# Patient Record
Sex: MALE | Race: WHITE | NOT HISPANIC OR LATINO | Employment: OTHER | URBAN - METROPOLITAN AREA
[De-identification: names, ages, dates, MRNs, and addresses within clinical notes are randomized per-mention and may not be internally consistent; named-entity substitution may affect disease eponyms.]

---

## 2018-01-04 ENCOUNTER — APPOINTMENT (OUTPATIENT)
Dept: CT IMAGING | Facility: CLINIC | Age: 78
End: 2018-01-04
Attending: EMERGENCY MEDICINE
Payer: MEDICARE

## 2018-01-04 ENCOUNTER — HOSPITAL ENCOUNTER (EMERGENCY)
Facility: CLINIC | Age: 78
Discharge: HOME | End: 2018-01-04
Attending: EMERGENCY MEDICINE | Admitting: EMERGENCY MEDICINE
Payer: MEDICARE

## 2018-01-04 VITALS
DIASTOLIC BLOOD PRESSURE: 77 MMHG | OXYGEN SATURATION: 100 % | RESPIRATION RATE: 18 BRPM | WEIGHT: 170 LBS | HEART RATE: 65 BPM | SYSTOLIC BLOOD PRESSURE: 128 MMHG | TEMPERATURE: 97 F | BODY MASS INDEX: 21.83 KG/M2

## 2018-01-04 DIAGNOSIS — K43.9 VENTRAL HERNIA WITHOUT OBSTRUCTION OR GANGRENE: ICD-10-CM

## 2018-01-04 LAB
ANION GAP SERPL CALCULATED.3IONS-SCNC: 4 MMOL/L (ref 3–14)
BASOPHILS # BLD AUTO: 0.1 10E9/L (ref 0–0.2)
BASOPHILS NFR BLD AUTO: 0.7 %
BUN SERPL-MCNC: 10 MG/DL (ref 7–30)
CALCIUM SERPL-MCNC: 9 MG/DL (ref 8.5–10.1)
CHLORIDE SERPL-SCNC: 103 MMOL/L (ref 94–109)
CO2 SERPL-SCNC: 28 MMOL/L (ref 20–32)
CREAT SERPL-MCNC: 0.8 MG/DL (ref 0.66–1.25)
DIFFERENTIAL METHOD BLD: NORMAL
EOSINOPHIL # BLD AUTO: 0.3 10E9/L (ref 0–0.7)
EOSINOPHIL NFR BLD AUTO: 3.7 %
ERYTHROCYTE [DISTWIDTH] IN BLOOD BY AUTOMATED COUNT: 12.9 % (ref 10–15)
GFR SERPL CREATININE-BSD FRML MDRD: >90 ML/MIN/1.7M2
GLUCOSE SERPL-MCNC: 86 MG/DL (ref 70–99)
HCT VFR BLD AUTO: 45.8 % (ref 40–53)
HGB BLD-MCNC: 15.3 G/DL (ref 13.3–17.7)
IMM GRANULOCYTES # BLD: 0 10E9/L (ref 0–0.4)
IMM GRANULOCYTES NFR BLD: 0.4 %
LACTATE SERPL-SCNC: 1 MMOL/L (ref 0.4–2)
LYMPHOCYTES # BLD AUTO: 1.1 10E9/L (ref 0.8–5.3)
LYMPHOCYTES NFR BLD AUTO: 14.9 %
MCH RBC QN AUTO: 31.8 PG (ref 26.5–33)
MCHC RBC AUTO-ENTMCNC: 33.4 G/DL (ref 31.5–36.5)
MCV RBC AUTO: 95 FL (ref 78–100)
MONOCYTES # BLD AUTO: 0.6 10E9/L (ref 0–1.3)
MONOCYTES NFR BLD AUTO: 8.3 %
NEUTROPHILS # BLD AUTO: 5.2 10E9/L (ref 1.6–8.3)
NEUTROPHILS NFR BLD AUTO: 72 %
NRBC # BLD AUTO: 0 10*3/UL
NRBC BLD AUTO-RTO: 0 /100
PLATELET # BLD AUTO: 247 10E9/L (ref 150–450)
POTASSIUM SERPL-SCNC: 4.1 MMOL/L (ref 3.4–5.3)
RBC # BLD AUTO: 4.81 10E12/L (ref 4.4–5.9)
SODIUM SERPL-SCNC: 135 MMOL/L (ref 133–144)
WBC # BLD AUTO: 7.3 10E9/L (ref 4–11)

## 2018-01-04 PROCEDURE — 83605 ASSAY OF LACTIC ACID: CPT | Performed by: EMERGENCY MEDICINE

## 2018-01-04 PROCEDURE — 74177 CT ABD & PELVIS W/CONTRAST: CPT

## 2018-01-04 PROCEDURE — 80048 BASIC METABOLIC PNL TOTAL CA: CPT | Performed by: EMERGENCY MEDICINE

## 2018-01-04 PROCEDURE — 25000128 H RX IP 250 OP 636: Performed by: EMERGENCY MEDICINE

## 2018-01-04 PROCEDURE — 85025 COMPLETE CBC W/AUTO DIFF WBC: CPT | Performed by: EMERGENCY MEDICINE

## 2018-01-04 PROCEDURE — 99285 EMERGENCY DEPT VISIT HI MDM: CPT | Mod: 25

## 2018-01-04 RX ORDER — HYDROCODONE BITARTRATE AND ACETAMINOPHEN 5; 325 MG/1; MG/1
1-2 TABLET ORAL EVERY 4 HOURS PRN
Qty: 10 TABLET | Refills: 0 | Status: SHIPPED | OUTPATIENT
Start: 2018-01-04 | End: 2018-01-24

## 2018-01-04 RX ORDER — IOPAMIDOL 755 MG/ML
500 INJECTION, SOLUTION INTRAVASCULAR ONCE
Status: COMPLETED | OUTPATIENT
Start: 2018-01-04 | End: 2018-01-04

## 2018-01-04 RX ADMIN — IOPAMIDOL 89 ML: 755 INJECTION, SOLUTION INTRAVENOUS at 16:14

## 2018-01-04 RX ADMIN — SODIUM CHLORIDE 62 ML: 9 INJECTION, SOLUTION INTRAVENOUS at 16:14

## 2018-01-04 ASSESSMENT — ENCOUNTER SYMPTOMS
VOMITING: 0
FEVER: 0
APPETITE CHANGE: 0
NAUSEA: 0
CONSTIPATION: 1
ABDOMINAL PAIN: 1
DIARRHEA: 0
BACK PAIN: 1

## 2018-01-04 NOTE — ED PROVIDER NOTES
"  History     Chief Complaint:  Hernia    HPI   Tigre Gillette is a 77 year old male who presents to the emergency department today for evaluation of a hernia. The patient reports two days ago after dinner he noticed his umbilical hernia to pop out. He has had this hernia for \"many decades\" and has been able to reduce it at home previously. He has been pushing in the hernia and trying to reposition himself to reduce it. Due to persistent symptoms, he presented to his clinic, however, they were unable to reduce it therefore referred to the ED for further evaluation. Upon presentation, he also endorses constipation and lower back pain. His last bowel movement was two days ago and took some stool softeners last night.  The patient denies nausea and vomiting.     Allergies:  No Known Drug Allergies     Medications:    Naproxen Sodium (ALEVE PO)  cetirizine (ZYRTEC) 10 MG tablet    Past Medical History:    Chronic low back pain  Substance abuse  Lumbar spondylosis  Rhinitis    Past Surgical History:    Hernia repair  Tonsillectomy    Family History:    Arthritis  Alcohol/Drug abuse    Social History:  The patient was accompanied to the ED by his wife.  Smoking Status: Current  Alcohol Use: Yes  Marital Status:        Review of Systems   Constitutional: Negative for appetite change and fever.   Gastrointestinal: Positive for abdominal pain and constipation. Negative for diarrhea, nausea and vomiting.   Musculoskeletal: Positive for back pain.   All other systems reviewed and are negative.    Physical Exam   First Vitals:  BP: 155/89  Pulse: 65  Heart Rate: 65  Temp: 97  F (36.1  C)  Resp: 18  Weight: 77.1 kg (170 lb)  SpO2: 99 %    Physical Exam  General: Patient is alert and interactive when I enter the room  Head:  The scalp, face, and head appear normal  Eyes:  Conjunctivae are normal  ENT:    The nose is normal    Pinnae are normal    External acoustic canals are normal  Neck:  Trachea midline  CV:  Pulses are " normal.    Resp:  No respiratory distress   Abdomen:      Soft, ventral hernia noted with bulging, mild lateral tenderness, unable to reduce with moderate tenderness, non-distended  Musc:  Normal muscular tone    No major joint effusion    Good capillary refill noted  Skin:  No rash or lesions noted  Neuro: Speech is normal and fluent. Face is symmetric.     Moving all extremities well.   Psych:  Awake. Alert.  Normal affect.  Appropriate interactions.    Emergency Department Course     Imaging:  Radiology findings were communicated with the patient and family who voiced understanding of the findings.  Abdominal/Pelvis CT IV Contrast only Trauma/AAA  IMPRESSION: Fat-containing midline ventral hernia in mid to upper  abdomen. There is a small amount of fluid in the hernia and mild fat  stranding. Cannot rule out incarcerated fatty hernia but there is no  bowel involvement. No acute bowel abnormality.  Report per radiology     Laboratory:  Laboratory findings were communicated with the patient and family who voiced understanding of the findings.  CBC: WNL. (WBC 7.3, HGB 15.3, )   Lactic Acid: 1.0  BMP: AWNL (Creatinine 0.80)    Emergency Department Course:  Nursing notes and vitals reviewed.  IV was inserted and blood was drawn for laboratory testing, results above.  The patient was sent for a Abdominal/Pelvis CT IV Contrast only Trauma/AAA while in the emergency department, results above.   1505: I performed an exam of the patient as documented above.   1656 I spoke with Dr. Martin of the General Surgery service regarding patient's presentation, findings, and plan of care.  1658: Patient rechecked and updated.   Findings and plan explained to the Patient. Patient discharged home with instructions regarding supportive care, medications, and reasons to return. The importance of close follow-up was reviewed. The patient was prescribed norco.   I personally reviewed the laboratory and imaging results with the  Patient and spouse and answered all related questions prior to discharge.    Impression & Plan      Medical Decision Making:  Tigre Gillette is a 77 year old male who presents for an evaluation of abdominal pain.  The patient has ventral abdominal pain and an exam consistent with a hernia.  CT of the abdomen and pelvis ordered and demonstrated a fat containing midline ventral hernia in mid to upper abdomen .  Other diagnoses seem far less likely given history, exam, and ED work up.  Blood work is unremarkable including a normal WBC, lactate and lipase. Unable to reduce the hernia.  I spoke with surgery and they recommend to discharge the patient home as the hernia is fat and will likely reduce on its own once swelling goes down. Has minimal pain on exam. General surgery contacted and will see the patient.  Patient discharged with norco for pain control.     Diagnosis:    ICD-10-CM    1. Ventral hernia without obstruction or gangrene K43.9      Disposition:  discharged to home    Discharge Medications:  New Prescriptions    HYDROCODONE-ACETAMINOPHEN (NORCO) 5-325 MG PER TABLET    Take 1-2 tablets by mouth every 4 hours as needed for moderate to severe pain     Scribe Disclosure:  ANIYAH, Nicolasa Logan, am serving as a scribe at 3:02 PM on 1/4/2018 to document services personally performed by Vanessa Alvarado MD based on my observations and the provider's statements to me.     1/4/2018   [facility] EMERGENCY DEPARTMENT       Vanessa Alvarado MD  01/05/18 [id number]

## 2018-01-04 NOTE — DISCHARGE INSTRUCTIONS
Hernia (Adult)    A hernia can happen when there is a weakness or defect in the wall of the abdomen or groin. Intestines or nearby tissues may move from their usual location and push through the weakness in the wall. This can cause a hernia (bulge) you may see or feel.  Causes and Risk Factors   A hernia may be present at birth. Or it may be caused by the wear and tear of daily living. Certain factors can make a hernia more likely. These can include:    Heavy lifting    Straining, whether from lifting, movement, or constipation    Chronic cough    Injury to the abdominal wall    Excess weight    Pregnancy    Prior surgery    Older age    Family history of hernia  Symptoms  Symptoms of a hernia may come on suddenly. Or they may appear slowly over time. Some common symptoms include:    Bulge in the groin area, around the navel, or in the scrotum (the bulge may get bigger when you stand and go away when you lie down)    Pain or pressure around the bulge    Pain during activities such as lifting, coughing, or sneezing    A feeling of weakness or pressure in the groin    Pain or swelling in the scrotum  Types of hernias  There are different types of hernia. The type you have depends on its location:    Inguinal: This type is in the groin or scrotum. It is more common in men.    Femoral: This type is in the groin, upper thigh (where the leg bends), or labia. It is more common in women.    Ventral: This type is in the abdominal wall.    Umbilical: This type occurs around the navel (belly button).    Incisional: This type occurs at the site of a previous surgery.  The condition of the hernia can help determine how urgently it needs to be treated.    Reducible: It goes back in by itself, or it can be pushed back in.    Irreducible: It can t be pushed back in.    Incarcerated/Strangulated: The intestine is trapped (incarcerated). If this happens, you won t be able to push the bulge back in. If the incarcerated hernia isn t  treated, it may become strangulated. This means the area loses blood supply and the tissue may die. This requires emergency surgery! Treatment is needed right away!  In most cases, a hernia will not heal on its own. Surgery is usually needed to repair the defect in the abdominal wall or groin. You ll be told more about surgery, if needed.  If your symptoms are not severe, treatment may sometimes be delayed. In such cases, regular follow-up visits with the provider will be needed. You ll be asked to keep track of your symptoms and to watch for signs of more serious problems. You may also be given guidelines similar to the home care instructions below.  Home Care  To help keep a hernia from getting worse, you may be advised to:    Avoid heavy lifting and straining as directed.    Take steps to prevent constipation, such as eating more fiber and drinking more water. This may help reduce straining that can occur when having a bowel movement. Reducing straining may help keep your symptoms from getting worse.    Maintain a healthy weight or lose excess weight. This can help reduce strain on abdominal muscles and tissues.    Stop smoking. This can help prevent coughing that may also strain abdominal muscles and tissues.  Follow-up care  Follow up with your healthcare provider, or as directed. If imaging tests were done, they will be reviewed a doctor. You will be told the results and any new findings that may affect your care.  When to seek medical advice  Call your healthcare provider right away if any of these occur:    Hernia hardens, swells, or grows larger    Hernia can no longer be pushed back in    Pain moves to the lower right abdomen (just below the waistline), or spreads to the back  Call 911  Call 911 right away if any of these occur:    Nausea and vomiting    Severe pain, redness, or tenderness in the area near the hernia    Pain worsens quickly and doesn t get better    Inability to have a bowel movement or  pass gas    Fever of 100.4 F (38 C) or higher    Trouble breathing    Fainting    Rapid heart rate    Vomiting blood    Large amounts of blood in stool  Date Last Reviewed: 6/9/2015 2000-2017 [employer]. [address] [place] [address], [place] [address]. All rights reserved. This information is not intended as a substitute for professional medical care. Always follow your healthcare professional's instructions.

## 2018-01-04 NOTE — ED AVS SNAPSHOT
[facility] Emergency Department    [address]    [facility] [address]    Phone:  [phone]    Fax:  [phone]                                       Tigre Gillette   MRN: [id number]    Department:  [facility] Emergency Department   Date of Visit:  1/4/2018           After Visit Summary Signature Page     I have received my discharge instructions, and my questions have been answered. I have discussed any challenges I see with this plan with the nurse or doctor.    ..........................................................................................................................................  Patient/Patient Representative Signature      ..........................................................................................................................................  Patient Representative Print Name and Relationship to Patient    ..................................................               ................................................  Date                                            Time    ..........................................................................................................................................  Reviewed by Signature/Title    ...................................................              ..............................................  Date                                                            Time

## 2018-01-04 NOTE — ED AVS SNAPSHOT
[facility] Emergency Department    [address]    [facility] [address]    Phone:  [phone]    Fax:  [phone]                                       Tigre Gillette   MRN: [id number]    Department:  [facility] Emergency Department   Date of Visit:  1/4/2018           Patient Information     Date Of Birth          1940        Your diagnoses for this visit were:     Ventral hernia without obstruction or gangrene        You were seen by Vanessa Alvarado MD.      Follow-up Information     Follow up with Xavier Schumacher NP In 2 days.    Specialty:  Nurse Practitioner    Contact information:    [address] [place]  [address] [address]   [facility] [address]  [phone]          Follow up with Scar Martin MD In 3 days.    Specialty:  General Surgery    Contact information:    [address]  [facility] [address]  [phone]          Follow up with [facility] Emergency Department.    Specialty:  EMERGENCY MEDICINE    Why:  If symptoms worsen    Contact information:    [address]  [facility] [id number] [phone]        Discharge Instructions         Hernia (Adult)    A hernia can happen when there is a weakness or defect in the wall of the abdomen or groin. Intestines or nearby tissues may move from their usual location and push through the weakness in the wall. This can cause a hernia (bulge) you may see or feel.  Causes and Risk Factors   A hernia may be present at birth. Or it may be caused by the wear and tear of daily living. Certain factors can make a hernia more likely. These can include:    Heavy lifting    Straining, whether from lifting, movement, or constipation    Chronic cough    Injury to the abdominal wall    Excess weight    Pregnancy    Prior surgery    Older age    Family history of hernia  Symptoms  Symptoms of a hernia may come on suddenly. Or they may appear slowly over time. Some common symptoms  include:    Bulge in the groin area, around the navel, or in the scrotum (the bulge may get bigger when you stand and go away when you lie down)    Pain or pressure around the bulge    Pain during activities such as lifting, coughing, or sneezing    A feeling of weakness or pressure in the groin    Pain or swelling in the scrotum  Types of hernias  There are different types of hernia. The type you have depends on its location:    Inguinal: This type is in the groin or scrotum. It is more common in men.    Femoral: This type is in the groin, upper thigh (where the leg bends), or labia. It is more common in women.    Ventral: This type is in the abdominal wall.    Umbilical: This type occurs around the navel (belly button).    Incisional: This type occurs at the site of a previous surgery.  The condition of the hernia can help determine how urgently it needs to be treated.    Reducible: It goes back in by itself, or it can be pushed back in.    Irreducible: It can t be pushed back in.    Incarcerated/Strangulated: The intestine is trapped (incarcerated). If this happens, you won t be able to push the bulge back in. If the incarcerated hernia isn t treated, it may become strangulated. This means the area loses blood supply and the tissue may die. This requires emergency surgery! Treatment is needed right away!  In most cases, a hernia will not heal on its own. Surgery is usually needed to repair the defect in the abdominal wall or groin. You ll be told more about surgery, if needed.  If your symptoms are not severe, treatment may sometimes be delayed. In such cases, regular follow-up visits with the provider will be needed. You ll be asked to keep track of your symptoms and to watch for signs of more serious problems. You may also be given guidelines similar to the home care instructions below.  Home Care  To help keep a hernia from getting worse, you may be advised to:    Avoid heavy lifting and straining as  directed.    Take steps to prevent constipation, such as eating more fiber and drinking more water. This may help reduce straining that can occur when having a bowel movement. Reducing straining may help keep your symptoms from getting worse.    Maintain a healthy weight or lose excess weight. This can help reduce strain on abdominal muscles and tissues.    Stop smoking. This can help prevent coughing that may also strain abdominal muscles and tissues.  Follow-up care  Follow up with your healthcare provider, or as directed. If imaging tests were done, they will be reviewed a doctor. You will be told the results and any new findings that may affect your care.  When to seek medical advice  Call your healthcare provider right away if any of these occur:    Hernia hardens, swells, or grows larger    Hernia can no longer be pushed back in    Pain moves to the lower right abdomen (just below the waistline), or spreads to the back  Call 911  Call 911 right away if any of these occur:    Nausea and vomiting    Severe pain, redness, or tenderness in the area near the hernia    Pain worsens quickly and doesn t get better    Inability to have a bowel movement or pass gas    Fever of 100.4 F (38 C) or higher    Trouble breathing    Fainting    Rapid heart rate    Vomiting blood    Large amounts of blood in stool  Date Last Reviewed: 6/9/2015 2000-2017 [employer] [address] [place] [address], [place] [address]. All rights reserved. This information is not intended as a substitute for professional medical care. Always follow your healthcare professional's instructions.          24 Hour Appointment Hotline       To make an appointment at any [facility] clinic, call [phone] ([phone]). If you don't have a family doctor or clinic, we will help you find one. [facility] clinics are conveniently located to serve the needs of you and your family.             Review of your medicines      START taking        Dose /  Directions Last dose taken    HYDROcodone-acetaminophen 5-325 MG per tablet   Commonly known as:  NORCO   Dose:  1-2 tablet   Quantity:  10 tablet        Take 1-2 tablets by mouth every 4 hours as needed for moderate to severe pain   Refills:  0          Our records show that you are taking the medicines listed below. If these are incorrect, please call your family doctor or clinic.        Dose / Directions Last dose taken    ALEVE PO        Refills:  0        cetirizine 10 MG tablet   Commonly known as:  zyrTEC   Dose:  10 mg        Take 10 mg by mouth daily   Refills:  0        ipratropium - albuterol 0.5 mg/2.5 mg/3 mL 0.5-2.5 (3) MG/3ML neb solution   Commonly known as:  DUONEB   Dose:  1 vial   Quantity:  360 mL        Take 1 vial (3 mLs) by nebulization every 6 hours as needed for shortness of breath / dyspnea or wheezing   Refills:  0        pseudoePHEDrine-guaiFENesin  MG per 12 hr tablet   Commonly known as:  MUCINEX D   Dose:  1 tablet   Quantity:  30 tablet        Take 1 tablet by mouth every 12 hours   Refills:  0                Prescriptions were sent or printed at these locations (1 Prescription)                   Other Prescriptions                Printed at Department/Unit printer (1 of 1)         HYDROcodone-acetaminophen (NORCO) 5-325 MG per tablet                Procedures and tests performed during your visit     Abd/pelvis CT,  IV  contrast only TRAUMA / AAA    Basic metabolic panel    CBC + differential    Lactic acid      Orders Needing Specimen Collection     None      Pending Results     Date and Time Order Name Status Description    1/4/2018 1508 Abd/pelvis CT,  IV  contrast only TRAUMA / AAA Preliminary             Pending Culture Results     No orders found from 1/2/2018 to 1/5/2018.            Pending Results Instructions     If you had any lab results that were not finalized at the time of your Discharge, you can call the ED Lab Result RN at [phone]. You will be contacted by  this team for any positive Lab results or changes in treatment. The nurses are available 7 days a week from 10A to 6:30P.  You can leave a message 24 hours per day and they will return your call.        Test Results From Your Hospital Stay        1/4/2018  3:56 PM      Component Results     Component Value Ref Range & Units Status    WBC 7.3 4.0 - 11.0 10e9/L Final    RBC Count 4.81 4.4 - 5.9 10e12/L Final    Hemoglobin 15.3 13.3 - 17.7 g/dL Final    Hematocrit 45.8 40.0 - 53.0 % Final    MCV 95 78 - 100 fl Final    MCH 31.8 26.5 - 33.0 pg Final    MCHC 33.4 31.5 - 36.5 g/dL Final    RDW 12.9 10.0 - 15.0 % Final    Platelet Count 247 150 - 450 10e9/L Final    Diff Method Automated Method  Final    % Neutrophils 72.0 % Final    % Lymphocytes 14.9 % Final    % Monocytes 8.3 % Final    % Eosinophils 3.7 % Final    % Basophils 0.7 % Final    % Immature Granulocytes 0.4 % Final    Nucleated RBCs 0 0 /100 Final    Absolute Neutrophil 5.2 1.6 - 8.3 10e9/L Final    Absolute Lymphocytes 1.1 0.8 - 5.3 10e9/L Final    Absolute Monocytes 0.6 0.0 - 1.3 10e9/L Final    Absolute Eosinophils 0.3 0.0 - 0.7 10e9/L Final    Absolute Basophils 0.1 0.0 - 0.2 10e9/L Final    Abs Immature Granulocytes 0.0 0 - 0.4 10e9/L Final    Absolute Nucleated RBC 0.0  Final         1/4/2018  3:56 PM      Component Results     Component Value Ref Range & Units Status    Lactic Acid 1.0 0.4 - 2.0 mmol/L Final         1/4/2018  4:12 PM      Component Results     Component Value Ref Range & Units Status    Sodium 135 133 - 144 mmol/L Final    Potassium 4.1 3.4 - 5.3 mmol/L Final    Chloride 103 94 - 109 mmol/L Final    Carbon Dioxide 28 20 - 32 mmol/L Final    Anion Gap 4 3 - 14 mmol/L Final    Glucose 86 70 - 99 mg/dL Final    Urea Nitrogen 10 7 - 30 mg/dL Final    Creatinine 0.80 0.66 - 1.25 mg/dL Final    GFR Estimate >90 >60 mL/min/1.7m2 Final    Non  GFR Calc    GFR Estimate If Black >90 >60 mL/min/1.7m2 Final    African American GFR  Calc    Calcium 9.0 8.5 - 10.1 mg/dL Final         1/4/2018  4:47 PM      Narrative     CT ABDOMEN AND PELVIS WITH CONTRAST 1/4/2018 4:27 PM    TECHNIQUE: Images from diaphragm to pubic symphysis. 89 mL Isovue-370  IV contrast.  Radiation dose for this scan was reduced using automated exposure  control, adjustment of the mA and/or kV according to patient size, or  iterative reconstruction technique.    HISTORY: Concern for incarcerated hernia.     COMPARISON: None    FINDINGS:   Abdomen and Pelvis: Discoid atelectasis lung bases. Normal-appearing  liver, spleen, gallbladder, pancreas. Mild bilateral adrenal  thickening. Left kidney appears normal. Small cyst lower pole right  kidney and subcentimeter low dense lesion right kidney too small to  characterize but may represent cyst.    There is a midline ventral hernia anterior abdominal wall at the level  of the kidneys. Abdominal wall defect 3 cm in transverse diameter. The  hernia contains fat, mild fat stranding and small amount of fluid but  no bowel. The herniated fat is approximately 3.5 to 4 cm transverse x  craniocaudal dimension and 3 cm AP dimension.    Atherosclerotic plaque and calcification within diffusely ectatic  abdominal aorta with fusiform dilatation to distal aorta up to 2.9 cm  in diameter compatible with ectatic aorta. No periaortic or pelvic  adenopathy.    Diffuse colonic diverticulosis without acute diverticulitis. Appendix  not visualized. No acute bowel abnormality or aggressive bone lesions.        Impression     IMPRESSION: Fat-containing midline ventral hernia in mid to upper  abdomen. There is a small amount of fluid in the hernia and mild fat  stranding. Cannot rule out incarcerated fatty hernia but there is no  bowel involvement. No acute bowel abnormality.                Clinical Quality Measure: Blood Pressure Screening     Your blood pressure was checked while you were in the emergency department today. The last reading we obtained  "was  BP: 128/77 . Please read the guidelines below about what these numbers mean and what you should do about them.  If your systolic blood pressure (the top number) is less than 120 and your diastolic blood pressure (the bottom number) is less than 80, then your blood pressure is normal. There is nothing more that you need to do about it.  If your systolic blood pressure (the top number) is 120-139 or your diastolic blood pressure (the bottom number) is 80-89, your blood pressure may be higher than it should be. You should have your blood pressure rechecked within a year by a primary care provider.  If your systolic blood pressure (the top number) is 140 or greater or your diastolic blood pressure (the bottom number) is 90 or greater, you may have high blood pressure. High blood pressure is treatable, but if left untreated over time it can put you at risk for heart attack, stroke, or kidney failure. You should have your blood pressure rechecked by a primary care provider within the next 4 weeks.  If your provider in the emergency department today gave you specific instructions to follow-up with your doctor or provider even sooner than that, you should follow that instruction and not wait for up to 4 weeks for your follow-up visit.        Thank you for choosing [facility]       Thank you for choosing [facility] for your care. Our goal is always to provide you with excellent care. Hearing back from our patients is one way we can continue to improve our services. Please take a few minutes to complete the written survey that you may receive in the mail after you visit with us. Thank you!        Skyl[employer]art Information     Timely lets you send messages to your doctor, view your test results, renew your prescriptions, schedule appointments and more. To sign up, go to [email or web address] . Click on \"Log in\" on the left side of the screen, which will take you to the Welcome page. Then click on \"Sign up Now\" on the right side " of the page.     You will be asked to enter the access code listed below, as well as some personal information. Please follow the directions to create your username and password.     Your access code is: [email or web address]  Expires: 2018  5:06 PM     Your access code will  in 90 days. If you need help or a new code, please call your [facility] clinic or [phone].        Care EveryWhere ID     This is your Care EveryWhere ID. This could be used by other organizations to access your [facility] medical records  [id number]        Equal Access to Services     [facility]NA : Dre Abraham, juana kumar, mireya kerr, elmer viera . So [facility] [phone].    ATENCIÓN: Si habla español, tiene a soni disposición servicios gratuitos de asistencia lingüística. Llame al [phone].    We comply with applicable federal civil rights laws and [place] laws. We do not discriminate on the basis of race, color, national origin, age, disability, sex, sexual orientation, or gender identity.            After Visit Summary       This is your record. Keep this with you and show to your community pharmacist(s) and doctor(s) at your next visit.

## 2018-01-04 NOTE — ED NOTES
Pt in with umbilical hernia, unable to reduce at clinic PTA. Denies vomiting. ABC's intact, alert and oriented X3.

## 2018-01-11 ENCOUNTER — OFFICE VISIT (OUTPATIENT)
Dept: SURGERY | Facility: CLINIC | Age: 78
End: 2018-01-11
Payer: COMMERCIAL

## 2018-01-11 VITALS
SYSTOLIC BLOOD PRESSURE: 142 MMHG | RESPIRATION RATE: 16 BRPM | BODY MASS INDEX: 21.82 KG/M2 | DIASTOLIC BLOOD PRESSURE: 80 MMHG | WEIGHT: 170 LBS | HEART RATE: 70 BPM | HEIGHT: 74 IN | OXYGEN SATURATION: 99 %

## 2018-01-11 DIAGNOSIS — K43.9 EPIGASTRIC HERNIA: Primary | ICD-10-CM

## 2018-01-11 PROCEDURE — 99203 OFFICE O/P NEW LOW 30 MIN: CPT | Performed by: SURGERY

## 2018-01-11 NOTE — LETTER
2018    Re: Tigre Gillette - 1940  Tigre Gillette is seen in consultation for a hernia, at the request of Xavier Schumacher NP.     Primary, reducible epigastric hernia.     Plan:    We have discussed observation, reduction techniques and importance, incarceration and strangulation signs, symptoms and importance as well as need to seek emergency treatment.       We have discussed open epigastric hernia repair with mesh in detail, including benefits, alternatives, complications, incision, scar, mesh, infection, anesthesia, bleeding, blood transfusion, DVT, PE, hernia recurrence, lifting and activity limits after surgery.  All questions have been answered to the best of my ability.  I have encouraged the patient to quit smoking prior to surgery to reduce his risk of recurrence and infection postoperatively.     He has been given literature to review.   We will schedule surgery at the patient's convenience.      HPI:  Tigre is a 77 year old male who presents for evaluation of a lump in the epigastric region.  He first noticed it > 20 years ago.  He presented to urgent care and was referred to the ED one week ago for incarceration.  A CT of the abdomen and pelvis was performed which revealed an fat-containing epigastric hernia.  The patient was able to manually reduce the hernia himself later that evening. Negative for associated symptoms of anorexia, nausea and bloating.  He has not had previous surgery in this location.  He has not had a previous herniorrhaphy in this location.  He does enjoy fishing which entails lifting 50 pound gas tank.     Constipation: Yes, occ, uses dulcolax, fiber  Colonoscopy: Yes, ?date   Cough: Yes, chronic sinus drainage, smoker, copd  Diabetes: No  Current Smoker: Yes, <1/2 ppd, encouraged him to quit     Past Medical History:  Has no past medical history on file.    ROS:  The 10 point review of systems is negative other than noted in the HPI and above.     PE:   "  Vitals: /80 (BP Location: Left arm, Cuff Size: Adult Regular)  Pulse 70  Resp 16  Ht 6' 2\" (1.88 m)  Wt 170 lb (77.1 kg)  SpO2 99%  BMI 21.83 kg/m2  BMI= Body mass index is 21.83 kg/(m^2).  General - Well developed, well nourished male in no apparent distress  HEENT:  Head normocephalic and atraumatic, pupils equal and round, conjunctivae clear, no scleral icterus, mucous membranes moist, external ears and nose normal  Heart: Regular rate and rhythm, no murmurs  Abdomen:  abdomen is soft without significant tenderness, masses, organomegaly or guarding  Hernia:  epigastric, 3 cm, reducible, non-tender  Musculoskeletal:  Normal station and gait  Neurologic: alert, speech is clear, moves all extremities with good strength  Psychiatric: Mood and affect appropriate  Skin: Without lesions or rashes, or jamie Alas MD        "

## 2018-01-11 NOTE — MR AVS SNAPSHOT
After Visit Summary   1/11/2018    Tigre Gillette    MRN: [id number]           Patient Information     Date Of Birth          1940        Visit Information        Provider Department      1/11/2018 10:30 AM Rossana Alas MD [facility]      Today's Diagnoses     Epigastric hernia    -  1       Follow-ups after your visit        Your next 10 appointments already scheduled     Jan 26, 2018 11:00 AM CST   [facility]-Op physical with [facility] Tripp MD   [facility]    [address]  [facility] [address]   [phone]            Jan 30, 2018   Procedure with Rossana Alas MD   [facility] (--)    [address]  [facility] [address]   [phone]            Jan 30, 2018 12:30 PM CST   [facility] Same Day Surgery with Rossana Alas MD   [facility]    [facility])   [phone]              Who to contact     If you have questions or need follow up information about today's clinic visit or your schedule please contact [facility] directly at [phone].  Normal or non-critical lab and imaging results will be communicated to you by Hitmeisterhart, letter or phone within 4 business days after the clinic has received the results. If you do not hear from us within 7 days, please contact the clinic through Hitmeisterhart or phone. If you have a critical or abnormal lab result, we will notify you by phone as soon as possible.  Submit refill requests through [employer] or call your pharmacy and they will forward the refill request to us. Please allow 3 business days for your refill to be completed.          Additional Information About Your Visit        [employer] Information     [employer] lets you send messages to your doctor,  "view your test results, renew your prescriptions, schedule appointments and more. To sign up, go to [email or web address] . Click on \"Log in\" on the left side of the screen, which will take you to the Welcome page. Then click on \"Sign up Now\" on the right side of the page.     You will be asked to enter the access code listed below, as well as some personal information. Please follow the directions to create your username and password.     Your access code is: [email or web address]  Expires: 2018  5:06 PM     Your access code will  in 90 days. If you need help or a new code, please call your [facility] clinic or [phone].        Care EveryWhere ID     This is your Care EveryWhere ID. This could be used by other organizations to access your [facility] medical records  [id number]        Your Vitals Were     Pulse Respirations Height Pulse Oximetry BMI (Body Mass Index)       70 16 6' 2\" (1.88 m) 99% 21.83 kg/m2        Blood Pressure from Last 3 Encounters:   18 142/80   18 128/77   05/25/15 156/84    Weight from Last 3 Encounters:   18 170 lb (77.1 kg)   18 170 lb (77.1 kg)              Today, you had the following     No orders found for display       Primary Care Provider Office Phone # Fax #    Xavier Schumacher [phone]       [address] [place] [address] [place] [address]        Equal Access to Services     OCTAVIO ESTES : Hadii aad ku hadasho Soelierali, waaxda luqadaha, qaybta kaalmada adeegyatoby, elmer caraballo. So [facility] [phone].    ATENCIÓN: Si habla español, tiene a soni disposición servicios gratuitos de asistencia lingüística. Llame al [phone].    We comply with applicable federal civil rights laws and [place] laws. We do not discriminate on the basis of race, color, national origin, age, disability, sex, sexual orientation, or gender identity.            Thank you!     Thank you for choosing [facility]  for your care. Our goal is always to provide you with excellent care. Hearing back from our patients is one way we can continue to improve our services. Please take a few minutes to complete the written survey that you may receive in the mail after your visit with us. Thank you!             Your Updated Medication List - Protect others around you: Learn how to safely use, store and throw away your medicines at www.disposemymeds.org.          This list is accurate as of: 1/11/18 11:34 AM.  Always use your most recent med list.                   Brand Name Dispense Instructions for use Diagnosis    ALEVE PO           cetirizine 10 MG tablet    zyrTEC     Take 10 mg by mouth daily        HYDROcodone-acetaminophen 5-325 MG per tablet    NORCO    10 tablet    Take 1-2 tablets by mouth every 4 hours as needed for moderate to severe pain        ipratropium - albuterol 0.5 mg/2.5 mg/3 mL 0.5-2.5 (3) MG/3ML neb solution    DUONEB    360 mL    Take 1 vial (3 mLs) by nebulization every 6 hours as needed for shortness of breath / dyspnea or wheezing        pseudoePHEDrine-guaiFENesin  MG per 12 hr tablet    MUCINEX D    30 tablet    Take 1 tablet by mouth every 12 hours

## 2018-01-11 NOTE — PROGRESS NOTES
Assessment:    Tigre Gillette is seen in consultation for a hernia, at the request of Xavier Schumacher NP.    Primary, reducible epigastric hernia.    Plan:    We have discussed observation, reduction techniques and importance, incarceration and strangulation signs, symptoms and importance as well as need to seek emergency treatment.      We have discussed open epigastric hernia repair with mesh in detail, including benefits, alternatives, complications, incision, scar, mesh, infection, anesthesia, bleeding, blood transfusion, DVT, PE, hernia recurrence, lifting and activity limits after surgery.  All questions have been answered to the best of my ability.  I have encouraged the patient to quit smoking prior to surgery to reduce his risk of recurrence and infection postoperatively.    He has been given literature to review.   We will schedule surgery at the patient's convenience.      HPI:  Tigre is a 77 year old male who presents for evaluation of a lump in the epigastric region.  He first noticed it > 20 years ago.  He presented to urgent care and was referred to the ED one week ago for incarceration.  A CT of the abdomen and pelvis was performed which revealed an fat-containing epigastric hernia.  The patient was able to manually reduce the hernia himself later that evening.  Negative for associated symptoms of anorexia, nausea and bloating.  He has not had previous surgery in this location.  He has not had a previous herniorrhaphy in this location.  He does enjoy fishing which entails lifting 50 pound gas tank.    Constipation: Yes, occ, uses dulcolax, fiber  Colonoscopy: Yes, ?date   Cough: Yes, chronic sinus drainage, smoker, copd  Diabetes: No  Current Smoker: Yes, <1/2 ppd, encouraged him to quit    Past Medical History:   has no past medical history on file.    Past Surgical History:  Past Surgical History:   Procedure Laterality Date     HERNIA REPAIR          Social History:  Social History     Social  "History     Marital status:      Spouse name: N/A     Number of children: N/A     Years of education: N/A     Occupational History     Not on file.     Social History Main Topics     Smoking status: Never Smoker     Smokeless tobacco: Never Used     Alcohol use No     Drug use: No     Sexual activity: Not on file     Other Topics Concern     Not on file     Social History Narrative        Family History:  Family History   Problem Relation Age of Onset     Family history unknown: Yes     Family history reviewed and not pertinent.    ROS:  The 10 point review of systems is negative other than noted in the HPI and above.    PE:    Vitals: /80 (BP Location: Left arm, Cuff Size: Adult Regular)  Pulse 70  Resp 16  Ht 6' 2\" (1.88 m)  Wt 170 lb (77.1 kg)  SpO2 99%  BMI 21.83 kg/m2  BMI= Body mass index is 21.83 kg/(m^2).  General - Well developed, well nourished male in no apparent distress  HEENT:  Head normocephalic and atraumatic, pupils equal and round, conjunctivae clear, no scleral icterus, mucous membranes moist, external ears and nose normal  Heart: Regular rate and rhythm, no murmurs  Abdomen:  abdomen is soft without significant tenderness, masses, organomegaly or guarding   Hernia:  epigastric, 3 cm, reducible, non-tender  Musculoskeletal:  Normal station and gait  Neurologic: alert, speech is clear, moves all extremities with good strength  Psychiatric: Mood and affect appropriate  Skin: Without lesions or rashes, or juandice    This note was created using voice recognition software. Undetected word substitutions or other errors may have occurred.     Time spent with the patient with greater that 50% of the time in discussion was 20 minutes.     Rossana Alas MD    Please route or send letter to:  Primary Care Provider (PCP) and Referring Provider  "

## 2018-01-24 RX ORDER — BETAMETHASONE VALERATE 1.2 MG/G
CREAM TOPICAL 2 TIMES DAILY
COMMUNITY
End: 2021-05-11

## 2018-01-26 ENCOUNTER — OFFICE VISIT (OUTPATIENT)
Dept: INTERNAL MEDICINE | Facility: CLINIC | Age: 78
End: 2018-01-26
Payer: COMMERCIAL

## 2018-01-26 VITALS
TEMPERATURE: 98 F | HEART RATE: 68 BPM | WEIGHT: 168.4 LBS | OXYGEN SATURATION: 97 % | BODY MASS INDEX: 21.62 KG/M2 | SYSTOLIC BLOOD PRESSURE: 138 MMHG | DIASTOLIC BLOOD PRESSURE: 80 MMHG | RESPIRATION RATE: 16 BRPM

## 2018-01-26 DIAGNOSIS — Z01.818 PREOP GENERAL PHYSICAL EXAM: Primary | ICD-10-CM

## 2018-01-26 DIAGNOSIS — K43.9 VENTRAL HERNIA WITHOUT OBSTRUCTION OR GANGRENE: ICD-10-CM

## 2018-01-26 DIAGNOSIS — M47.816 SPONDYLOSIS OF LUMBAR REGION WITHOUT MYELOPATHY OR RADICULOPATHY: ICD-10-CM

## 2018-01-26 DIAGNOSIS — J44.9 CHRONIC OBSTRUCTIVE PULMONARY DISEASE, UNSPECIFIED COPD TYPE (H): ICD-10-CM

## 2018-01-26 PROBLEM — Z13.6 CARDIOVASCULAR SCREENING; LDL GOAL LESS THAN 130: Status: ACTIVE | Noted: 2018-01-26

## 2018-01-26 LAB
FEF 25/75: NORMAL
FEV-1: NORMAL
FEV1/FVC: NORMAL
FVC: NORMAL

## 2018-01-26 PROCEDURE — 93000 ELECTROCARDIOGRAM COMPLETE: CPT | Performed by: INTERNAL MEDICINE

## 2018-01-26 PROCEDURE — 94010 BREATHING CAPACITY TEST: CPT | Performed by: INTERNAL MEDICINE

## 2018-01-26 PROCEDURE — 99204 OFFICE O/P NEW MOD 45 MIN: CPT | Mod: 25 | Performed by: INTERNAL MEDICINE

## 2018-01-26 NOTE — NURSING NOTE
"Chief Complaint   Patient presents with     Pre-Op Exam       Initial /80  Pulse 68  Temp 98  F (36.7  C) (Oral)  Resp 16  Wt 168 lb 6.4 oz (76.4 kg)  SpO2 97%  BMI 21.62 kg/m2 Estimated body mass index is 21.62 kg/(m^2) as calculated from the following:    Height as of 1/24/18: 6' 2\" (1.88 m).    Weight as of this encounter: 168 lb 6.4 oz (76.4 kg).  Medication Reconciliation: complete      Carito Longoria CMA      "

## 2018-01-26 NOTE — PROGRESS NOTES
[place] [address]  [facility] [address]  [phone]  Dept: [phone]    PRE-OP EVALUATION:  Today's date: 2018    Tigre Gillette (: 1940) presents for pre-operative evaluation assessment as requested by Dr. Alas.  He requires evaluation and anesthesia risk assessment prior to undergoing surgery/procedure for treatment of ventral hernia .  Proposed procedure: HERNIORRHAPHY EPIGASTRIC    Date of Surgery/ Procedure: 2018  Time of Surgery/ Procedure: 12:05  Hospital/Surgical Facility: [facility]  Fax number for surgical facility:   Primary Physician: Xavier Schumacher  Type of Anesthesia Anticipated: to be determined    Patient has a Health Care Directive or Living Will:  YES     1. NO - Do you have a history of heart attack, stroke, stent, bypass or surgery on an artery in the head, neck, heart or legs?  2. NO - Do you ever have any pain or discomfort in your chest?  3. NO - Do you have a history of  Heart Failure?  4. NO - ARE YOUR TROUBLED BY SHORTNESS OF BREATH WHEN WALKING ON THE LEVEL, UP A SLIGHT HILL OR AT NIGHT?   5. NO - Do you currently have a cold, bronchitis or other respiratory infection?  6. YES - DO YOU HAVE A COUGH, SHORTNESS OF BREATH OR WHEEZING? Some dyspnea with moderate exertion  7. NO - Do you sometimes get pains in the calves of your legs when you walk?  8. NO - Do you or anyone in your family have previous history of blood clots?  9. NO - Do you or does anyone in your family have a serious bleeding problem such as prolonged bleeding following surgeries or cuts?  10. NO - Have you ever had problems with anemia or been told to take iron pills?  11. NO - Have you had any abnormal blood loss such as black, tarry or bloody stools, or abnormal vaginal bleeding?  12. NO - Have you ever had a blood transfusion?  13. NO - Have you or any of your relatives ever had problems with anesthesia?  14. NO - Do you have sleep apnea, excessive snoring or  daytime drowsiness?  15. NO - Do you have any prosthetic heart valves?  16. NO - Do you have prosthetic joints?  17. NO - Is there any chance that you may be pregnant?              HPI:                                                      See problem list for active medical problems.  Problems all longstanding and stable, except as noted/documented.  See ROS for pertinent symptoms related to these conditions.                                                                                                  .    MEDICAL HISTORY:                                                    There are no active problems to display for this patient.     Past Medical History:   Diagnosis Date     Arthritis     spine     COPD (chronic obstructive pulmonary disease) (H)      Gastroesophageal reflux disease      Other chronic pain     lower back     Past Surgical History:   Procedure Laterality Date     ENT SURGERY      t&a     HERNIORRHAPHY INGUINAL  2010    open recurrent LIH rpr with mesh     HERNIORRHAPHY INGUINAL  1990    open LIH rpr with mesh     Current Outpatient Prescriptions   Medication Sig Dispense Refill     fluticasone-vilanterol (BREO ELLIPTA) 100-25 MCG/INH oral inhaler Inhale 1 puff into the lungs daily       umeclidinium (INCRUSE ELLIPTA) 62.5 MCG/INH oral inhaler Inhale 1 puff into the lungs daily       betamethasone valerate (VALISONE) 0.1 % cream Apply topically 2 times daily       Naproxen Sodium (ALEVE PO) Take 440 mg by mouth 2 times daily (with meals)        cetirizine (ZYRTEC) 10 MG tablet Take 10 mg by mouth daily       NSAID on hold a week  OTC products: None, except as noted above    No Known Allergies   Latex Allergy: NO    Social History   Substance Use Topics     Smoking status: Heavy Tobacco Smoker     Packs/day: 0.50     Types: Cigarettes     Smokeless tobacco: Never Used     Alcohol use Yes      Comment: rare      History   Drug Use No       REVIEW OF SYSTEMS:                                                     GENERAL: negative for, fever, chills, weight loss, weight gain  EYES: negative  ENT: negative  RESPIRATORY: No cough and stable dyspnea on exertion with moderate activity  CARDIOVASCULAR: negative for, palpitations, tachycardia, irregular heart beat, chest pain and lower extremity edema  GI: negative for, nausea, vomiting, , melena and hematochezia, abdominal pain related to hernia  : negative for, dysuria and hematuria  MUSCULOSKELETAL: low back pain  NEUROLOGIC: negative for, headaches, seizures, local weakness, numbness or tingling of hands and positive episodic numbness feet related to back, resolves with change in position  SKIN: negative  ENDOCRINE: negative         EXAM:                                                    There were no vitals taken for this visit.    Patient is alert, oriented, cooperative in no acute distress.  /80  Pulse 68  Temp 98  F (36.7  C) (Oral)  Resp 16  Wt 168 lb 6.4 oz (76.4 kg)  SpO2 97%  BMI 21.62 kg/m2    HEENT: PERRL, EOMI, TM's are normal. Oropharynx is clear.  NECK: No lymphadenopathy or thyromegaly. Carotid pulses full without bruits.  LUNGS: clear with mild decrease breath sounds, no wheezes  CV: normal S1, S2 without murmur, S3 or S4 present. Pulses are 2/2 throughout. No JVD.  ABDOMEN: Bowel sounds present, nontender without hepatosplenomegaly. Liver is normal size to percussion.  EXTREMITIES: no edema present, unremarkable joints  NEUROLOGIC: Cranial nerves II-XII intact, reflexes 2/4 throughout, strength 5/5, sensation grossly intact, gait normal.  SKIN: without rashes or significant lesions     DIAGNOSTICS:                                                    EKG: Normal Sinus Rhythm, normal axis, normal intervals, no acute ST/T changes c/w ischemia, no LVH by voltage criteria, unchanged from previous tracings  Spirometry: 2.74/5.23, mild airway obstruction    Recent Labs   Lab Test  01/04/18   1541  05/25/15   1053   HGB  15.3  15.7   PLT  247   228   NA  135  133   POTASSIUM  4.1  4.0   CR  0.80  0.74        IMPRESSION:                                                    Reason for surgery/procedure: abdominal hernia  Diagnosis/reason for consult: preop    The proposed surgical procedure is considered INTERMEDIATE risk.    REVISED CARDIAC RISK INDEX  The patient has the following serious cardiovascular risks for perioperative complications such as (MI, PE, VFib and 3  AV Block):  No serious cardiac risks  INTERPRETATION: 0 risks: Class I (very low risk - 0.4% complication rate)    The patient has the following additional risks for perioperative complications:  No identified additional risks      ICD-10-CM    1. Preop general physical exam Z01.818 EKG 12-lead complete w/read - Clinics   2. Ventral hernia without obstruction or gangrene K43.9    3. Chronic obstructive pulmonary disease, unspecified COPD type (H) J44.9 Spirometry, Breathing Capacity: Normal Order, Clinic Performed   4. Spondylosis of lumbar region without myelopathy or radiculopathy M47.816        RECOMMENDATIONS:                                                        Pulmonary Risk  Incentive spirometry post op  Respiratory Therapy (Respiratory Care IP Consult)  post op  Continue inhalers.   Recommend albuterol neb treatment as needed intra- or postoperatively for hypoxia or wheezing.       --Patient is to take all scheduled medications on the day of surgery EXCEPT for modifications listed below. none    APPROVAL GIVEN to proceed with proposed procedure, without further diagnostic evaluation       Signed Electronically by: Lauren Tripp MD    Copy of this evaluation report is provided to requesting physician.    [facility] Preop Guidelines

## 2018-01-26 NOTE — MR AVS SNAPSHOT
After Visit Summary   1/26/2018    Tigre Gillette    MRN: [id number]           Patient Information     Date Of Birth          1940        Visit Information        Provider Department      1/26/2018 11:00 AM Lauren Tripp MD [facility]        Today's Diagnoses     Preop general physical exam    -  1    Ventral hernia without obstruction or gangrene        Chronic obstructive pulmonary disease, unspecified COPD type (H)          Care Instructions      Before Your Surgery      Call your surgeon if there is any change in your health. This includes signs of a cold or flu (such as a sore throat, runny nose, cough, rash or fever).    Do not smoke, drink alcohol or take over the counter medicine (unless your surgeon or primary care doctor tells you to) for the 24 hours before and after surgery.    If you take prescribed drugs: Follow your doctor s orders about which medicines to take and which to stop until after surgery.    Eating and drinking prior to surgery: follow the instructions from your surgeon    Take a shower or bath the night before surgery. Use the soap your surgeon gave you to gently clean your skin. If you do not have soap from your surgeon, use your regular soap. Do not shave or scrub the surgery site.  Wear clean pajamas and have clean sheets on your bed.           Follow-ups after your visit        Your next 10 appointments already scheduled     Jan 30, 2018   Procedure with Rossana Alas MD   [facility] PeriOp Services (--)    [address] [facility] [address]   [phone]            Jan 30, 2018 12:30 PM CST   [facility] Same Day Surgery with Rossana Alas MD, Angela Kaye Storlie, PA-C   [facility] Surgery Scheduling ([facility])    [facility] Surgery Scheduling ([facility])   [phone]              Who to contact     If you have questions or need follow up information about  "today's clinic visit or your schedule please contact [facility] directly at [phone].  Normal or non-critical lab and imaging results will be communicated to you by MyChart, letter or phone within 4 business days after the clinic has received the results. If you do not hear from us within 7 days, please contact the clinic through [employer] or phone. If you have a critical or abnormal lab result, we will notify you by phone as soon as possible.  Submit refill requests through [employer] or call your pharmacy and they will forward the refill request to us. Please allow 3 business days for your refill to be completed.          Additional Information About Your Visit        MyChart Information     [employer] lets you send messages to your doctor, view your test results, renew your prescriptions, schedule appointments and more. To sign up, go to [email or web address] . Click on \"Log in\" on the left side of the screen, which will take you to the Welcome page. Then click on \"Sign up Now\" on the right side of the page.     You will be asked to enter the access code listed below, as well as some personal information. Please follow the directions to create your username and password.     Your access code is: [email or web address]  Expires: 2018  5:06 PM     Your access code will  in 90 days. If you need help or a new code, please call your [facility] clinic or [phone].        Care EveryWhere ID     This is your Care EveryWhere ID. This could be used by other organizations to access your [facility] medical records  [id number]        Your Vitals Were     Pulse Temperature Respirations Pulse Oximetry BMI (Body Mass Index)       68 98  F (36.7  C) (Oral) 16 97% 21.62 kg/m2        Blood Pressure from Last 3 Encounters:   18 140/80   18 142/80   18 128/77    Weight from Last 3 Encounters:   18 168 lb 6.4 oz (76.4 kg)   18 170 lb (77.1 kg)   18 170 lb (77.1 kg)              We " Performed the Following     EKG 12-lead complete w/read - Clinics     Spirometry, Breathing Capacity: Normal Order, Clinic Performed        Primary Care Provider Office Phone # Fax #    Xavier Schumacher [phone]       [facility] [address] [place] [address]        Equal Access to Services     [facility] FRANKLYN : Hadii aad ku hadasho Soomaali, waaxda luqadaha, qaybta kaalmada adeegyada, waxay idiin hayaan adeeg kharash la'soheilan . So [facility] [phone].    ATENCIÓN: Si habla español, tiene a soni disposición servicios gratuitos de asistencia lingüística. Yrname al [phone].    We comply with applicable federal civil rights laws and [place] laws. We do not discriminate on the basis of race, color, national origin, age, disability, sex, sexual orientation, or gender identity.            Thank you!     Thank you for choosing [facility]  for your care. Our goal is always to provide you with excellent care. Hearing back from our patients is one way we can continue to improve our services. Please take a few minutes to complete the written survey that you may receive in the mail after your visit with us. Thank you!             Your Updated Medication List - Protect others around you: Learn how to safely use, store and throw away your medicines at www.disposemymeds.org.          This list is accurate as of 1/26/18 11:53 AM.  Always use your most recent med list.                   Brand Name Dispense Instructions for use Diagnosis    ALEVE PO      Take 440 mg by mouth 2 times daily (with meals)        betamethasone valerate 0.1 % cream    VALISONE     Apply topically 2 times daily        cetirizine 10 MG tablet    zyrTEC     Take 10 mg by mouth daily        fluticasone-vilanterol 100-25 MCG/INH oral inhaler    BREO ELLIPTA     Inhale 1 puff into the lungs daily        INCRUSE ELLIPTA 62.5 MCG/INH oral inhaler   Generic drug:  umeclidinium      Inhale 1 puff into the  lungs daily

## 2018-01-29 NOTE — H&P (VIEW-ONLY)
[place] [address]  [facility] [address]  [phone]  Dept: [phone]    PRE-OP EVALUATION:  Today's date: 2018    Tigre Gillette (: 1940) presents for pre-operative evaluation assessment as requested by Dr. Alas.  He requires evaluation and anesthesia risk assessment prior to undergoing surgery/procedure for treatment of ventral hernia .  Proposed procedure: HERNIORRHAPHY EPIGASTRIC    Date of Surgery/ Procedure: 2018  Time of Surgery/ Procedure: 12:05  Hospital/Surgical Facility: [facility]  Fax number for surgical facility:   Primary Physician: Xavier Schumacher  Type of Anesthesia Anticipated: to be determined    Patient has a Health Care Directive or Living Will:  YES     1. NO - Do you have a history of heart attack, stroke, stent, bypass or surgery on an artery in the head, neck, heart or legs?  2. NO - Do you ever have any pain or discomfort in your chest?  3. NO - Do you have a history of  Heart Failure?  4. NO - ARE YOUR TROUBLED BY SHORTNESS OF BREATH WHEN WALKING ON THE LEVEL, UP A SLIGHT HILL OR AT NIGHT?   5. NO - Do you currently have a cold, bronchitis or other respiratory infection?  6. YES - DO YOU HAVE A COUGH, SHORTNESS OF BREATH OR WHEEZING? Some dyspnea with moderate exertion  7. NO - Do you sometimes get pains in the calves of your legs when you walk?  8. NO - Do you or anyone in your family have previous history of blood clots?  9. NO - Do you or does anyone in your family have a serious bleeding problem such as prolonged bleeding following surgeries or cuts?  10. NO - Have you ever had problems with anemia or been told to take iron pills?  11. NO - Have you had any abnormal blood loss such as black, tarry or bloody stools, or abnormal vaginal bleeding?  12. NO - Have you ever had a blood transfusion?  13. NO - Have you or any of your relatives ever had problems with anesthesia?  14. NO - Do you have sleep apnea, excessive snoring or  daytime drowsiness?  15. NO - Do you have any prosthetic heart valves?  16. NO - Do you have prosthetic joints?  17. NO - Is there any chance that you may be pregnant?              HPI:                                                      See problem list for active medical problems.  Problems all longstanding and stable, except as noted/documented.  See ROS for pertinent symptoms related to these conditions.                                                                                                  .    MEDICAL HISTORY:                                                    There are no active problems to display for this patient.     Past Medical History:   Diagnosis Date     Arthritis     spine     COPD (chronic obstructive pulmonary disease) (H)      Gastroesophageal reflux disease      Other chronic pain     lower back     Past Surgical History:   Procedure Laterality Date     ENT SURGERY      t&a     HERNIORRHAPHY INGUINAL  2010    open recurrent LIH rpr with mesh     HERNIORRHAPHY INGUINAL  1990    open LIH rpr with mesh     Current Outpatient Prescriptions   Medication Sig Dispense Refill     fluticasone-vilanterol (BREO ELLIPTA) 100-25 MCG/INH oral inhaler Inhale 1 puff into the lungs daily       umeclidinium (INCRUSE ELLIPTA) 62.5 MCG/INH oral inhaler Inhale 1 puff into the lungs daily       betamethasone valerate (VALISONE) 0.1 % cream Apply topically 2 times daily       Naproxen Sodium (ALEVE PO) Take 440 mg by mouth 2 times daily (with meals)        cetirizine (ZYRTEC) 10 MG tablet Take 10 mg by mouth daily       NSAID on hold a week  OTC products: None, except as noted above    No Known Allergies   Latex Allergy: NO    Social History   Substance Use Topics     Smoking status: Heavy Tobacco Smoker     Packs/day: 0.50     Types: Cigarettes     Smokeless tobacco: Never Used     Alcohol use Yes      Comment: rare      History   Drug Use No       REVIEW OF SYSTEMS:                                                     GENERAL: negative for, fever, chills, weight loss, weight gain  EYES: negative  ENT: negative  RESPIRATORY: No cough and stable dyspnea on exertion with moderate activity  CARDIOVASCULAR: negative for, palpitations, tachycardia, irregular heart beat, chest pain and lower extremity edema  GI: negative for, nausea, vomiting, , melena and hematochezia, abdominal pain related to hernia  : negative for, dysuria and hematuria  MUSCULOSKELETAL: low back pain  NEUROLOGIC: negative for, headaches, seizures, local weakness, numbness or tingling of hands and positive episodic numbness feet related to back, resolves with change in position  SKIN: negative  ENDOCRINE: negative         EXAM:                                                    There were no vitals taken for this visit.    Patient is alert, oriented, cooperative in no acute distress.  /80  Pulse 68  Temp 98  F (36.7  C) (Oral)  Resp 16  Wt 168 lb 6.4 oz (76.4 kg)  SpO2 97%  BMI 21.62 kg/m2    HEENT: PERRL, EOMI, TM's are normal. Oropharynx is clear.  NECK: No lymphadenopathy or thyromegaly. Carotid pulses full without bruits.  LUNGS: clear with mild decrease breath sounds, no wheezes  CV: normal S1, S2 without murmur, S3 or S4 present. Pulses are 2/2 throughout. No JVD.  ABDOMEN: Bowel sounds present, nontender without hepatosplenomegaly. Liver is normal size to percussion.  EXTREMITIES: no edema present, unremarkable joints  NEUROLOGIC: Cranial nerves II-XII intact, reflexes 2/4 throughout, strength 5/5, sensation grossly intact, gait normal.  SKIN: without rashes or significant lesions     DIAGNOSTICS:                                                    EKG: Normal Sinus Rhythm, normal axis, normal intervals, no acute ST/T changes c/w ischemia, no LVH by voltage criteria, unchanged from previous tracings  Spirometry: 2.74/5.23, mild airway obstruction    Recent Labs   Lab Test  01/04/18   1541  05/25/15   1053   HGB  15.3  15.7   PLT  247   228   NA  135  133   POTASSIUM  4.1  4.0   CR  0.80  0.74        IMPRESSION:                                                    Reason for surgery/procedure: abdominal hernia  Diagnosis/reason for consult: preop    The proposed surgical procedure is considered INTERMEDIATE risk.    REVISED CARDIAC RISK INDEX  The patient has the following serious cardiovascular risks for perioperative complications such as (MI, PE, VFib and 3  AV Block):  No serious cardiac risks  INTERPRETATION: 0 risks: Class I (very low risk - 0.4% complication rate)    The patient has the following additional risks for perioperative complications:  No identified additional risks      ICD-10-CM    1. Preop general physical exam Z01.818 EKG 12-lead complete w/read - Clinics   2. Ventral hernia without obstruction or gangrene K43.9    3. Chronic obstructive pulmonary disease, unspecified COPD type (H) J44.9 Spirometry, Breathing Capacity: Normal Order, Clinic Performed   4. Spondylosis of lumbar region without myelopathy or radiculopathy M47.816        RECOMMENDATIONS:                                                        Pulmonary Risk  Incentive spirometry post op  Respiratory Therapy (Respiratory Care IP Consult)  post op  Continue inhalers.   Recommend albuterol neb treatment as needed intra- or postoperatively for hypoxia or wheezing.       --Patient is to take all scheduled medications on the day of surgery EXCEPT for modifications listed below. none    APPROVAL GIVEN to proceed with proposed procedure, without further diagnostic evaluation       Signed Electronically by: Lauren Tripp MD    Copy of this evaluation report is provided to requesting physician.    [facility] Preop Guidelines

## 2018-01-30 ENCOUNTER — ANESTHESIA EVENT (OUTPATIENT)
Dept: SURGERY | Facility: CLINIC | Age: 78
End: 2018-01-30
Payer: MEDICARE

## 2018-01-30 ENCOUNTER — HOSPITAL ENCOUNTER (OUTPATIENT)
Facility: CLINIC | Age: 78
Discharge: HOME | End: 2018-01-30
Attending: SURGERY | Admitting: SURGERY
Payer: MEDICARE

## 2018-01-30 ENCOUNTER — APPOINTMENT (OUTPATIENT)
Dept: SURGERY | Facility: PHYSICIAN GROUP | Age: 78
End: 2018-01-30
Payer: COMMERCIAL

## 2018-01-30 ENCOUNTER — ANESTHESIA (OUTPATIENT)
Dept: SURGERY | Facility: CLINIC | Age: 78
End: 2018-01-30
Payer: MEDICARE

## 2018-01-30 VITALS
BODY MASS INDEX: 21.17 KG/M2 | SYSTOLIC BLOOD PRESSURE: 152 MMHG | RESPIRATION RATE: 34 BRPM | HEIGHT: 74 IN | TEMPERATURE: 97.6 F | DIASTOLIC BLOOD PRESSURE: 71 MMHG | WEIGHT: 165 LBS | OXYGEN SATURATION: 92 %

## 2018-01-30 DIAGNOSIS — K43.9 EPIGASTRIC HERNIA: Primary | ICD-10-CM

## 2018-01-30 PROCEDURE — 49570 ZZHC REPAIR EPIGASTRIC HERNIA,REDUC: CPT | Performed by: SURGERY

## 2018-01-30 PROCEDURE — 25000132 ZZH RX MED GY IP 250 OP 250 PS 637: Mod: GY | Performed by: ANESTHESIOLOGY

## 2018-01-30 PROCEDURE — 25000128 H RX IP 250 OP 636: Performed by: ANESTHESIOLOGY

## 2018-01-30 PROCEDURE — 49570 ZZHC REPAIR EPIGASTRIC HERNIA,REDUC: CPT | Mod: AS | Performed by: PHYSICIAN ASSISTANT

## 2018-01-30 PROCEDURE — 25000125 ZZHC RX 250: Performed by: SURGERY

## 2018-01-30 PROCEDURE — 36000093 ZZH SURGERY LEVEL 4 1ST 30 MIN: Performed by: SURGERY

## 2018-01-30 PROCEDURE — 36000063 ZZH SURGERY LEVEL 4 EA 15 ADDTL MIN: Performed by: SURGERY

## 2018-01-30 PROCEDURE — 25000128 H RX IP 250 OP 636: Performed by: NURSE ANESTHETIST, CERTIFIED REGISTERED

## 2018-01-30 PROCEDURE — 25000566 ZZH SEVOFLURANE, EA 15 MIN: Performed by: SURGERY

## 2018-01-30 PROCEDURE — 37000008 ZZH ANESTHESIA TECHNICAL FEE, 1ST 30 MIN: Performed by: SURGERY

## 2018-01-30 PROCEDURE — 25000125 ZZHC RX 250: Performed by: ANESTHESIOLOGY

## 2018-01-30 PROCEDURE — 25000125 ZZHC RX 250: Performed by: NURSE ANESTHETIST, CERTIFIED REGISTERED

## 2018-01-30 PROCEDURE — 27210794 ZZH OR GENERAL SUPPLY STERILE: Performed by: SURGERY

## 2018-01-30 PROCEDURE — C1781 MESH (IMPLANTABLE): HCPCS | Performed by: SURGERY

## 2018-01-30 PROCEDURE — 37000009 ZZH ANESTHESIA TECHNICAL FEE, EACH ADDTL 15 MIN: Performed by: SURGERY

## 2018-01-30 PROCEDURE — A9270 NON-COVERED ITEM OR SERVICE: HCPCS | Mod: GY | Performed by: SURGERY

## 2018-01-30 PROCEDURE — 71000013 ZZH RECOVERY PHASE 1 LEVEL 1 EA ADDTL HR: Performed by: SURGERY

## 2018-01-30 PROCEDURE — 40000306 ZZH STATISTIC PRE PROC ASSESS II: Performed by: SURGERY

## 2018-01-30 PROCEDURE — 71000027 ZZH RECOVERY PHASE 2 EACH 15 MINS: Performed by: SURGERY

## 2018-01-30 PROCEDURE — 25000128 H RX IP 250 OP 636: Performed by: SURGERY

## 2018-01-30 PROCEDURE — 71000012 ZZH RECOVERY PHASE 1 LEVEL 1 FIRST HR: Performed by: SURGERY

## 2018-01-30 PROCEDURE — 25000132 ZZH RX MED GY IP 250 OP 250 PS 637: Mod: GY | Performed by: SURGERY

## 2018-01-30 PROCEDURE — A9270 NON-COVERED ITEM OR SERVICE: HCPCS | Mod: GY | Performed by: ANESTHESIOLOGY

## 2018-01-30 DEVICE — MESH VENTRALEX HERNIA 3.2" CIRCLE LG W/STRAP 5950009: Type: IMPLANTABLE DEVICE | Site: ABDOMEN | Status: FUNCTIONAL

## 2018-01-30 RX ORDER — FENTANYL CITRATE 50 UG/ML
25-50 INJECTION, SOLUTION INTRAMUSCULAR; INTRAVENOUS EVERY 5 MIN PRN
Status: DISCONTINUED | OUTPATIENT
Start: 2018-01-30 | End: 2018-01-30 | Stop reason: HOSPADM

## 2018-01-30 RX ORDER — BUPIVACAINE HYDROCHLORIDE 2.5 MG/ML
INJECTION, SOLUTION EPIDURAL; INFILTRATION; INTRACAUDAL PRN
Status: DISCONTINUED | OUTPATIENT
Start: 2018-01-30 | End: 2018-01-30 | Stop reason: HOSPADM

## 2018-01-30 RX ORDER — HYDROCODONE BITARTRATE AND ACETAMINOPHEN 5; 325 MG/1; MG/1
1 TABLET ORAL
Status: COMPLETED | OUTPATIENT
Start: 2018-01-30 | End: 2018-01-30

## 2018-01-30 RX ORDER — DEXAMETHASONE SODIUM PHOSPHATE 4 MG/ML
INJECTION, SOLUTION INTRA-ARTICULAR; INTRALESIONAL; INTRAMUSCULAR; INTRAVENOUS; SOFT TISSUE PRN
Status: DISCONTINUED | OUTPATIENT
Start: 2018-01-30 | End: 2018-01-30

## 2018-01-30 RX ORDER — HYDRALAZINE HYDROCHLORIDE 20 MG/ML
2.5-5 INJECTION INTRAMUSCULAR; INTRAVENOUS EVERY 10 MIN PRN
Status: DISCONTINUED | OUTPATIENT
Start: 2018-01-30 | End: 2018-01-30 | Stop reason: HOSPADM

## 2018-01-30 RX ORDER — GLYCINE 1.5 G/100ML
SOLUTION IRRIGATION PRN
Status: DISCONTINUED | OUTPATIENT
Start: 2018-01-30 | End: 2018-01-30 | Stop reason: HOSPADM

## 2018-01-30 RX ORDER — EPHEDRINE SULFATE 50 MG/ML
25 INJECTION, SOLUTION INTRAVENOUS ONCE
Status: COMPLETED | OUTPATIENT
Start: 2018-01-30 | End: 2018-01-30

## 2018-01-30 RX ORDER — CEFAZOLIN SODIUM 1 G/3ML
1 INJECTION, POWDER, FOR SOLUTION INTRAMUSCULAR; INTRAVENOUS SEE ADMIN INSTRUCTIONS
Status: DISCONTINUED | OUTPATIENT
Start: 2018-01-30 | End: 2018-01-30 | Stop reason: HOSPADM

## 2018-01-30 RX ORDER — IBUPROFEN 600 MG/1
600 TABLET, FILM COATED ORAL
Status: DISCONTINUED | OUTPATIENT
Start: 2018-01-30 | End: 2018-01-30 | Stop reason: HOSPADM

## 2018-01-30 RX ORDER — PROPOFOL 10 MG/ML
INJECTION, EMULSION INTRAVENOUS PRN
Status: DISCONTINUED | OUTPATIENT
Start: 2018-01-30 | End: 2018-01-30

## 2018-01-30 RX ORDER — AMOXICILLIN 250 MG
1-2 CAPSULE ORAL 2 TIMES DAILY
Qty: 30 TABLET | Refills: 0 | Status: SHIPPED | OUTPATIENT
Start: 2018-01-30 | End: 2019-07-22

## 2018-01-30 RX ORDER — ALBUTEROL SULFATE 0.83 MG/ML
2.5 SOLUTION RESPIRATORY (INHALATION) EVERY 4 HOURS PRN
Status: DISCONTINUED | OUTPATIENT
Start: 2018-01-30 | End: 2018-01-30 | Stop reason: HOSPADM

## 2018-01-30 RX ORDER — HYDROCODONE BITARTRATE AND ACETAMINOPHEN 5; 325 MG/1; MG/1
1-2 TABLET ORAL EVERY 4 HOURS PRN
Qty: 20 TABLET | Refills: 0 | Status: SHIPPED | OUTPATIENT
Start: 2018-01-30 | End: 2018-02-01

## 2018-01-30 RX ORDER — PROMETHAZINE HYDROCHLORIDE 25 MG/ML
25 INJECTION, SOLUTION INTRAMUSCULAR; INTRAVENOUS EVERY 6 HOURS PRN
Status: DISCONTINUED | OUTPATIENT
Start: 2018-01-30 | End: 2018-01-30 | Stop reason: HOSPADM

## 2018-01-30 RX ORDER — ONDANSETRON 2 MG/ML
INJECTION INTRAMUSCULAR; INTRAVENOUS PRN
Status: DISCONTINUED | OUTPATIENT
Start: 2018-01-30 | End: 2018-01-30

## 2018-01-30 RX ORDER — ONDANSETRON 2 MG/ML
4 INJECTION INTRAMUSCULAR; INTRAVENOUS EVERY 30 MIN PRN
Status: DISCONTINUED | OUTPATIENT
Start: 2018-01-30 | End: 2018-01-30 | Stop reason: HOSPADM

## 2018-01-30 RX ORDER — METOPROLOL TARTRATE 1 MG/ML
1-2 INJECTION, SOLUTION INTRAVENOUS EVERY 5 MIN PRN
Status: DISCONTINUED | OUTPATIENT
Start: 2018-01-30 | End: 2018-01-30 | Stop reason: HOSPADM

## 2018-01-30 RX ORDER — HYDROMORPHONE HYDROCHLORIDE 1 MG/ML
.3-.5 INJECTION, SOLUTION INTRAMUSCULAR; INTRAVENOUS; SUBCUTANEOUS EVERY 10 MIN PRN
Status: DISCONTINUED | OUTPATIENT
Start: 2018-01-30 | End: 2018-01-30 | Stop reason: HOSPADM

## 2018-01-30 RX ORDER — EPHEDRINE SULFATE 50 MG/ML
INJECTION, SOLUTION INTRAMUSCULAR; INTRAVENOUS; SUBCUTANEOUS PRN
Status: DISCONTINUED | OUTPATIENT
Start: 2018-01-30 | End: 2018-01-30

## 2018-01-30 RX ORDER — GLYCOPYRROLATE 0.2 MG/ML
INJECTION, SOLUTION INTRAMUSCULAR; INTRAVENOUS PRN
Status: DISCONTINUED | OUTPATIENT
Start: 2018-01-30 | End: 2018-01-30

## 2018-01-30 RX ORDER — LIDOCAINE 40 MG/G
CREAM TOPICAL
Status: DISCONTINUED | OUTPATIENT
Start: 2018-01-30 | End: 2018-01-30 | Stop reason: HOSPADM

## 2018-01-30 RX ORDER — CEFAZOLIN SODIUM 2 G/100ML
2 INJECTION, SOLUTION INTRAVENOUS
Status: COMPLETED | OUTPATIENT
Start: 2018-01-30 | End: 2018-01-30

## 2018-01-30 RX ORDER — SODIUM CHLORIDE, SODIUM LACTATE, POTASSIUM CHLORIDE, CALCIUM CHLORIDE 600; 310; 30; 20 MG/100ML; MG/100ML; MG/100ML; MG/100ML
INJECTION, SOLUTION INTRAVENOUS CONTINUOUS
Status: DISCONTINUED | OUTPATIENT
Start: 2018-01-30 | End: 2018-01-30 | Stop reason: HOSPADM

## 2018-01-30 RX ORDER — OXYCODONE HYDROCHLORIDE 5 MG/1
5 TABLET ORAL EVERY 4 HOURS PRN
Status: DISCONTINUED | OUTPATIENT
Start: 2018-01-30 | End: 2018-01-30 | Stop reason: HOSPADM

## 2018-01-30 RX ORDER — NALOXONE HYDROCHLORIDE 0.4 MG/ML
.1-.4 INJECTION, SOLUTION INTRAMUSCULAR; INTRAVENOUS; SUBCUTANEOUS
Status: DISCONTINUED | OUTPATIENT
Start: 2018-01-30 | End: 2018-01-30 | Stop reason: HOSPADM

## 2018-01-30 RX ORDER — FENTANYL CITRATE 50 UG/ML
25-50 INJECTION, SOLUTION INTRAMUSCULAR; INTRAVENOUS
Status: DISCONTINUED | OUTPATIENT
Start: 2018-01-30 | End: 2018-01-30 | Stop reason: HOSPADM

## 2018-01-30 RX ORDER — ONDANSETRON 4 MG/1
4 TABLET, ORALLY DISINTEGRATING ORAL EVERY 30 MIN PRN
Status: DISCONTINUED | OUTPATIENT
Start: 2018-01-30 | End: 2018-01-30 | Stop reason: HOSPADM

## 2018-01-30 RX ORDER — FENTANYL CITRATE 50 UG/ML
INJECTION, SOLUTION INTRAMUSCULAR; INTRAVENOUS PRN
Status: DISCONTINUED | OUTPATIENT
Start: 2018-01-30 | End: 2018-01-30

## 2018-01-30 RX ORDER — LIDOCAINE HYDROCHLORIDE 10 MG/ML
INJECTION, SOLUTION INFILTRATION; PERINEURAL PRN
Status: DISCONTINUED | OUTPATIENT
Start: 2018-01-30 | End: 2018-01-30

## 2018-01-30 RX ORDER — MEPERIDINE HYDROCHLORIDE 25 MG/ML
12.5 INJECTION INTRAMUSCULAR; INTRAVENOUS; SUBCUTANEOUS
Status: DISCONTINUED | OUTPATIENT
Start: 2018-01-30 | End: 2018-01-30 | Stop reason: HOSPADM

## 2018-01-30 RX ADMIN — ONDANSETRON 4 MG: 2 INJECTION INTRAMUSCULAR; INTRAVENOUS at 13:17

## 2018-01-30 RX ADMIN — ONDANSETRON 4 MG: 2 INJECTION INTRAMUSCULAR; INTRAVENOUS at 15:04

## 2018-01-30 RX ADMIN — FENTANYL CITRATE 100 MCG: 50 INJECTION, SOLUTION INTRAMUSCULAR; INTRAVENOUS at 12:43

## 2018-01-30 RX ADMIN — PHENYLEPHRINE HYDROCHLORIDE 150 MCG: 10 INJECTION, SOLUTION INTRAMUSCULAR; INTRAVENOUS; SUBCUTANEOUS at 12:43

## 2018-01-30 RX ADMIN — PROCHLORPERAZINE EDISYLATE 5 MG: 5 INJECTION INTRAMUSCULAR; INTRAVENOUS at 15:19

## 2018-01-30 RX ADMIN — HYDROCODONE BITARTRATE AND ACETAMINOPHEN 1 TABLET: 5; 325 TABLET ORAL at 13:59

## 2018-01-30 RX ADMIN — Medication 5 MG: at 12:49

## 2018-01-30 RX ADMIN — SODIUM CHLORIDE, POTASSIUM CHLORIDE, SODIUM LACTATE AND CALCIUM CHLORIDE: 600; 310; 30; 20 INJECTION, SOLUTION INTRAVENOUS at 11:23

## 2018-01-30 RX ADMIN — PROPOFOL 200 MG: 10 INJECTION, EMULSION INTRAVENOUS at 12:43

## 2018-01-30 RX ADMIN — FENTANYL CITRATE 100 MCG: 50 INJECTION, SOLUTION INTRAMUSCULAR; INTRAVENOUS at 12:46

## 2018-01-30 RX ADMIN — FENTANYL CITRATE 50 MCG: 50 INJECTION INTRAMUSCULAR; INTRAVENOUS at 15:20

## 2018-01-30 RX ADMIN — PROMETHAZINE HYDROCHLORIDE 25 MG: 25 INJECTION INTRAMUSCULAR; INTRAVENOUS at 17:54

## 2018-01-30 RX ADMIN — OXYCODONE HYDROCHLORIDE 5 MG: 5 TABLET ORAL at 17:17

## 2018-01-30 RX ADMIN — FENTANYL CITRATE 50 MCG: 50 INJECTION INTRAMUSCULAR; INTRAVENOUS at 13:51

## 2018-01-30 RX ADMIN — Medication 5 MG: at 12:57

## 2018-01-30 RX ADMIN — EPHEDRINE SULFATE 25 MG: 50 INJECTION INTRAMUSCULAR; INTRAVENOUS; SUBCUTANEOUS at 17:45

## 2018-01-30 RX ADMIN — DEXAMETHASONE SODIUM PHOSPHATE 4 MG: 4 INJECTION, SOLUTION INTRA-ARTICULAR; INTRALESIONAL; INTRAMUSCULAR; INTRAVENOUS; SOFT TISSUE at 12:43

## 2018-01-30 RX ADMIN — FENTANYL CITRATE 50 MCG: 50 INJECTION INTRAMUSCULAR; INTRAVENOUS at 17:49

## 2018-01-30 RX ADMIN — LIDOCAINE HYDROCHLORIDE 50 MG: 10 INJECTION, SOLUTION INFILTRATION; PERINEURAL at 12:43

## 2018-01-30 RX ADMIN — GLYCOPYRROLATE 0.2 MG: 0.2 INJECTION, SOLUTION INTRAMUSCULAR; INTRAVENOUS at 13:00

## 2018-01-30 RX ADMIN — CEFAZOLIN SODIUM 2 G: 2 INJECTION, SOLUTION INTRAVENOUS at 12:49

## 2018-01-30 RX ADMIN — FENTANYL CITRATE 50 MCG: 50 INJECTION INTRAMUSCULAR; INTRAVENOUS at 14:15

## 2018-01-30 ASSESSMENT — COPD QUESTIONNAIRES
COPD: 1
CAT_SEVERITY: MODERATE

## 2018-01-30 ASSESSMENT — LIFESTYLE VARIABLES: TOBACCO_USE: 1

## 2018-01-30 NOTE — DISCHARGE INSTRUCTIONS
You had 1 [place] at 1pm    GENERAL ANESTHESIA OR SEDATION ADULT DISCHARGE INSTRUCTIONS   SPECIAL PRECAUTIONS FOR 24 HOURS AFTER SURGERY    IT IS NOT UNUSUAL TO FEEL LIGHT-HEADED OR FAINT, UP TO 24 HOURS AFTER SURGERY OR WHILE TAKING PAIN MEDICATION.  IF YOU HAVE THESE SYMPTOMS; SIT FOR A FEW MINUTES BEFORE STANDING AND HAVE SOMEONE ASSIST YOU WHEN YOU GET UP TO WALK OR USE THE BATHROOM.    YOU SHOULD REST AND RELAX FOR THE NEXT 24 HOURS AND YOU MUST MAKE ARRANGEMENTS TO HAVE SOMEONE STAY WITH YOU FOR AT LEAST 24 HOURS AFTER YOUR DISCHARGE.  AVOID HAZARDOUS AND STRENUOUS ACTIVITIES.  DO NOT MAKE IMPORTANT DECISIONS FOR 24 HOURS.    DO NOT DRIVE ANY VEHICLE OR OPERATE MECHANICAL EQUIPMENT FOR 24 HOURS FOLLOWING THE END OF YOUR SURGERY.  EVEN THOUGH YOU MAY FEEL NORMAL, YOUR REACTIONS MAY BE AFFECTED BY THE MEDICATION YOU HAVE RECEIVED.    DO NOT DRINK ALCOHOLIC BEVERAGES FOR 24 HOURS FOLLOWING YOUR SURGERY.    DRINK CLEAR LIQUIDS (APPLE JUICE, GINGER ALE, 7-UP, BROTH, ETC.).  PROGRESS TO YOUR REGULAR DIET AS YOU FEEL ABLE.    YOU MAY HAVE A DRY MOUTH, A SORE THROAT, MUSCLES ACHES OR TROUBLE SLEEPING.  THESE SHOULD GO AWAY AFTER 24 HOURS.    CALL YOUR DOCTOR FOR ANY OF THE FOLLOWING:  SIGNS OF INFECTION (FEVER, GROWING TENDERNESS AT THE SURGERY SITE, A LARGE AMOUNT OF DRAINAGE OR BLEEDING, SEVERE PAIN, FOUL-SMELLING DRAINAGE, REDNESS OR SWELLING.    IT HAS BEEN OVER 8 TO 10 HOURS SINCE SURGERY AND YOU ARE STILL NOT ABLE TO URINATE (PASS WATER).       HOME CARE FOLLOWING UMBILICAL/VENTRAL HERNIA REPAIR  KELLI Gallo E. Gavin, N. Guttormson, D. Maurer, SANIA Stiles    DIET:  No restrictions.  Increased fluid intake is recommended. While taking pain medications, increase dietary fiber or add a fiber supplementation like Metamucil or Citrucel to help prevent constipation - a possible side effect of pain medications.    NAUSEA:  If nauseated from the anesthetic/pain meds; rest in bed, get up  "cautiously with assistance, and drink clear liquids (juice, tea, broth).    ACTIVITY:  Light Activity -- you may immediately be up and about as tolerated.  Driving -- you may drive when comfortable and off narcotic pain medications.  Light Work -- resume when comfortable off pain medications.  (If you can drive, you probably can work.)  Strenuous Work/Activity -- limit lifting to 20 pounds for 6 weeks.  Active Sports (running, biking, etc.) -- cautiously resume after 4 weeks.    INCISIONAL CARE:    If you have a dressing in place, keep clean and dry for 48 hours after surgery.  After this timeframe, you may replace the gauze daily if it becomes soiled.    You may remove the dressing and shower 48 hours after surgery.  Do not submerse incision in water for 1 week.    If you have a Dermabond dressing (a type of skin glue), you may shower immediately.    Sutures will absorb and need not be removed.    If present, leave the steri-strips (white paper tapes) in place for 14 days after surgery.    If present, leave Dermabond glue in place until it wears/flakes off.    Expect a variable amount of swelling/bruising/discoloration that may appear around or below the repair site.    Some numbness around the incision is common.    A lump/\"healing ridge\" under the incision is normal and will gradually resolve over the following 1-2 months.    DISCOMFORT:  Local anesthetic placed at surgery should provide relief for 4-8 hours.  Begin taking pain pills before discomfort is severe.  Take the pain medication with some food, when possible, to minimize side effects.  Intermittent use of ice packs to the hernia repair site may help during the first 1-3 weeks after surgery.  Expect gradual improvement.    Over-the-counter anti-inflammatory medications (i.e. Ibuprofen/Advil/Motrin or Naprosyn/Aleve) may be used per package instructions in addition to or while tapering off the narcotic pain medications to decrease swelling and sensitivity " at the repair site.  DO NOT TAKE these Anti-inflammatory medications if your primary physician has advised against doing so, or if you have acid reflux, ulcer, or bleeding disorder, or take blood-thinner medications.  Call your primary physician or the surgery office if you have medication questions.      RETURN APPOINTMENT:  Schedule a follow-up visit 2-3 weeks post-op.  Office Phone:  [phone]     CONTACT US IF THE FOLLOWING DEVELOPS:   1. A fever that is above 101     2. If there is a large amount of drainage, bleeding, or swelling.   3. Severe pain that is not relieved by your prescription.   4. Drainage that is thick, cloudy, yellow, green or white.   5. Any other questions not answered by  Frequently Asked Questions  sheet.      FREQUENTLY ASKED QUESTIONS:    Q:  How should my incision look?    A:  Normally your incision will appear slightly swollen with light redness directly along the incision itself as it heals.  It may feel like a bump or ridge as the healing/scarring happens, and over time (3-4 months) this bump or ridge feeling should slowly go away.  In general, clear or pink watery drainage can be normal at first as your incision heals, but should decrease over time.    Q:  How do I know if my incision is infected?  A:  Look at your incision for signs of infection, like redness around the incision spreading to surrounding skin, or drainage of cloudy or foul-smelling drainage.  If you feel warm, check your temperature to see if you are running a fever.    **If any of these things occur, please notify the nurse at our office.  We may need you to come into the office for an incision check.      Q:  How do I take care of my incision?  A:  If you have a dressing in place - Starting the day after surgery, replace the dressing 1-2 times a day until there is no further drainage from the incision.  At that time, a dressing is no longer needed.  Try to minimize tape on the skin if irritation is occurring at  the tape sites.  If you have significant irritation from tape on the skin, please call the office to discuss other method of dressing your incision.    Small pieces of tape called  steri-strips  may be present directly overlying your incision; these may be removed 10 days after surgery unless otherwise specified by your surgeon.  If these tapes start to loosen at the ends, you may trim them back until they fall off or are removed.    A:  If you had  Dermabond  tissue glue used as a dressing (this causes your incision to look shiny with a clear covering over it) - This type of dressing wears off with time and does not require more dressings over the top unless it is draining around the glue as it wears off.  Do not apply ointments or lotions over the incisions until the glue has completely worn off.    Q:  There is a piece of tape or a sticky  lead  still on my skin.  Can I remove this?  A:  Sometimes the sticky  leads  used for monitoring during surgery or for evaluation in the emergency department are not all removed while you are in the hospital.  These sometimes have a tab or metal dot on them.  You can easily remove these on your own, like taking off a band-aid.  If there is a gel substance under the  lead , simply wipe/clean it off with a washcloth or paper towel.      Q:  What can I do to minimize constipation (very hard stools, or lack of stools)?  A:  Stay well hydrated.  Increase your dietary fiber intake or take a fiber supplement -with plenty of water.  Walk around frequently.  You may consider an over-the-counter stool-softener.  Your Pharmacist can assist you with choosing one that is stocked at your pharmacy.  Constipation is also one of the most common side effects of pain medication.  If you are using pain medication, be pro-active and try to PREVENT problems with constipation by taking the steps above BEFORE constipation becomes a problem.    Q:  What do I do if I need more pain medications?  A:   Call the office to receive refills.  Be aware that certain pain meds cannot be called into a pharmacy and actually require a paper prescription.  A change may be made in your pain med as you progress thru your recovery period or if you have side effects to certain meds.    --Pain meds are NOT refilled after 5pm on weekdays, and NOT AT ALL on the weekends, so please look ahead to prevent problems.      Q:  Why am I having a hard time sleeping now that I am at home?  A:  Many medications you receive while you are in the hospital can impact your sleep for a number of days after your surgery/hospitalization.  Decreased level of activity and naps during the day may also make sleeping at night difficult.  Try to minimize day-time naps, and get up frequently during the day to walk around your home during your recovery time.  Sleep aides may be of some help, but are not recommended for long-term use.      Q:  I am having some back discomfort.  What should I do?  A:  This may be related to certain positioning that was required for your surgery, extended periods of time in bed, or other changes in your overall activity level.  You may try ice, heat, acetaminophen, or ibuprofen to treat this temporarily.  Note that many pain medications have acetaminophen in them and would state this on the prescription bottle.  Be sure not to exceed the maximum of 4000mg per day of acetaminophen.     **If the pain you are having does not resolve, is severe, or is a flare of back pain you have had on other occasions prior to surgery, please contact your primary physician for further recommendations or for an appointment to be examined at their office.    Q:  Why am I having headaches?  A:  Headaches can be caused by many things:  caffeine withdrawal, use of pain meds, dehydration, high blood pressure, lack of sleep, over-activity/exhaustion, flare-up of usual migraine headaches.  If you feel this is related to muscle tension (a band-like  feeling around the head, or a pressure at the low-back of the head) you may try ice or heat to this area.  You may need to drink more fluids (try electrolyte drink like Gatorade), rest, or take your usual migraine medications.   **If your headaches do not resolve, worsen, are accompanied by other symptoms, or if your blood pressure is high, please call your primary physician for recommendation and/or examination.    Q:  I am unable to urinate.  What do I do?  A:  A small percentage of people can have difficulty urinating initially after surgery.  This includes being able to urinate only a very small amount at a time and feeling discomfort or pressure in the very low abdomen.  This is called  urinary retention , and is actually an urgent situation.  Proceed to your nearest Emergency department for evaluation (not an Urgent Care Center).  Sometimes the bladder does not work correctly after certain medications you receive during surgery, or related to certain procedures.  You may need to have a catheter placed until your bladder recovers.  When planning to go to an Emergency department, it may help to call the ER to let them know you are coming in for this problem after a surgery.  This may help you get in quicker to be evaluated.  **If you have symptoms of a urinary tract infection, please contact your primary physician for the proper evaluation and treatment.          If you have other questions, please call the office Monday thru Friday between 8am and 5pm to discuss with the nurse or physician assistant.  [phone]    There is a surgeon ON CALL on weekday evenings and over the weekend in case of urgent need only, and may be contacted at the same number.    If you are having an emergency, call 911 or proceed to your nearest emergency department.

## 2018-01-30 NOTE — OP NOTE
General Surgery Operative Note      Pre-operative diagnosis: Epigastric Hernia   Post-operative diagnosis: Same    Procedure: Epigastric Herniorrhaphy with Bard Ventralex ST Hernia Patch (8 cm round)    Surgeon: Rossana Alas MD   Assistant(s): Gilda Jenkins PA-C  The Physician Assistant was medically necessary for their expertise in prepping, suctioning, suturing and retraction.   Anesthesia: General    Estimated blood loss:   1 cc       DESCRIPTION OF PROCEDURE: The patient was placed on the table in supine position. The abdomen was prepped and draped in standard sterile fashion. A longitudinal incision in the midline 3 cm above the umbilicus was made with a blade. The incision was carried down into the subcutaneous tissue. The hernia contents (preperitoneal fat) were  from surrounding subcutaneous tissue using blunt and sharp dissection. The fascial defect was 3 cm. The preperitoneal space was developed circumferentially to allow for placement of the 8 cm Ventralex ST mesh patch. We then trimmed the leaflets at the level of the fascia and closed the fascia transverely with a running 0 PDS suture, incorporating the leaflets of the mesh. Hemostasis was maintained throughout with cautery. We irrigated the wound with Irricept followed by saline. The skin and subcutaneous tissue was anesthetized with local. The subcutaneous tissue was closed with interrupted 3-0 vicryl suture. The skin was closed with a running 4-0 Vicryl subcuticular suture and Dermabond. The patient tolerated the procedure well. Sponge and instrument counts were correct.   Rossana lAas MD

## 2018-01-30 NOTE — ANESTHESIA PREPROCEDURE EVALUATION
Anesthesia Evaluation     . Pt has had prior anesthetic. Type: General    No history of anesthetic complications          ROS/MED HX    ENT/Pulmonary:     (+)tobacco use, Current use moderate COPD, , . .    Neurologic:       Cardiovascular:     (+) ----. : . . KRISHNAN, . :. .       METS/Exercise Tolerance:  3 - Able to walk 1-2 blocks without stopping   Hematologic:         Musculoskeletal:   (+) arthritis, , , -       GI/Hepatic:     (+) GERD       Renal/Genitourinary:         Endo:         Psychiatric:         Infectious Disease:         Malignancy:         Other:    (+) H/O Chronic Pain,                   Physical Exam      Airway   Mallampati: II  TM distance: >3 FB  Neck ROM: full    Dental     Cardiovascular   Rhythm and rate: regular and normal      Pulmonary (+) decreased breath sounds                       Anesthesia Plan      History & Physical Review  History and physical reviewed and following examination; no interval change.    ASA Status:  3 .    NPO Status:  > 8 hours    Plan for General and LMA with Intravenous and Propofol induction. Maintenance will be Balanced.    PONV prophylaxis:  Ondansetron (or other 5HT-3) and Dexamethasone or Solumedrol       Postoperative Care  Postoperative pain management:  IV analgesics, Oral pain medications and Multi-modal analgesia.      Consents  Anesthetic plan, risks, benefits and alternatives discussed with:  Patient..                          .

## 2018-01-30 NOTE — IP AVS SNAPSHOT
[facility] PreOP/PostOP    [address]    [facility] [address]    Phone:  [phone]    Fax:  [phone]                                       After Visit Summary   1/30/2018    Tigre Gillette    MRN: [id number]           After Visit Summary Signature Page     I have received my discharge instructions, and my questions have been answered. I have discussed any challenges I see with this plan with the nurse or doctor.    ..........................................................................................................................................  Patient/Patient Representative Signature      ..........................................................................................................................................  Patient Representative Print Name and Relationship to Patient    ..................................................               ................................................  Date                                            Time    ..........................................................................................................................................  Reviewed by Signature/Title    ...................................................              ..............................................  Date                                                            Time

## 2018-01-30 NOTE — IP AVS SNAPSHOT
MRN:[id number]                      After Visit Summary   1/30/2018    Tigre Gillette    MRN: [id number]           Thank you!     Thank you for choosing [facility] for your care. Our goal is always to provide you with excellent care. Hearing back from our patients is one way we can continue to improve our services. Please take a few minutes to complete the written survey that you may receive in the mail after you visit. If you would like to speak to someone directly about your visit please contact Patient Relations at [phone]. Thank you!          Patient Information     Date Of Birth          1940        About your hospital stay     You were admitted on:  January 30, 2018 You last received care in the:  [facility] PreOP/PostOP    You were discharged on:  January 30, 2018       Who to Call     For medical emergencies, please call 911.  For non-urgent questions about your medical care, please call your primary care provider or clinic, [phone]  For questions related to your surgery, please call your surgery clinic        Attending Provider     Provider Specialty    Rossana Alas MD Surgery       Primary Care Provider Office Phone # Fax #    Lauren Tripp [phone]      After Care Instructions     Ice to affected area       Ice to operative site PRN                  Further instructions from your care team         You had 1 Ariel at 1pm    GENERAL ANESTHESIA OR SEDATION ADULT DISCHARGE INSTRUCTIONS   SPECIAL PRECAUTIONS FOR 24 HOURS AFTER SURGERY    IT IS NOT UNUSUAL TO FEEL LIGHT-HEADED OR FAINT, UP TO 24 HOURS AFTER SURGERY OR WHILE TAKING PAIN MEDICATION.  IF YOU HAVE THESE SYMPTOMS; SIT FOR A FEW MINUTES BEFORE STANDING AND HAVE SOMEONE ASSIST YOU WHEN YOU GET UP TO WALK OR USE THE BATHROOM.    YOU SHOULD REST AND RELAX FOR THE NEXT 24 HOURS AND YOU MUST MAKE ARRANGEMENTS TO HAVE SOMEONE STAY WITH YOU FOR AT LEAST 24 HOURS AFTER YOUR  DISCHARGE.  AVOID HAZARDOUS AND STRENUOUS ACTIVITIES.  DO NOT MAKE IMPORTANT DECISIONS FOR 24 HOURS.    DO NOT DRIVE ANY VEHICLE OR OPERATE MECHANICAL EQUIPMENT FOR 24 HOURS FOLLOWING THE END OF YOUR SURGERY.  EVEN THOUGH YOU MAY FEEL NORMAL, YOUR REACTIONS MAY BE AFFECTED BY THE MEDICATION YOU HAVE RECEIVED.    DO NOT DRINK ALCOHOLIC BEVERAGES FOR 24 HOURS FOLLOWING YOUR SURGERY.    DRINK CLEAR LIQUIDS (APPLE JUICE, GINGER ALE, 7-UP, BROTH, ETC.).  PROGRESS TO YOUR REGULAR DIET AS YOU FEEL ABLE.    YOU MAY HAVE A DRY MOUTH, A SORE THROAT, MUSCLES ACHES OR TROUBLE SLEEPING.  THESE SHOULD GO AWAY AFTER 24 HOURS.    CALL YOUR DOCTOR FOR ANY OF THE FOLLOWING:  SIGNS OF INFECTION (FEVER, GROWING TENDERNESS AT THE SURGERY SITE, A LARGE AMOUNT OF DRAINAGE OR BLEEDING, SEVERE PAIN, FOUL-SMELLING DRAINAGE, REDNESS OR SWELLING.    IT HAS BEEN OVER 8 TO 10 HOURS SINCE SURGERY AND YOU ARE STILL NOT ABLE TO URINATE (PASS WATER).       HOME CARE FOLLOWING UMBILICAL/VENTRAL HERNIA REPAIR  KELLI Gallo, YENNI Alas, BAILEY Troy, KELLI Hernandez, SANIA Stiles    DIET:  No restrictions.  Increased fluid intake is recommended. While taking pain medications, increase dietary fiber or add a fiber supplementation like Metamucil or Citrucel to help prevent constipation - a possible side effect of pain medications.    NAUSEA:  If nauseated from the anesthetic/pain meds; rest in bed, get up cautiously with assistance, and drink clear liquids (juice, tea, broth).    ACTIVITY:  Light Activity -- you may immediately be up and about as tolerated.  Driving -- you may drive when comfortable and off narcotic pain medications.  Light Work -- resume when comfortable off pain medications.  (If you can drive, you probably can work.)  Strenuous Work/Activity -- limit lifting to 20 pounds for 6 weeks.  Active Sports (running, biking, etc.) -- cautiously resume after 4 weeks.    INCISIONAL CARE:    If you have a dressing in place,  "keep clean and dry for 48 hours after surgery.  After this timeframe, you may replace the gauze daily if it becomes soiled.    You may remove the dressing and shower 48 hours after surgery.  Do not submerse incision in water for 1 week.    If you have a Dermabond dressing (a type of skin glue), you may shower immediately.    Sutures will absorb and need not be removed.    If present, leave the steri-strips (white paper tapes) in place for 14 days after surgery.    If present, leave Dermabond glue in place until it wears/flakes off.    Expect a variable amount of swelling/bruising/discoloration that may appear around or below the repair site.    Some numbness around the incision is common.    A lump/\"healing ridge\" under the incision is normal and will gradually resolve over the following 1-2 months.    DISCOMFORT:  Local anesthetic placed at surgery should provide relief for 4-8 hours.  Begin taking pain pills before discomfort is severe.  Take the pain medication with some food, when possible, to minimize side effects.  Intermittent use of ice packs to the hernia repair site may help during the first 1-3 weeks after surgery.  Expect gradual improvement.    Over-the-counter anti-inflammatory medications (i.e. Ibuprofen/Advil/Motrin or Naprosyn/Aleve) may be used per package instructions in addition to or while tapering off the narcotic pain medications to decrease swelling and sensitivity at the repair site.  DO NOT TAKE these Anti-inflammatory medications if your primary physician has advised against doing so, or if you have acid reflux, ulcer, or bleeding disorder, or take blood-thinner medications.  Call your primary physician or the surgery office if you have medication questions.      RETURN APPOINTMENT:  Schedule a follow-up visit 2-3 weeks post-op.  Office Phone:  [phone]     CONTACT US IF THE FOLLOWING DEVELOPS:   1. A fever that is above 101     2. If there is a large amount of drainage, bleeding, or " swelling.   3. Severe pain that is not relieved by your prescription.   4. Drainage that is thick, cloudy, yellow, green or white.   5. Any other questions not answered by  Frequently Asked Questions  sheet.      FREQUENTLY ASKED QUESTIONS:    Q:  How should my incision look?    A:  Normally your incision will appear slightly swollen with light redness directly along the incision itself as it heals.  It may feel like a bump or ridge as the healing/scarring happens, and over time (3-4 months) this bump or ridge feeling should slowly go away.  In general, clear or pink watery drainage can be normal at first as your incision heals, but should decrease over time.    Q:  How do I know if my incision is infected?  A:  Look at your incision for signs of infection, like redness around the incision spreading to surrounding skin, or drainage of cloudy or foul-smelling drainage.  If you feel warm, check your temperature to see if you are running a fever.    **If any of these things occur, please notify the nurse at our office.  We may need you to come into the office for an incision check.      Q:  How do I take care of my incision?  A:  If you have a dressing in place - Starting the day after surgery, replace the dressing 1-2 times a day until there is no further drainage from the incision.  At that time, a dressing is no longer needed.  Try to minimize tape on the skin if irritation is occurring at the tape sites.  If you have significant irritation from tape on the skin, please call the office to discuss other method of dressing your incision.    Small pieces of tape called  steri-strips  may be present directly overlying your incision; these may be removed 10 days after surgery unless otherwise specified by your surgeon.  If these tapes start to loosen at the ends, you may trim them back until they fall off or are removed.    A:  If you had  Dermabond  tissue glue used as a dressing (this causes your incision to look shiny  with a clear covering over it) - This type of dressing wears off with time and does not require more dressings over the top unless it is draining around the glue as it wears off.  Do not apply ointments or lotions over the incisions until the glue has completely worn off.    Q:  There is a piece of tape or a sticky  lead  still on my skin.  Can I remove this?  A:  Sometimes the sticky  leads  used for monitoring during surgery or for evaluation in the emergency department are not all removed while you are in the hospital.  These sometimes have a tab or metal dot on them.  You can easily remove these on your own, like taking off a band-aid.  If there is a gel substance under the  lead , simply wipe/clean it off with a washcloth or paper towel.      Q:  What can I do to minimize constipation (very hard stools, or lack of stools)?  A:  Stay well hydrated.  Increase your dietary fiber intake or take a fiber supplement -with plenty of water.  Walk around frequently.  You may consider an over-the-counter stool-softener.  Your Pharmacist can assist you with choosing one that is stocked at your pharmacy.  Constipation is also one of the most common side effects of pain medication.  If you are using pain medication, be pro-active and try to PREVENT problems with constipation by taking the steps above BEFORE constipation becomes a problem.    Q:  What do I do if I need more pain medications?  A:  Call the office to receive refills.  Be aware that certain pain meds cannot be called into a pharmacy and actually require a paper prescription.  A change may be made in your pain med as you progress thru your recovery period or if you have side effects to certain meds.    --Pain meds are NOT refilled after 5pm on weekdays, and NOT AT ALL on the weekends, so please look ahead to prevent problems.      Q:  Why am I having a hard time sleeping now that I am at home?  A:  Many medications you receive while you are in the hospital can  impact your sleep for a number of days after your surgery/hospitalization.  Decreased level of activity and naps during the day may also make sleeping at night difficult.  Try to minimize day-time naps, and get up frequently during the day to walk around your home during your recovery time.  Sleep aides may be of some help, but are not recommended for long-term use.      Q:  I am having some back discomfort.  What should I do?  A:  This may be related to certain positioning that was required for your surgery, extended periods of time in bed, or other changes in your overall activity level.  You may try ice, heat, acetaminophen, or ibuprofen to treat this temporarily.  Note that many pain medications have acetaminophen in them and would state this on the prescription bottle.  Be sure not to exceed the maximum of 4000mg per day of acetaminophen.     **If the pain you are having does not resolve, is severe, or is a flare of back pain you have had on other occasions prior to surgery, please contact your primary physician for further recommendations or for an appointment to be examined at their office.    Q:  Why am I having headaches?  A:  Headaches can be caused by many things:  caffeine withdrawal, use of pain meds, dehydration, high blood pressure, lack of sleep, over-activity/exhaustion, flare-up of usual migraine headaches.  If you feel this is related to muscle tension (a band-like feeling around the head, or a pressure at the low-back of the head) you may try ice or heat to this area.  You may need to drink more fluids (try electrolyte drink like Gatorade), rest, or take your usual migraine medications.   **If your headaches do not resolve, worsen, are accompanied by other symptoms, or if your blood pressure is high, please call your primary physician for recommendation and/or examination.    Q:  I am unable to urinate.  What do I do?  A:  A small percentage of people can have difficulty urinating initially after  "surgery.  This includes being able to urinate only a very small amount at a time and feeling discomfort or pressure in the very low abdomen.  This is called  urinary retention , and is actually an urgent situation.  Proceed to your nearest Emergency department for evaluation (not an Urgent Care Center).  Sometimes the bladder does not work correctly after certain medications you receive during surgery, or related to certain procedures.  You may need to have a catheter placed until your bladder recovers.  When planning to go to an Emergency department, it may help to call the ER to let them know you are coming in for this problem after a surgery.  This may help you get in quicker to be evaluated.  **If you have symptoms of a urinary tract infection, please contact your primary physician for the proper evaluation and treatment.          If you have other questions, please call the office Monday thru Friday between 8am and 5pm to discuss with the nurse or physician assistant.  [phone]    There is a surgeon ON CALL on weekday evenings and over the weekend in case of urgent need only, and may be contacted at the same number.    If you are having an emergency, call 911 or proceed to your nearest emergency department.          Pending Results     No orders found from 1/28/2018 to 1/31/2018.            Admission Information     Date & Time Provider Department Dept. Phone    1/30/2018 Rossana Alas MD [facility] PreOP/PostOP [phone]      Your Vitals Were     Blood Pressure Temperature Respirations Height Weight Pulse Oximetry    152/71 97.6  F (36.4  C) (Temporal) 34 1.88 m (6' 2.02\") 74.8 kg (165 lb) 92%    BMI (Body Mass Index)                   21.18 kg/m2           [facility] Information     [facility] lets you send messages to your doctor, view your test results, renew your prescriptions, schedule appointments and more. To sign up, go to [email or web address] . Click on \"Log in\" on the left side " "of the screen, which will take you to the Welcome page. Then click on \"Sign up Now\" on the right side of the page.     You will be asked to enter the access code listed below, as well as some personal information. Please follow the directions to create your username and password.     Your access code is: [email or web address]  Expires: 2018  5:06 PM     Your access code will  in 90 days. If you need help or a new code, please call your [place] clinic or [phone].        Care EveryWhere ID     This is your Care EveryWhere ID. This could be used by other organizations to access your [place] medical records  [id number]        Equal Access to Services     OCTAVIO ESTES : Dre Abraham, juana kumar, mireya kerr, elmer caraballo. So [facility] [phone].    ATENCIÓN: Si habla español, tiene a soni disposición servicios gratuitos de asistencia lingüística. Llame al [phone].    We comply with applicable federal civil rights laws and [place] laws. We do not discriminate on the basis of race, color, national origin, age, disability, sex, sexual orientation, or gender identity.               Review of your medicines      START taking        Dose / Directions    HYDROcodone-acetaminophen 5-325 MG per tablet   Commonly known as:  NORCO   Used for:  Epigastric hernia        Dose:  1-2 tablet   Take 1-2 tablets by mouth every 4 hours as needed for other (Moderate to Severe Pain)   Quantity:  20 tablet   Refills:  0       senna-docusate 8.6-50 MG per tablet   Commonly known as:  SENOKOT-S;PERICOLACE   Used for:  Epigastric hernia        Dose:  1-2 tablet   Take 1-2 tablets by mouth 2 times daily Take while on oral narcotics to prevent or treat constipation.   Quantity:  30 tablet   Refills:  0         CONTINUE these medicines which have NOT CHANGED        Dose / Directions    ALEVE PO        Dose:  440 mg   Take 440 mg by mouth 2 times daily (with meals) "   Refills:  0       betamethasone valerate 0.1 % cream   Commonly known as:  VALISONE        Apply topically 2 times daily   Refills:  0       cetirizine 10 MG tablet   Commonly known as:  zyrTEC        Dose:  10 mg   Take 10 mg by mouth daily   Refills:  0       fluticasone-vilanterol 100-25 MCG/INH oral inhaler   Commonly known as:  BREO ELLIPTA        Dose:  1 puff   Inhale 1 puff into the lungs daily   Refills:  0       INCRUSE ELLIPTA 62.5 MCG/INH oral inhaler   Generic drug:  umeclidinium        Dose:  1 puff   Inhale 1 puff into the lungs daily   Refills:  0       TYLENOL PO        Dose:  1000 mg   Take 1,000 mg by mouth   Refills:  0            Where to get your medicines      [place] [facility] [id number] [facility] [id number]     Phone:  [phone]     senna-docusate 8.6-50 MG per tablet         Some of these will need a paper prescription and others can be bought over the counter. Ask your nurse if you have questions.     Bring a paper prescription for each of these medications     HYDROcodone-acetaminophen 5-325 MG per tablet                Protect others around you: Learn how to safely use, store and throw away your medicines at www.disposemymeds.org.        Information about OPIOIDS     PRESCRIPTION OPIOIDS: WHAT YOU NEED TO KNOW    Prescription opioids can be used to help relieve moderate to severe pain and are often prescribed following a surgery or injury, or for certain health conditions. These medications can be an important part of treatment but also come with serious risks. It is important to work with your health care provider to make sure you are getting the safest, most effective care.    WHAT ARE THE RISKS AND SIDE EFFECTS OF OPIOID USE?  Prescription opioids carry serious risks of addiction and overdose, especially with prolonged use. An opioid overdose, often marked by slowed breathing can  cause sudden death. The use of prescription opioids can have a number of side effects as well, even when taken as directed:      Tolerance - meaning you might need to take more of a medication for the same pain relief    Physical dependence - meaning you have symptoms of withdrawal when a medication is stopped    Increased sensitivity to pain    Constipation    Nausea, vomiting, and dry mouth    Sleepiness and dizziness    Confusion    Depression    Low levels of testosterone that can result in lower sex drive, energy, and strength    Itching and sweating    RISKS ARE GREATER WITH:    History of drug misuse, substance use disorder, or overdose    Mental health conditions (such as depression or anxiety)    Sleep apnea    Older age (65 years or older)    Pregnancy    Avoid alcohol while taking prescription opioids.   Also, unless specifically advised by your health care provider, medications to avoid include:    Benzodiazepines (such as Xanax or Valium)    Muscle relaxants (such as Soma or Flexeril)    Hypnotics (such as Ambien or Lunesta)    Other prescription opioids    KNOW YOUR OPTIONS:  Talk to your health care provider about ways to manage your pain that do not involve prescription opioids. Some of these options may actually work better and have fewer risks and side effects:    Pain relievers such as acetaminophen, ibuprofen, and naproxen    Some medications that are also used for depression or seizures    Physical therapy and exercise    Cognitive behavioral therapy, a psychological, goal-directed approach, in which patients learn how to modify physical, behavioral, and emotional triggers of pain and stress    IF YOU ARE PRESCRIBED OPIOIDS FOR PAIN:    Never take opioids in greater amounts or more often than prescribed    Follow up with your primary health care provider and work together to create a plan on how to manage your pain.    Talk about ways to help manage your pain that do not involve prescription  opioids    Talk about all concerns and side effects    Help prevent misuse and abuse    Never sell or share prescription opioids    Never use another person's prescription opioids    Store prescription opioids in a secure place and out of reach of others (this may include visitors, children, friends, and family)    Visit www.cdc.gov/drugoverdose to learn about risks of opioid abuse and overdose    If you believe you may be struggling with addiction, tell your health care provider and ask for guidance or call [facility]'s National Helpline at 4-232-083-HELP    LEARN MORE / www.cdc.gov/drugoverdose/prescribing/guideline.html    Safely dispose of unused prescription opioids: Find your local drug take-back programs and more information about the importance of safe disposal at www.doseofreality.[place].gov             Medication List: This is a list of all your medications and when to take them. Check marks below indicate your daily home schedule. Keep this list as a reference.      Medications           Morning Afternoon Evening Bedtime As Needed    ALEVE PO   Take 440 mg by mouth 2 times daily (with meals)                                betamethasone valerate 0.1 % cream   Commonly known as:  VALISONE   Apply topically 2 times daily                                cetirizine 10 MG tablet   Commonly known as:  zyrTEC   Take 10 mg by mouth daily                                fluticasone-vilanterol 100-25 MCG/INH oral inhaler   Commonly known as:  BREO ELLIPTA   Inhale 1 puff into the lungs daily                                HYDROcodone-acetaminophen 5-325 MG per tablet   Commonly known as:  NORCO   Take 1-2 tablets by mouth every 4 hours as needed for other (Moderate to Severe Pain)   Last time this was given:  1 tablet on 1/30/2018  1:59 PM                                INCRUSE ELLIPTA 62.5 MCG/INH oral inhaler   Inhale 1 puff into the lungs daily   Generic drug:  umeclidinium                                senna-docusate  8.6-50 MG per tablet   Commonly known as:  SENOKOT-S;PERICOLACE   Take 1-2 tablets by mouth 2 times daily Take while on oral narcotics to prevent or treat constipation.                                TYLENOL PO   Take 1,000 mg by mouth                                          More Information        Cornell Catheter Care    A Cornell catheter is a rubber tube that is placed through the urethra (opening where urine comes out) and into the bladder. This helps drain urine from the bladder. There is a small balloon on the end of the tube that is inflated after insertion. This keeps the catheter from sliding out of the bladder.  A Cornell catheter is used to treat urinary retention (unable to pass urine). It is also used when there is incontinence (loss of bladder control).  Home care    Finish taking any prescribed antibiotic even if you are feeling better before then.    It is important to keep bacteria from getting into the collection bag. Do not disconnect the catheter from the collection bag.    Use a leg band to secure the drainage tube, so it does not pull on the catheter. Drain the collection bag when it becomes full using the drain spout at the bottom of the bag.    Do not try to pull or remove your catheter. This will injure your urethra. It must be removed by your healthcare provider or nurse.  Follow-up care  Follow up with your healthcare provider as advised for repeat urine testing and catheter removal or replacement.  When to seek medical advice  Call your healthcare provider right away if any of these occur:    Fever of 100.4 F (38 C) or higher, or as directed by your healthcare provider    Bladder pain or fullness    Abdominal swelling, nausea or vomiting, or back pain    Blood or urine leakage around the catheter    Bloody urine coming from the catheter (if a new symptom)    Catheter falls out    Catheter stops draining for 6 hours    Weakness, dizziness, or fainting  Date Last Reviewed: 10/1/2016     [id number] [employer]. [address] [place] [address], [place] [address]. All rights reserved. This information is not intended as a substitute for professional medical care. Always follow your healthcare professional's instructions.

## 2018-01-30 NOTE — ANESTHESIA POSTPROCEDURE EVALUATION
Patient: Tigre Gillette    Procedure(s):  open epigastric hernia repair with mesh - Wound Class: I-Clean    Diagnosis:epigastric hernia  Diagnosis Additional Information: Pre-operative diagnosis: Epigastric Hernia  Post-operative diagnosis: Same   Procedure: Epigastric Herniorrhaphy with Bard Ventralex ST Hernia Patch (8 cm round)         Anesthesia Type:  General, LMA    Note:  Anesthesia Post Evaluation    Patient location during evaluation: PACU  Patient participation: Able to fully participate in evaluation  Level of consciousness: awake  Pain management: adequate  Airway patency: patent  Cardiovascular status: acceptable  Respiratory status: acceptable  Hydration status: acceptable  PONV: controlled     Anesthetic complications: None          Last vitals:  Vitals:    01/30/18 1425 01/30/18 1430 01/30/18 1450   BP:   147/69   Resp: 22 (!) 34    Temp:   97.6  F (36.4  C)   SpO2: 97% 99% 95%         Electronically Signed By: Carlos Carvajal MD  January 30, 2018  4:05 PM

## 2018-01-30 NOTE — ANESTHESIA CARE TRANSFER NOTE
Patient: Tigre Gillette    Procedure(s):  open epigastric hernia repair with mesh - Wound Class: I-Clean    Diagnosis: epigastric hernia  Diagnosis Additional Information: No value filed.    Anesthesia Type:   General, LMA     Note:  Airway :Face Mask  Patient transferred to:PACU  Comments: Pt spon resp, follows commands, LMA removed without complications. Pt tx to PACU, VSS, pt comfortable. Report given to  PACU RN.Handoff Report: Identifed the Patient, Identified the Reponsible Provider, Reviewed the pertinent medical history, Discussed the surgical course, Reviewed Intra-OP anesthesia mangement and issues during anesthesia, Set expectations for post-procedure period and Allowed opportunity for questions and acknowledgement of understanding      Vitals: (Last set prior to Anesthesia Care Transfer)    CRNA VITALS  1/30/2018 1301 - 1/30/2018 1338      1/30/2018             Pulse: 71    SpO2: 99 %    Resp Rate (observed): 8                Electronically Signed By: JERALD Quevedo CRNA  January 30, 2018  1:38 PM

## 2018-01-31 ENCOUNTER — TELEPHONE (OUTPATIENT)
Dept: SURGERY | Facility: CLINIC | Age: 78
End: 2018-01-31

## 2018-01-31 DIAGNOSIS — R33.9 URINE RETENTION: Primary | ICD-10-CM

## 2018-01-31 RX ORDER — TAMSULOSIN HYDROCHLORIDE 0.4 MG/1
0.4 CAPSULE ORAL DAILY
Qty: 7 CAPSULE | Refills: 0 | Status: SHIPPED | OUTPATIENT
Start: 2018-01-31 | End: 2019-07-22

## 2018-01-31 NOTE — TELEPHONE ENCOUNTER
S/p Epigastric hernia repair, 1/30/18  Surgeon:  Dr. Alas    Patient was sent home with calderon cath, that was placed late in the day yesterday at 8pm, as pt was unable to void after surgery.  He is calling this morning to schedule clinic appointment to have catheter removed.    Pt is anxious to have calderon removed as he reports it causes him discomfort when he is trying to have a BM.  States no previous problems with prostate or difficulty urinating.        Discussed with Gilda Jenkins PA-C.  Patient to start on Flomax today - take for one week. Scheduled for catheter removal tomorrow 2/1/18 at 10:30.  Pt agrees with plan.

## 2018-01-31 NOTE — TELEPHONE ENCOUNTER
Patient stopped by clinic after picking up rx.(Flomax). He has some pink tinged urine in calderon bag and wanted to be sure this is ok.    Upon pt exam their is a small amount of pink tinged urine in calderon bag.  Urine in calderon tubing is clear.  Likely due to irritation from catheter.    Denies any burning or low pelvic pain at this time.  States he had some discomfort this morning while sitting on toilet and believes this is when irritation occurred.      He will monitor and call clinic if any new blood/clots noted, or if burning or pain.  Clinic appointment tomorrow AM to ROBERT calderon.

## 2018-01-31 NOTE — OR NURSING
Pt post hernia repair unable to void.  Bladder scanned multiple times; 487 cc at 1841 and 564 cc at 2000.  Dr. Cm contacted to consult.  Calderon ordered with pt to call Dr. Alas's office to schedule removal. Calderon placed with 475 immediate return.  Instructions on calderon care given to pt and wife. Questions answered.

## 2018-02-01 ENCOUNTER — OFFICE VISIT (OUTPATIENT)
Dept: SURGERY | Facility: CLINIC | Age: 78
End: 2018-02-01
Payer: COMMERCIAL

## 2018-02-01 VITALS — WEIGHT: 165 LBS | HEIGHT: 74 IN | BODY MASS INDEX: 21.17 KG/M2

## 2018-02-01 DIAGNOSIS — Z09 SURGICAL FOLLOWUP VISIT: Primary | ICD-10-CM

## 2018-02-01 PROCEDURE — 99207 ZZC NO CHARGE NURSE ONLY: CPT

## 2018-02-01 NOTE — MR AVS SNAPSHOT
"              After Visit Summary   2/1/2018    Tigre Gillette    MRN: [id number]           Patient Information     Date Of Birth          1940        Visit Information        Provider Department      2/1/2018 10:30 AM Nurse, [facility] Hern[facility]      Today's Diagnoses     Surgical followup visit    -  1       Follow-ups after your visit        Your next 10 appointments already scheduled     Feb 12, 2018 10:30 AM CST   Post Op with Argelia Zepeda PA-C   [facility] ([facility]    [address]  [facility] [id number] [phone]              Who to contact     If you have questions or need follow up information about today's clinic visit or your schedule please contact [facility] directly at [phone].  Normal or non-critical lab and imaging results will be communicated to you by Consumer Physicshart, letter or phone within 4 business days after the clinic has received the results. If you do not hear from us within 7 days, please contact the clinic through MyChart or phone. If you have a critical or abnormal lab result, we will notify you by phone as soon as possible.  Submit refill requests through Storyvine or call your pharmacy and they will forward the refill request to us. Please allow 3 business days for your refill to be completed.          Additional Information About Your Visit        MyChart Information     Storyvine lets you send messages to your doctor, view your test results, renew your prescriptions, schedule appointments and more. To sign up, go to [email or web address] . Click on \"Log in\" on the left side of the screen, which will take you to the Welcome page. Then click on \"Sign up Now\" on the right side of the page.     You will be asked to enter the access code listed below, as well as some personal information. Please follow the directions to " "create your username and password.     Your access code is: [email or web address]  Expires: 2018  5:06 PM     Your access code will  in 90 days. If you need help or a new code, please call your [facility] clinic or [phone].        Care EveryWhere ID     This is your Care EveryWhere ID. This could be used by other organizations to access your [facility] medical records  [id number]        Your Vitals Were     Height BMI (Body Mass Index)                6' 2\" (1.88 m) 21.18 kg/m2           Blood Pressure from Last 3 Encounters:   18 152/71   18 138/80   18 142/80    Weight from Last 3 Encounters:   18 165 lb (74.8 kg)   18 165 lb (74.8 kg)   18 168 lb 6.4 oz (76.4 kg)              Today, you had the following     No orders found for display       Primary Care Provider Office Phone # Fax #    Lauren Tripp [phone]       [address]  [facility] [address]        Equal Access to Services     [facility]: Hadii aad ku hadasho Soelierali, waaxda luqadaha, qaybta kaalmada adeegyada, elmer viera . So [facility] [phone].    ATENCIÓN: Si habla español, tiene a soni disposición servicios gratuitos de asistencia lingüística. Llame al [phone].    We comply with applicable federal civil rights laws and [place] laws. We do not discriminate on the basis of race, color, national origin, age, disability, sex, sexual orientation, or gender identity.            Thank you!     Thank you for choosing [facility]  for your care. Our goal is always to provide you with excellent care. Hearing back from our patients is one way we can continue to improve our services. Please take a few minutes to complete the written survey that you may receive in the mail after your visit with us. Thank you!             Your Updated Medication List - Protect others around you: Learn how to safely use, store and throw away your medicines at " www.disposemymeds.org.          This list is accurate as of 2/1/18 11:44 AM.  Always use your most recent med list.                   Brand Name Dispense Instructions for use Diagnosis    ALEVE PO      Take 440 mg by mouth 2 times daily (with meals)        betamethasone valerate 0.1 % cream    VALISONE     Apply topically 2 times daily        cetirizine 10 MG tablet    zyrTEC     Take 10 mg by mouth daily        fluticasone-vilanterol 100-25 MCG/INH oral inhaler    BREO ELLIPTA     Inhale 1 puff into the lungs daily        INCRUSE ELLIPTA 62.5 MCG/INH oral inhaler   Generic drug:  umeclidinium      Inhale 1 puff into the lungs daily        senna-docusate 8.6-50 MG per tablet    SENOKOT-S;PERICOLACE    30 tablet    Take 1-2 tablets by mouth 2 times daily Take while on oral narcotics to prevent or treat constipation.    Epigastric hernia       tamsulosin 0.4 MG capsule    FLOMAX    7 capsule    Take 1 capsule (0.4 mg) by mouth daily    Urine retention       TYLENOL PO      Take 1,000 mg by mouth

## 2018-02-01 NOTE — NURSING NOTE
[facility] Note-Nursing  Subjective:  Tigre Gillette is here for his first postoperative visit.  He underwent Epigastric Herniorrhaphy with Bard Ventralex ST Hernia Patch (8 cm round)  by Dr. Alas.  He is now 2 days postop.  There was a Cornell catheter placed post operatively for urinary retention.  He has been compliant with activity restrictions since surgery.  He has no concerns about his recovery today.      Objective:  Incisions - clean, dry, intact, Durabond in place.  No erythema.    Cornell - clear light yellow urine.serous fluid in bulb.  Entire Cornell catheter drain removed without difficulty.    No complaints of pain.  Getting up and out of chair and exam table, unusually well for PO day 2.  Assessment:  Cornell Catheter removal; s/p Epigastric Herniorrhaphy with Bard Ventralex ST Hernia Patch (8 cm round) .    Plan:  Patient will Call- go to ED or urgent care, if he has not voided in 6 hours- or experiences pain and is unable to void.    Tigre will RTC to see clinic PA- C  in 1-3 weeks for his regular post-operative appointment.  He will get further instruction regarding activity and weight restrictions at that time.  Until then, he was recommended to remain at a 15 lb limit   He will call the office at any time if he has further questions or concerns.  Kerri Couch RN

## 2018-02-12 ENCOUNTER — OFFICE VISIT (OUTPATIENT)
Dept: SURGERY | Facility: CLINIC | Age: 78
End: 2018-02-12
Payer: COMMERCIAL

## 2018-02-12 VITALS
HEART RATE: 72 BPM | BODY MASS INDEX: 21.17 KG/M2 | RESPIRATION RATE: 16 BRPM | WEIGHT: 165 LBS | OXYGEN SATURATION: 99 % | SYSTOLIC BLOOD PRESSURE: 130 MMHG | HEIGHT: 74 IN | DIASTOLIC BLOOD PRESSURE: 74 MMHG

## 2018-02-12 DIAGNOSIS — Z09 SURGICAL FOLLOWUP VISIT: Primary | ICD-10-CM

## 2018-02-12 PROCEDURE — 99024 POSTOP FOLLOW-UP VISIT: CPT | Performed by: PHYSICIAN ASSISTANT

## 2018-02-12 NOTE — PROGRESS NOTES
2018    Re:  Tigre Gillette   :  1940      Dear Dr. Tripp,    I had the pleasure of seeing Tigre today in follow-up after his ventral hernia repair with mesh on 2018 by Dr. Alas. The patient tolerated the procedure well. Tigre is happy to report that his preoperative symptoms have improved. He is now tolerating a regular diet and having normal bowel movements. He denies abdominal pain today.    On exam, the patient's incision is healing well without signs of infection. A normal healing ridge is present at the incision site, as expected. His abdomen is soft and nontender.     Tigre is recovering well postoperatively. We will be happy to see him in the future as needed. He was encouraged to call us with any questions or concerns.      Sincerely,           Argelia Zepeda PA-C            Please route or send letter to:  Primary Care Provider (PCP)

## 2018-02-12 NOTE — MR AVS SNAPSHOT
"              After Visit Summary   2018    Tigre Gillette    MRN: [id number]           Patient Information     Date Of Birth          1940        Visit Information        Provider Department      2018 10:30 AM Argelia Zepeda PA-C [facility] Hern[facility]      Today's Diagnoses     Surgical followup visit    -  1       Follow-ups after your visit        Who to contact     If you have questions or need follow up information about today's clinic visit or your schedule please contact [facility] directly at [phone].  Normal or non-critical lab and imaging results will be communicated to you by [email or web address], letter or phone within 4 business days after the clinic has received the results. If you do not hear from us within 7 days, please contact the clinic through [email or web address] or phone. If you have a critical or abnormal lab result, we will notify you by phone as soon as possible.  Submit refill requests through [email or web address] or call your pharmacy and they will forward the refill request to us. Please allow 3 business days for your refill to be completed.          Additional Information About Your Visit        MyChart Information     [email or web address] lets you send messages to your doctor, view your test results, renew your prescriptions, schedule appointments and more. To sign up, go to [email or web address] . Click on \"Log in\" on the left side of the screen, which will take you to the Welcome page. Then click on \"Sign up Now\" on the right side of the page.     You will be asked to enter the access code listed below, as well as some personal information. Please follow the directions to create your username and password.     Your access code is: [email or web address]  Expires: 2018  5:06 PM     Your access code will  in 90 days. If you need help or a new code, please call your [facility] clinic or [phone].        Care EveryWhere ID     This is " "your Care EveryWhere ID. This could be used by other organizations to access your [facility] medical records  [id number]        Your Vitals Were     Pulse Respirations Height Pulse Oximetry BMI (Body Mass Index)       72 16 1.88 m (6' 2\") 99% 21.18 kg/m2        Blood Pressure from Last 3 Encounters:   02/12/18 130/74   01/30/18 152/71   01/26/18 138/80    Weight from Last 3 Encounters:   02/12/18 74.8 kg (165 lb)   02/01/18 74.8 kg (165 lb)   01/30/18 74.8 kg (165 lb)              Today, you had the following     No orders found for display       Primary Care Provider Office Phone # Fax #    Lauren Tripp [phone]       [address]  [facility] [address]        Equal Access to Services     [facility]: Hadii juan gomes hadasho Soomaali, waaxda luqadaha, qaybta kaalmada adeegyada, elmer viera . So [facility] [phone].    ATENCIÓN: Si habla español, tiene a soni disposición servicios gratuitos de asistencia lingüística. Dennis al [phone].    We comply with applicable federal civil rights laws and [place] laws. We do not discriminate on the basis of race, color, national origin, age, disability, sex, sexual orientation, or gender identity.            Thank you!     Thank you for choosing [facility]  for your care. Our goal is always to provide you with excellent care. Hearing back from our patients is one way we can continue to improve our services. Please take a few minutes to complete the written survey that you may receive in the mail after your visit with us. Thank you!             Your Updated Medication List - Protect others around you: Learn how to safely use, store and throw away your medicines at www.disposemymeds.org.          This list is accurate as of 2/12/18 10:41 AM.  Always use your most recent med list.                   Brand Name Dispense Instructions for use Diagnosis    ALEVE PO      Take 440 mg by mouth 2 times daily " (with meals)        betamethasone valerate 0.1 % cream    VALISONE     Apply topically 2 times daily        cetirizine 10 MG tablet    zyrTEC     Take 10 mg by mouth daily        fluticasone-vilanterol 100-25 MCG/INH oral inhaler    BREO ELLIPTA     Inhale 1 puff into the lungs daily        INCRUSE ELLIPTA 62.5 MCG/INH oral inhaler   Generic drug:  umeclidinium      Inhale 1 puff into the lungs daily        senna-docusate 8.6-50 MG per tablet    SENOKOT-S;PERICOLACE    30 tablet    Take 1-2 tablets by mouth 2 times daily Take while on oral narcotics to prevent or treat constipation.    Epigastric hernia       tamsulosin 0.4 MG capsule    FLOMAX    7 capsule    Take 1 capsule (0.4 mg) by mouth daily    Urine retention       TYLENOL PO      Take 1,000 mg by mouth

## 2019-07-22 ENCOUNTER — ANCILLARY PROCEDURE (OUTPATIENT)
Dept: GENERAL RADIOLOGY | Facility: CLINIC | Age: 79
End: 2019-07-22
Attending: INTERNAL MEDICINE
Payer: COMMERCIAL

## 2019-07-22 ENCOUNTER — OFFICE VISIT (OUTPATIENT)
Dept: INTERNAL MEDICINE | Facility: CLINIC | Age: 79
End: 2019-07-22
Payer: COMMERCIAL

## 2019-07-22 VITALS
SYSTOLIC BLOOD PRESSURE: 144 MMHG | TEMPERATURE: 98 F | HEART RATE: 75 BPM | OXYGEN SATURATION: 98 % | WEIGHT: 166.2 LBS | DIASTOLIC BLOOD PRESSURE: 80 MMHG | BODY MASS INDEX: 21.34 KG/M2

## 2019-07-22 DIAGNOSIS — M54.50 BILATERAL LOW BACK PAIN WITHOUT SCIATICA, UNSPECIFIED CHRONICITY: ICD-10-CM

## 2019-07-22 DIAGNOSIS — M54.50 BILATERAL LOW BACK PAIN WITHOUT SCIATICA, UNSPECIFIED CHRONICITY: Primary | ICD-10-CM

## 2019-07-22 PROCEDURE — 72100 X-RAY EXAM L-S SPINE 2/3 VWS: CPT

## 2019-07-22 PROCEDURE — 99214 OFFICE O/P EST MOD 30 MIN: CPT | Performed by: INTERNAL MEDICINE

## 2019-07-22 RX ORDER — HYDROCORTISONE 2.5 %
CREAM (GRAM) TOPICAL PRN
COMMUNITY
Start: 2018-04-20

## 2019-07-22 NOTE — PROGRESS NOTES
Subjective     Tigre Gillette is a 79 year old male who presents to clinic today for the following health issues:    HPI   Back pain: He has had problems with back pain for at least 8 to 9 years in the past he had had chiropractic treatments.  He did have an MRI there in 2011.  He has some physical therapy which seemed to help for a while.   he also had been followed at [facility], had an MRI in around 2012.  He is tried to continue working with regular exercises but it is not helping as much.  He had some significant increase in his back pain a few weeks ago.  This is mostly across the low back, not constant but will be present much of the day.  It tends to be worse later in the day, bothersome standing.  Walking short-term can feel a little better but he cannot walk long distances without increasing pain.  Sitting bothers pain.  Can bother her occasionally.  Sometimes he will feel it radiating a little to the upper back, occasionally in the buttock muscles.  He tries ice which may seem to help.  He does not have significant radiation into the legs, numbness, tingling or weakness in the legs.          Patient Active Problem List   Diagnosis     CARDIOVASCULAR SCREENING; LDL GOAL LESS THAN 130     COPD (chronic obstructive pulmonary disease) (H)     Degenerative arthritis of lumbar spine     Current Outpatient Medications   Medication Sig Dispense Refill     cetirizine (ZYRTEC) 10 MG tablet Take 10 mg by mouth daily       fluticasone-vilanterol (BREO ELLIPTA) 100-25 MCG/INH oral inhaler Inhale 1 puff into the lungs daily       hydrocortisone 2.5 % cream        Naproxen Sodium (ALEVE PO) Take 440 mg by mouth 2 times daily (with meals)        umeclidinium (INCRUSE ELLIPTA) 62.5 MCG/INH oral inhaler Inhale 1 puff into the lungs daily       Acetaminophen (TYLENOL PO) Take 1,000 mg by mouth       betamethasone valerate (VALISONE) 0.1 % cream Apply topically 2 times daily                Past Surgical History:    Procedure Laterality Date     HERNIORRHAPHY EPIGASTRIC N/A 1/30/2018    Procedure: HERNIORRHAPHY EPIGASTRIC;  Epigastric herniorrhaphy with Bard Ventralex ST Hernia Patch ;  Surgeon: Rossana Alas MD;  Location: [facility] OR     HERNIORRHAPHY INGUINAL  2010    open recurrent LIH rpr with mesh     HERNIORRHAPHY INGUINAL  1990    open LIH rpr with mesh     TONSILLECTOMY & ADENOIDECTOMY      t&a      Social History     Tobacco Use     Smoking status: Heavy Tobacco Smoker     Packs/day: 0.50     Types: Cigarettes     Smokeless tobacco: Never Used   Substance Use Topics     Alcohol use: Yes     Comment: rare      Drug use: No      Reviewed and updated as needed this visit by Provider         Review of Systems   No fever, chills, numbness, tingling or weakness, no loss of control of bowel or bladder, no gait changes, no abdominal pain, no hematuria.        Objective    /80 (BP Location: Left arm, Patient Position: Sitting, Cuff Size: Adult Regular)   Pulse 75   Temp 98  F (36.7  C) (Oral)   Wt 75.4 kg (166 lb 3.2 oz)   SpO2 98%   BMI 21.34 kg/m    Body mass index is 21.34 kg/m .  Physical Exam     Back: There is some tenderness diffusely across lumbar back.  Range of motion is mildly decreased with flexion, extension, fairly preserved lateral flexion and rotation.  Stance: Normal, gait normal  DTRs 2/4  Strength: 5/5  Sensation intact    X-ray of lumbar spine with some degenerative changes, no spondylolisthesis or other structural abnormality        Assessment & Plan     1. Bilateral low back pain without sciatica, unspecified chronicity  She has been in her chronic condition with evidence of degenerative changes on previous MRIs.  Gradually progressive.  Recommend repeat MRI, depending on those results may refer on to spine clinic or to pain clinic for management.  No acute red flags to suggest need for emergent assessment.  Advised on over-the-counter medications, probably just for pain management for  now.  - XR Lumbar Spine 2/3 Views; Future  - MR Lumbar Spine w/o Contrast; Future       No follow-ups on file.    Lauren Tripp MD  [facility]

## 2019-07-30 ENCOUNTER — TELEPHONE (OUTPATIENT)
Dept: INTERNAL MEDICINE | Facility: CLINIC | Age: 79
End: 2019-07-30

## 2019-07-30 NOTE — TELEPHONE ENCOUNTER
Reason for Call:  Other call back    Detailed comments: Patient called because he has not been contacted by scheduling to get the MRI that Dr. Tripp discussed at his appointment on 7/22/19. Please call him back to advise.    Phone Number Patient can be reached at: Cell number on file:    Telephone Information:   Mobile [phone]       Best Time: any    Can we leave a detailed message on this number? YES    Call taken on 7/30/2019 at 11:01 AM by Hallie Smith

## 2019-08-01 ENCOUNTER — HOSPITAL ENCOUNTER (OUTPATIENT)
Dept: MRI IMAGING | Facility: CLINIC | Age: 79
Discharge: HOME | End: 2019-08-01
Attending: INTERNAL MEDICINE | Admitting: INTERNAL MEDICINE
Payer: COMMERCIAL

## 2019-08-01 DIAGNOSIS — M54.50 BILATERAL LOW BACK PAIN WITHOUT SCIATICA, UNSPECIFIED CHRONICITY: ICD-10-CM

## 2019-08-01 PROCEDURE — 72148 MRI LUMBAR SPINE W/O DYE: CPT

## 2019-08-05 NOTE — PROGRESS NOTES
[facility]     Date of visit: 8/6/2019    Reason for consultation:    Tigre Gillette is a 79 year old male who is seen in consultation today at the request of his PCP,  Lauren Tripp for evaluation of his pain issues and recommendations for management, with specific emphasis on  Reason for Referral: Evaluation for comprehensive services- patients will be evaluated if appropriate for comprehensive service including medication changes, procedures, pain psychology, and pain physical therapy.  While involved with comprehensive services, pain providers will work with referring provider/PCP to stabilize appropriate medication management, with long-term plan of transition of prescribing back to referring provider/PCP upon completion of comprehensive services.      Please complete the following questions:    Do you have any specific questions for the pain specialist? No    Are there any red flags that may impact the assessment or management of the patient? None      What is your diagnosis for the patient's pain? Degenerative disk disease lumbar spine.     Please see the [facility] health questionnaire which the patient completed and reviewed with me in detail.    Review of [place] Prescription Monitoring Program (): No concern for abuse or misuse of controlled medications based on this report. No .    Pain medications are being prescribed by NA.     Subjective:    Chief Complaint:    Chief Complaint   Patient presents with     Pain       Pain history:  Tigre Gillette is a 79 year old male who presents for initial evaluation of chief complaint of low back pain. He presents with his wife.     He first started having problems with low back pain 25 years ago. Insidious onset, without acute precipitating event. He started chiropractic care with good benefit, went on a regular basis for years at various locations. He stopped in 2012 so that he could start physical therapy.  Physical therapy was somewhat helpful. He was referred to [facility] neurosurgery, was told that surgery was not recommended. He recently completed physical therapy, has had some benefit but not as much as previous. He has occasional muscle spasm, reports this responds well to ice. He walks on a daily basis. He has intermittently practiced stretches and exercises, not recently. The pain is located in bilateral low back, denies radiation. He does have some tingling in right foot, rarely in left foot. Denies weakness.     Pain description:  Location: low back  Quality: aching, sharp, shooting  Severity/Intensity: 2/10 at best, 9/10 at worst, 7-8/10 on average  Aggravating factors include: pulling weeds, standing still for long periods  Relieving factors include: walking usually, changing positions, Tylenol/naproxen    The patient otherwise denies bowel or bladder incontinence, parasthesias, weakness, saddle anesthesia, unintentional weight loss, or fever/chills/sweats.     Tigre Gillette has not been seen at a pain clinic in the past.      Pain Treatments:  (H--helped; HI--Helped initially; [facility]--Somewhat helpful; [facility]--No help; W--worse; SE--side effects; ?--Unsure if helpful)   1. Medications:       Current pain medications:   Naproxen 440mg BID- [facility], notes only when forgets    Tylenol 500mg BID (afternoon and bedtime)- [facility], wakes up in the middle of the night when wears off, recently added 2 weeks ago per PCP   Lidocaine cream/roll on prn- [facility]    Current calculated MME: 0    1. Previous Pain Relevant Medications:  NOTE: This medication information taken from patient's intake form, not medical records.    Opiates: Norco- [facility] (given post op)   NSAIDS: ibuprofen- [facility], naproxen- [facility], more so    Muscle Relaxants: no   Anti-migraine mediations: no   Anti-depressants: no   Sleep aids: no   Anxiolytics: no   Neuropathics: no    Topicals: lidocaine cream/roll on- [facility]   Other medications not covered above: Tylenol- [facility]    2.  Physical Therapy: twice as recent as [facility] 2016- [facility]   3. Pain Psychology: no  4. Surgery: no  5. Injections: no  6. Chiropractic: yes for years with different providers with good benefit, stopped in 2012 as he was starting physical therapy  7. Acupuncture: no  8. TENS Unit: no    Imaging:  MRI of lumbar spine was completed on 8/1/19 and shows:  T12-L1, L1-L2, L2-L3: Normal.    L3-L4: Shallow symmetric disc bulge with minimal facet degenerative  changes. Minimal contact of the posterior leftward aspect of the disc  and the traversing left L4 nerve root, without significant mass  effect/displacement or narrowing of the lateral recess. There is no  spinal or lateral recess stenosis. No neural foraminal stenosis.     L4-L5: Shallow symmetric disc bulge with mild facet arthropathy and  ligamentum flavum thickening results in minimal narrowing of the  lateral recess without central spinal stenosis. No significant neural  foraminal stenosis.     L5-S1: Shallow disc bulge with small superimposed disc protrusion. No  spinal, lateral recess, or neural foraminal stenosis.     Extraspinal findings: Suggestion of sigmoid colonic diverticulosis,  incompletely evaluated. Multiple T2 hyperintense lesions of the right  kidney are incompletely evaluated, but presumably represent cysts.                                                                      IMPRESSION:  1. Mild degenerative changes of the lumbar spine. No significant  spinal or neural foraminal stenosis.  2. 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    Past Medical History:  Past Medical History:   Diagnosis Date     COPD (chronic obstructive pulmonary disease) (H)      Degenerative arthritis of lumbar spine     spine     Gastroesophageal reflux disease        Past Surgical History:  Past Surgical History:   Procedure Laterality Date     HERNIORRHAPHY EPIGASTRIC N/A 1/30/2018    Procedure: HERNIORRHAPHY EPIGASTRIC;  Epigastric  herniorrhaphy with Bard Ventralex ST Hernia Patch ;  Surgeon: Rossana Alas MD;  Location: [facility] OR     HERNIORRHAPHY INGUINAL  2010    open recurrent LIH rpr with mesh     HERNIORRHAPHY INGUINAL  1990    open LIH rpr with mesh     TONSILLECTOMY & ADENOIDECTOMY      t&a       Medications:  Current Outpatient Medications   Medication Sig Dispense Refill     Acetaminophen (TYLENOL PO) Take 1,000 mg by mouth       betamethasone valerate (VALISONE) 0.1 % cream Apply topically 2 times daily       cetirizine (ZYRTEC) 10 MG tablet Take 10 mg by mouth daily       fluticasone-vilanterol (BREO ELLIPTA) 100-25 MCG/INH oral inhaler Inhale 1 puff into the lungs daily       hydrocortisone 2.5 % cream        Naproxen Sodium (ALEVE PO) Take 440 mg by mouth 2 times daily (with meals)        umeclidinium (INCRUSE ELLIPTA) 62.5 MCG/INH oral inhaler Inhale 1 puff into the lungs daily         Allergies:     Allergies   Allergen Reactions     Amoxicillin        Social History:  Home situation: lives in a house  Support system: wife and daughter  Occupation/Schooling: retired   Tobacco use: less than 1/2 PPD  Drug use: no  Alcohol use: a beer a week, hx of alcoholism, sober 1991  History of chemical dependency treatment: for alcoholism 1991  Mental health admissions: no    Family history:  Family History   Family history unknown: Yes       Review of Systems:    POSTIVE IN BOLD  GENERAL: fever/chills, fatigue, general unwell feeling, weight gain/loss.  HEAD/EYES:  headache, dizziness, or vision changes.    EARS/NOSE/THROAT: nosebleeds, hearing loss, sinus infection, earache, tinnitus.  IMMUNE:  allergies, cancer, immune deficiency, or infections.  SKIN:  itching, rash, hives  HEME/Lymphatic: anemia, easy bruising, easy bleeding.  RESPIRATORY: cough, wheezing, or shortness of breath  CARDIOVASCULAR/Circulation: extremity edema, syncope, hypertension, tachycardia, or angina.  GASTROINTESTINAL: abdominal pain,  nausea/emesis, diarrhea, constipation,  hematochezia, or melena.  ENDOCRINE:  diabetes, steroid use,  thyroid disease or osteoporosis.  MUSCULOSKELETAL: joint pain, stiffness, neck pain, back pain, arthritis, or gout.  GENITOURINARY: frequency, urgency, dysuria, difficulty voiding, hematuria or incontinence.  NEUROLOGIC: weakness, numbness, paresthesias, seizure, tremor, stroke or memory loss.  PSYCHIATRIC: depression, anxiety, stress, suicidal thoughts or mood swings.     Objective:    Physical Exam:  Vitals:    08/06/19 1020   BP: (!) 143/81   Pulse: 59   SpO2: 98%     Exam:  Constitutional: Well developed, well nourished, appears stated age.  HEENT: Head atraumatic, normocephalic. Eyes without conjunctival injection or jaundice. Oropharynx clear. Neck supple. No obvious neck masses.   Skin: No rash, lesions, or petechiae of exposed skin.   Extremities: Peripheral pulses intact. No clubbing, cyanosis, or edema.  Psychiatric/mental status: Alert, without lethargy or stupor. Speech fluent. Appropriate affect. Mood normal. Able to follow commands without difficulty.     Musculoskeletal exam:  Able to walk on the heels and toes with difficulty. Patient has a slightly antalgic gait.   Normal bulk and tone. Unremarkable spinal curvature.     Cervical spine:  Range of motion decreased.  Tenderness in the cervical paraspinal muscles.No    Thoracic spine:    Kyphosis. No   Tenderness in the thoracic paraspinal muscles.No    Lumbar spine:    Flex:  80 degrees   Ext: 25 degrees   Tenderness in the lumbar paraspinal muscles.No   Rotation/ext to right: painful    Rotation/ext to left: pain free    Myofascial tenderness:  no  Focal tenderness: No SI joint, gluteal, piriformis, or GT tenderness  Straight leg raise: negative   FADIR: negative     Hip exam:   Normal internal and external range of motion bilaterally. CEDRIC negative .     Neurologic exam:  CN:  Cranial nerves 2-12 are grossly intact  Motor:  5/5 UE and LE  strength  Strength:       C4 (shoulder shrug)  symmetric 5/5       C5 (shoulder abduction) symmetric 5/5       C6 (elbow flexion) symmetric 5/5       C7 (elbow extension) symmetric 5/5       C8 (finger abduction, thumb flexion) symmetric 5/5    Reflexes:     Biceps:     R:  2/4 L: 2/4   Brachioradialis   R:  2/4 L: 2/4   Patella:  R:  1/4 L: 1/4   Achilles:  R:  2/4 L: 2/4  Other reflexes:    No ankle clonus     Sensory:   Light touch: normal bilateral upper and lower extremities    No allodynia, dysesthesia, or hyperalgesia.    DIRE Score for ongoing opioid management is calculated as follows:   Diagnosis = 2 pts (slowly progressive; moderate pain/objective findings)   Intractability = 2 pts (most treatments tried; patient not fully engaged/barriers)   Risk    Psych = 3 pts (no significant personality dysfunction/mental illness; good communication with clinic)    Chem Hlth = 2 pts (use of medications to cope with stress; chemical dependency in remission)   Reliability = 3 pts (highly reliable with meds, appointments, treatments)   Social = 3 pts (supportive family/close relationships; involved in work/school; no isolation)   (Psych + Chem hlth + Reliability + Social) = 15     Efficacy = 2 pts (moderate benefit/function; low med dose; too early/not tried meds)         DIRE Score = 17        7-13: likely NOT suitable candidate for long-term opioid analgesia       14-21: may be a suitable candidate for long-term opioid analgesia     Assessment:  Tigre Gillette is a 79 year old male with a past medical history significant for GERD and COPD who presents with complaints of low back pain.     1. Low back pain- etiology likely mild degenerative changes with overlying myofascial component.   2. Mental Health - the patient's mental health concerns affect his experience of pain and contribute to his clinically significant distress.    1. Chronic bilateral low back pain without sciatica    2. Lumbosacral spondylosis without  myelopathy        Plan:  The following recommendations were given to the patient. Diagnosis, treatment options, risks, benefits, and alternatives were discussed, and all questions were answered. The patient expressed understanding of the plan for management.     I am recommending a multidisciplinary treatment plan to help this patient better manage his pain.  This includes:      1.  Pain Physical Therapy:  YES   Would recommend this resource but would like to prioritize chiropractic and medication adjustments first. Advised he consider, plan to discuss at next visit.    2.  Pain Psychologist to address relaxation, behavioral change, coping style, and other factors important to improvement.  Not at this time.    3.  Medication Management:     1. Continue Naproxen and Lidocaine roll on a consistent basis.    2. Tigre started Tylenol 2 weeks ago with good benefit, currently taking 500mg BID. He notes he wakes up with pain in the middle of the night. We discussed that I recommend a dose increase. Suggested 1000mg at bedtime or 500mg in the middle of the night prn. See safe dosing provided.  Acetaminophen (tylenol) comes in 325mg and 500mg sizes.  In the situation of normal liver function, you can take up to 1000mg per dose.  You should limit to no more than 3,000mg per day.  Acetaminophen is available in many over the counter medications including cold medications, so make sure to read labels.   4.  Potential procedures: could potentially consider lumbar facet injections, briefly discussed today. Not at this time.     5.  Referrals: chiropractic care was helpful years ago but stopped due to initiation of physical therapy. He is interested in restarting. Chiropractic care ordered today. Would also recommend addition of acupuncture. He will call to check insurance coverage.    6.  Follow up with JERALD Eckert CNP in 4 weeks.    7.  Tigre to follow up with Primary Care provider regarding elevated blood  pressure.      Review of Electronic Chart: Today I have also reviewed available medical information in the patient's medical record at [facility] ([facility]), including relevant provider notes, laboratory work, and imaging.       I spent 60 minutes of time face to face with the patient.  Greater than 50% of this time was spent in patient counseling and/or coordination of care regarding principles of multidisciplinary care, medication management, and treatment options as discussed above.      JERALD Eckert CNP  [facility]

## 2019-08-06 ENCOUNTER — OFFICE VISIT (OUTPATIENT)
Dept: PALLIATIVE MEDICINE | Facility: CLINIC | Age: 79
End: 2019-08-06
Payer: COMMERCIAL

## 2019-08-06 VITALS — OXYGEN SATURATION: 98 % | SYSTOLIC BLOOD PRESSURE: 143 MMHG | HEART RATE: 59 BPM | DIASTOLIC BLOOD PRESSURE: 81 MMHG

## 2019-08-06 DIAGNOSIS — G89.29 CHRONIC BILATERAL LOW BACK PAIN WITHOUT SCIATICA: Primary | ICD-10-CM

## 2019-08-06 DIAGNOSIS — M54.50 CHRONIC BILATERAL LOW BACK PAIN WITHOUT SCIATICA: Primary | ICD-10-CM

## 2019-08-06 DIAGNOSIS — M47.817 LUMBOSACRAL SPONDYLOSIS WITHOUT MYELOPATHY: ICD-10-CM

## 2019-08-06 PROCEDURE — 99215 OFFICE O/P EST HI 40 MIN: CPT | Performed by: NURSE PRACTITIONER

## 2019-08-06 ASSESSMENT — PAIN SCALES - GENERAL: PAINLEVEL: EXTREME PAIN (8)

## 2019-08-06 NOTE — PATIENT INSTRUCTIONS
1.  Pain Physical Therapy:  YES   Can consider in the future, would recommend.    2.  Pain Psychologist to address relaxation, behavioral change, coping style, and other factors important to improvement.  Not at this time.    3.  Medication Management:     1. Continue Naproxen and Lidocaine roll on a consistent basis.    2. Recommend increasing amount of Tylenol you are taking. Consider taking 1000mg at bedtime or 500mg in the middle of the night. See safe dosing below.  Acetaminophen (tylenol) comes in 325mg and 500mg sizes.  In the situation of normal liver function, you can take up to 1000mg per dose.  You should limit to no more than 3,000mg per day.  Acetaminophen is available in many over the counter medications including cold medications, so make sure to read labels.   4.  Potential procedures: not at this time.     5.  Referrals: chiropractic care ordered today. Consider addition of acupuncture. Call to check insurance coverage.    6.  Follow up with JERALD Eckert CNP in 4 weeks.       ----------------------------------------------------------------  Clinic Number:  [phone]     Call with any questions about your care and for scheduling assistance.     Calls are returned Monday through Friday between 8 AM and 4:30 PM. We usually get back to you within 2 business days depending on the issue/request.    If we are prescribing your medications:    For opioid medication refills, call the clinic or send a appiris message 7 days in advance.  Please include:    Name of requested medication    Name of the pharmacy.    For non-opioid medications, call your pharmacy directly to request a refill. Please allow 3-4 days to be processed.     Per [place] State Law:    All controlled substance prescriptions must be filled within 30 days of being written.      For those controlled substances allowing refills, pickup must occur within 30 days of last fill.      We believe regular attendance is key to your success in our  program!      Any time you are unable to keep your appointment we ask that you call us at least 24 hours in advance to cancel.This will allow us to offer the appointment time to another patient.     Multiple missed appointments may lead to dismissal from the clinic.

## 2019-08-12 ENCOUNTER — MYC MEDICAL ADVICE (OUTPATIENT)
Dept: PALLIATIVE MEDICINE | Facility: CLINIC | Age: 79
End: 2019-08-12

## 2019-08-12 NOTE — TELEPHONE ENCOUNTER
Chente Landeros,     I reviewed Mesha Henry's office note with you and it looks like it would be ok for you to reschedule to see her after you see the chiropractor. Sounds like she wanted you to follow up with chiropractic, then possibly with our pain physical therapy depending on how chiropractic appointment went. Feel free to call to move that appointment with her out a bit further. [phone]    Dagmar POLANCON, RN Care Coordinator  [facility]

## 2019-09-17 ENCOUNTER — THERAPY VISIT (OUTPATIENT)
Dept: CHIROPRACTIC MEDICINE | Facility: CLINIC | Age: 79
End: 2019-09-17
Payer: COMMERCIAL

## 2019-09-17 DIAGNOSIS — M99.03 SEGMENTAL DYSFUNCTION OF LUMBAR REGION: ICD-10-CM

## 2019-09-17 DIAGNOSIS — M99.02 SEGMENTAL DYSFUNCTION OF THORACIC REGION: ICD-10-CM

## 2019-09-17 DIAGNOSIS — M99.04 SEGMENTAL DYSFUNCTION OF SACRAL REGION: Primary | ICD-10-CM

## 2019-09-17 DIAGNOSIS — M54.9 MECHANICAL BACK PAIN: ICD-10-CM

## 2019-09-17 PROCEDURE — 98941 CHIROPRACT MANJ 3-4 REGIONS: CPT | Mod: AT | Performed by: CHIROPRACTOR

## 2019-09-17 PROCEDURE — 99203 OFFICE O/P NEW LOW 30 MIN: CPT | Mod: GA | Performed by: CHIROPRACTOR

## 2019-09-17 PROCEDURE — 97035 APP MDLTY 1+ULTRASOUND EA 15: CPT | Mod: GA | Performed by: CHIROPRACTOR

## 2019-09-17 NOTE — PROGRESS NOTES
Answers for HPI/ROS submitted by the patient on 9/12/2019   History Reported by Patient  Reason for Visit:: Lower back pain  How problem occurred:: Progressive over the last couple of decades.  Number scale: 6/10  Pain quality: aching  Frequency: constant  Pain is: worse during the day, worse during the night  Progression since onset: unchanged  Special tests: MRI, x-ray  Previous treatment: physical therapy, chiropractic  Improvement with previous treatment: none  General health as reported by patient: good  Please check all that apply to your current or past medical history: smoking  Surgeries: other  Other Surgery Detail: hernia surgery 3 times  Medications you are currently taking: other  Other Meds Detail: My medications are listed above.  Medical allergies: adhesives  Occupation:: Retired  What are your primary job tasks: other  Other Tasks Detail: Household, yard maintenance, repair and cleaning.  Barriers at home/work: none as reported by patient  Red flags: other      Initial Chiropractic Clinic Visit    PCP: Lauren Tripp    Tigre Gillette is a 79 year old male who is seen  as a self referral presenting with LBP that he has had for years but has worsened recently. He saw his PCP, they did xray and MRI, sent him to pain management, was lined up with a chiropractor in [place] who didn't carry his insurance. He was told that the L5/S1 disc is worn out. He points to pain in this general area, with the muscle laterally. Ice helps. He takes Naproxcyn for pain. His sleep has been interrupted. His PCP told him to take more Tylenol, 2-4 times per day. The pain worsens throughout the day. Sitting causes the worst pain. He rates his current pain 8/10 and debilitating. This is for short durations and he does things to make the pain better, adjusts sitting, hand behind back, etc. He denies radiation of symptoms, but he has tingling in his bilateral toes. He has been to a chiropractor in the past which was helpful. He  has done PT and it just wasn't helpful anymore.     Injury: Not aware of any.     Location of Pain: bilateral and central LBP   Duration of Pain: 25 years, several months  Rating of Pain at worst: 10/10  Rating of Pain Currently: 8/10  Symptoms are better with: Ice and Tylenol  Symptoms are worse with: sitting  Additional Features: tingling in toes     Health History  as reported by the patient:    How does the patient rate their own health:   Good    Current or past medical history:   Smoking - 1/2 pack, COPD    Medical allergies:  Amoxicillin    Past Traumas/Surgeries:  Hernia surgery    Family History:  Strokes, RA, heart issues    Medications:  As currently listed    Occupation:  Retired; ; always been active    Primary job tasks:   Very active person, states he is Type A    Barriers as home/work:   active            Tigre was asked to complete the Oswestry Low Back Disability Index and Kvng Start Back screening tool, today in the office.  Patient states he did them online.     Review of Systems  Musculoskeletal: as above  Remainder of review of systems is negative including constitutional, CV, pulmonary, GI, Skin and Neurologic except as noted in HPI or medical history.    Past Medical History:   Diagnosis Date     COPD (chronic obstructive pulmonary disease) (H)      Degenerative arthritis of lumbar spine     spine     Gastroesophageal reflux disease      Past Surgical History:   Procedure Laterality Date     HERNIORRHAPHY EPIGASTRIC N/A 1/30/2018    Procedure: HERNIORRHAPHY EPIGASTRIC;  Epigastric herniorrhaphy with Bard Ventralex ST Hernia Patch ;  Surgeon: Rossana Alas MD;  Location: [facility] OR     HERNIORRHAPHY INGUINAL  2010    open recurrent LIH rpr with mesh     HERNIORRHAPHY INGUINAL  1990    open LIH rpr with mesh     TONSILLECTOMY & ADENOIDECTOMY      t&a     Objective  There were no vitals taken for this visit.      GENERAL APPEARANCE: healthy, alert and no distress    GAIT: NORMAL  SKIN: no suspicious lesions or rashes  NEURO: Normal strength and tone, mentation intact and speech normal  PSYCH:  mentation appears normal and affect normal/bright    Low back exam:    Inspection:       no visible deformity in the low back    ROM:       WNL    Tender:  Central L5 region      Strength:       ankle dorsiflexion 5/5 Normal       ankle plantarflexion 5/5 Normal       dorsiflexion of the great toe 5/5 Normal    Reflexes:       patellar (L3, L4) 2 bilaterally    Sensation:      grossly intact throughout lower extremities    Special tests:  Milgrams - negative, Valsalva - negative, Kemps - Right negative and Left negative, SLR - Right negative and Left negative, Gaenslen's - Right negative and Left negative, Fabere - Right negative and Left negative, Yeoman's - Right negative and Left negative, Bryson - Right negative and Left negative and Ely's - Right negative and Left negative    Segmental spinal dysfunction/restrictions found at:  T10, L4  and PSIS Right       Muscle spasm found in:Lumbar erector spine      Radiology: Lumbar MRI 8/1/2019  IMPRESSION:  1. Mild degenerative changes of the lumbar spine. No significant  spinal or neural foraminal stenosis.  2. 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    Assessment:    No diagnosis found.    RX ordered/plan of care: Mechanical lower back pain, likely related to degenerative changes, with associated myospasm and intersegmental dysfunction.   Anticipated outcomes: Patient is expected to get relief with care.   Possible risks and side effects: Minimal soreness expected post-adjustment.     After discussing the risk and benefits of care, patient consented to treatment.    Prognosis: Good      Patient's condition:  Patient had restrictions pre-manipulation    Treatment effectiveness:  Post manipulation there is better intersegmental movement and Patient claims to feel looser post  manipulation      Plan:    Procedures:  Evaluation and Management:  11553 Moderate level exam 30 min    CMT:  72546 Chiropractic manipulative treatment 3-4 regions performed   Thoracic: Diversified, T10, Prone  Lumbar: Drop Table, L4, Prone  Pelvis: Drop Table, PSIS Right , Prone    Modalities:  42079: US:  1.0 Downs/cm squared for 8 minutes at 1.2mhz  Continuous  pulsed, Location: bilateral lower lumbar spine    Therapeutic procedures:  Gave patient Ice instructions post adjustment, and instructions for acute care    Response to Treatment:  Patient tolerated treatment well today.       Treatment plan and goals:  Goals:  Decrease pain from 8/10 to 4/10 on VAS.  Be able to continue to be active.  Be able to sit without pain.     Frequency of care  Duration of care is estimated to be 6 weeks, from the initial treatment.  It is estimated that the patient will need a total of 8 visits to resolve this episode.  For the initial therapeutic trial of care, the frequency is recommended at 1-2 times per week.  A reevaluation would be clinically appropriate in 8 visits, to determine progress and further course of care.    In-Office Treatment  Evaluation  Spinal Chiropractic Manipulative Therapy:  Trial of care - re-evaluate after 8 visits.         Recommendations:    Instructions:  ice 20 minutes every other hour as needed    Follow-up:    Return to care in 1 week.       Discussed the assessment with the patient.      Disclaimer: This note consists of symbols derived from keyboarding, dictation and/or voice recognition software. As a result, there may be errors in the script that have gone undetected. Please consider this when interpreting information found in this chart.

## 2019-09-24 ENCOUNTER — THERAPY VISIT (OUTPATIENT)
Dept: CHIROPRACTIC MEDICINE | Facility: CLINIC | Age: 79
End: 2019-09-24
Payer: COMMERCIAL

## 2019-09-24 DIAGNOSIS — M54.9 MECHANICAL BACK PAIN: ICD-10-CM

## 2019-09-24 DIAGNOSIS — M99.02 SEGMENTAL DYSFUNCTION OF THORACIC REGION: ICD-10-CM

## 2019-09-24 DIAGNOSIS — M99.04 SEGMENTAL DYSFUNCTION OF SACRAL REGION: Primary | ICD-10-CM

## 2019-09-24 DIAGNOSIS — M99.03 SEGMENTAL DYSFUNCTION OF LUMBAR REGION: ICD-10-CM

## 2019-09-24 PROCEDURE — 98941 CHIROPRACT MANJ 3-4 REGIONS: CPT | Mod: AT | Performed by: CHIROPRACTOR

## 2019-09-24 NOTE — PROGRESS NOTES
[facility]    Date of visit: 9/26/2019    Chief complaint:   Chief Complaint   Patient presents with     Pain       Interval history:  Tigre Gillette is a 79 year old male last seen by me on 8/6/19.      Recommendations/plan at the last visit included:              1.  Pain Physical Therapy:  YES   Would recommend this resource but would like to prioritize chiropractic and medication adjustments first. Advised he consider, plan to discuss at next visit.               2.  Pain Psychologist to address relaxation, behavioral change, coping style, and other factors important to improvement.  Not at this time.               3.  Medication Management:                           1. Continue Naproxen and Lidocaine roll on a consistent basis.                          2. Tigre started Tylenol 2 weeks ago with good benefit, currently taking 500mg BID. He notes he wakes up with pain in the middle of the night. We discussed that I recommend a dose increase. Suggested 1000mg at bedtime or 500mg in the middle of the night prn. See safe dosing provided.  Acetaminophen (tylenol) comes in 325mg and 500mg sizes.  In the situation of normal liver function, you can take up to 1000mg per dose.  You should limit to no more than 3,000mg per day.  Acetaminophen is available in many over the counter medications including cold medications, so make sure to read labels.              4.  Potential procedures: could potentially consider lumbar facet injections, briefly discussed today. Not at this time.                5.  Referrals: chiropractic care was helpful years ago but stopped due to initiation of physical therapy. He is interested in restarting. Chiropractic care ordered today. Would also recommend addition of acupuncture. He will call to check insurance coverage.               6.  Follow up with JERALD Eckert CNP in 4 weeks.               7.  Tigre to follow up with Primary Care provider regarding elevated blood  "pressure.      Since his last visit, Tigre Gillette reports:  -His pain is better than it was at last visit.   -He has had x2 sessions of chiropractic care with Melissa Rodney. States he was told he had a couple areas that were tight and needed to be adjusted. He did have a slight increase in pain for the first day or two after are each session but the improvement has been significant. He states, \"I think Mesha that we've hit the nail on the head with the chiropractic care process.\"   -He decreased the amount of Tylenol he is taking, from 3-4x a day to 1-2x a day. States he is taking it more as prevention rather than treatment.    -He continues taking naproxen BID with good benefit.   -He wants to hold off on consideration of an injection for now.       Pain Information:   Pain quality: numb    Pain timing: intermittent     Pain rating: intensity ranges from 2/10 to 9/10, and averages 4/10 on a 0-10 scale.   Aggravating factors include: bending   Relieving factors include: chiropractic care    Current pain medications:               Naproxen 440mg BID- SW, notes only when forgets               Tylenol 500mg BID (afternoon and bedtime)- H, was taking 3-4x a day              Lidocaine cream/roll on prn- H    Current MME: 0    Review of [place] Prescription Monitoring Program (): No concern for abuse or misuse of controlled medications based on this report.     Annual Controlled Substance Agreement/UDS due date: NA    Past pain treatments:  1. Previous Pain Relevant Medications:  NOTE: This medication information taken from patient's intake form, not medical records.               Opiates: Norco- H (given post op)              NSAIDS: ibuprofen- [facility], naproxen- [facility], more so              Muscle Relaxants: no              Anti-migraine mediations: no              Anti-depressants: no              Sleep aids: no              Anxiolytics: no              Neuropathics: no                       Topicals: lidocaine " "cream/roll on- H              Other medications not covered above: Tylenol- [facility]     2. Physical Therapy: twice as recent as [facility] 2016- [facility]   3. Pain Psychology: no  4. Surgery: no  5. Injections: no  6. Chiropractic: yes for years with different providers with good benefit, stopped in 2012 as he was starting physical therapy, started at [facility]  7. Acupuncture: no  8. TENS Unit: no    Medications:  Current Outpatient Medications   Medication Sig Dispense Refill     Acetaminophen (TYLENOL PO) Take 1,000 mg by mouth       betamethasone valerate (VALISONE) 0.1 % cream Apply topically 2 times daily       cetirizine (ZYRTEC) 10 MG tablet Take 10 mg by mouth daily       fluticasone-vilanterol (BREO ELLIPTA) 100-25 MCG/INH oral inhaler Inhale 1 puff into the lungs daily       hydrocortisone 2.5 % cream        Naproxen Sodium (ALEVE PO) Take 440 mg by mouth 2 times daily (with meals)        umeclidinium (INCRUSE ELLIPTA) 62.5 MCG/INH oral inhaler Inhale 1 puff into the lungs daily         Medical History: any changes in medical history since they were last seen? No    Review of Systems:  The 14 system ROS was reviewed from the intake questionnaire, and is positive for: allergies, rash, joint pain, arthritis, stiffness, back pain  Any bowel or bladder problems: denies   Mood: \"much better\"     Physical Exam:  Blood pressure (!) 167/73, pulse 85, weight 75.3 kg (166 lb 1.6 oz), SpO2 97 %.  General: A&O x4, no signs of distress.  Gait: Slow.   Neuro exam: 5/5 upper and lower extremity strength.    Imaging:  MRI of lumbar spine was completed on 8/1/19 and shows:  T12-L1, L1-L2, L2-L3: Normal.     L3-L4: Shallow symmetric disc bulge with minimal facet degenerative  changes. Minimal contact of the posterior leftward aspect of the disc  and the traversing left L4 nerve root, without significant mass  effect/displacement or narrowing of the lateral recess. There is no  spinal or lateral recess " stenosis. No neural foraminal stenosis.     L4-L5: Shallow symmetric disc bulge with mild facet arthropathy and  ligamentum flavum thickening results in minimal narrowing of the  lateral recess without central spinal stenosis. No significant neural  foraminal stenosis.     L5-S1: Shallow disc bulge with small superimposed disc protrusion. No  spinal, lateral recess, or neural foraminal stenosis.     Extraspinal findings: Suggestion of sigmoid colonic diverticulosis,  incompletely evaluated. Multiple T2 hyperintense lesions of the right  kidney are incompletely evaluated, but presumably represent cysts.        IMPRESSION:  1. Mild degenerative changes of the lumbar spine. No significant  spinal or neural foraminal stenosis.  2. 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    Assessment:   Tigre Gillette is a 79 year old male with a past medical history significant for GERD and COPD who presents with complaints of low back pain.      1. Low back pain- etiology likely mild degenerative changes with overlying myofascial component.   2. Mental Health - the patient's mental health concerns affect his experience of pain and contribute to his clinically significant distress.     1. Lumbosacral spondylosis without myelopathy      Plan:     1.  Pain Physical Therapy:  We discussed benefit of pain physical therapy today, may be of benefit but he would like to hold off for now. We will review again at next visit.    2.  Pain Psychologist to address relaxation, behavioral change, coping style, and other factors important to improvement.  NO   3.  Medication Management:     1. Taking Naproxen 440mg BID and Tylenol 500-1000mg daily or BID. We discussed that it is okay to continue Tylenol and naproxen as needed. I encouraged Tigre continue to try to take Tylenol before naproxen.     4.  Potential procedures: he is not interested in.    5.  Referrals: chiropractic care has been of great benefit recommend  he continue. Okay to transition to an as needed basis when ready.    6.  Follow up with JERALD Eckert CNP in 4-6 weeks. Will likely be last visit needed.     Tigre to follow up with Primary Care provider regarding elevated blood pressure.      I spent 30 minutes of time face to face with the patient.  Greater than 50% of this time was spent in patient counseling and/or coordination of care.    Mesha MARQUES CNP  [facility]

## 2019-09-24 NOTE — PROGRESS NOTES
"Visit #:  2 of 8 based on treatment plan 9/17/2019    Subjective:  Tigre Gillette is a 79 year old male who is seen in f/u up for:        Segmental dysfunction of sacral region  Segmental dysfunction of lumbar region  Segmental dysfunction of thoracic region  Mechanical back pain.     Since last visit on 9/17/2019,  Tigre Gillette reports the following changes: Patient presents and states that he is much improved. He states that we got the kink out of his back, and it has remained improved. He was more active yesterday, and it is still better. He feels it when he drives. He currently has pain rated 3/10. He continues to take the Naproxcyn. Patient \"cannot believe how much better he feels!\"       Objective:  The following was observed:    P: palpatory tenderness across lower back    A: {[id number]::\"static palpation demonstrates intersegmental asymmetry     R: motion palpation notes restricted motion    T: localized muscle spasm at: Gluteal and Lumbar erector spine Bilaterally      Assessment:    Segmental spinal dysfunction/restrictions found at:  T7  T10  L4  PSIS Right    Diagnoses:      1. Segmental dysfunction of sacral region    2. Segmental dysfunction of lumbar region    3. Segmental dysfunction of thoracic region    4. Mechanical back pain        Patient's condition:  Patient had restrictions pre-manipulation and Patient had decreased motion prior to manipulation    Treatment effectiveness:  Post manipulation there is better intersegmental movement and Patient claims to feel looser post manipulation      Procedures:  CMT:  14224 Chiropractic manipulative treatment 3-4 regions performed   Thoracic: Mobilization, T7, T10, Prone  Lumbar: Drop Table, L4, Prone  Pelvis: Drop Table, PSIS Right , Prone    Modalities:  59304: MSTM:  To Gluteal and Lumbar erector spine  for 5 min    Therapeutic procedures:  Gave patient Ice instructions post adjustment, and instructions for acute care      Prognosis: Good    Progress " "towards Goals:   Decrease pain from 8/10 to 4/10 on VAS.  Be able to continue to be active.  Be able to sit without pain.      Response to Treatment:   Reduction of symptoms with first treatment, patient states over and over \"I feel so good, I thought I was going to have pain forever, I cannot believe how good I feel\" after treatment.       Recommendations:    Instructions:ice 20 minutes every other hour as needed    Follow-up:  Return to care in 1 week, per patient's request. He sees his pain management provider on Thursday.      "

## 2019-09-26 ENCOUNTER — OFFICE VISIT (OUTPATIENT)
Dept: PALLIATIVE MEDICINE | Facility: CLINIC | Age: 79
End: 2019-09-26
Payer: COMMERCIAL

## 2019-09-26 VITALS
BODY MASS INDEX: 21.33 KG/M2 | OXYGEN SATURATION: 97 % | WEIGHT: 166.1 LBS | SYSTOLIC BLOOD PRESSURE: 167 MMHG | HEART RATE: 85 BPM | DIASTOLIC BLOOD PRESSURE: 73 MMHG

## 2019-09-26 DIAGNOSIS — M47.817 LUMBOSACRAL SPONDYLOSIS WITHOUT MYELOPATHY: Primary | ICD-10-CM

## 2019-09-26 PROCEDURE — 99214 OFFICE O/P EST MOD 30 MIN: CPT | Performed by: NURSE PRACTITIONER

## 2019-09-26 ASSESSMENT — PAIN SCALES - GENERAL: PAINLEVEL: MILD PAIN (3)

## 2019-09-26 NOTE — PATIENT INSTRUCTIONS
1.  Pain Physical Therapy:  Not at this time. Discuss again at next visit.    2.  Pain Psychologist to address relaxation, behavioral change, coping style, and other factors important to improvement.  NO   3.  Medication Management:     1. Okay to continue Tylenol and naproxen as needed. Remember to try to take Tylenol before naproxen.     4.  Potential procedures: not at this time.     5.  Referrals: continue chiropractic care, on an as needed basis when you feel ready.    6.  Follow up with JERALD Eckert CNP in 4-6 weeks.       ----------------------------------------------------------------  Clinic Number:  [phone]     Call with any questions about your care and for scheduling assistance.     Calls are returned Monday through Friday between 8 AM and 4:30 PM. We usually get back to you within 2 business days depending on the issue/request.    If we are prescribing your medications:    For opioid medication refills, call the clinic or send a [facility] ([facility]) message 7 days in advance.  Please include:    Name of requested medication    Name of the pharmacy.    For non-opioid medications, call your pharmacy directly to request a refill. Please allow 3-4 days to be processed.     Per [place] State Law:    All controlled substance prescriptions must be filled within 30 days of being written.      For those controlled substances allowing refills, pickup must occur within 30 days of last fill.      We believe regular attendance is key to your success in our program!      Any time you are unable to keep your appointment we ask that you call us at least 24 hours in advance to cancel.This will allow us to offer the appointment time to another patient.     Multiple missed appointments may lead to dismissal from the clinic.

## 2019-09-29 ENCOUNTER — HEALTH MAINTENANCE LETTER (OUTPATIENT)
Age: 79
End: 2019-09-29

## 2019-10-03 ENCOUNTER — MYC MEDICAL ADVICE (OUTPATIENT)
Dept: PALLIATIVE MEDICINE | Facility: CLINIC | Age: 79
End: 2019-10-03

## 2019-10-03 ENCOUNTER — THERAPY VISIT (OUTPATIENT)
Dept: CHIROPRACTIC MEDICINE | Facility: CLINIC | Age: 79
End: 2019-10-03
Payer: COMMERCIAL

## 2019-10-03 DIAGNOSIS — M99.04 SEGMENTAL DYSFUNCTION OF SACRAL REGION: Primary | ICD-10-CM

## 2019-10-03 DIAGNOSIS — M99.02 SEGMENTAL DYSFUNCTION OF THORACIC REGION: ICD-10-CM

## 2019-10-03 DIAGNOSIS — M99.03 SEGMENTAL DYSFUNCTION OF LUMBAR REGION: ICD-10-CM

## 2019-10-03 DIAGNOSIS — M54.9 MECHANICAL BACK PAIN: ICD-10-CM

## 2019-10-03 PROCEDURE — 98941 CHIROPRACT MANJ 3-4 REGIONS: CPT | Mod: AT | Performed by: CHIROPRACTOR

## 2019-10-03 NOTE — PROGRESS NOTES
"Visit #:  3 of 8 based on treatment plan 9/17/2019    Subjective:  Tigre Gillette is a 79 year old male who is seen in f/u up for:        Segmental dysfunction of sacral region  Segmental dysfunction of lumbar region  Segmental dysfunction of thoracic region  Mechanical back pain.     Since last visit on 9/24/2019,  Tigre Gillette reports the following changes: Patient presents and states that had been doing better but he is having some funky pain in his central lumbar spine. He thinks he may have been sitting around too much. He rates his current pain 8/10 earlier, but he was able to calm it down with a \"fist in his back in his recliner.\" Changing positions increased the pain. Extending his back hurts. He has had to use ice a couple of times. He states that it took a while to calm down but he had been very active outside. His pain is more nagging now.        Objective:  The following was observed:    P: palpatory tenderness across lower back    A: {[id number]::\"static palpation demonstrates intersegmental asymmetry     R: motion palpation notes restricted motion    T: localized muscle spasm at: Gluteal and Lumbar erector spine Bilaterally      Assessment:    Segmental spinal dysfunction/restrictions found at:  T7 E, FR  T10 E, FR  L4 LR, RRR  Right SI posterior    Diagnoses:      1. Segmental dysfunction of sacral region    2. Segmental dysfunction of lumbar region    3. Segmental dysfunction of thoracic region    4. Mechanical back pain        Patient's condition:  Patient had restrictions pre-manipulation and Patient had decreased motion prior to manipulation    Treatment effectiveness:  Post manipulation there is better intersegmental movement and Patient claims to feel looser post manipulation      Procedures:  CMT:  41257 Chiropractic manipulative treatment 3-4 regions performed   Thoracic: Mobilization, T7, T10, Prone  Lumbar: Drop Table, L4, Prone  Pelvis: Drop Table, PSIS Right , " Prone    Modalities:  13316: MSTM:  To Gluteal and Lumbar erector spine  for 5 min    Therapeutic procedures:  Gave patient Ice instructions post adjustment, and instructions for acute care      Prognosis: Good    Progress towards Goals:   Decrease pain from 8/10 to 4/10 on VAS.  Be able to continue to be active.  Be able to sit without pain.      Response to Treatment:   Reduction of symptoms with care, especially the first treatment.     Recommendations:    Instructions:ice 20 minutes every other hour as needed    Follow-up:  Return to care PRN, per patient's request. He sees his pain management provider and the next step would be injections.

## 2019-10-22 NOTE — PROGRESS NOTES
[facility]    Date of visit: 10/24/2019    Chief complaint:   Chief Complaint   Patient presents with     Pain       Interval history:  Tigre Gillette is a 79 year old male last seen by me on 9/26/19.      Recommendations/plan at the last visit included:   1.  Pain Physical Therapy:  We discussed benefit of pain physical therapy today, may be of benefit but he would like to hold off for now. We will review again at next visit.    2.  Pain Psychologist to address relaxation, behavioral change, coping style, and other factors important to improvement.  NO   3.  Medication Management:     1. Taking Naproxen 440mg BID and Tylenol 500-1000mg daily or BID. We discussed that it is okay to continue Tylenol and naproxen as needed. I encouraged Tigre continue to try to take Tylenol before naproxen.     4.  Potential procedures: he is not interested in.    5.  Referrals: chiropractic care has been of great benefit recommend he continue. Okay to transition to an as needed basis when ready.    6.  Follow up with JERALD Eckert CNP in 4-6 weeks. Will likely be last visit needed.       Since his last visit, Tigre Gillette reports:  -His pain is somewhat better than it was at last visit, though still continues to be bothersome.   -He attributes the recent improvement to be due to Tylenol and Naproxen. He previously tried decreasing as directed. Due to difficulty/increased pain, he returned to previous dosing with good benefit.   -He reduced naproxen to 220mg in the morning and 440mg in the evening. He tried decreasing naproxen further but had an increase in pain. He has been taking 500mg of Tylenol TID or QID.   -He had another session with Melissa Rodney, chiropractic with additional benefit. He plans to return as needed for now.  -He has been having trouble sleeping at night, states he wakes up due to discomfort after a couple hours.   -He would be interested in an injection, states he is pleased with the  progress he has made with chiropractor but would still finds pain limiting.     Pain Information:   Pain quality: aching and nagging   Pain timing: intermittent     Pain rating: intensity ranges from 4/10 to 8/10, and averages 4/10 on a 0-10 scale.   Aggravating factors include: bending   Relieving factors include: chiropractic care    Current pain medications:               Naproxen 220mg and 440mg qhs- [facility]              Tylenol 500mg BID (afternoon and bedtime)- [facility], taking 3-4x a day 500mg tabs              Lidocaine cream/roll on prn- [facility]    Current MME: 0    Review of [place] Prescription Monitoring Program (): No concern for abuse or misuse of controlled medications based on this report.     Annual Controlled Substance Agreement/UDS due date: NA    Past pain treatments:  1. Previous Pain Relevant Medications:  NOTE: This medication information taken from patient's intake form, not medical records.               Opiates: Norco- [facility] (given post op)              NSAIDS: ibuprofen- [facility], naproxen- [facility], more so              Muscle Relaxants: no              Anti-migraine mediations: no              Anti-depressants: no              Sleep aids: no              Anxiolytics: no              Neuropathics: no                       Topicals: lidocaine cream/roll on- [facility]              Other medications not covered above: Tylenol- [facility]     2. Physical Therapy: twice as recent as [facility] 2016- [facility]   3. Pain Psychology: no  4. Surgery: no  5. Injections: no  6. Chiropractic: yes for years with different providers with good benefit, stopped in 2012 as he was starting physical therapy, [facility] Pablo Rodney- [facility], no returning prn   7. Acupuncture: no  8. TENS Unit: no    Medications:  Current Outpatient Medications   Medication Sig Dispense Refill     Acetaminophen (TYLENOL PO) Take 1,000 mg by mouth       betamethasone valerate (VALISONE) 0.1 % cream Apply topically 2 times daily       cetirizine (ZYRTEC) 10 MG tablet  "Take 10 mg by mouth daily       fluticasone-vilanterol (BREO ELLIPTA) 100-25 MCG/INH oral inhaler Inhale 1 puff into the lungs daily       hydrocortisone 2.5 % cream        Naproxen Sodium (ALEVE PO) Take 440 mg by mouth 2 times daily (with meals)        umeclidinium (INCRUSE ELLIPTA) 62.5 MCG/INH oral inhaler Inhale 1 puff into the lungs daily         Medical History: any changes in medical history since they were last seen? No    Review of Systems:  The 14 system ROS was reviewed from the intake questionnaire, and is positive for: sinus infection, rash, cough, SOB, joint pain, arthritis, stiffness, back pain, numbness and tingling  Any bowel or bladder problems: denies   Mood: \"same\"    Physical Exam:  Blood pressure (!) 159/88, pulse 84, weight 75.3 kg (166 lb 1.6 oz), SpO2 98 %.  General: A&O x4, no signs of distress.  Gait: Slow.   Neuro exam: 5/5 upper and lower extremity strength.    Imaging:  MRI of lumbar spine was completed on 8/1/19 and shows:  T12-L1, L1-L2, L2-L3: Normal.     L3-L4: Shallow symmetric disc bulge with minimal facet degenerative  changes. Minimal contact of the posterior leftward aspect of the disc  and the traversing left L4 nerve root, without significant mass  effect/displacement or narrowing of the lateral recess. There is no  spinal or lateral recess stenosis. No neural foraminal stenosis.     L4-L5: Shallow symmetric disc bulge with mild facet arthropathy and  ligamentum flavum thickening results in minimal narrowing of the  lateral recess without central spinal stenosis. No significant neural  foraminal stenosis.     L5-S1: Shallow disc bulge with small superimposed disc protrusion. No  spinal, lateral recess, or neural foraminal stenosis.     Extraspinal findings: Suggestion of sigmoid colonic diverticulosis,  incompletely evaluated. Multiple T2 hyperintense lesions of the right  kidney are incompletely evaluated, but presumably represent cysts.        IMPRESSION:  1. Mild " degenerative changes of the lumbar spine. No significant  spinal or neural foraminal stenosis.  2. 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    Assessment:   Tigre Gillette is a 79 year old male with a past medical history significant for GERD and COPD who presents with complaints of low back pain.      1. Low back pain- etiology likely mild degenerative changes/ bilateral L4-5 facet arthropathy with overlying myofascial component.   2. Mental Health - the patient's mental health concerns affect his experience of pain and contribute to his clinically significant distress.     1. Lumbar facet arthropathy         Plan:     1.  Pain Physical Therapy:    We discussed pain physical therapy today. Tigre has tried physical therapy twice in the past with limited benefit. Also, he reports that physical therapy limited his ability to continue chiropractic in the past which he finds beneficial. Discussed how pain physical therapy is different and I may recommend. We will hold off on for now but consider in the future, especially if injection isn't helpful.    2.  Pain Psychologist to address relaxation, behavioral change, coping style, and other factors important to improvement.  NO   3.  Medication Management:     1. Previous attempt to decrease naproxen and Tylenol was not very successful, was able to reduce by x1 tab of naproxen. Continue current regimen naproxen 220mg in the morning and 440mg in the evening, Tylenol 3-4 tabs/day.     4.  Potential procedures: we discussed that facet arthropathy may be contributing to his pain. Could try lumbar facet joint injections. He is interested in. They will call to schedule.     5.  Continue chiropractic care as needed.    6.  Follow up with JERALD Eckert CNP in 4 weeks.    7.  Tigre to follow up with Primary Care provider regarding elevated blood pressure.      I spent 30 minutes of time face to face with the patient.  Greater than 50% of this  time was spent in patient counseling and/or coordination of care.    Mesha MARQUES [facility]

## 2019-10-24 ENCOUNTER — TELEPHONE (OUTPATIENT)
Dept: PALLIATIVE MEDICINE | Facility: CLINIC | Age: 79
End: 2019-10-24

## 2019-10-24 ENCOUNTER — OFFICE VISIT (OUTPATIENT)
Dept: PALLIATIVE MEDICINE | Facility: CLINIC | Age: 79
End: 2019-10-24
Payer: COMMERCIAL

## 2019-10-24 VITALS
DIASTOLIC BLOOD PRESSURE: 88 MMHG | OXYGEN SATURATION: 98 % | BODY MASS INDEX: 21.33 KG/M2 | WEIGHT: 166.1 LBS | SYSTOLIC BLOOD PRESSURE: 159 MMHG | HEART RATE: 84 BPM

## 2019-10-24 DIAGNOSIS — M47.816 LUMBAR FACET ARTHROPATHY: Primary | ICD-10-CM

## 2019-10-24 PROCEDURE — 99214 OFFICE O/P EST MOD 30 MIN: CPT | Performed by: NURSE PRACTITIONER

## 2019-10-24 ASSESSMENT — PAIN SCALES - GENERAL: PAINLEVEL: MODERATE PAIN (5)

## 2019-10-24 NOTE — TELEPHONE ENCOUNTER
Kody to schedule bilateral L4-5 facet joint injection       Jenna Rolle    [facility]

## 2019-10-24 NOTE — PATIENT INSTRUCTIONS
1.  Pain Physical Therapy:    Could consider in the future, especially if injection isn't helpful.    2.  Pain Psychologist to address relaxation, behavioral change, coping style, and other factors important to improvement.  NO   3.  Medication Management:     1. Continue naproxen 220mg in the morning and 440mg in the evening. Continue Tylenol 3-4 tabs/day.     4.  Potential procedures: lumbar facet joint injections ordered today. They will call to schedule.     5.  Continue chiropractic care as needed.    6.  Follow up with JERALD Eckert CNP in 4 weeks.       ----------------------------------------------------------------  Clinic Number:  [phone]     Call with any questions about your care and for scheduling assistance.     Calls are returned Monday through Friday between 8 AM and 4:30 PM. We usually get back to you within 2 business days depending on the issue/request.    If we are prescribing your medications:    For opioid medication refills, call the clinic or send a [employer] message 7 days in advance.  Please include:    Name of requested medication    Name of the pharmacy.    For non-opioid medications, call your pharmacy directly to request a refill. Please allow 3-4 days to be processed.     Per [place] State Law:    All controlled substance prescriptions must be filled within 30 days of being written.      For those controlled substances allowing refills, pickup must occur within 30 days of last fill.      We believe regular attendance is key to your success in our program!      Any time you are unable to keep your appointment we ask that you call us at least 24 hours in advance to cancel.This will allow us to offer the appointment time to another patient.     Multiple missed appointments may lead to dismissal from the clinic.

## 2019-10-29 ENCOUNTER — THERAPY VISIT (OUTPATIENT)
Dept: CHIROPRACTIC MEDICINE | Facility: CLINIC | Age: 79
End: 2019-10-29
Payer: COMMERCIAL

## 2019-10-29 DIAGNOSIS — M99.04 SEGMENTAL DYSFUNCTION OF SACRAL REGION: ICD-10-CM

## 2019-10-29 DIAGNOSIS — M54.9 MECHANICAL BACK PAIN: ICD-10-CM

## 2019-10-29 DIAGNOSIS — M99.02 SEGMENTAL DYSFUNCTION OF THORACIC REGION: ICD-10-CM

## 2019-10-29 DIAGNOSIS — M99.03 SEGMENTAL DYSFUNCTION OF LUMBAR REGION: ICD-10-CM

## 2019-10-29 PROCEDURE — 98941 CHIROPRACT MANJ 3-4 REGIONS: CPT | Mod: AT | Performed by: CHIROPRACTOR

## 2019-10-29 NOTE — PROGRESS NOTES
"Visit #:  4 of 8 based on treatment plan 9/17/2019    Subjective:  Tigre Gillette is a 79 year old male who is seen in f/u up for:        Segmental dysfunction of sacral region  Segmental dysfunction of lumbar region  Segmental dysfunction of thoracic region  Mechanical back pain.     Since last visit on 10/3/2019,  Tigre Gillette reports the following changes: Patient presents and states that he is starting to have some pain in his central lower back again, more on the right side near the SI joint. He typically feels better when he puts a fist in his back, but it hasn't been working. He is waiting to hear about making an appt for injections. I will assist him with that today.          Objective:  The following was observed:    P: palpatory tenderness across lower back    A: {[id number]::\"static palpation demonstrates intersegmental asymmetry     R: motion palpation notes restricted motion    T: localized muscle spasm at: Gluteal and Lumbar erector spine Bilaterally      Assessment:    Segmental spinal dysfunction/restrictions found at:  T7 E, FR  T10 E, FR  L4 LR, RRR  Right SI posterior    Diagnoses:      1. Segmental dysfunction of sacral region    2. Segmental dysfunction of lumbar region    3. Segmental dysfunction of thoracic region    4. Mechanical back pain        Patient's condition:  Patient had restrictions pre-manipulation and Patient had decreased motion prior to manipulation    Treatment effectiveness:  Post manipulation there is better intersegmental movement and Patient claims to feel looser post manipulation      Procedures:  CMT:  60822 Chiropractic manipulative treatment 3-4 regions performed   Thoracic: Mobilization, T7, T10, Prone  Lumbar: Drop Table, L4, Prone  Pelvis: Drop Table, PSIS Right , Prone    Modalities:  39463: MSTM:  To Gluteal and Lumbar erector spine  for 5 min    Therapeutic procedures:  Gave patient Ice instructions post adjustment, and instructions for acute " care      Prognosis: Good    Progress towards Goals:   Decrease pain from 8/10 to 4/10 on VAS.  Be able to continue to be active.  Be able to sit without pain.      Response to Treatment:   Reduction of symptoms with care, especially the first treatment.     Recommendations:    Instructions:ice 20 minutes every other hour as needed    Follow-up:  Return to care PRN, per patient's request. Patient will call to schedule injections.

## 2019-11-02 ENCOUNTER — OFFICE VISIT (OUTPATIENT)
Dept: URGENT CARE | Facility: URGENT CARE | Age: 79
End: 2019-11-02
Payer: COMMERCIAL

## 2019-11-02 VITALS
WEIGHT: 168.9 LBS | OXYGEN SATURATION: 96 % | DIASTOLIC BLOOD PRESSURE: 70 MMHG | TEMPERATURE: 97.7 F | BODY MASS INDEX: 21.69 KG/M2 | SYSTOLIC BLOOD PRESSURE: 141 MMHG | HEART RATE: 62 BPM

## 2019-11-02 DIAGNOSIS — J06.9 VIRAL URI: Primary | ICD-10-CM

## 2019-11-02 DIAGNOSIS — R50.9 FEVER IN ADULT: ICD-10-CM

## 2019-11-02 LAB
DEPRECATED S PYO AG THROAT QL EIA: NORMAL
SPECIMEN SOURCE: NORMAL

## 2019-11-02 PROCEDURE — 99213 OFFICE O/P EST LOW 20 MIN: CPT | Performed by: PHYSICIAN ASSISTANT

## 2019-11-02 PROCEDURE — 87880 STREP A ASSAY W/OPTIC: CPT | Performed by: FAMILY MEDICINE

## 2019-11-02 PROCEDURE — 87081 CULTURE SCREEN ONLY: CPT | Performed by: PHYSICIAN ASSISTANT

## 2019-11-02 RX ORDER — AZITHROMYCIN 250 MG/1
TABLET, FILM COATED ORAL
Qty: 6 TABLET | Refills: 0 | Status: SHIPPED | OUTPATIENT
Start: 2019-11-02 | End: 2020-09-01

## 2019-11-03 LAB
BACTERIA SPEC CULT: NORMAL
SPECIMEN SOURCE: NORMAL

## 2019-11-03 NOTE — PROGRESS NOTES
SUBJECTIVE:   Tigre Gillette is a 79 year old male presenting with a chief complaint of sinus congestion and yellow discharge.  Low grade fevers today of 99.  Wife with strep.  Does have COPD but breathing been pretty good overall and taking inhalers as directed.  .  Ears fine.  Denies SOB or chest pain ut does have a standard cough for him.   Was raking leaves recently which worsened his congestion. Doing some nasal saline rinses.  He thinks that fine but wants to make sure as is getting a steroid shot soon in his back for pain and cannot be sick to get it performed   Onset of symptoms was 3 day(s) ago.  Course of illness is same.    Severity mild  Current and Associated symptoms: no GI sx, rashes ear pain, fatigue or other sx   Treatment measures tried include Fluids, Rest and saline rinse .  Predisposing factors include HX of COPD.    Past Medical History:   Diagnosis Date     COPD (chronic obstructive pulmonary disease) (H)      Degenerative arthritis of lumbar spine     spine     Gastroesophageal reflux disease      Current Outpatient Medications   Medication Sig Dispense Refill     Acetaminophen (TYLENOL PO) Take 1,000 mg by mouth       betamethasone valerate (VALISONE) 0.1 % cream Apply topically 2 times daily       cetirizine (ZYRTEC) 10 MG tablet Take 10 mg by mouth daily       fluticasone-vilanterol (BREO ELLIPTA) 100-25 MCG/INH oral inhaler Inhale 1 puff into the lungs daily       hydrocortisone 2.5 % cream        Naproxen Sodium (ALEVE PO) Take 440 mg by mouth 2 times daily (with meals)        umeclidinium (INCRUSE ELLIPTA) 62.5 MCG/INH oral inhaler Inhale 1 puff into the lungs daily       Social History     Tobacco Use     Smoking status: Heavy Tobacco Smoker     Packs/day: 0.50     Types: Cigarettes     Smokeless tobacco: Never Used   Substance Use Topics     Alcohol use: Yes     Comment: rare        ROS:  Review of systems negative except as stated above.    OBJECTIVE:  BP (!) 141/70   Pulse 62    Temp 97.7  F (36.5  C)   Wt 76.6 kg (168 lb 14.4 oz)   SpO2 96%   BMI 21.69 kg/m    GENERAL APPEARANCE: healthy, alert and no distress  EYES: EOMI,  PERRL, conjunctiva clear  HENT: ear canals and TM's normal.  Nose and mouth without ulcers, erythema or lesions  Small amount of PND noted. No sinus pain or pressure.    NECK: supple, nontender, no lymphadenopathy  RESP: lungs clear to auscultation - no rales, rhonchi or wheezes  CV: regular rates and rhythm, normal S1 S2, no murmur noted  SKIN: no suspicious lesions or rashes    Results for orders placed or performed in visit on 11/02/19   Strep, Rapid Screen     Status: None   Result Value Ref Range    Specimen Description Throat     Rapid Strep A Screen       NEGATIVE: No Group A streptococcal antigen detected by immunoassay, await culture report.         ASSESSMENT:  Viral syndrome    PLAN:  Tylenol, Ibuprofen, Fluids, Rest, Saline gargles, Saline nasal spray, Smoking discouraged and continue with COPD med.  No red flag signs and appears well.  Negative strep and culture pending.  Hold med and start if sx worsen due to upcoming procedure     See orders in Epic

## 2019-11-05 ENCOUNTER — ANCILLARY PROCEDURE (OUTPATIENT)
Dept: GENERAL RADIOLOGY | Facility: CLINIC | Age: 79
End: 2019-11-05
Attending: PHYSICAL MEDICINE & REHABILITATION
Payer: COMMERCIAL

## 2019-11-05 ENCOUNTER — RADIOLOGY INJECTION OFFICE VISIT (OUTPATIENT)
Dept: PALLIATIVE MEDICINE | Facility: CLINIC | Age: 79
End: 2019-11-05
Payer: COMMERCIAL

## 2019-11-05 VITALS — OXYGEN SATURATION: 99 % | HEART RATE: 69 BPM | SYSTOLIC BLOOD PRESSURE: 170 MMHG | DIASTOLIC BLOOD PRESSURE: 87 MMHG

## 2019-11-05 DIAGNOSIS — M47.816 FACET ARTHROPATHY, LUMBAR: ICD-10-CM

## 2019-11-05 DIAGNOSIS — M47.816 LUMBAR FACET ARTHROPATHY: Primary | ICD-10-CM

## 2019-11-05 PROCEDURE — 64493 INJ PARAVERT F JNT L/S 1 LEV: CPT | Mod: 50 | Performed by: PHYSICAL MEDICINE & REHABILITATION

## 2019-11-05 NOTE — NURSING NOTE
Pre-procedure Intake    Have you been fasting? No    If yes, for how long?     Are you taking a prescribed blood thinner such as coumadin, Plavix, Xarelto?    No    If yes, when did you take your last dose?     Do you take aspirin?  No    If cervical procedure, have you held aspirin for 6 days?   NA    Do you have any allergies to contrast dye, iodine, steroid and/or numbing medications?  NO    Are you currently taking antibiotics or have an active infection?  NO    Have you had a fever/elevated temperature within the past week? Unsure- went to  no temperature     Are you currently taking oral steroids? NO    Do you have a ? Yes       Are you pregnant or breastfeeding?  NO    Are the vital signs normal?  Yes

## 2019-11-05 NOTE — PATIENT INSTRUCTIONS
[facility] Procedure Discharge Instructions    Today you saw:    Dr. Meka Moon      Your procedure:  Facet joint injection      Medications used:  Lidocaine (anesthetic)  Bupivacaine (anesthetic) Kenalog (steroid)  Omnipaque (contrast)              Be cautious when walking as numbness and/or weakness in the legs may occur up to 6-8 hours after the procedure due to effect of the local anesthetic    Do not drive for 6 hours. The effect of the local anesthetic could slow your reflexes.     Avoid strenuous activity for the first 24 hours. You may resume your regular activities after that.     You may shower, however avoid swimming, tub baths or hot tubs for 24 hours following your procedure    You may have a mild to moderate increase in pain for several days following the injection.      You may use ice packs for 10-15 minutes, 3 to 4 times a day at the injection site for comfort    Do not use heat to painful areas for 6 to 8 hours. This will give the local anesthetic time to wear off and prevent you from accidentally burning your skin.    Unless you have been directed to avoid the use of anti-inflammatory medications (NSAIDS-ibuprofen, Aleve, Motrin), you may use these medications or Tylenol for pain control if needed.     With diabetes, check your blood sugar more frequently than usual as your blood sugar may be higher than normal for 10-14 days following a steroid injection. Contact your doctor who manages your diabetes if your blood sugar is higher than usual    Possible side effects of steroids that you may experience include flushing, elevated blood pressure, increased appetite, mild headaches and restlessness.  All of these symptoms will get better with time.    It may take up to 14 days for the steroid medication to start working although you may feel the effect as early as a few days after the procedure.     Follow up with your referring provider in 2-3 weeks      If you experience any of  the following, call the pain center line during work hours at [phone] or on-call physician after hours at [phone]:  -Fever over 100 degree F  -Swelling, bleeding, redness, drainage, warmth at the injection site  -Progressive weakness or numbness in your legs  -Loss of bowel or bladder function  -Unusual headache that is not relieved by Tylenol or your regular headache medication  -Unusual new onset of pain that is not improving

## 2019-11-19 NOTE — PROGRESS NOTES
"[facility] Pain Management     Date of visit: 11/21/2019    Chief complaint:   Chief Complaint   Patient presents with     Pain     low back pain     Pain Management       Interval history:  Tigre Gillette is a 79 year old male last seen by me on 10/24/19.      Recommendations/plan at the last visit included:   1.  Pain Physical Therapy:    We discussed pain physical therapy today. Tigre has tried physical therapy twice in the past with limited benefit. Also, he reports that physical therapy limited his ability to continue chiropractic in the past which he finds beneficial. Discussed how pain physical therapy is different and I may recommend. We will hold off on for now but consider in the future, especially if injection isn't helpful.    2.  Pain Psychologist to address relaxation, behavioral change, coping style, and other factors important to improvement.  NO   3.  Medication Management:     1. Previous attempt to decrease naproxen and Tylenol was not very successful, was able to reduce by x1 tab of naproxen. Continue current regimen naproxen 220mg in the morning and 440mg in the evening, Tylenol 3-4 tabs/day.     4.  Potential procedures: we discussed that facet arthropathy may be contributing to his pain. Could try lumbar facet joint injections. He is interested in. They will call to schedule.     5.  Continue chiropractic care as needed.    6.  Follow up with JERALD Eckert CNP in 4 weeks.    7.  Tigre to follow up with Primary Care provider regarding elevated blood pressure.    Since his last visit, Tigre Gillette reports:  -His pain is better than it was at last visit.  -He had bilateral L4-5 facet joint injections with Dr. Moon on 11/5/19. He notes significant improvement in pain over last weekend, \"I'm pretty sure its helping.\" Pain now is most bothersome only with certain positions.   -He reports a lot of stress related to his wife's health. She was hospitalized with a complicated UTI this " week and was discharged home from the hospital last night. He states she is starting to feel better but he is worried about her.   -He doesn't feel as though he needs pain physical therapy at this time, states he thinks he is feeling well enough without it.   -He plans to continue chiropractic care as needed, hasn't returned lately as he hasn't felt as though he needed.   -He continues naproxen 220mg and 440mg daily and has been taking Tylenol 3-4 tabs a day with benefit. He hasn't been able to reduce further, states has taken this consistently over the last few days with increased stress.     Pain Information:   Pain quality: aching    Pain timing: intermittent     Pain rating: intensity ranges from 1/10 to 8/10, and averages 4-5/10 on a 0-10 scale.   Aggravating factors include: bending, stress, certain positions   Relieving factors include: chiropractic care, injection    Current pain medications:               Naproxen 220mg and 440mg qhs- SWH              Tylenol 500mg BID (afternoon and bedtime)- H, taking 3-4x a day 500mg tabs              Lidocaine cream/roll on prn- H    Current MME: 0    Review of [place] Prescription Monitoring Program (): No concern for abuse or misuse of controlled medications based on this report.     Annual Controlled Substance Agreement/UDS due date: NA    Past pain treatments:  1. Previous Pain Relevant Medications:  NOTE: This medication information taken from patient's intake form, not medical records.               Opiates: Norco- H (given post op)              NSAIDS: ibuprofen- SWH, naproxen- SWH, more so              Muscle Relaxants: no              Anti-migraine mediations: no              Anti-depressants: no              Sleep aids: no              Anxiolytics: no              Neuropathics: no                       Topicals: lidocaine cream/roll on- H              Other medications not covered above: Tylenol- SWH     2. Physical Therapy: twice as recent as [facility]   3. Pain Psychology: no  4. Surgery: no  5. Injections: bilateral L4-5 facet joint injections with Dr. Moon on 11/5/19- H  6. Chiropractic: yes for years with different providers with good benefit, stopped in 2012 as he was starting physical therapy, Pedro Pablo Rodney- [facility], no returning prn   7. Acupuncture: no  8. TENS Unit: no    Medications:  Current Outpatient Medications   Medication Sig Dispense Refill     Acetaminophen (TYLENOL PO) Take 1,000 mg by mouth       azithromycin (ZITHROMAX) 250 MG tablet Two tablets first day, then one tablet daily for four days. 6 tablet 0     betamethasone valerate (VALISONE) 0.1 % cream Apply topically 2 times daily       cetirizine (ZYRTEC) 10 MG tablet Take 10 mg by mouth daily       fluticasone-vilanterol (BREO ELLIPTA) 100-25 MCG/INH oral inhaler Inhale 1 puff into the lungs daily       hydrocortisone 2.5 % cream        Naproxen Sodium (ALEVE PO) Take 440 mg by mouth 2 times daily (with meals)        umeclidinium (INCRUSE ELLIPTA) 62.5 MCG/INH oral inhaler Inhale 1 puff into the lungs daily          Medical History: any changes in medical history since they were last seen? No    Review of Systems:  The 14 system ROS was reviewed from the intake questionnaire, and is positive for: back pain, stress  Any bowel or bladder problems: denies   Mood: reports stress related to wife's illness    Physical Exam:  Blood pressure (!) 149/82, pulse 73, height 1.88 m (6' 2\"), weight 76.2 kg (168 lb), SpO2 99 %.  General: A&O x4, no signs of distress.  Gait: Slow.   Neuro exam: 5/5 upper and lower extremity strength.    Imaging:  MRI of lumbar spine was completed on 8/1/19 and shows:  T12-L1, L1-L2, L2-L3: Normal.     L3-L4: Shallow symmetric disc bulge with minimal facet degenerative  changes. Minimal contact of the posterior leftward aspect of the disc  and the traversing left L4 nerve root, without significant mass  effect/displacement or narrowing of the " lateral recess. There is no  spinal or lateral recess stenosis. No neural foraminal stenosis.     L4-L5: Shallow symmetric disc bulge with mild facet arthropathy and  ligamentum flavum thickening results in minimal narrowing of the  lateral recess without central spinal stenosis. No significant neural  foraminal stenosis.     L5-S1: Shallow disc bulge with small superimposed disc protrusion. No  spinal, lateral recess, or neural foraminal stenosis.     Extraspinal findings: Suggestion of sigmoid colonic diverticulosis,  incompletely evaluated. Multiple T2 hyperintense lesions of the right  kidney are incompletely evaluated, but presumably represent cysts.        IMPRESSION:  1. Mild degenerative changes of the lumbar spine. No significant  spinal or neural foraminal stenosis.  2. 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    Assessment:   Tigre Gillette is a 79 year old male with a past medical history significant for GERD and COPD who presents with complaints of low back pain.      1. Low back pain- etiology likely mild degenerative changes/ bilateral L4-5 facet arthropathy with overlying myofascial component.   2. Mental Health - the patient's mental health concerns affect his experience of pain and contribute to his clinically significant distress.     1. Lumbar facet arthropathy         Plan:     1.  Pain Physical Therapy:   We discussed pain physical therapy again today, I would recommend.  He notes that physical therapy limited his ability to continue chiropractic in the past which was frustrating for him. We will hold off on for now but consider in the future.   2.  Pain Psychologist to address relaxation, behavioral change, coping style, and other factors important to improvement.  NO   3.  Medication Management:      1. Continue current regimen naproxen 220mg in the morning and 440mg in the evening and Tylenol 3-4 tabs/day. Continue to reduce as able, supplement with lidocaine  roll on.     2.  Continue lidocaine roll on.    4.  Potential procedures: advised Tigre monitor benefit from injection, can repeat in the future as needed.     5.  Have return visits with Melissa for chiropractic care as needed.    6.  Follow up with JERALD Eckert CNP in 4 weeks. If doing well, okay to return as needed.   7. Tigre to follow up with Primary Care provider regarding elevated blood pressure.      I spent 30 minutes of time face to face with the patient.  Greater than 50% of this time was spent in patient counseling and/or coordination of care.    Mesha MARQUES CNP  [facility] Pain Management

## 2019-11-21 ENCOUNTER — OFFICE VISIT (OUTPATIENT)
Dept: PALLIATIVE MEDICINE | Facility: CLINIC | Age: 79
End: 2019-11-21
Payer: COMMERCIAL

## 2019-11-21 VITALS
WEIGHT: 168 LBS | HEIGHT: 74 IN | OXYGEN SATURATION: 99 % | SYSTOLIC BLOOD PRESSURE: 149 MMHG | HEART RATE: 73 BPM | DIASTOLIC BLOOD PRESSURE: 82 MMHG | BODY MASS INDEX: 21.56 KG/M2

## 2019-11-21 DIAGNOSIS — M47.816 LUMBAR FACET ARTHROPATHY: Primary | ICD-10-CM

## 2019-11-21 DIAGNOSIS — M79.18 MYOFASCIAL PAIN: ICD-10-CM

## 2019-11-21 PROCEDURE — 99214 OFFICE O/P EST MOD 30 MIN: CPT | Performed by: NURSE PRACTITIONER

## 2019-11-21 ASSESSMENT — MIFFLIN-ST. JEOR: SCORE: 1556.79

## 2019-11-21 ASSESSMENT — PAIN SCALES - GENERAL: PAINLEVEL: NO PAIN (1)

## 2019-11-21 NOTE — PATIENT INSTRUCTIONS
1.  Pain Physical Therapy:   We will hold off on for now but consider in the future.   2.  Pain Psychologist to address relaxation, behavioral change, coping style, and other factors important to improvement.  NO   3.  Medication Management:      1. Continue current regimen naproxen 220mg in the morning and 440mg in the evening and Tylenol 3-4 tabs/day. Continue to reduce as able.    2.  Continue lidocaine roll on.    4.  Potential procedures: monitor benefit from injection, can repeat in the future as needed.     5.  Have return visits with Melissa for chiropractic care as needed.    6.  Follow up with JERALD Eckert CNP in 4 weeks. If doing well, okay to return as needed.       ----------------------------------------------------------------  Clinic Number:  [phone]     Call with any questions about your care and for scheduling assistance.     Calls are returned Monday through Friday between 8 AM and 4:30 PM. We usually get back to you within 2 business days depending on the issue/request.    If we are prescribing your medications:    For opioid medication refills, call the clinic or send a App[employer]y message 7 days in advance.  Please include:    Name of requested medication    Name of the pharmacy.    For non-opioid medications, call your pharmacy directly to request a refill. Please allow 3-4 days to be processed.     Per [place] State Law:    All controlled substance prescriptions must be filled within 30 days of being written.      For those controlled substances allowing refills, pickup must occur within 30 days of last fill.      We believe regular attendance is key to your success in our program!      Any time you are unable to keep your appointment we ask that you call us at least 24 hours in advance to cancel.This will allow us to offer the appointment time to another patient.     Multiple missed appointments may lead to dismissal from the clinic.

## 2019-11-27 ENCOUNTER — TRANSFERRED RECORDS (OUTPATIENT)
Dept: HEALTH INFORMATION MANAGEMENT | Facility: CLINIC | Age: 79
End: 2019-11-27

## 2020-03-10 NOTE — PROGRESS NOTES
[facility] Pain Management     Date of visit: 3/12/2020    Chief complaint:   Chief Complaint   Patient presents with     Pain       Interval history:  Tigre Gillette is a 79 year old male last seen by me on 11/21/19.      Recommendations/plan at the last visit included:   1.  Pain Physical Therapy:   We discussed pain physical therapy again today, I would recommend.  He notes that physical therapy limited his ability to continue chiropractic in the past which was frustrating for him. We will hold off on for now but consider in the future.   2.  Pain Psychologist to address relaxation, behavioral change, coping style, and other factors important to improvement.  NO   3.  Medication Management:      1. Continue current regimen naproxen 220mg in the morning and 440mg in the evening and Tylenol 3-4 tabs/day. Continue to reduce as able, supplement with lidocaine roll on.     2.  Continue lidocaine roll on.    4.  Potential procedures: advised Tigre monitor benefit from injection, can repeat in the future as needed.     5.  Have return visits with Melissa for chiropractic care as needed.    6.  Follow up with JERALD Eckert CNP in 4 weeks. If doing well, okay to return as needed.   7. Tigre to follow up with Primary Care provider regarding elevated blood pressure.    Since his last visit, Tigre Gillette reports:  -His pain is about the same as it was at last visit.  -He was last seen in clinic in November of last year, states things were going well and thus hasn't returned since then.  -He had bilateral L4-5 facet joint injections with Dr. Moon on 11/5/19, thinks he continues to have pain benefit. He had an increase in pain last week but this improved without intervention.   -He reports restless legs that have been bothersome, he has trouble getting comfortable in the evening and has leg twitching at times. He may be interested in starting gabapentin.  -He increased the amount of naproxen he is taking, now  440mg BID. States 220mg in the morning didn't seem enough. He stopped taking Tylenol as he thinks he developed an allergic reaction.   -He hasn't returned for chiropractic care in a while, doesn't feel like it has been needed.  -He continues to report stress regarding his wife's health, though notes this is improving.       Pain Information:   Pain quality: aching    Pain timing: intermittent     Pain rating: intensity ranges from 0/10 to 3/10, and averages 3/10 on a 0-10 scale.   Aggravating factors include: bending, stress   Relieving factors include: chiropractic care, injection    Current pain medications:               Naproxen 440mg BID- SWH              Tylenol 500mg BID (afternoon and bedtime)- H, taking 3-4x a day 500mg tabs              Lidocaine cream/roll on prn- H    Current MME: 0    Review of [place] Prescription Monitoring Program (): No concern for abuse or misuse of controlled medications based on this report.     Annual Controlled Substance Agreement/UDS due date: NA    Past pain treatments:  1. Previous Pain Relevant Medications:  NOTE: This medication information taken from patient's intake form, not medical records.               Opiates: Norco- H (given post op)              NSAIDS: ibuprofen- SWH, naproxen- SWH, more so              Muscle Relaxants: no              Anti-migraine mediations: no              Anti-depressants: no              Sleep aids: no              Anxiolytics: no              Neuropathics: no                       Topicals: lidocaine cream/roll on- H              Other medications not covered above: Tylenol- H     2. Physical Therapy: twice as recent as [facility]   3. Pain Psychology: no  4. Surgery: no  5. Injections: bilateral L4-5 facet joint injections with Dr. Moon on 11/5/19- H  6. Chiropractic: yes for years with different providers with good benefit, stopped in 2012 as he was starting physical therapy, Pedro Pablo Rodney- [facility], no  "returning prn   7. Acupuncture: no  8. TENS Unit: no    Medications:  Current Outpatient Medications   Medication Sig Dispense Refill     gabapentin (NEURONTIN) 300 MG capsule Take 2 capsules (600 mg) by mouth At Bedtime 60 capsule 0     Acetaminophen (TYLENOL PO) Take 1,000 mg by mouth       azithromycin (ZITHROMAX) 250 MG tablet Two tablets first day, then one tablet daily for four days. (Patient not taking: Reported on 3/12/2020) 6 tablet 0     betamethasone valerate (VALISONE) 0.1 % cream Apply topically 2 times daily       cetirizine (ZYRTEC) 10 MG tablet Take 10 mg by mouth daily       fluticasone-vilanterol (BREO ELLIPTA) 100-25 MCG/INH oral inhaler Inhale 1 puff into the lungs daily       hydrocortisone 2.5 % cream        Naproxen Sodium (ALEVE PO) Take 440 mg by mouth 2 times daily (with meals)        umeclidinium (INCRUSE ELLIPTA) 62.5 MCG/INH oral inhaler Inhale 1 puff into the lungs daily          Medical History: any changes in medical history since they were last seen? No    Review of Systems:  The 14 system ROS was reviewed from the intake questionnaire, and is positive for: back pain, stress  Any bowel or bladder problems: denies   Mood: \"same\"     Physical Exam:  Blood pressure 132/80, pulse 70, SpO2 98 %.  General: A&O x4, no signs of distress.  Gait: Slow.   Neuro exam: 5/5 upper and lower extremity strength.    Imaging:  MRI of lumbar spine was completed on 8/1/19 and shows:  T12-L1, L1-L2, L2-L3: Normal.     L3-L4: Shallow symmetric disc bulge with minimal facet degenerative  changes. Minimal contact of the posterior leftward aspect of the disc  and the traversing left L4 nerve root, without significant mass  effect/displacement or narrowing of the lateral recess. There is no  spinal or lateral recess stenosis. No neural foraminal stenosis.     L4-L5: Shallow symmetric disc bulge with mild facet arthropathy and  ligamentum flavum thickening results in minimal narrowing of the  lateral recess " without central spinal stenosis. No significant neural  foraminal stenosis.     L5-S1: Shallow disc bulge with small superimposed disc protrusion. No  spinal, lateral recess, or neural foraminal stenosis.     Extraspinal findings: Suggestion of sigmoid colonic diverticulosis,  incompletely evaluated. Multiple T2 hyperintense lesions of the right  kidney are incompletely evaluated, but presumably represent cysts.        IMPRESSION:  1. Mild degenerative changes of the lumbar spine. No significant  spinal or neural foraminal stenosis.  2. 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    Assessment:   Tigre Gillette is a 79 year old male with a past medical history significant for GERD and COPD who presents with complaints of low back pain.      1. Low back pain- etiology likely mild degenerative changes/ bilateral L4-5 facet arthropathy with overlying myofascial component.   2. Mental Health - the patient's mental health concerns affect his experience of pain and contribute to his clinically significant distress.     1. Lumbar facet arthropathy    2. Encounter for therapeutic drug monitoring    3. Pain syndrome, chronic         Plan:     1.  Pain Physical Therapy:     Encouraged Tigre continue regular physical activity, walking as able. He will let me know if interested physical therapy.   2.  Pain Psychologist to address relaxation, behavioral change, coping style, and other factors important to improvement.  NO   3.  Medication Management:     1. Tigre reports increased pain at bedtime and restless legs. We discussed gabapentin as an option and he is interested. He will start gabapentin at bedtime, x1 capsule at bedtime and up to x2 capsules as needed. If this is not helping, I recommend he discuss restless legs with primary care provider.     4.  Potential procedures: monitor benefit from injection. As pain seems to worsen steadily and he feels ready for an injection, he will call to let me  know.     5.  Chiropractic care has been helpful. Advised Tigre look into chiropractic coverage and return as needed.    6.  Follow up with JERALD Eckert CNP as needed.       I spent 30 minutes of time face to face with the patient.  Greater than 50% of this time was spent in patient counseling and/or coordination of care.    Mesha MARQUES CNP  [facility] Pain Management

## 2020-03-12 ENCOUNTER — OFFICE VISIT (OUTPATIENT)
Dept: PALLIATIVE MEDICINE | Facility: CLINIC | Age: 80
End: 2020-03-12
Payer: COMMERCIAL

## 2020-03-12 VITALS — DIASTOLIC BLOOD PRESSURE: 80 MMHG | SYSTOLIC BLOOD PRESSURE: 132 MMHG | HEART RATE: 70 BPM | OXYGEN SATURATION: 98 %

## 2020-03-12 DIAGNOSIS — G89.4 PAIN SYNDROME, CHRONIC: ICD-10-CM

## 2020-03-12 DIAGNOSIS — M47.816 LUMBAR FACET ARTHROPATHY: ICD-10-CM

## 2020-03-12 DIAGNOSIS — Z51.81 ENCOUNTER FOR THERAPEUTIC DRUG MONITORING: Primary | ICD-10-CM

## 2020-03-12 PROCEDURE — 99214 OFFICE O/P EST MOD 30 MIN: CPT | Performed by: NURSE PRACTITIONER

## 2020-03-12 RX ORDER — GABAPENTIN 300 MG/1
600 CAPSULE ORAL AT BEDTIME
Qty: 60 CAPSULE | Refills: 0 | Status: SHIPPED | OUTPATIENT
Start: 2020-03-12 | End: 2020-05-04

## 2020-03-12 ASSESSMENT — PAIN SCALES - GENERAL: PAINLEVEL: MODERATE PAIN (4)

## 2020-03-12 NOTE — PATIENT INSTRUCTIONS
1.  Pain Physical Therapy:     Continue regular physical activity, walking as able. Let me know if interested physical therapy.   2.  Pain Psychologist to address relaxation, behavioral change, coping style, and other factors important to improvement.  NO   3.  Medication Management:     1. Start gabapentin at bedtime. Take one capsule at bedtime and monitor pain and restless leg benefit. If after 1 week not helping, okay to increase to x2 capsules at bedtime. If this is not helping, I recommend discussing restless legs with primary care provider.     4.  Potential procedures: monitor benefit from injection. As pain seems to worsen steadily and you feel ready for an injection, call to let me know.     5. Look into chiropractic care and return as needed.    6.  Follow up with JERALD Eckert CNP as needed.       ----------------------------------------------------------------  Clinic Number:  [phone]     Call with any questions about your care and for scheduling assistance.     Calls are returned Monday through Friday between 8 AM and 4:30 PM. We usually get back to you within 2 business days depending on the issue/request.    If we are prescribing your medications:    For opioid medication refills, call the clinic or send a Nimbuzz message 7 days in advance.  Please include:    Name of requested medication    Name of the pharmacy.    For non-opioid medications, call your pharmacy directly to request a refill. Please allow 3-4 days to be processed.     Per [place] State Law:    All controlled substance prescriptions must be filled within 30 days of being written.      For those controlled substances allowing refills, pickup must occur within 30 days of last fill.      We believe regular attendance is key to your success in our program!      Any time you are unable to keep your appointment we ask that you call us at least 24 hours in advance to cancel.This will allow us to offer the appointment time to another  patient.     Multiple missed appointments may lead to dismissal from the clinic.

## 2020-03-13 DIAGNOSIS — Z51.81 ENCOUNTER FOR THERAPEUTIC DRUG MONITORING: ICD-10-CM

## 2020-03-13 LAB
ANION GAP SERPL CALCULATED.3IONS-SCNC: 6 MMOL/L (ref 3–14)
BUN SERPL-MCNC: 9 MG/DL (ref 7–30)
CALCIUM SERPL-MCNC: 9.2 MG/DL (ref 8.5–10.1)
CHLORIDE SERPL-SCNC: 104 MMOL/L (ref 94–109)
CO2 SERPL-SCNC: 25 MMOL/L (ref 20–32)
CREAT SERPL-MCNC: 0.74 MG/DL (ref 0.66–1.25)
GFR SERPL CREATININE-BSD FRML MDRD: 88 ML/MIN/{1.73_M2}
GLUCOSE SERPL-MCNC: 85 MG/DL (ref 70–99)
POTASSIUM SERPL-SCNC: 3.9 MMOL/L (ref 3.4–5.3)
SODIUM SERPL-SCNC: 135 MMOL/L (ref 133–144)

## 2020-03-13 PROCEDURE — 80048 BASIC METABOLIC PNL TOTAL CA: CPT | Performed by: NURSE PRACTITIONER

## 2020-03-13 PROCEDURE — 36415 COLL VENOUS BLD VENIPUNCTURE: CPT | Performed by: NURSE PRACTITIONER

## 2020-03-15 ENCOUNTER — HEALTH MAINTENANCE LETTER (OUTPATIENT)
Age: 80
End: 2020-03-15

## 2020-03-17 ENCOUNTER — MYC MEDICAL ADVICE (OUTPATIENT)
Dept: PALLIATIVE MEDICINE | Facility: CLINIC | Age: 80
End: 2020-03-17

## 2020-03-17 DIAGNOSIS — M47.816 LUMBAR FACET ARTHROPATHY: Primary | ICD-10-CM

## 2020-03-17 NOTE — TELEPHONE ENCOUNTER
Routing FYI to provider.     Chente Landeros,     I will pass along the message about the gabapentin to Mesha Henry. We re taking every precaution to prevent the spread of COVID-19. Our top priority is to protect and care for our patients. Changes are being made across the country to cancel most elective procedures, given the risk of exposure to COVID 19 to you and our staff.  In addition, receiving steroids can decrease your body s ability to fight infection.  This would be a big concern during this COVID-19 pandemic. We are currently not scheduling injections. As we continue to monitor the impact of COVID-19, we ask that you reach out to us in a couple of weeks to find out if we are able to schedule your injection at [phone] or inquire via [employer].     If you have questions between now and when call us in a few weeks, please do not hesitate to call us. If you have any additional questions about COVID-19, please visit the [place] Department of Health ([facility]) or Centers for Disease Control (CDC) websites for the latest information on COVID-19.  Please take care of yourself during this time and practice recommended safety measures including social distancing and good hand hygiene. If you develop symptoms, please contact [email or web address] or [phone].    Dagmar RODRIGUEZ, RN Care Coordinator  [facility]  Pain Management       Please order injections and advise who I call to schedule those.  The gabapentine is working very well.    Thanks.  Tigre Gillette

## 2020-03-19 NOTE — TELEPHONE ENCOUNTER
[employer] message sent to patient: Mesha Henry did review your Home[employer] message.  She did place the order for the facet joint injection.  The orders are going into a holding file with our scheduling department. They should be reaching out to you in about 2 weeks to hopefully schedule these appointments. If you do not hear from anyone at the beginning of April, please call [phone] to see what the status is for scheduling injection procedures. Stay safe and healthy.    Take Care,  Olga West RN  Care Coordinator  [facility] Pain Management

## 2020-03-19 NOTE — TELEPHONE ENCOUNTER
Order placed for non emergent injection.    JERALD Eckert [facility] Pain Management

## 2020-05-04 DIAGNOSIS — M47.816 LUMBAR FACET ARTHROPATHY: ICD-10-CM

## 2020-05-04 DIAGNOSIS — G89.4 PAIN SYNDROME, CHRONIC: ICD-10-CM

## 2020-05-04 RX ORDER — GABAPENTIN 300 MG/1
600 CAPSULE ORAL AT BEDTIME
Qty: 60 CAPSULE | Refills: 0 | Status: SHIPPED | OUTPATIENT
Start: 2020-05-04 | End: 2020-07-02

## 2020-05-04 NOTE — TELEPHONE ENCOUNTER
Signed Prescriptions:                        Disp   Refills    gabapentin (NEURONTIN) 300 MG capsule      60 cap*0        Sig: Take 2 capsules (600 mg) by mouth At Bedtime  Authorizing Provider: KRISTEL RODRÍGUEZ    Please have Tigre make follow up this month.    JERALD Eckert [facility] Pain Management

## 2020-05-04 NOTE — TELEPHONE ENCOUNTER
Received fax request from [facility] pharmacy requesting refill(s) for gabapentin (NEURONTIN) 300 MG capsule    Last refilled on 3/12/2020    Pt last seen on 3/12/2020  Next appt scheduled for none    Will facilitate refill.

## 2020-06-05 ENCOUNTER — RADIOLOGY INJECTION OFFICE VISIT (OUTPATIENT)
Dept: PALLIATIVE MEDICINE | Facility: CLINIC | Age: 80
End: 2020-06-05
Payer: COMMERCIAL

## 2020-06-05 ENCOUNTER — ANCILLARY PROCEDURE (OUTPATIENT)
Dept: GENERAL RADIOLOGY | Facility: CLINIC | Age: 80
End: 2020-06-05
Attending: PHYSICAL MEDICINE & REHABILITATION
Payer: COMMERCIAL

## 2020-06-05 VITALS — OXYGEN SATURATION: 99 % | SYSTOLIC BLOOD PRESSURE: 158 MMHG | DIASTOLIC BLOOD PRESSURE: 86 MMHG | HEART RATE: 71 BPM

## 2020-06-05 DIAGNOSIS — M47.816 FACET ARTHROPATHY, LUMBAR: Primary | ICD-10-CM

## 2020-06-05 DIAGNOSIS — M47.816 FACET ARTHROPATHY, LUMBAR: ICD-10-CM

## 2020-06-05 PROCEDURE — 64493 INJ PARAVERT F JNT L/S 1 LEV: CPT | Mod: 50 | Performed by: PHYSICAL MEDICINE & REHABILITATION

## 2020-06-05 NOTE — PATIENT INSTRUCTIONS
[facility] Procedure Discharge Instructions    Today you saw:    Dr. Meka Moon     Your procedure:   Facet joint injection     Medications used:  Lidocaine (anesthetic)  Bupivacaine (anesthetic),  Kenalog (steroid)  Omnipaque (contrast)             Be cautious when walking as numbness and/or weakness in the legs may occur up to 6-8 hours after the procedure due to effect of the local anesthetic    Do not drive for 6 hours. The effect of the local anesthetic could slow your reflexes.     Avoid strenuous activity for the first 24 hours. You may resume your regular activities after that.     You may shower, however avoid swimming, tub baths or hot tubs for 24 hours following your procedure    You may have a mild to moderate increase in pain for several days following the injection.      You may use ice packs for 10-15 minutes, 3 to 4 times a day at the injection site for comfort    Do not use heat to painful areas for 6 to 8 hours. This will give the local anesthetic time to wear off and prevent you from accidentally burning your skin.    Unless you have been directed to avoid the use of anti-inflammatory medications (NSAIDS-ibuprofen, Aleve, Motrin), you may use these medications or Tylenol for pain control if needed.     With diabetes, check your blood sugar more frequently than usual as your blood sugar may be higher than normal for 10-14 days following a steroid injection. Contact your doctor who manages your diabetes if your blood sugar is higher than usual    Possible side effects of steroids that you may experience include flushing, elevated blood pressure, increased appetite, mild headaches and restlessness.  All of these symptoms will get better with time.    It may take up to 14 days for the steroid medication to start working although you may feel the effect as early as a few days after the procedure.     Follow up with your referring provider in 2-3 weeks      If you experience any of  the following, call the pain center line during work hours at [phone] or on-call physician after hours at [phone]:  -Fever over 100 degree F  -Swelling, bleeding, redness, drainage, warmth at the injection site  -Progressive weakness or numbness in your legs   -Loss of bowel or bladder function  -Unusual headache that is not relieved by Tylenol or your regular headache medication  -Unusual new onset of pain that is not improving

## 2020-06-05 NOTE — PROGRESS NOTES
[facility] - Procedure Note    Date of Visit: 6/5/2020    Pre procedure Diagnosis: Facet arthropathy   Post procedure Diagnosis: Same  Procedure performed: Bilateral L4-5 facet joint injections  Anesthesia: none  Complications: none  Operators: Meka Moon MD    Indications:   Tigre Gillette is a 79 year old male was sent by JERALD Nunez CNP for lumbar facet joint injections.  They have a history of chronic low back pain.  Exam shows pain with  and they have tried conservative treatment including lumbar extension and extension rotation.     This is a repeat injection. Last one was completed by myself on 11/5/2019 which provided 70% relief for about 3 months.    Options/alternatives, benefits and risks were discussed with the patient including bleeding, infection, flared pain, tissue trauma, exposure to radiation, reaction to medications including seizure, spinal cord injury, paralysis, weakness, numbness and headache.    Questions were answered to his satisfaction and he agrees to proceed. Voluntary informed consent was obtained and signed.     Vitals were reviewed: Yes  Allergies were reviewed:  Yes   Medications were reviewed:  Yes     Imaging:   MRI of Lumbar Spine was completed on 8/1/2019 which shows:     FINDINGS: Trace anterolisthesis of L4 on L5. Alignment otherwise is  within normal limits. No fracture. Normal marrow signal. Disc  desiccation at L3-L4, L4-L5 and L5-S1, with only minimal disc height  loss at L4-L5 and L5-S1.     The conus terminates at L1-L2, which is normal. Normal signal  intensity of the conus and cauda equina nerve roots. 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 There is mild thickening of the filum terminale,  which extends just to the right and anterior to the Tarlov cyst. No  visible associated intraspinal  lipoma/fatty infiltration appreciated.  Paraspinous soft tissues are normal.     Segmental analysis:  T12-L1, L1-L2, L2-L3: Normal.     L3-L4: Shallow symmetric disc bulge with minimal facet degenerative  changes. Minimal contact of the posterior leftward aspect of the disc  and the traversing left L4 nerve root, without significant mass  effect/displacement or narrowing of the lateral recess. There is no  spinal or lateral recess stenosis. No neural foraminal stenosis.     L4-L5: Shallow symmetric disc bulge with mild facet arthropathy and  ligamentum flavum thickening results in minimal narrowing of the  lateral recess without central spinal stenosis. No significant neural  foraminal stenosis.     L5-S1: Shallow disc bulge with small superimposed disc protrusion. No  spinal, lateral recess, or neural foraminal stenosis.     Extraspinal findings: Suggestion of sigmoid colonic diverticulosis,  incompletely evaluated. Multiple T2 hyperintense lesions of the right  kidney are incompletely evaluated, but presumably represent cysts.                                                                      IMPRESSION:  1. Mild degenerative changes of the lumbar spine. No significant  spinal or neural foraminal stenosis.  2. 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    Procedure:  After getting informed consent, patient was brought into the procedure suite and was placed in a prone position on the procedure table.   A Pause for the Cause was performed.  Patient was prepped and draped in sterile fashion.     Under AP fluoroscopic guidance the L4-5 facet joints on the right and left sides were identified, and the C-arm was rotated obliquely to the affected side to open the joint space. A total of 2 ml of 1% lidocaine was injected at the needle entry point and needle tract. Then a 22 gauge 3.5 inch quincke type spinal needle was inserted and advanced under fluoroscopic guidance targeting the  superior articular pillar of each joint. Once the needle made a contact with SAP, it was rotated and was then advanced into the joint.    AP fluoroscopic views were obtained to confirm the needle placement. Then,  Omnipaque 300 contrast dye was injected after negative aspiration for heme and CSF in each joint, confirming appropriate placement.  A total of 0.75 mL of Omnipaque was used and 9.25 mL was wasted.    The injection was then accomplished using a solution containing 1ml of 0.25% bupivacaine mixed with 40 mg of kenolog, divided between the 2 joints. The needles were removed..     Hemostasis was achieved, the area was cleaned, and bandaids were placed when appropriate.  The patient tolerated the procedure well, and was taken to the recovery room.    Images were saved to PACS.    Pre-procedure pain score: 3/10  Post-procedure pain score: 0/10    Assessment/Plan: Tigre Gillette is a 79 year old male s/p bilateral L4-5 facet joint injections for chronic back pain.     1. Following today's procedure, the patient was advised to contact the [facility] for any of the following:   Fever, chills, or night sweats   New onset of pain, numbness, or weakness   Any questions/concerns regarding the procedure  If unable to contact the Pain Center, the patient was instructed to go to a local Emergency Room for any complications.   2. The patient will receive a follow-up call in 1 week.   3. Follow-up with the referring provider in 2 weeks for post-procedure evaluation.    Meka Moon MD  [facility] [place]

## 2020-06-05 NOTE — NURSING NOTE
Discharge Information    IV Discontiued Time:  NA    Amount of Fluid Infused:  NA    Discharge Criteria = When patient returns to baseline or as per MD order    Consciousness:  Pt is fully awake    Circulation:  BP +/- 20% of pre-procedure level    Respiration:  Patient is able to breathe deeply    O2 Sat:  Patient is able to maintain O2 Sat >92% on room air    Activity:  Moves 4 extremities on command    Ambulation:  Patient is able to stand and walk or stand and pivot into wheelchair    Dressing:  Clean/dry or No Dressing    Notes:   Discharge instructions and AVS given to patient    Patient meets criteria for discharge?  YES    Admitted to PCU?  No    Responsible adult present to accompany patient home?  Yes    Signature/Title:    Dagmar Truong RN  RN Care Coordinator  [facility] [place]

## 2020-07-02 DIAGNOSIS — G89.4 PAIN SYNDROME, CHRONIC: ICD-10-CM

## 2020-07-02 DIAGNOSIS — M47.816 LUMBAR FACET ARTHROPATHY: ICD-10-CM

## 2020-07-02 RX ORDER — GABAPENTIN 300 MG/1
600 CAPSULE ORAL AT BEDTIME
Qty: 60 CAPSULE | Refills: 1 | Status: SHIPPED | OUTPATIENT
Start: 2020-07-02 | End: 2020-09-11

## 2020-07-02 NOTE — TELEPHONE ENCOUNTER
Signed Prescriptions:                        Disp   Refills    gabapentin (NEURONTIN) 300 MG capsule      60 cap*1        Sig: Take 2 capsules (600 mg) by mouth At Bedtime  Authorizing Provider: KRISTEL RODRÍGUEZ      I am okay seeing him as needed but do need to see him at least every 6 months if I am prescribing gabapentin. Alternatively his primary care provider could prescribe this medication.    JERALD Eckert [facility]

## 2020-07-02 NOTE — TELEPHONE ENCOUNTER
Received fax request from     [id number] - [place] - [address]  [address] [place] [address]  Phone: [phone] Fax: [phone]     pharmacy requesting refill(s) for gabapentin (NEURONTIN) 300 MG capsule     Last refilled on 05/04/20    Pt last seen on 03/12/20    No future appointments scheduled at this time    Will facilitate refill.    Henna Maguire [facility]  [place]

## 2020-09-03 NOTE — PROGRESS NOTES
[facility]     Date of visit: 9/8/2020    Chief complaint:   Chief Complaint   Patient presents with     Pain       Interval history:  Tigre Gillette is a 80 year old male last seen by me on 3/12/2020.      Recommendations/plan at the last visit included:      1.  Pain Physical Therapy:                Encouraged Tigre continue regular physical activity, walking as able. He will let me know if interested physical therapy.              2.  Pain Psychologist to address relaxation, behavioral change, coping style, and other factors important to improvement.  NO              3.  Medication Management:                           1. Tigre reports increased pain at bedtime and restless legs. We discussed gabapentin as an option and he is interested. He will start gabapentin at bedtime, x1 capsule at bedtime and up to x2 capsules as needed. If this is not helping, I recommend he discuss restless legs with primary care provider.                4.  Potential procedures: monitor benefit from injection. As pain seems to worsen steadily and he feels ready for an injection, he will call to let me know.                5.  Chiropractic care has been helpful. Advised Tigre look into chiropractic coverage and return as needed.               6.  Follow up with JERALD Eckert CNP as needed.     Since his last visit, Tigre Gillette reports:  -His pain is somewhat worse than it was at last visit.   -He had bilateral L4-5 facet joint injections with Dr. Moon on 6/5/2020. He reports 80-90% benefit for about 3 months though notes it took about a month to start working.   -He has some concerns about COVID-19 and not being able to have an elective procedure this fall and winter. He would be interested in longer term pain relief if possible with a RFA.  -He hasn't returned to chiropractic care in a while, hasn't felt like that is necessary.   -He continues naproxen 440mg BID with benefit.   -He started gabapentin as  directed at bedtime with improvement in restless leg syndrome.     Pain Information:   Pain quality: penetrating    Pain timing: constant     Pain rating: intensity ranges from 5/10 to 8/10, and averages 5/10 on a 0-10 scale.   Aggravating factors include: prolonged sitting   Relieving factors include: changing positions, stretching    Current pain medications:               Naproxen 440mg BID- [facility], kidney function normal in March              Tylenol 500mg BID (afternoon and bedtime)- [facility], taking 3-4x a day 500mg tabs   Gabapentin 600mg at bedtime- H for restless leg syndrome               Lidocaine cream/roll on prn- [facility]    Current MME: 0    Review of [place] Prescription Monitoring Program (): No concern for abuse or misuse of controlled medications based on this report.     Annual Controlled Substance Agreement/UDS due date: NA    Past pain treatments:  1. Previous Pain Relevant Medications:  NOTE: This medication information taken from patient's intake form, not medical records.               Opiates: Norco- [facility] (given post op)              NSAIDS: ibuprofen- [facility], naproxen- [facility], more so              Muscle Relaxants: no              Anti-migraine mediations: no              Anti-depressants: no              Sleep aids: no              Anxiolytics: no              Neuropathics: no                       Topicals: lidocaine cream/roll on- [facility]              Other medications not covered above: Tylenol- [facility]     2. Physical Therapy: twice as recent as [facility] 2016- [facility]   3. Pain Psychology: no  4. Surgery: no  5. Injections: bilateral L4-5 facet joint injections with Dr. Moon on 6/5/2020 80-90% for almost 3 months, better than last  bilateral L4-5 facet joint injections with Dr. Moon on 11/5/19- H 70% 3 months   6. Chiropractic: yes for years with different providers with good benefit, stopped in 2012 as he was starting physical therapy, [facility]- [facility], some change in insurance  7.  Acupuncture: no  8. TENS Unit: no       Medications:  Current Outpatient Medications   Medication Sig Dispense Refill     Acetaminophen (TYLENOL PO) Take 1,000 mg by mouth       betamethasone valerate (VALISONE) 0.1 % cream Apply topically 2 times daily       cetirizine (ZYRTEC) 10 MG tablet Take 10 mg by mouth daily       fluticasone-vilanterol (BREO ELLIPTA) 100-25 MCG/INH oral inhaler Inhale 1 puff into the lungs daily       gabapentin (NEURONTIN) 300 MG capsule Take 2 capsules (600 mg) by mouth At Bedtime 60 capsule 1     hydrocortisone 2.5 % cream        Naproxen Sodium (ALEVE PO) Take 440 mg by mouth 2 times daily (with meals)        umeclidinium (INCRUSE ELLIPTA) 62.5 MCG/INH oral inhaler Inhale 1 puff into the lungs daily         Medical History: any changes in medical history since they were last seen? No    Review of Systems:  The 14 system ROS was reviewed from the intake questionnaire, and is positive for: joint pain, stiffness, arthritis  Any bowel or bladder problems: denies  Mood: denies depression or anxiety    Physical Exam:  Blood pressure (!) 155/95, pulse 61, SpO2 98 %.  General: A&O x4, no signs of distress.  Gait: Slow.   Neuro exam: 5/5 upper and lower extremity strength     Imaging:  MRI of lumbar spine was completed on 8/1/19 and shows:  T12-L1, L1-L2, L2-L3: Normal.     L3-L4: Shallow symmetric disc bulge with minimal facet degenerative  changes. Minimal contact of the posterior leftward aspect of the disc  and the traversing left L4 nerve root, without significant mass  effect/displacement or narrowing of the lateral recess. There is no  spinal or lateral recess stenosis. No neural foraminal stenosis.     L4-L5: Shallow symmetric disc bulge with mild facet arthropathy and  ligamentum flavum thickening results in minimal narrowing of the  lateral recess without central spinal stenosis. No significant neural  foraminal stenosis.     L5-S1: Shallow disc bulge with small superimposed disc  protrusion. No  spinal, lateral recess, or neural foraminal stenosis.     Extraspinal findings: Suggestion of sigmoid colonic diverticulosis,  incompletely evaluated. Multiple T2 hyperintense lesions of the right  kidney are incompletely evaluated, but presumably represent cysts.        IMPRESSION:  1. Mild degenerative changes of the lumbar spine. No significant  spinal or neural foraminal stenosis.  2. 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    Assessment:   Tigre Gillette is a 80 year old male with a past medical history significant for GERD and COPD who presents with complaints of low back pain.      1. Low back pain- etiology likely mild degenerative changes/ bilateral L4-5 facet arthropathy with overlying myofascial component.   2. Mental Health - the patient's mental health concerns affect his experience of pain and contribute to his clinically significant distress.       1. Lumbar facet arthropathy        Plan:       1.  Pain Physical Therapy:               Encouraged Tigre continue regular physical activity, walking as able.               2.  Pain Psychologist to address relaxation, behavioral change, coping style, and other factors important to improvement.  NO              3.  Medication Management:                           1. Gabapentin 600mg at bedtime has been helpful for restless leg syndrome and sleep. We will plan to continue at this dose.     2. Okay to continue naproxen 440mg BID for now. Advised he try decreasing naproxen after RFA when feeling better.     3. Continue Tylenol as needed.               4.  Potential procedures: discussed repeating lumbar facet joint injections vs. RFA process. He is interested in trying RFA process as he hopes for longer term relief with COVID-19. 1st lumbar medial branch block ordered today.               5.  He hasn't had chiropractic care in a while because he thinks his coverage may have changed. As this has been helpful for him  in the past, advised he recheck insurance coverage of chiropractic.               6.  Follow up with JERALD Eckert CNP as needed.    7.  Tigre to follow up with Primary Care provider regarding elevated blood pressure.      I spent 30 minutes of time face to face with the patient.  Greater than 50% of this time was spent in patient counseling and/or coordination of care.    Mesha MARQUES CNP  [facility]

## 2020-09-08 ENCOUNTER — OFFICE VISIT (OUTPATIENT)
Dept: PALLIATIVE MEDICINE | Facility: CLINIC | Age: 80
End: 2020-09-08
Payer: COMMERCIAL

## 2020-09-08 VITALS — SYSTOLIC BLOOD PRESSURE: 155 MMHG | DIASTOLIC BLOOD PRESSURE: 95 MMHG | HEART RATE: 61 BPM | OXYGEN SATURATION: 98 %

## 2020-09-08 DIAGNOSIS — M47.816 LUMBAR FACET ARTHROPATHY: Primary | ICD-10-CM

## 2020-09-08 PROCEDURE — 99214 OFFICE O/P EST MOD 30 MIN: CPT | Performed by: NURSE PRACTITIONER

## 2020-09-08 ASSESSMENT — PAIN SCALES - GENERAL: PAINLEVEL: SEVERE PAIN (6)

## 2020-09-08 NOTE — PATIENT INSTRUCTIONS
1.  Pain Physical Therapy:                Continue regular physical activity, walking as able.               2.  Pain Psychologist to address relaxation, behavioral change, coping style, and other factors important to improvement.  NO              3.  Medication Management:                           1. Continue gabapentin 600mg at bedtime.    2. Okay to continue naproxen x2 tabs twice daily. Consider decreasing naproxen after RFA if feeling better.     3. Continue Tylenol as needed.               4.  Potential procedures: 1st lumbar medial branch block ordered today. They will call to schedule. If this goes well, you will have your second medial branch block. If this also goes well, you will have your RFA.               5.  Recheck insurance coverage of chiropractic. It would be nice to return to this as needed.               6.  Follow up with JERALD Eckert CNP as needed.     ----------------------------------------------------------------  Clinic Number:  [phone]     Call with any questions about your care and for scheduling assistance.     Calls are returned Monday through Friday between 8 AM and 4:30 PM. We usually get back to you within 2 business days depending on the issue/request.    If we are prescribing your medications:    For opioid medication refills, call the clinic or send a [employer] message 7 days in advance.  Please include:    Name of requested medication    Name of the pharmacy.    For non-opioid medications, call your pharmacy directly to request a refill. Please allow 3-4 days to be processed.     Per [place] State Law:    All controlled substance prescriptions must be filled within 30 days of being written.      For those controlled substances allowing refills, pickup must occur within 30 days of last fill.      We believe regular attendance is key to your success in our program!      Any time you are unable to keep your appointment we ask that you call us at least 24 hours in advance  to cancel.This will allow us to offer the appointment time to another patient.     Multiple missed appointments may lead to dismissal from the clinic.

## 2020-09-11 ENCOUNTER — MYC MEDICAL ADVICE (OUTPATIENT)
Dept: PALLIATIVE MEDICINE | Facility: CLINIC | Age: 80
End: 2020-09-11

## 2020-09-11 DIAGNOSIS — M47.816 LUMBAR FACET ARTHROPATHY: ICD-10-CM

## 2020-09-11 DIAGNOSIS — G89.4 PAIN SYNDROME, CHRONIC: ICD-10-CM

## 2020-09-11 RX ORDER — GABAPENTIN 300 MG/1
600 CAPSULE ORAL AT BEDTIME
Qty: 60 CAPSULE | Refills: 3 | Status: SHIPPED | OUTPATIENT
Start: 2020-09-11 | End: 2020-12-29

## 2020-09-11 NOTE — TELEPHONE ENCOUNTER
Signed Prescriptions:                        Disp   Refills    gabapentin (NEURONTIN) 300 MG capsule      60 cap*3        Sig: Take 2 capsules (600 mg) by mouth At Bedtime  Authorizing Provider: KRISTEL RODRÍGUEZ APRN [facility]

## 2020-09-11 NOTE — TELEPHONE ENCOUNTER
Routing to  to contact for [facility] scheduling    Interventional Evaluation:     Interventional Injection Only - Type of Injection: bilateral L3,4,5 medial branch block #1 with plan to progress to RFA Radiology? Yes       Mychart below from pt    I have not received a phone call to schedule an appointment for RFA test.  Please make sure my information lists [phone] as my primary phone number.  Tigre    ---------------  Chente Landeros,     Laine for reaching out. Sorry you haven't been contacted yet. I will go ahead and forward your message to our scheduling desk and they should reach out to assist you with that appointment.  You can also call them at [phone]. Thanks!    Dagmar RODRIGUEZ, RN Care Coordinator  [facility]  Pain [facility]

## 2020-09-11 NOTE — TELEPHONE ENCOUNTER
Received fax request from     [id number] - [place] - [address]  [address] [place] [address]  Phone: [phone] Fax: [phone]     pharmacy requesting refill(s) for gabapentin (NEURONTIN) 300 MG capsule     Last refilled on 08/01/20    Pt last seen on 09/08/20    No future appointments scheduled at this time    Will facilitate refill.    Henna Maguire [facility]  [place]

## 2020-09-14 ENCOUNTER — TELEPHONE (OUTPATIENT)
Dept: PALLIATIVE MEDICINE | Facility: CLINIC | Age: 80
End: 2020-09-14

## 2020-09-14 DIAGNOSIS — Z20.822 ENCOUNTER FOR LABORATORY TESTING FOR COVID-19 VIRUS: Primary | ICD-10-CM

## 2020-09-14 NOTE — TELEPHONE ENCOUNTER
Screening questions for MBB Injections:    Injection to be done at which interventional clinic site? [facility]    Instruct patient to arrive as directed prior to the scheduled appointment time:    [place] and [place]: 30 minutes before      Procedure ordered by Dr. Henry    Procedure ordered? Lumbar Medial Branch Block    What insurance would patient like us to bill for this procedure? Medicare/ [employer]      Worker's comp- Any injection DO NOT SCHEDULE and route to Marnie Canas.      [employer] insurance - If scheduling an SI joint injection DO NOT SCHEDULE and route to Marnie Canas.       MBBs must be scheduled with elapsed time interval of at least 2 weeks and not more than 6 months between the First MBB and the Second MBB for insurance purposes       [employer] - Any injection besides hip/shoulder/knee joint DO NOT SCHEDULE and route to Marnie Canas. She will obtain PA and call pt back to schedule procedure or notify pt of denial.       HP [employer]- PA required for all MBB's      **BCBS- ALL need to be routed to Marnie for review if a PA is needed**    Any chance of pregnancy? Not Applicable   If YES, do NOT schedule and route to RN pool    Is an  needed? No     Patient has a drive home? (mandatory) Yes     Is patient taking any blood thinners (plavix, coumadin, jantoven, warfarin, heparin, pradaxa or dabigatran )? No    If hold needed, do NOT schedule, route to RN pool     Is patient taking any aspirin products? No     If more than 325mg/day, OK to schedule; Instruct pt to decrease to less than 325 mg for 7 days AND route to RN pool    For CERVICAL procedures, hold all aspirin products for 6 days.     Tell pt that if aspirin product is not held for 6 days, the procedure WILL BE cancelled.      Does the patient have a bleeding or clotting disorder? No    If YES, okay to schedule AND route to RN nurse pool    **For any patients with platelet count <100, must be forwarded to provider**    Is  patient diabetic? NO If YES, have them bring their glucometer.    Does patient have an active infection or treated for one within the past week? No    Is patient currently taking any antibiotics?  No    For patients on chronic, preventative, or prophylactic antibiotics, procedures may be scheduled.     For patients on antibiotics for active or recent infection:antibiotic course must have been completed for 4 days    Is patient currently taking any steroid medications? (i.e. Prednisone, Medrol)  No     For patients on steroid medications, course must have been completed for 4 days    Is patient actively being treated for cancer or immunocompromised? No   If YES, do NOT schedule and route to RN    Are you able to get on and off an exam table with minimal or no assistance? Yes   If NO, do NOT schedule and route to RN    Are you able to roll over and lay on your stomach with minimal or no assistance? Yes   If NO, do NOT schedule and route to RN    Any allergies to contrast dye, iodine, shellfish, or numbing and steroid medications? No  (If so, inform nursing and note in scheduling comments.)    Allergies: Amoxicillin     Has the patient had a flu shot or any other vaccinations within 7 days before or after the procedure.  YES- 9/9     Does patient have an MRI/CT?  YES: 2019  Check Procedure Scheduling Grid to see if required.      Was the MRI done within the last 3 years?  Yes    If yes, where was the MRI done i.e.[facility], [facility], [facility], [facility] etc? [facility]      If no, do not schedule and route to nursing    If MRI was not done at [facility], [facility] or [facility] do NOT schedule and route to nursing.      If pt has an imaging disc, the injection MAY be scheduled but pt has to bring disc to appt.     If they show up without the disc the injection cannot be done      Medial Branch Block Pre-Procedure Instructions    It is okay to take long acting pain medications (if you are on them) the day of the  procedure but try not to take any short acting medications unless absolutely necessary.    YES: Informed   Long acting meds would include: Gabapentin (Neurontin), MS Contin, Oxycontin        Short acting meds would include:  Percocet, Oxycodone, Vicodin, Ibuprofen     The day of the procedure, you should try to do things that provoke your pain, since the injection is being done to see if it will relieve your pain . YES: Informed     If your pain level is a 4 out of 10 or less on the day of the procedu re, please call [phone] to reschedule.  YES: Informed     Reminders:      If you are started on any steroids or antibiotics between now and your appointment, you must contact us because it may affect our ability to perform your procedure Informed      Instructed pt to arrive 30 minutes early for IV start if required. (Check Procedure Scheduling Grid) INot Applicable       If this is for a cervical MBB aspirin needs to be held for 6 days.  Not Applicable       Do not schedule procedures requiring IV placement in the first appointment of the day or first appointment after lunch. Do NOT schedule at 0745, 0815 or 1245.        For patients 85 or older we recommend having an adult stay w/ them for the remainder of the day.      Does the patient have any questions? NO    Verna CHOW    [facility]

## 2020-09-14 NOTE — TELEPHONE ENCOUNTER
Pt scheduled 9/18 and pre-screened.    Verna CHOW    [facility]

## 2020-09-16 NOTE — TELEPHONE ENCOUNTER
MBB #1 scheduled for 9/18.     JENNIFER Blakely, RN-BC  Patient Care Supervisor  [facility] [place]

## 2020-09-17 NOTE — PROGRESS NOTES
[facility] - Procedure Note    Date of Service: 9/18/2020    Procedure performed: Bilateral L3,4,5 medial branch block #1  Diagnosis: Lumbar spondylosis; Lumbar facet arthropathy  : Patricia Frias MD     Indications: Tigre Gillette is a 80 year old male who is seen at the request of Mesha Henry CNP for lumbar medial branch blocks. The patient describes pain with extension/rotation. The patient reports minimal improvement with conservative treatment, including previous injections, PT and medications.    S/P previous bilateral L4-5 facet joint injections Dr. Moon on 6/5/2020 & 11/5/2019 which provided 70% pain relief for 1-3 months.     MRI of the LUMBAR SPINE was done on 8/1/2019 which showed   FINDINGS: Trace anterolisthesis of L4 on L5. Alignment otherwise is  within normal limits. No fracture. Normal marrow signal. Disc  desiccation at L3-L4, L4-L5 and L5-S1, with only minimal disc height  loss at L4-L5 and L5-S1.     The conus terminates at L1-L2, which is normal. Normal signal  intensity of the conus and cauda equina nerve roots. 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 There is mild thickening of the filum terminale,  which extends just to the right and anterior to the Tarlov cyst. No  visible associated intraspinal lipoma/fatty infiltration appreciated.  Paraspinous soft tissues are normal.     Segmental analysis:  T12-L1, L1-L2, L2-L3: Normal.     L3-L4: Shallow symmetric disc bulge with minimal facet degenerative  changes. Minimal contact of the posterior leftward aspect of the disc  and the traversing left L4 nerve root, without significant mass  effect/displacement or narrowing of the lateral recess. There is no  spinal or lateral recess stenosis. No neural foraminal stenosis.     L4-L5: Shallow symmetric disc bulge with mild facet  arthropathy and  ligamentum flavum thickening results in minimal narrowing of the  lateral recess without central spinal stenosis. No significant neural  foraminal stenosis.     L5-S1: Shallow disc bulge with small superimposed disc protrusion. No  spinal, lateral recess, or neural foraminal stenosis.     Extraspinal findings: Suggestion of sigmoid colonic diverticulosis,  incompletely evaluated. Multiple T2 hyperintense lesions of the right  kidney are incompletely evaluated, but presumably represent cysts.                                                                   IMPRESSION:  1. Mild degenerative changes of the lumbar spine. No significant  spinal or neural foraminal stenosis.  2. 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    Allergies:      Allergies   Allergen Reactions     Amoxicillin         Vitals:  BP (!) 148/76   Pulse 72   SpO2 97%     Review of Systems: The patient denies recent fever, chills, illness, use of antibiotics or anticoagulants. All other 10-point review of systems negative.     Procedure:   Options/alternatives, benefits and risks were discussed with the patient including bleeding, infection, tissue trauma, exposure to radiation, reaction to medications, spinal cord injury, weakness, numbness and paralysis.  Questions were answered to his satisfaction and he agrees to proceed. Voluntary informed consent was obtained and signed.     After getting informed consent, patient was brought into the procedure suite and was placed in a prone position on the procedure table.   A Pause for the Cause was performed.  Patient was prepped and draped in sterile fashion.     Under AP fluoroscopic guidance the L3, L4, L5 vertebral bodies were identified. The C-arm was rotated to the oblique view to afford optimal visualization the pedicles.  Under intermittent fluoroscopy, 25 gauge 3.5 inch Quinke spinal needles were positioned inferior and lateral to the intersection of  the transverse process and pedicle at the Bilateral L4 & L5 levels, as well as the corresponding sacral alar notch. The needle positions were verified and optimized from the AP and lateral views.    The anatomic targets for the L3 & L4 medial nerve and L5 dorsal ramus (which functionally incorporates the medial branch) were the  L4 & L5 transverse processes and sacral alar notch, with laterality as described above, resulting in blockade of the L4/5 and L5/S1 facet joints.    After negative aspiration, Bupivacaine 0.5% 0.5 ml was injected at each location. The needles were removed. Hemostasis was achieved, the area was cleaned, and bandaids were placed when appropriate. The patient tolerated the procedure well, and was taken to the recovery room. Images were saved to PACS.    Assessment/Plan: Tigre Gillette is a 80 year old male s/p Bilateral L3,4,5 medial branch block today for lumbar spondylosis, facet arthropathy.     Pre procecedure pain score: 6/10   Post procedure pain score: 2/10.   The patient will continue to monitor progress, and they were given a pain diary to complete at home.  They will either fax or mail this back to us or bring it to their next appointment. We will determine the treatment plan after we review the diary.      1. The patient was advised to contact the Pain Management Center for any of the following:   Fever, chills, or night sweats   New onset of pain, numbness, or weakness   Any questions/concerns regarding the procedure  If unable to contact the Pain Center, the patient was instructed to go to a local Emergency Room for any complications.   2. The patient will receive a follow-up call in 1 week.  3. We will await the pain diary to determent next step of the treatment plan and call patient to schedule.      KLEVER HERNANDEZ MD   Pain Management & Addiction Medicine

## 2020-09-18 ENCOUNTER — RADIOLOGY INJECTION OFFICE VISIT (OUTPATIENT)
Dept: PALLIATIVE MEDICINE | Facility: CLINIC | Age: 80
End: 2020-09-18
Payer: COMMERCIAL

## 2020-09-18 ENCOUNTER — ANCILLARY PROCEDURE (OUTPATIENT)
Dept: GENERAL RADIOLOGY | Facility: CLINIC | Age: 80
End: 2020-09-18
Attending: ANESTHESIOLOGY
Payer: COMMERCIAL

## 2020-09-18 VITALS — HEART RATE: 70 BPM | OXYGEN SATURATION: 97 % | SYSTOLIC BLOOD PRESSURE: 131 MMHG | DIASTOLIC BLOOD PRESSURE: 75 MMHG

## 2020-09-18 DIAGNOSIS — M47.816 LUMBAR FACET ARTHROPATHY: Primary | ICD-10-CM

## 2020-09-18 DIAGNOSIS — M47.816 FACET ARTHROPATHY, LUMBAR: ICD-10-CM

## 2020-09-18 PROCEDURE — 64493 INJ PARAVERT F JNT L/S 1 LEV: CPT | Mod: 50 | Performed by: ANESTHESIOLOGY

## 2020-09-18 PROCEDURE — 64494 INJ PARAVERT F JNT L/S 2 LEV: CPT | Performed by: ANESTHESIOLOGY

## 2020-09-18 NOTE — NURSING NOTE
Pre-procedure Intake    Have you been fasting? No    If yes, for how long?     Are you taking a prescribed blood thinner such as coumadin, Plavix, Xarelto?    No    If yes, when did you take your last dose?     Do you take aspirin?   NO    If cervical procedure, have you held aspirin for 6 days?   NA    Do you have any allergies to contrast dye, iodine, steroid and/or numbing medications?  NO    Are you currently taking antibiotics or have an active infection?  NO    Have you had a fever/elevated temperature within the past week? NO    Are you currently taking oral steroids? NO    Do you have a ? Yes       Are you pregnant or breastfeeding?  NO    Are the vital signs normal?  No:

## 2020-09-18 NOTE — PATIENT INSTRUCTIONS
[facility]   Medial Branch Block Discharge Instructions      Your procedure was performed by:  Dr. Patricia Frias Dr.     Medications used: lidocaine, bupivicaine     You will need to complete the Pain Scale Log form and return it to us as soon as possible.  Once we have received the form, we will review it and call you to determine the next steps.     The form can be faxed to [phone] or mailed to:   [facility] [address], [address] [id number]      You may resume your regular activity after the injection.    Be cautious with walking since you may have numbness and/or weakness in the lower extremities for up to 6-8 hours after the procedure due to the effects of the local anesthetic.    Avoid driving for 6 hours. The local anesthetic could slow your reflexes    You may shower, however no swimming or tub baths or hot tubs for 24 hours following your procedure.    Your pain will return after the numbing medications have worn off.  You may use your current pain medications as needed.    Unless you have been directed to avoid the use of anti-inflammatory medications (NSAIDS), you may use medications such as ibuprofen, Aleve or Tylenol for pain control if needed. Some people find it helpful to alternate ibuprofen and Tylenol every 3 hours for a couple of days.    You may use ice packs 10-15 minutes three to four times a day at the injection site for comfort.     Do not use heat to painful areas for 6 to 8 hours. This will give the local anesthetic time to wear off and prevent you from accidentally burning your skin.     If you experience any of the following, call the Pain Clinic during work hours (Monday through Friday 8 am-4:30 pm) at [phone] or the Provider Line after hours at [phone]:  -Fever over 100 degree F  -Swelling, bleeding, redness, drainage, warmth at the injection site  -Progressive  weakness or numbness in your legs   -If lumbar, call if you have a loss of bowel or bladder function  -Unusual new onset of pain that is not improving

## 2020-09-18 NOTE — NURSING NOTE
Discharge Information    IV Discontiued Time:  NA    Amount of Fluid Infused:  NA    Discharge Criteria = When patient returns to baseline or as per MD order    Consciousness:  Pt is fully awake    Circulation:  BP +/- 20% of pre-procedure level    Respiration:  Patient is able to breathe deeply    O2 Sat:  Patient is able to maintain O2 Sat >92% on room air    Activity:  Moves 4 extremities on command    Ambulation:  Patient is able to stand and walk or stand and pivot into wheelchair    Dressing:  Clean/dry or No Dressing    Notes:   Discharge instructions and AVS given to patient    Patient meets criteria for discharge?  YES    Admitted to PCU?  No    Responsible adult present to accompany patient home?  Yes    Signature/Title:    Olga West RN Care Coordinator  [facility] [place]

## 2020-09-23 ENCOUNTER — MYC MEDICAL ADVICE (OUTPATIENT)
Dept: PALLIATIVE MEDICINE | Facility: CLINIC | Age: 80
End: 2020-09-23

## 2020-09-24 NOTE — TELEPHONE ENCOUNTER
Lumbar MBB#1 pain data reviewed. Max relief obtained:     At rest:                    83% for hour 1-2, 100% hr 3  Left facet load:        67% hr 1, then 100% hr 2-3  Right facet load:      67% hr 1, then 100% hr 2-3  Normal activity:       75% hr 1, then 100% hr 2-3     MBB to RFA order verified:  Yes  Insurance coverage verified with TEENA Canas: OK to proceed to MBB or RFA: please copy in criteria for proceeding to RFA (any PT requirements ect)    **routing to  to contact for scheduling    Dagmar RODRIGUEZ, RN Care Coordinator  [facility]  Pain Management

## 2020-09-24 NOTE — TELEPHONE ENCOUNTER
Will leave encounter open for patient response/chart review by nursing.     Chente Landeros,     It does take an extra day or so for us to receive our mail at times (as it is sorted from the suite into the various clinics in the suite) so I have not received your pain log yet.  I imagine I will likely get that today if you mailed it Saturday. Once received, we will calculate the percentage of relief and verify with your insurance that you meet their criteria to move forward. This may take a day to hear back. We will contact you and let you know/get you scheduled for the second block at that time.   I just wanted to clarify what you mean by Dr Burton calling with your report?     Dagmar RODRIGUEZ, RN Care Coordinator  [facility]  Pain Management    ---below from pt----  I saw Dr. Patricia Frias Fri and mailed my  homework Sat.   Dr. Frias said she would call me when she got my report.   I have not heard from her.      This unfriendly website won't let me write to her.  Please let her know that Mon. and Tues.  were bad days.  Better today.  Also let tell her I am waiting for her phone call.  I really want to schedule my second test ASAP.     Thank you.  Tigre Gillette

## 2020-09-25 NOTE — TELEPHONE ENCOUNTER
Screening questions for MBB Injections:    Injection to be done at which interventional clinic site? [facility]       Instruct patient to arrive as directed prior to the scheduled appointment time:    [place] and [place]: 30 minutes before      Procedure ordered by Dr. Frias    Procedure ordered? Lumbar Medial Branch Block    What insurance would patient like us to bill for this procedure? [employer]      Worker's comp- Any injection DO NOT SCHEDULE and route to Marnie Canas.      [employer] insurance - If scheduling an SI joint injection DO NOT SCHEDULE and route to Marnie Canas.       MBBs must be scheduled with elapsed time interval of at least 2 weeks and not more than 6 months between the First MBB and the Second MBB for insurance purposes       [employer] - Any injection besides hip/shoulder/knee joint DO NOT SCHEDULE and route to Marnie Canas. She will obtain PA and call pt back to schedule procedure or notify pt of denial.       HP [employer]- PA required for all MBB's      **BCBS- ALL need to be routed to Marnie for review if a PA is needed**    Any chance of pregnancy? Not Applicable   If YES, do NOT schedule and route to RN pool    Is an  needed? No     Patient has a drive home? (mandatory) Yes     Is patient taking any blood thinners (plavix, coumadin, jantoven, warfarin, heparin, pradaxa or dabigatran )? No    If hold needed, do NOT schedule, route to RN pool     Is patient taking any aspirin products? No     If more than 325mg/day, OK to schedule; Instruct pt to decrease to less than 325 mg for 7 days AND route to RN pool    For CERVICAL procedures, hold all aspirin products for 6 days.     Tell pt that if aspirin product is not held for 6 days, the procedure WILL BE cancelled.      Does the patient have a bleeding or clotting disorder? No    If YES, okay to schedule AND route to RN nurse pool    **For any patients with platelet count <100, must be forwarded to provider**    Is patient  diabetic? NO If YES, have them bring their glucometer.    Does patient have an active infection or treated for one within the past week? No    Is patient currently taking any antibiotics?  No    For patients on chronic, preventative, or prophylactic antibiotics, procedures may be scheduled.     For patients on antibiotics for active or recent infection:antibiotic course must have been completed for 4 days    Is patient currently taking any steroid medications? (i.e. Prednisone, Medrol)  No     For patients on steroid medications, course must have been completed for 4 days    Is patient actively being treated for cancer or immunocompromised? No   If YES, do NOT schedule and route to RN    Are you able to get on and off an exam table with minimal or no assistance? Yes   If NO, do NOT schedule and route to RN    Are you able to roll over and lay on your stomach with minimal or no assistance? Yes   If NO, do NOT schedule and route to RN    Any allergies to contrast dye, iodine, shellfish, or numbing and steroid medications? No  (If so, inform nursing and note in scheduling comments.)    Allergies: Amoxicillin     Has the patient had a flu shot or any other vaccinations within 7 days before or after the procedure.  No     Does patient have an MRI/CT?  YES: 2019  Check Procedure Scheduling Grid to see if required.      Was the MRI done within the last 3 years?  Yes    If yes, where was the MRI done i.e.[facility], [facility], [facility], [facility] etc? [facility]      If no, do not schedule and route to nursing    If MRI was not done at [facility], [facility] or [facility] do NOT schedule and route to nursing.      If pt has an imaging disc, the injection MAY be scheduled but pt has to bring disc to appt.     If they show up without the disc the injection cannot be done      Medial Branch Block Pre-Procedure Instructions    It is okay to take long acting pain medications (if you are on them) the day of the procedure but try not  to take any short acting medications unless absolutely necessary.    YES: Informed   Long acting meds would include: Gabapentin (Neurontin), MS Contin, Oxycontin        Short acting meds would include:  Percocet, Oxycodone, Vicodin, Ibuprofen     The day of the procedure, you should try to do things that provoke your pain, since the injection is being done to see if it will relieve your pain . YES: Informed     If your pain level is a 4 out of 10 or less on the day of the procedu re, please call [phone] to reschedule.  YES: Informed     Reminders:      If you are started on any steroids or antibiotics between now and your appointment, you must contact us because it may affect our ability to perform your procedure Informed      Instructed pt to arrive 30 minutes early for IV start if required. (Check Procedure Scheduling Grid) INot Applicable       If this is for a cervical MBB aspirin needs to be held for 6 days.  Not Applicable       Do not schedule procedures requiring IV placement in the first appointment of the day or first appointment after lunch. Do NOT schedule at 0745, 0815 or 1245.        For patients 85 or older we recommend having an adult stay w/ them for the remainder of the day.      Does the patient have any questions? NO    Verna CHOW    [facility]

## 2020-09-25 NOTE — TELEPHONE ENCOUNTER
[employer] RFA REQUIREMENTS- NO PA REQUIRED    [employer] (MEDICARE REPLACEMENT)  o Dx of arthropathy, spondylosis, or sprain;   o 6 months failed conservative treatment (medication, injections, etc.) This includes 4 weeks of PT or an unfavorable evaluation;   o 2 successful workups resulting in >80-90% pain relief  Okay to schedule MBB #2        Marnie ELMORE    [facility]

## 2020-09-29 NOTE — PROGRESS NOTES
[facility] - Procedure Note    Date of Service: 10/1/2020    Procedure performed: Bilateral L3,4,5 medial branch block #2 (first was done on 9/18/2020)  Diagnosis: Lumbar spondylosis; Lumbar facet arthropathy  : Patricia Frias MD & Urvashi Lambert MD (pain fellow)     Indications: Tigre Gillette is a 80 year old male who is seen at the request of Mesha Henry CNP for lumbar medial branch blocks. The patient describes pain with extension/rotation. The patient reports minimal improvement with conservative treatment, including previous injections, PT and medications.    S/P previous bilateral L4-5 facet joint injections Dr. Moon on 6/5/2020 & 11/5/2019 which provided 70% pain relief for 1-3 months.     First MBB done on 9/18/2020 provided at least 70% relief    MRI of the LUMBAR SPINE was done on 8/1/2019 which showed   FINDINGS: Trace anterolisthesis of L4 on L5. Alignment otherwise is  within normal limits. No fracture. Normal marrow signal. Disc  desiccation at L3-L4, L4-L5 and L5-S1, with only minimal disc height  loss at L4-L5 and L5-S1.     The conus terminates at L1-L2, which is normal. Normal signal  intensity of the conus and cauda equina nerve roots. 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 There is mild thickening of the filum terminale,  which extends just to the right and anterior to the Tarlov cyst. No  visible associated intraspinal lipoma/fatty infiltration appreciated.  Paraspinous soft tissues are normal.     Segmental analysis:  T12-L1, L1-L2, L2-L3: Normal.     L3-L4: Shallow symmetric disc bulge with minimal facet degenerative  changes. Minimal contact of the posterior leftward aspect of the disc  and the traversing left L4 nerve root, without significant mass  effect/displacement or narrowing of the lateral recess. There is  no  spinal or lateral recess stenosis. No neural foraminal stenosis.     L4-L5: Shallow symmetric disc bulge with mild facet arthropathy and  ligamentum flavum thickening results in minimal narrowing of the  lateral recess without central spinal stenosis. No significant neural  foraminal stenosis.     L5-S1: Shallow disc bulge with small superimposed disc protrusion. No  spinal, lateral recess, or neural foraminal stenosis.     Extraspinal findings: Suggestion of sigmoid colonic diverticulosis,  incompletely evaluated. Multiple T2 hyperintense lesions of the right  kidney are incompletely evaluated, but presumably represent cysts.                                                                   IMPRESSION:  1. Mild degenerative changes of the lumbar spine. No significant  spinal or neural foraminal stenosis.  2. 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    Allergies:      Allergies   Allergen Reactions     Amoxicillin         Vitals:  BP (!) 168/98 (BP Location: Left arm, Cuff Size: Adult Large)   Pulse 81   SpO2 97%     Review of Systems: The patient denies recent fever, chills, illness, use of antibiotics or anticoagulants. All other 10-point review of systems negative.     Procedure:   Options/alternatives, benefits and risks were discussed with the patient including bleeding, infection, tissue trauma, exposure to radiation, reaction to medications, spinal cord injury, weakness, numbness and paralysis.  Questions were answered to his satisfaction and he agrees to proceed. Voluntary informed consent was obtained and signed.     After getting informed consent, patient was brought into the procedure suite and was placed in a prone position on the procedure table.   A Pause for the Cause was performed.  Patient was prepped and draped in sterile fashion.     Under AP fluoroscopic guidance the L3, L4, L5 vertebral bodies were identified. The C-arm was rotated to the oblique view to  afford optimal visualization the pedicles.  Under intermittent fluoroscopy, 25 gauge 3.5 inch Quinke spinal needles were positioned inferior and lateral to the intersection of the transverse process and pedicle at the Bilateral L4 & L5 levels, as well as the corresponding sacral alar notch. The needle positions were verified and optimized from the AP and lateral views.    The anatomic targets for the L3 & L4 medial nerve and L5 dorsal ramus (which functionally incorporates the medial branch) were the  L4 & L5 transverse processes and sacral alar notch, with laterality as described above, resulting in blockade of the L4/5 and L5/S1 facet joints.    After negative aspiration, Lidocaine 2% 0.6 ml was injected at each location. The needles were removed. Hemostasis was achieved, the area was cleaned, and bandaids were placed when appropriate. The patient tolerated the procedure well, and was taken to the recovery room. Images were saved to PACS.    Assessment/Plan: Tigre Gillette is a 80 year old male s/p Bilateral L3,4,5 medial branch block #2 today for lumbar spondylosis, facet arthropathy.     Pre procecedure pain score: 7/10   Post procedure pain score: 2/10.   The patient will continue to monitor progress, and they were given a pain diary to complete at home.  They will either fax or mail this back to us or bring it to their next appointment. We will determine the treatment plan after we review the diary.      1. The patient was advised to contact the Pain Management Center for any of the following:   Fever, chills, or night sweats   New onset of pain, numbness, or weakness   Any questions/concerns regarding the procedure  If unable to contact the Pain Center, the patient was instructed to go to a local Emergency Room for any complications.   2. The patient will receive a follow-up call in 1 week.  3. We will await the pain diary to determent next step of the treatment plan and call patient to schedule.      KLEVER  MD MARY   Pain Management & Addiction Medicine

## 2020-09-30 ENCOUNTER — TELEPHONE (OUTPATIENT)
Dept: PALLIATIVE MEDICINE | Facility: CLINIC | Age: 80
End: 2020-09-30

## 2020-09-30 NOTE — TELEPHONE ENCOUNTER
Spoke with patient's spouse reminded them of date, time and location of appointment.     Reminded patient they will need a  for this appointment and requested that the  stays out of the clinic unless its medically necessary. Reminded them to wear mask the whole time they are at the clinic.     Henna Maguire [facility]  [place]

## 2020-10-01 ENCOUNTER — ANCILLARY PROCEDURE (OUTPATIENT)
Dept: GENERAL RADIOLOGY | Facility: CLINIC | Age: 80
End: 2020-10-01
Attending: ANESTHESIOLOGY
Payer: COMMERCIAL

## 2020-10-01 ENCOUNTER — RADIOLOGY INJECTION OFFICE VISIT (OUTPATIENT)
Dept: PALLIATIVE MEDICINE | Facility: CLINIC | Age: 80
End: 2020-10-01
Payer: COMMERCIAL

## 2020-10-01 VITALS — HEART RATE: 81 BPM | OXYGEN SATURATION: 97 % | DIASTOLIC BLOOD PRESSURE: 98 MMHG | SYSTOLIC BLOOD PRESSURE: 168 MMHG

## 2020-10-01 DIAGNOSIS — M47.816 FACET ARTHROPATHY, LUMBAR: ICD-10-CM

## 2020-10-01 DIAGNOSIS — M47.819 FACET ARTHROPATHY: Primary | ICD-10-CM

## 2020-10-01 PROCEDURE — 99207 PR DROP WITH A PROCEDURE: CPT | Performed by: ANESTHESIOLOGY

## 2020-10-01 NOTE — NURSING NOTE
Discharge Information    IV Discontiued Time:  NA    Amount of Fluid Infused:  NA    Discharge Criteria = When patient returns to baseline or as per MD order    Consciousness:  Pt is fully awake    Circulation:  BP +/- 20% of pre-procedure level    Respiration:  Patient is able to breathe deeply    O2 Sat:  Patient is able to maintain O2 Sat >92% on room air    Activity:  Moves 4 extremities on command    Ambulation:  Patient is able to stand and walk or stand and pivot into wheelchair    Dressing:  Clean/dry or No Dressing    Notes:   Discharge instructions and AVS given to patient    Patient meets criteria for discharge?  YES    Admitted to PCU?  No    Responsible adult present to accompany patient home?  Yes    Signature/Title:    Olga West RN Care Coordinator  [facility] [place]

## 2020-10-01 NOTE — PATIENT INSTRUCTIONS
[facility]   Medial Branch Block Discharge Instructions      Your procedure was performed by:  Dr. Patricia Frias     Medications used: lidocaine    You will need to complete the Pain Scale Log form and return it to us as soon as possible.  Once we have received the form, we will review it and call you to determine the next steps.     The form can be faxed to [phone] or mailed to:   [facility]    [address], [address] [id number]      You may resume your regular activity after the injection.    Be cautious with walking since you may have numbness and/or weakness in the lower extremities for up to 6-8 hours after the procedure due to the effects of the local anesthetic.    Avoid driving for 6 hours. The local anesthetic could slow your reflexes    You may shower, however no swimming or tub baths or hot tubs for 24 hours following your procedure.    Your pain will return after the numbing medications have worn off.  You may use your current pain medications as needed.    You may use anti-inflammatory medications (such as ibuprofen, aleve, or advil) or Tylenol for pain control if necessary.  Some people find it helpful to alternate ibuprofen and Tylenol every 3 hours for a couple of days.    You may use ice packs 10-15 minutes three to four times a day at the injection site for comfort.     Do not use heat to painful areas for 6 to 8 hours. This will give the local anesthetic time to wear off and prevent you from accidentally burning your skin.     If you experience any of the following, call the Pain Clinic during work hours at [phone] or the Provider Line after hours at [phone]:  -Fever over 100 degree F  -Swelling, bleeding, redness, drainage, warmth at the injection site  -Progressive weakness or numbness on your legs  -If lumbar, call if you have a loss of bowel or bladder function  -Unusual new  onset of pain that is not improving

## 2020-10-02 NOTE — TELEPHONE ENCOUNTER
Lumbar MBB# 2 pain data reviewed. Max relief obtained:     At rest:  Hr 1-2: 100%, Hr 3-4:86% Hr 5:71%  Left facet load:  Hr 1-4: 100% Hr 5: 71%  Right facet load:  Hr 1-4: 100%, Hr 5 71%  Normal activity: Hr 1-3: 100%, Hr 4: 88%, Hr 5: 75%    Pt would like to proceed with RFA. PT exercises stopped due to not being helpful after 5-6 months of trying. [facility] [place]    Routing to CHANCE Canas for insurance verification    Olga West RN  Care Coordinator  [facility]

## 2020-10-05 NOTE — TELEPHONE ENCOUNTER
GAYLE to SCHEDULE LUMBAR RFA        Jenna Rolle    [place] [facility]

## 2020-10-06 NOTE — TELEPHONE ENCOUNTER
Screening Questions for RFA Procedure      Procedure ordered? LRFA    What insurance are we billing for this procedure?  [employer]    Has patient had this injection before? No  This procedure requires that a COVID-19 lab test be done within 4 days of the procedure.   If patient asks why? We will not always be able to maintain a 6' distance, the procedure takes a long time, there will be more people in a smaller area, and use of sedation medication increases the risk of respiratory treatments.    Would you still like to move forward with scheduling the procedure?  Yes  If YES, let patient know that someone will call them to schedule the COVID-19 test. Route to nursing to enter order.   Any chance of pregnancy? NO   If YES, do NOT schedule and route to RN pool     Is  Needed?: No  Will patient have a ?  Yes   If pt is given sedation meds, no driving for 24 hours.  Is pt taking a cab or transportation service? NO        If so will need to be accompanied by an adult too (friend/family member) in order for IV sedation to be given.      Per [place] Policy:  Outpatients are to have responsible adult or family member to accompany them at discharge and drive them home. A service providing medically trained drivers or attendants would be acceptable. Public transportation would not be acceptable unless the patient is accompanied by a responsible adult or family member.  Is patient taking any blood thinners (i.e. plavix, coumadin, jantoven, warfarin, heparin, pradaxa or dabigatran, etc)? No   If YES, do NOT schedule, and route to RN pool    Is patient taking any aspirin products? Yes - Pt takes 325mg daily; instructed to hold 0 day(s) prior to procedure.      If more than 325mg/day, OK to schedule; Instruct pt to decrease to less than 325 mg for 7 days AND route to RN pool    For CERVICAL procedures, hold all aspirin products for 6 days.     Tell pt that if aspirin product is not held for 6 days, the procedure  WILL BE cancelled.      Does the patient have a bleeding or clotting disorder? No     If YES, it it OKAY to schedule AND route to RN pool    **For any patients with platelet count <100, must be forwarded to provider**    Is patient diabetic? No If YES, have them bring their glucometer.    Does patient have an active infection or treated for one within the past week? No   If YES, do NOT schedule and route to RN nurse pool     Is patient currently taking any antibiotics?  No    For patients on chronic, preventative, or prophylactic antibiotics, procedures may be scheduled.     For patients on antibiotics for active or recent infection:antibiotic course must have been completed for 4 days    Is patient currently taking any steroid medications? (i.e. Prednisone, Medrol)  No     For patients on steroid medications, course must have been completed for 4 days    Is patient actively being treated for cancer or immunocompromised, including the spleen having been removed? No  If YES, do NOT schedule and route to RN pool     Any history of complications with sedation medications?  NO   If YES, OK to schedule AND route to RN pool     Any history of sleep apnea?  NO   If YES, OK to schedule AND route to RN pool     Any cardiac history?  NO   If YES, OK to schedule AND route to RN pool     Do you have an implanted pacemaker, ICD (implanted cardiac device) or AICD (automatic implanted cardiac device)?  NO    If YES, do NOT schedule AND route to RN pool.     Obtain name of device :       Obtain name of cardiologist:       Do you have an implanted stimulator?  NO    If YES, OK to schedule AND route to nursing.     Instruct patient to bring in the remote to the appointment and it will need to be turned off.  reviewed      Does patient have an allergy to contrast dye, iodine or shellfish?  No   If YES, OK to schedule. Route to RN pool AND add allergy information to appointment notes    Are you able to get on and off an  exam table with minimal or no assistance? Yes   If NO, do NOT schedule and route to RN pool    Are you able to roll over and lay on your stomach with minimal or no assistance? Yes   If NO, do NOT schedule and route to RN pool    Reminders:    If you are started on any steroids or antibiotics between now and your appointment, you must contact us because it may affect our ability to perform your procedure.  Yes    Informed patient that s/he needs to be NPO for 6 hours before procedure?  YES    Informed patient that it is OK to take normal medications with sips of water, especially blood pressure medications, before the procedure and must hold blood thinners as instructed.  Yes    Informed patient to arrive 30 minutes before procedure time to have an IV inserted.  reviewed   Do NOT schedule at 0745, 0815 or 1245.  reviewed     All radiofrequency ablations are in a 90 minute time slot.  reviewed

## 2020-10-06 NOTE — TELEPHONE ENCOUNTER
Order placed, Pt notified that scheduling will call Pt to schedule.    Bandar Bai, RN  Care Coordinator   [facility] [place]

## 2020-10-08 NOTE — TELEPHONE ENCOUNTER
10/23/2020 LFRA with Dr Frias. COVID order placed.    Olga West RN  Care Coordinator  [facility]

## 2020-10-16 NOTE — PROGRESS NOTES
[facility] - Procedure Note    Date of Visit: 10/23/2020    Pre procedure Diagnosis: facet arthropathy   Post procedure Diagnosis: Same  Procedure performed: Bilateral L3,4,5 radiofrequency ablation   Anesthesia: moderate sedation with 0mg versed & 50mcg fentanyl  Complications: NONE  Operators: Patricia Frias MD &  Urvashi Lambert MD (Fellow)    Indications:   Tigre Gillette is a 80 year old male was sent by Mesha Henry CNP for lumbar RFA.  They have a history of lumbar facet arthropathy.  Exam shows increased discomfort with extension and rotation and they have tried conservative treatment including PT, medications and prior injections..    Tigre Gillette had medial branch blocks on 9/18/2020 and 10/1/2020 showing appropriate pain relief, therefore radiofrequency ablation will be done.     MRI of the LUMBAR SPINE was done on 8/1/2019 which showed   FINDINGS: Trace anterolisthesis of L4 on L5. Alignment otherwise is  within normal limits. No fracture. Normal marrow signal. Disc  desiccation at L3-L4, L4-L5 and L5-S1, with only minimal disc height  loss at L4-L5 and L5-S1.     The conus terminates at L1-L2, which is normal. Normal signal  intensity of the conus and cauda equina nerve roots. 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 There is mild thickening of the filum terminale,  which extends just to the right and anterior to the Tarlov cyst. No  visible associated intraspinal lipoma/fatty infiltration appreciated.  Paraspinous soft tissues are normal.     Segmental analysis:  T12-L1, L1-L2, L2-L3: Normal.     L3-L4: Shallow symmetric disc bulge with minimal facet degenerative  changes. Minimal contact of the posterior leftward aspect of the disc  and the traversing left L4 nerve root, without significant mass  effect/displacement or narrowing of the  lateral recess. There is no  spinal or lateral recess stenosis. No neural foraminal stenosis.     L4-L5: Shallow symmetric disc bulge with mild facet arthropathy and  ligamentum flavum thickening results in minimal narrowing of the  lateral recess without central spinal stenosis. No significant neural  foraminal stenosis.     L5-S1: Shallow disc bulge with small superimposed disc protrusion. No  spinal, lateral recess, or neural foraminal stenosis.     Extraspinal findings: Suggestion of sigmoid colonic diverticulosis,  incompletely evaluated. Multiple T2 hyperintense lesions of the right  kidney are incompletely evaluated, but presumably represent cysts.                                                                      IMPRESSION:  1. Mild degenerative changes of the lumbar spine. No significant  spinal or neural foraminal stenosis.  2. 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    Allergies:      Allergies   Allergen Reactions     Amoxicillin         Vitals:  BP (!) 169/86   Pulse 74   SpO2 99%     Review of Systems: The patient denies recent fever, chills, illness, use of antibiotics or anticoagulants. All other 10-point review of systems negative.     Physical Exam:   Resp: CTA bilaterally  CV: RRR no murmurs, rubs or gallops  Airway:  Mallampati Class I    The patient does not have a history of obstructive sleep apnea.     Options/alternatives, benefits and risks were discussed with the patient including bleeding, infection, no pain relief, tissue trauma, exposure to radiation, reaction to medications including seizure, spinal cord injury,increased pain after the procedure, weakness, numbness or sensory changes and headache.   We also discussed risks of sedation, including reaction to medications and cardiovascular collapse.    Questions were answered to his satisfaction and he agrees to proceed. Voluntary informed consent was obtained and signed.     Medications were  reviewed:  Yes   Pre-procedure pain score: 6/10    Procedure:  After getting informed consent, patient was brought into the procedure suite and was placed in a prone position on the procedure table.   A Pause for the Cause was performed.  Patient was prepped and draped in sterile fashion.     Tigre Gillette had an IV line placed prior the procedure.  The C-arm was positioned in the contralateral oblique view to afford optimal view of the L4-L5 vertebral bodies. Lidocaine 1% was used to anesthetize the skin at each level.  At each level, a 150mm, 20G curved radiofrequency cannula with a 10mm active tip was positioned, overlying the intersection of the transverse process and pedicle at L4 & L5, and was advanced under intermittent fluoroscopy until it contacted the transverse process and notch, and the tip slightly overran that process, just lateral to the mamillary process.  The position of each cannula was verified and optimized in the oblique view and AP views.    In the AP view, another cannula was placed at the sacral alar notch.      Each position was tested for motor and sensory stimulation, and was positioned so that stimulation was negative for stimuli outside the immediate area of the desired lesion.  Sensory stimulation was completed at 50 Hz, with max stimulation up to 0.8V.  Motor stimulation was completed at 2Hz, up to 2.5V. Lidocaine 1% 0.5 ml was injected at each level, and a 90 second, 80 degree Centigrade lesion was generated.    The needles were then rotated within the pathway of the medial branch, and locations were evaluated with repeat imaging.  Motor testing was again completed, and showed appropriate stimulation.  A second lesion was then generated at each location.    The procedure was then repeated on the left side, using the same technique as described above.    The needles were withdrawn. Hemostasis was achieved, the area was cleaned, and bandaids were placed when appropriate.  The patient  tolerated the procedure well, and was taken to the recovery room.    Images were saved to PACS.    Start sedation time: 1335  End sedation time: 1420    Post-procedure pain score: 0/10  Follow-up includes:   -f/u phone call in one week  -post-procedure pain medications: none  -f/u with the referring provider      KLEVER HERNANDEZ MD   Pain Management & Addiction Medicine

## 2020-10-21 DIAGNOSIS — Z20.822 ENCOUNTER FOR LABORATORY TESTING FOR COVID-19 VIRUS: ICD-10-CM

## 2020-10-21 LAB
SARS-COV-2 RNA SPEC QL NAA+PROBE: NORMAL
SPECIMEN SOURCE: NORMAL

## 2020-10-21 PROCEDURE — U0003 INFECTIOUS AGENT DETECTION BY NUCLEIC ACID (DNA OR RNA); SEVERE ACUTE RESPIRATORY SYNDROME CORONAVIRUS 2 (SARS-COV-2) (CORONAVIRUS DISEASE [COVID-19]), AMPLIFIED PROBE TECHNIQUE, MAKING USE OF HIGH THROUGHPUT TECHNOLOGIES AS DESCRIBED BY CMS-2020-01-R: HCPCS | Performed by: INTERNAL MEDICINE

## 2020-10-22 ENCOUNTER — TELEPHONE (OUTPATIENT)
Dept: PALLIATIVE MEDICINE | Facility: CLINIC | Age: 80
End: 2020-10-22

## 2020-10-22 LAB
LABORATORY COMMENT REPORT: NORMAL
SARS-COV-2 RNA SPEC QL NAA+PROBE: NEGATIVE
SPECIMEN SOURCE: NORMAL

## 2020-10-22 NOTE — TELEPHONE ENCOUNTER
Spoke to patient, reminded patient of date, time and location of appointment. Reminded patient to fast at least 6 hours prior to appointment.     Reminded patient they will need a  for this appointment and requested that the  stays out of the clinic unless its medically necessary. Reminded patient to wear mask the whole time they are at the clinic. Patient stated they may not be able to come in 30 minutes early due to their ride, but may be able to come in 15 minutes early.     Henna Maguire [facility]  [place]

## 2020-10-23 ENCOUNTER — RADIOLOGY INJECTION OFFICE VISIT (OUTPATIENT)
Dept: PALLIATIVE MEDICINE | Facility: CLINIC | Age: 80
End: 2020-10-23
Payer: COMMERCIAL

## 2020-10-23 ENCOUNTER — ANCILLARY PROCEDURE (OUTPATIENT)
Dept: GENERAL RADIOLOGY | Facility: CLINIC | Age: 80
End: 2020-10-23
Attending: ANESTHESIOLOGY
Payer: COMMERCIAL

## 2020-10-23 VITALS — SYSTOLIC BLOOD PRESSURE: 132 MMHG | DIASTOLIC BLOOD PRESSURE: 83 MMHG | OXYGEN SATURATION: 94 % | HEART RATE: 66 BPM

## 2020-10-23 DIAGNOSIS — M47.816 FACET ARTHROPATHY, LUMBAR: ICD-10-CM

## 2020-10-23 DIAGNOSIS — G89.18 PAIN ASSOCIATED WITH SURGICAL PROCEDURE: ICD-10-CM

## 2020-10-23 DIAGNOSIS — M47.816 LUMBAR FACET ARTHROPATHY: Primary | ICD-10-CM

## 2020-10-23 PROCEDURE — 64636 DESTROY L/S FACET JNT ADDL: CPT | Mod: 50 | Performed by: ANESTHESIOLOGY

## 2020-10-23 PROCEDURE — 99152 MOD SED SAME PHYS/QHP 5/>YRS: CPT | Performed by: ANESTHESIOLOGY

## 2020-10-23 PROCEDURE — 99153 MOD SED SAME PHYS/QHP EA: CPT | Performed by: ANESTHESIOLOGY

## 2020-10-23 PROCEDURE — 64635 DESTROY LUMB/SAC FACET JNT: CPT | Mod: 50 | Performed by: ANESTHESIOLOGY

## 2020-10-23 RX ORDER — FENTANYL CITRATE 50 UG/ML
100 INJECTION, SOLUTION INTRAMUSCULAR; INTRAVENOUS ONCE
Status: COMPLETED | OUTPATIENT
Start: 2020-10-23 | End: 2020-10-23

## 2020-10-23 RX ADMIN — FENTANYL CITRATE 100 MCG: 50 INJECTION, SOLUTION INTRAMUSCULAR; INTRAVENOUS at 15:01

## 2020-10-23 ASSESSMENT — PAIN SCALES - GENERAL: PAINLEVEL: NO PAIN (0)

## 2020-10-23 NOTE — NURSING NOTE
22 gauge Peripheral IV inserted into left anticubital - attempts: 2    MD Time IN: 1330   Sedation start time:  1335  Sedation end time:  1420    Medications given: fentanyl 50 mcg IV  Intravenous fluids were administered, normal saline 50 cc's.  Sedation Level Achieved:  Moderate (conscious) sedation    Discharge Information    IV Discontiued Time:  1445    Amount of Fluid Infused:  NA    Discharge Criteria = When patient returns to baseline or as per MD order    Consciousness:  Pt is fully awake    Circulation:  BP +/- 20% of pre-procedure level    Respiration:  Patient is able to breathe deeply    O2 Sat:  Patient is able to maintain O2 Sat >92% on room air    Activity:  Moves 4 extremities on command    Ambulation:  Patient is able to stand and walk or stand and pivot into wheelchair    Dressing:  Clean/dry or No Dressing    Notes:   Discharge instructions and AVS given to patient    Patient meets criteria for discharge?  YES    Admitted to PCU?  No    Responsible adult present to accompany patient home?  Yes    Signature/Title:    Kristy Briones RN  RN Care Coordinator  [place] [facility] [place]

## 2020-10-23 NOTE — PROGRESS NOTES
Pre-procedure Intake    Have you been fasting? Yes    If yes, for how long? 6hrs    Are you taking a prescribed blood thinner such as coumadin, Plavix, Xarelto?    No    If yes, when did you take your last dose?     Do you take aspirin?  Yes -   ASA    If cervical procedure, have you held aspirin for 6 days?   Yes    Do you have any allergies to contrast dye, iodine, steroid and/or numbing medications?  NO    Are you currently taking antibiotics or have an active infection?  NO    Have you had a fever/elevated temperature within the past week? NO    Are you currently taking oral steroids? NO    Do you have a ? Yes       Are you pregnant or breastfeeding?  Not Applicable    Are the vital signs normal?  No: BP:169/86

## 2020-10-23 NOTE — PATIENT INSTRUCTIONS
[facility]   Radiofrequency Ablation (RFA) Discharge Instructions     Today you saw:  Dr. Patricia Frias       You should anticipate procedural pain for up to 2 weeks.     You may receive a prescription for pain medication and you should take this as directed.     It may take up to 8 weeks to receive relief from the RFA     Follow up with your provider in 8 weeks.     If you received sedation before, during or after your procedure, for the next 24 hours you shall NOT:    -Drive    -Operate machinery    -Drink alcohol    -Sign any legal documents     You may resume your normal diet     You may resume your regular medications after the procedure     If you were holding your blood thinning medication, please restart taking it: N/A    Be cautious with walking. Numbness and/or weakness in the lower extremities may occur for up to 6-8 hours due to effect of local anesthetic    Avoid strenuous activity for the first 24 hours     You may resume your regular activities after 24 hours     You may shower, however no swimming, tub baths or hot tubs for 24 hours following your procedure     You may use ice packs 10-15 minutes three to four times a day at the injection site for comfort     Do not use heat to painful areas for 6 to 8 hours. This will give the local anesthetic time to wear off and prevent you from accidentally burning your skin.     Unless you have been directed to avoid the use of anti-inflammatory medications (NSAIDS), you may use medications such as ibuprofen, Aleve or Tylenol for pain control if needed.     If you experience any of the following, call the Pain Clinic during work hours (Monday through Friday 8 am-4:30 pm) at [phone] or the Provider Line after hours at [phone]:   -Fever over 100 degree F    -Swelling, bleeding, redness, drainage, warmth at the injection site    -Progressive weakness or numbness in your legs or arms    -Loss of bowel or bladder function     -Unusual headache that is not relieved by Tylenol    -Unusual new onset of pain that is not improving

## 2020-12-29 DIAGNOSIS — M47.816 LUMBAR FACET ARTHROPATHY: ICD-10-CM

## 2020-12-29 DIAGNOSIS — G89.4 PAIN SYNDROME, CHRONIC: ICD-10-CM

## 2020-12-29 RX ORDER — GABAPENTIN 300 MG/1
600 CAPSULE ORAL AT BEDTIME
Qty: 60 CAPSULE | Refills: 3 | Status: SHIPPED | OUTPATIENT
Start: 2020-12-29 | End: 2021-05-13

## 2020-12-29 NOTE — TELEPHONE ENCOUNTER
Received fax from pharmacy requesting refill(s) for gabapentin (NEURONTIN) 300 MG capsule     Last refilled on 12/02/20    Pt last seen on 09/08/20  Next appt scheduled for none    E-prescribe to:    [id number] - [place], [place] - [address]     Will facilitate refill.      Susie Osorio MA  [facility] [place]

## 2021-01-15 ENCOUNTER — HEALTH MAINTENANCE LETTER (OUTPATIENT)
Age: 81
End: 2021-01-15

## 2021-01-29 ENCOUNTER — MYC MEDICAL ADVICE (OUTPATIENT)
Dept: INTERNAL MEDICINE | Facility: CLINIC | Age: 81
End: 2021-01-29

## 2021-05-04 ENCOUNTER — MYC MEDICAL ADVICE (OUTPATIENT)
Dept: PALLIATIVE MEDICINE | Facility: CLINIC | Age: 81
End: 2021-05-04

## 2021-05-05 NOTE — TELEPHONE ENCOUNTER
Marino message from patient on 5/4 at 1712:    I am having a lot of back pain.  I think the pain in above the area where I had the RFA procedure last fall.   I have been using Lidocaine and sometimes aspirin.  At bedtime I take three Naproxen instead of two.  I have not been taking Tylenol.       I am thinking we should talk.   Tigre Gillette    ---------------  Message to patient:    Chente Landeros,     I think you are absolutely right about connecting with Mesha Henry CNP about your care! Please call the main clinic number at [phone] to schedule an appointment. She is doing in person and virtual visits so you can decide which type of visit will work best for you.     Take care,     SHERRI BlakelyN, RN-BC  Patient Care Supervisor  [facility]

## 2021-05-05 NOTE — TELEPHONE ENCOUNTER
Pt scheduled with Mesha Henry CNP on 5/11/21.     JENNIFER Blakely, RN-BC  Patient Care Supervisor  [facility] [place]

## 2021-05-09 ENCOUNTER — HEALTH MAINTENANCE LETTER (OUTPATIENT)
Age: 81
End: 2021-05-09

## 2021-05-10 NOTE — PROGRESS NOTES
[facility]     Date of visit: 5/11/2021      Assessment:   Tigre Gillette is a 81 year old male with a past medical history significant for GERD and COPD who presents with complaints of low back pain.      1. Low back pain- etiology likely mild degenerative changes/ bilateral L4-5 facet arthropathy with overlying myofascial component.   2. Mental Health - the patient's mental health concerns affect his experience of pain and contribute to his clinically significant distress.    Visit Diagnoses:  1. DDD (degenerative disc disease), lumbar    2. Myofascial pain    3. Chronic pain syndrome        Plan:     1.  Encouraged Tigre continue regular physical activity, walking as able.    2.  Pain Psychologist to address relaxation, behavioral change, coping style, and other factors important to improvement.  NO   3.  Diagnostic Studies:  we may consider repeating lumbar MRI pending effectiveness of injections.    4.  Medication Management:     1. Given worsening of pain we discussed an increase in dose of gabapentin and he is interested. Continue gabapentin at bedtime 600mg but add on 300mg in the morning. If this goes okay, okay to increase to 300mg in the midday for a total of [phone].    5.  Potential procedures: Tigre has significant myofascial tenderness on exam today. I wonder if trigger point injections would be helpful and would like to start with these. Ordered today. They will call to schedule, lets do next week if possible.     6.  Follow up with JERALD Eckert CNP in 4 weeks.       Mesha HERNÁNDEZ [facility]     -------------------------------------------------    Subjective:    Chief complaint:   Chief Complaint   Patient presents with     Pain       Interval history:  Tigre Gillette is a 81 year old male last seen on 9/8/2020.  He is seen in follow up.     Recommendations/plan at the last visit included:    1.  Pain Physical Therapy:                Encouraged Tigre continue regular physical activity, walking as able.               2.  Pain Psychologist to address relaxation, behavioral change, coping style, and other factors important to improvement.  NO              3.  Medication Management:                           1. Gabapentin 600mg at bedtime has been helpful for restless leg syndrome and sleep. We will plan to continue at this dose.                           2. Okay to continue naproxen 440mg BID for now. Advised he try decreasing naproxen after RFA when feeling better.                           3. Continue Tylenol as needed.               4.  Potential procedures: discussed repeating lumbar facet joint injections vs. RFA process. He is interested in trying RFA process as he hopes for longer term relief with COVID-19. 1st lumbar medial branch block ordered today.               5.  He hasn't had chiropractic care in a while because he thinks his coverage may have changed. As this has been helpful for him in the past, advised he recheck insurance coverage of chiropractic.               6.  Follow up with JERALD Eckert CNP as needed.               7.  Tigre to follow up with Primary Care provider regarding elevated blood pressure.    Since his last visit, Tigre JHONATAN Gillette reports:  -His pain is worse than it was at last visit.   -He had a bilateral L3,4,5 RFA with Dr. Frias on 10/23/2020. He reports significant improvement in very low back pain that has continued. It took about a month to experience full benefit but reports % relief in this pain.   -He reports the onset of upper low back pain over the last few months. He notes the pain seems to be fairly localized.  He finds walking to be helpful for his low back pain but this causes pain in his right posterior calf. He notes this effects his balance at times, denies weakness.   -He continues gabapentin 600mg at bedtime, thinks this helps with RLS and sleep mostly.     Pain  Information:   Pain rating: averages 5/10 on a 0-10 scale.    Current pain medications:    Naproxen 440mg BID- [facility], kidney function normal in March   Tylenol 500mg BID (afternoon and bedtime)- [facility], [facility], stopped taking as not helping anymore   Gabapentin 600mg at bedtime- [facility] for restless leg syndrome    Lidocaine cream/roll on prn- H    Current MME: 0    Review of [place] Prescription Monitoring Program (): No concern for abuse or misuse of controlled medications based on this report.     Annual Controlled Substance Agreement/UDS due date: NA    Past pain treatments:  1. Previous Pain Relevant Medications:  NOTE: This medication information taken from patient's intake form, not medical records.               Opiates: Norco- [facility] (given post op)              NSAIDS: ibuprofen- [facility], naproxen- [facility], more so              Muscle Relaxants: no              Anti-migraine mediations: no              Anti-depressants: no              Sleep aids: no              Anxiolytics: no              Neuropathics: no                       Topicals: lidocaine cream/roll on- H              Other medications not covered above: Tylenol- H     2. Physical Therapy: twice as recent as [facility] 2016- H   3. Pain Psychology: no  4. Surgery: no  5. Injections: bilateral L3,4,5 RFA with Dr. Frias on 10/23/2020- % benefit for 7 months +  lumbar medial branch blocks  bilateral L4-5 facet joint injections with Dr. Moon on 6/5/2020 80-90% for almost 3 months, better than last  bilateral L4-5 facet joint injections with Dr. Moon on 11/5/19- H 70% 3 months   6. Chiropractic: yes for years with different providers with good benefit, stopped in 2012 as he was starting physical therapy, [facility]- , some change in insurance  7. Acupuncture: no  8. TENS Unit: no    Medications:  Current Outpatient Medications   Medication Sig Dispense Refill     Acetaminophen (TYLENOL PO) Take 1,000 mg by mouth       cetirizine  (ZYRTEC) 10 MG tablet Take 10 mg by mouth daily       fluticasone-vilanterol (BREO ELLIPTA) 100-25 MCG/INH oral inhaler Inhale 1 puff into the lungs daily       gabapentin (NEURONTIN) 300 MG capsule Take 2 capsules (600 mg) by mouth At Bedtime 60 capsule 3     hydrocortisone 2.5 % cream        Naproxen Sodium (ALEVE PO) Take 440 mg by mouth 2 times daily (with meals)        umeclidinium (INCRUSE ELLIPTA) 62.5 MCG/INH oral inhaler Inhale 1 puff into the lungs daily         Medical History: any changes in medical history since they were last seen? Yes    Review of Systems: A 10-point review of systems was negative, with the exception of chronic pain issues.      Objective:    Physical Exam:  Blood pressure (!) 148/81, pulse 68, SpO2 96 %.  Constitutional: Well developed, well nourished, appears stated age.  Gait: Antalgic  HEENT: Head atraumatic, normocephalic. Eyes without conjunctival injection or jaundice. Oropharynx clear. Neck supple. No obvious neck masses.  Skin: No rash, lesions, or petechiae of exposed skin.   Psychiatric/mental status: Alert, without lethargy or stupor. Speech fluent. Appropriate affect. Mood normal. Able to follow commands without difficulty.   MSK exam: tenderness to lumbar paraspinal muscles. Minimal pain with lumbar facet loading. Lumbar flexion 90 degrees and extension 20 degrees.     Imaging:  MRI of lumbar spine was completed on 8/1/19 and shows:  T12-L1, L1-L2, L2-L3: Normal.     L3-L4: Shallow symmetric disc bulge with minimal facet degenerative  changes. Minimal contact of the posterior leftward aspect of the disc  and the traversing left L4 nerve root, without significant mass  effect/displacement or narrowing of the lateral recess. There is no  spinal or lateral recess stenosis. No neural foraminal stenosis.     L4-L5: Shallow symmetric disc bulge with mild facet arthropathy and  ligamentum flavum thickening results in minimal narrowing of the  lateral recess without central  spinal stenosis. No significant neural  foraminal stenosis.     L5-S1: Shallow disc bulge with small superimposed disc protrusion. No  spinal, lateral recess, or neural foraminal stenosis.     Extraspinal findings: Suggestion of sigmoid colonic diverticulosis,  incompletely evaluated. Multiple T2 hyperintense lesions of the right  kidney are incompletely evaluated, but presumably represent cysts.        IMPRESSION:  1. Mild degenerative changes of the lumbar spine. No significant  spinal or neural foraminal stenosis.  2. 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      Tigre to follow up with Primary Care provider regarding elevated blood pressure.    BILLING TIME DOCUMENTATION:   The total TIME spent on this patient on the date of the encounter/appointment was 23 minutes.      TOTAL TIME includes:   Time spent preparing to see the patient (reviewing records and tests)   Time spent face to face (or over the phone) with the patient   Time spent ordering tests, medications, procedures and referrals   Time spent Referring and communicating with other healthcare professionals   Time spent documenting clinical information in Epic

## 2021-05-11 ENCOUNTER — OFFICE VISIT (OUTPATIENT)
Dept: PALLIATIVE MEDICINE | Facility: CLINIC | Age: 81
End: 2021-05-11
Payer: COMMERCIAL

## 2021-05-11 VITALS — OXYGEN SATURATION: 96 % | SYSTOLIC BLOOD PRESSURE: 148 MMHG | DIASTOLIC BLOOD PRESSURE: 81 MMHG | HEART RATE: 68 BPM

## 2021-05-11 DIAGNOSIS — M79.18 MYOFASCIAL PAIN: ICD-10-CM

## 2021-05-11 DIAGNOSIS — G89.4 CHRONIC PAIN SYNDROME: ICD-10-CM

## 2021-05-11 DIAGNOSIS — M51.369 DDD (DEGENERATIVE DISC DISEASE), LUMBAR: Primary | ICD-10-CM

## 2021-05-11 PROCEDURE — 99213 OFFICE O/P EST LOW 20 MIN: CPT | Performed by: NURSE PRACTITIONER

## 2021-05-11 ASSESSMENT — PAIN SCALES - GENERAL: PAINLEVEL: MODERATE PAIN (5)

## 2021-05-11 NOTE — PATIENT INSTRUCTIONS
1.  Continue regular physical activity, walking as able.    2.  Pain Psychologist to address relaxation, behavioral change, coping style, and other factors important to improvement.  NO   3.  Diagnostic Studies:  we may consider repeating lumbar MRI pending effectiveness of injections.    4.  Medication Management:     1. Lets try increasing your dose of gabapentin. Continue gabapentin at bedtime 600mg but add on 300mg in the morning. If this goes okay, okay to increase to 300mg in the midday for a total of [phone].    5.  Potential procedures: trigger point injections ordered today. They will call to schedule, lets do next week if possible.     6.  Follow up with JERALD Eckert CNP in 4 weeks.       ----------------------------------------------------------------  Clinic Number:  [phone]     Call with any questions about your care and for scheduling assistance.     Calls are returned Monday through Friday between 8 AM and 4:30 PM. We usually get back to you within 2 business days depending on the issue/request.    If we are prescribing your medications:    For opioid medication refills, call the clinic or send a Mark Forged message 7 days in advance.  Please include:    Name of requested medication    Name of the pharmacy.    For non-opioid medications, call your pharmacy directly to request a refill. Please allow 3-4 days to be processed.     Per [place] State Law:    All controlled substance prescriptions must be filled within 30 days of being written.      For those controlled substances allowing refills, pickup must occur within 30 days of last fill.      We believe regular attendance is key to your success in our program!      Any time you are unable to keep your appointment we ask that you call us at least 24 hours in advance to cancel.This will allow us to offer the appointment time to another patient.     Multiple missed appointments may lead to dismissal from the clinic.

## 2021-05-13 ENCOUNTER — TELEPHONE (OUTPATIENT)
Dept: PALLIATIVE MEDICINE | Facility: CLINIC | Age: 81
End: 2021-05-13

## 2021-05-13 DIAGNOSIS — M47.816 LUMBAR FACET ARTHROPATHY: ICD-10-CM

## 2021-05-13 DIAGNOSIS — G89.4 PAIN SYNDROME, CHRONIC: ICD-10-CM

## 2021-05-13 RX ORDER — GABAPENTIN 300 MG/1
CAPSULE ORAL
Qty: 120 CAPSULE | Refills: 3 | Status: SHIPPED | OUTPATIENT
Start: 2021-05-13 | End: 2021-09-16

## 2021-05-13 NOTE — TELEPHONE ENCOUNTER
Received fax from [facility] pharmacy. Note written by pharmacy stating that patient was expecting a new script reflecting a dosing change. Script the pharmacy is referring to has Gabapentin 300 mg with sig to take two capsules by mouth at bedtime. Please clarify with pharmacy. Phone # [phone].  Thank you.

## 2021-05-13 NOTE — TELEPHONE ENCOUNTER
Patient notified that a new script was sent to [facility]. Patient aware of dose adjustment, and verbalized understanding.

## 2021-05-13 NOTE — CONFIDENTIAL NOTE
Signed Prescriptions:                        Disp   Refills    gabapentin (NEURONTIN) 300 MG capsule      120 ca*3        Sig: Take 300mg in the morning and midday, 600mg at           bedtime.  Authorizing Provider: KRISTEL RODRÍGUEZ    [place] Prescription Monitoring Program checked, appears appropriate.       JERALD Eckert CNP  [facility]

## 2021-05-17 NOTE — PROGRESS NOTES
"[facility] - Procedure Note    Date of Visit: 5/18/2021    Pre procedure Diagnosis: myofascial pain/myositis 60.9   Post procedure Diagnosis: Same  Procedure performed: trigger point injections  Complications: none  Operators: Mesha MARQUES CNP    There were no vitals taken for this visit.    Indications:   Tigre Gillette is a 81 year old male with a history of localized low back pain.  Exam shows myofascial pain of the muscle groups listed below and they have tried conservative treatment including PT and medications.    Options/alternatives, benefits and risks were discussed with the patient including bleeding, infection, tissue trauma and pnuemothorax.  Questions were answered to his satisfaction and he agrees to proceed. Voluntary informed consent was obtained and signed.     Vitals allergies and medications were reviewed.    This is a first time trigger point injection.     Procedure:  After getting informed consent, a Pause for the Cause was performed.    Trigger points were identified by patient, and marked when appropriate.  The area was prepped with Chloroprep.    Using clean technique, injections were completed using a 25G, 1.5 inch needle.  After negative aspiration, injection was completed.  A total of 4 locations were injected.  When possible, tissue was retracted from the chest wall to avoid lung injury.    Muscle groups injected:  Bilateral lumbar paraspinals    Injection solution contained:  10ml of 0.5% bupivacaine.    Hemostasis was achieved, the area was cleaned, and bandaids were placed when appropriate.  The patient tolerated the procedure well.  Breath sounds were normal.      Follow-up includes:   -f/u with the referring provider  -repeat as needed  -of note he reports excellent pain relief with recent increase in gabapentin \"its made a world of difference.\"      Mesha MARQUES CNP  [facility] [place]         "

## 2021-05-18 ENCOUNTER — OFFICE VISIT (OUTPATIENT)
Dept: PALLIATIVE MEDICINE | Facility: CLINIC | Age: 81
End: 2021-05-18
Payer: COMMERCIAL

## 2021-05-18 VITALS
HEART RATE: 66 BPM | BODY MASS INDEX: 21.71 KG/M2 | SYSTOLIC BLOOD PRESSURE: 144 MMHG | WEIGHT: 169.1 LBS | OXYGEN SATURATION: 99 % | DIASTOLIC BLOOD PRESSURE: 77 MMHG

## 2021-05-18 DIAGNOSIS — M79.18 MYOFASCIAL PAIN: Primary | ICD-10-CM

## 2021-05-18 PROCEDURE — 20552 NJX 1/MLT TRIGGER POINT 1/2: CPT | Performed by: NURSE PRACTITIONER

## 2021-05-18 RX ORDER — BUPIVACAINE HYDROCHLORIDE 5 MG/ML
10 INJECTION, SOLUTION EPIDURAL; INTRACAUDAL ONCE
Status: COMPLETED | OUTPATIENT
Start: 2021-05-18 | End: 2021-05-18

## 2021-05-18 RX ADMIN — BUPIVACAINE HYDROCHLORIDE 50 MG: 5 INJECTION, SOLUTION EPIDURAL; INTRACAUDAL at 14:58

## 2021-05-18 NOTE — PATIENT INSTRUCTIONS
[facility]   Post Procedure Instructions    Today you had:  trigger point injections         Medications used: bupivicaine          Go to the emergency room if you develop any shortness of breath    Monitor the injection sites for signs and symptoms of infection-fever, chills, redness, swelling, warmth, or drainage to areas.    You may have soreness at injection sites for up to 24 hours.    You may apply ice to the painful areas to help minimize the discomfort of the needle pokes.    Do not apply heat to sites for at least 12 hours.    You may use anti-inflammatory medications or Tylenol for pain control if necessary    Pain Clinic phone number during work hours Monday-Friday:  [phone]    After hours provider line: [phone]

## 2021-06-08 ENCOUNTER — MYC MEDICAL ADVICE (OUTPATIENT)
Dept: PALLIATIVE MEDICINE | Facility: CLINIC | Age: 81
End: 2021-06-08

## 2021-06-08 DIAGNOSIS — M47.816 LUMBAR FACET ARTHROPATHY: Primary | ICD-10-CM

## 2021-06-10 NOTE — TELEPHONE ENCOUNTER
Per patient myChart message:  From: Tigre Gillette      Created: 6/10/2021 10:23 AM      The RFA gave me 7 months of pain relief.   The RFA gave me about 90% relief or better.  Tigre        Routed to provider to review.    Kavita RN-BSN  [facility]

## 2021-06-10 NOTE — CONFIDENTIAL NOTE
Absolutely. Order signed.    JERALD Eckert [facility]

## 2021-06-10 NOTE — TELEPHONE ENCOUNTER
Per patient myChart message:  From: Tigre JHONATAN Gillette      Created: 6/8/2021 8:16 PM      I believe your injections helped and the gabapentin during the day is helping.  Now my pain is lower.   I think I may need to have the RFA done again.  Tigre        10/23/20: bilateral lumbar radiofrequency ablation   5/18/21: trigger point injection   _________________________________    Mychart sent to pt:  From: Kavita Benito RN      Created: 6/10/2021 8:52 AM      Glenn Landeros.  Good to hear that the gabapentin and the trigger point injections were helpful for you.     We can certainly check your insurance coverage for a repeat bilateral lumbar ablation for you now and once it is covered get it scheduled. You last was on 10/23/20.  Your insurance needs to know:  1. How many months of relief did you have from your last radiofrequency ablation?  2. And during those months what % of pain relief did you experience?     Once the questions are answered this can be sent on to JERALD Eckert NP to review. If she is okay w/ proceeding we can submit your information to insurance.     Kavita Benito RN-BSN  [facility]

## 2021-06-15 ENCOUNTER — TELEPHONE (OUTPATIENT)
Dept: PALLIATIVE MEDICINE | Facility: CLINIC | Age: 81
End: 2021-06-15

## 2021-06-15 DIAGNOSIS — Z20.822 ENCOUNTER FOR LABORATORY TESTING FOR COVID-19 VIRUS: Primary | ICD-10-CM

## 2021-06-15 NOTE — TELEPHONE ENCOUNTER
[employer] RFA REQUIREMENTS- NO PA REQUIRED  REPEAT RFA REQUIREMENTS FOR ALL  Must wait 6 months and experience >50% pain relief following previous RFA.        Okay to schedule     Marnie ELMORE    [facility]

## 2021-06-15 NOTE — TELEPHONE ENCOUNTER
Please make sure you arrive 30 minutes prior to your scheduled time to have the IV placed.      You will need to have a Covid test prior the appointment, it must be within 4 days of the appointment. The scheduling office should call you to make this appointment but if you do not receive a call, please call them at [phone].     You will need to reschedule appointment if been in contact with some who has been suspected or confirmed of covid-19. New or worsening symptoms of cough, fever, rash or shortness of breath.      You will need to fast for at least 6 hours prior to the appointment.     Reminded patient they will need a  for this appointment and requested that the  stays out of the clinic unless its medically necessary.     Reminded patient that both patient and  must wear a mask during their visit    **Covid lab entered  RFA 07/09/21      Dagmar RODRIGUEZ, RN Care Coordinator  [facility]  Pain Management

## 2021-06-15 NOTE — TELEPHONE ENCOUNTER
Screening Questions for RFA Procedure      Procedure ordered? Repeat Bilateral L3,4,5 radiofrequency ablation.    What insurance are we billing for this procedure?  [employer]  IF SCHEDULING IN [place], IT IS OKAY TO SCHEDULE. [place] HANDLES THEIR OWN PA'S AFTER THE PATIENT IS SCHEDULED. PLEASE SCHEDULE AT LEAST 1 WEEK OUT SO A PA CAN BE OBTAINED.    Has patient had this injection before? Yes:   This procedure requires that a COVID-19 lab test be done within 4 days of the procedure.   If patient asks why? We will not always be able to maintain a 6' distance, the procedure takes a long time, there will be more people in a smaller area, and use of sedation medication increases the risk of respiratory treatments.    Would you still like to move forward with scheduling the procedure?  Yes  If YES, let patient know that someone will call them to schedule the COVID-19 test and that they will only receive a call back if the result is positive. Route to nursing to enter order.   Any chance of pregnancy? Not Applicable   If YES, do NOT schedule and route to RN pool     Is  Needed?: No  Will patient have a ?  Yes   If pt is given sedation meds, no driving for 24 hours.  Is pt taking a cab or transportation service? NO        If so will need to be accompanied by an adult too (friend/family member) in order for IV sedation to be given.      Per [facility] Policy:  Outpatients are to have responsible adult or family member to accompany them at discharge and drive them home. A service providing medically trained drivers or attendants would be acceptable. Public transportation would not be acceptable unless the patient is accompanied by a responsible adult or family member.  Is patient taking any blood thinners (i.e. plavix, coumadin, jantoven, warfarin, heparin, pradaxa or dabigatran, etc)? No   If YES, do NOT schedule, and route to RN pool    Is patient taking any aspirin products? No     If more than 325mg/day, OK  to schedule; Instruct pt to decrease to less than 325 mg for 7 days AND route to RN pool    For CERVICAL procedures, hold all aspirin products for 6 days.     Tell pt that if aspirin product is not held for 6 days, the procedure WILL BE cancelled.      Does the patient have a bleeding or clotting disorder? No     If YES, it it OKAY to schedule AND route to RN pool    **For any patients with platelet count <100, must be forwarded to provider**    Is patient diabetic? No If YES, have them bring their glucometer.    Does patient have an active infection or treated for one within the past week? No   If YES, do NOT schedule and route to RN nurse pool     Is patient currently taking any antibiotics?  No    For patients on chronic, preventative, or prophylactic antibiotics, procedures may be scheduled.     For patients on antibiotics for active or recent infection:antibiotic course must have been completed for 4 days    Is patient currently taking any steroid medications? (i.e. Prednisone, Medrol)  No     For patients on steroid medications, course must have been completed for 4 days    Is patient actively being treated for cancer or immunocompromised, including the spleen having been removed? No  If YES, do NOT schedule and route to RN pool     Any history of complications with sedation medications?  NO   If YES, OK to schedule AND route to RN pool     Any history of sleep apnea?  NO   If YES, OK to schedule AND route to RN pool     Any cardiac history?  NO   If YES, OK to schedule AND route to RN pool     Do you have an implanted pacemaker, ICD (implanted cardiac device) or AICD (automatic implanted cardiac device)?  NO    If YES, do NOT schedule AND route to RN pool.     Obtain name of device :       Obtain name of cardiologist:       Do you have an implanted stimulator?  NO    If YES, OK to schedule AND route to nursing.     Instruct patient to bring in the remote to the appointment and it will need to be  turned off.       Does patient have an allergy to contrast dye, iodine or shellfish?  No   If YES, OK to schedule. Route to RN pool AND add allergy information to appointment notes    Are you able to get on and off an exam table with minimal or no assistance? Yes   If NO, do NOT schedule and route to RN pool    Are you able to roll over and lay on your stomach with minimal or no assistance? Yes   If NO, do NOT schedule and route to RN pool    Reminders:    If you are started on any steroids or antibiotics between now and your appointment, you must contact us because it may affect our ability to perform your procedure.  Yes    Informed patient that s/he needs to fast for 6 hours before procedure?  YES    Informed patient that it is OK to take normal medications with sips of water, especially blood pressure medications, before the procedure and must hold blood thinners as instructed.  Yes    Informed patient to arrive 30 minutes before procedure time to have an IV inserted.  reviewed   Do NOT schedule at 0745, 0815 or 1245.  reviewed     All radiofrequency ablations are in a 90 minute time slot.  reviewed   Verna CHOW    [facility]

## 2021-06-15 NOTE — TELEPHONE ENCOUNTER
Order received for Repeat Bilateral L3,4,5 radiofrequency ablation. Radiology? Yes  10/2020 radiofrequency ablation provided pt w/ 90% relief for 7 months.    Routing for insurance.    Verna CHOW    [facility]

## 2021-07-05 ENCOUNTER — HOSPITAL ENCOUNTER (OUTPATIENT)
Dept: LAB | Facility: CLINIC | Age: 81
Discharge: HOME | End: 2021-07-05
Attending: NURSE PRACTITIONER | Admitting: NURSE PRACTITIONER
Payer: COMMERCIAL

## 2021-07-05 DIAGNOSIS — Z20.822 ENCOUNTER FOR LABORATORY TESTING FOR COVID-19 VIRUS: ICD-10-CM

## 2021-07-05 LAB
LABORATORY COMMENT REPORT: NORMAL
SARS-COV-2 RNA RESP QL NAA+PROBE: NEGATIVE
SARS-COV-2 RNA RESP QL NAA+PROBE: NORMAL
SPECIMEN SOURCE: NORMAL
SPECIMEN SOURCE: NORMAL

## 2021-07-05 PROCEDURE — U0003 INFECTIOUS AGENT DETECTION BY NUCLEIC ACID (DNA OR RNA); SEVERE ACUTE RESPIRATORY SYNDROME CORONAVIRUS 2 (SARS-COV-2) (CORONAVIRUS DISEASE [COVID-19]), AMPLIFIED PROBE TECHNIQUE, MAKING USE OF HIGH THROUGHPUT TECHNOLOGIES AS DESCRIBED BY CMS-2020-01-R: HCPCS | Performed by: NURSE PRACTITIONER

## 2021-07-05 PROCEDURE — U0005 INFEC AGEN DETEC AMPLI PROBE: HCPCS | Performed by: NURSE PRACTITIONER

## 2021-07-07 NOTE — PROGRESS NOTES
[facility] - Procedure Note    Date of Visit: 7/09/2021    Pre procedure Diagnosis: facet arthropathy   Post procedure Diagnosis: Same  Procedure performed: Bilateral L3,4,5 radiofrequency ablation   Anesthesia: NONE  Complications: NONE  Operators: Patricia Frias MD     Indications:   Tigre Gillette is a 81 year old male was sent by Mesha Henry CNP for lumbar RFA.  They have a history of lumbar facet arthropathy.  Exam shows increased discomfort with extension and rotation and they have tried conservative treatment including PT, medications and prior injections.    This is a repeat injection.  Previous Bilateral L3,4,5 RFA done by myself on 10/23/2020 provided good pain relief for a period of 6 months.     Tigre Gillette had medial branch blocks on 9/18/2020 and 10/1/2020 showing appropriate pain relief, therefore radiofrequency ablation will be done.     MRI of the LUMBAR SPINE was done on 8/1/2019 which showed   FINDINGS: Trace anterolisthesis of L4 on L5. Alignment otherwise is  within normal limits. No fracture. Normal marrow signal. Disc  desiccation at L3-L4, L4-L5 and L5-S1, with only minimal disc height  loss at L4-L5 and L5-S1.     The conus terminates at L1-L2, which is normal. Normal signal  intensity of the conus and cauda equina nerve roots. 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 There is mild thickening of the filum terminale,  which extends just to the right and anterior to the Tarlov cyst. No  visible associated intraspinal lipoma/fatty infiltration appreciated.  Paraspinous soft tissues are normal.     Segmental analysis:  T12-L1, L1-L2, L2-L3: Normal.     L3-L4: Shallow symmetric disc bulge with minimal facet degenerative  changes. Minimal contact of the posterior leftward aspect of the disc  and the traversing left L4 nerve root,  without significant mass  effect/displacement or narrowing of the lateral recess. There is no  spinal or lateral recess stenosis. No neural foraminal stenosis.     L4-L5: Shallow symmetric disc bulge with mild facet arthropathy and  ligamentum flavum thickening results in minimal narrowing of the  lateral recess without central spinal stenosis. No significant neural  foraminal stenosis.     L5-S1: Shallow disc bulge with small superimposed disc protrusion. No  spinal, lateral recess, or neural foraminal stenosis.     Extraspinal findings: Suggestion of sigmoid colonic diverticulosis,  incompletely evaluated. Multiple T2 hyperintense lesions of the right  kidney are incompletely evaluated, but presumably represent cysts.                                                                   IMPRESSION:  1. Mild degenerative changes of the lumbar spine. No significant  spinal or neural foraminal stenosis.  2. 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    Allergies:      Allergies   Allergen Reactions     Amoxicillin         Vitals:  BP (!) 168/96   Pulse 63   SpO2 98%     Review of Systems: The patient denies recent fever, chills, illness, use of antibiotics or anticoagulants. All other 10-point review of systems negative.     Physical Exam:   Resp: CTA bilaterally  CV: RRR no murmurs, rubs or gallops  Airway:  Mallampati Class I    The patient does not have a history of obstructive sleep apnea.     Options/alternatives, benefits and risks were discussed with the patient including bleeding, infection, no pain relief, tissue trauma, exposure to radiation, reaction to medications including seizure, spinal cord injury,increased pain after the procedure, weakness, numbness or sensory changes and headache.   We also discussed risks of sedation, including reaction to medications and cardiovascular collapse.    Questions were answered to his satisfaction and he agrees to proceed. Voluntary informed  consent was obtained and signed.     Medications were reviewed:  Yes   Pre-procedure pain score: 2/10    Procedure:  After getting informed consent, patient was brought into the procedure suite and was placed in a prone position on the procedure table.   A Pause for the Cause was performed.  Patient was prepped and draped in sterile fashion.     Tigre Gillette had an IV line placed prior the procedure.  The C-arm was positioned in the contralateral oblique view to afford optimal view of the L4-L5 vertebral bodies. Lidocaine 1% was used to anesthetize the skin at each level.  At each level, a 150mm, 20G curved radiofrequency cannula with a 10mm active tip was positioned, overlying the intersection of the transverse process and pedicle at L4 & L5, and was advanced under intermittent fluoroscopy until it contacted the transverse process and notch, and the tip slightly overran that process, just lateral to the mamillary process.  The position of each cannula was verified and optimized in the oblique view and AP views.    In the AP view, another cannula was placed at the sacral alar notch.      Each position was tested for motor and sensory stimulation, and was positioned so that stimulation was negative for stimuli outside the immediate area of the desired lesion.  Sensory stimulation was completed at 50 Hz, with max stimulation up to 0.8V.  Motor stimulation was completed at 2Hz, up to 2.5V. Lidocaine 1% 0.5 ml was injected at each level, and a 90 second, 80 degree Centigrade lesion was generated.    The needles were then rotated within the pathway of the medial branch, and locations were evaluated with repeat imaging.  Motor testing was again completed, and showed appropriate stimulation.  A second lesion was then generated at each location.    The procedure was then repeated on the left side, using the same technique as described above.    The needles were withdrawn. Hemostasis was achieved, the area was cleaned, and  bandaids were placed when appropriate.  The patient tolerated the procedure well, and was taken to the recovery room.    Images were saved to PACS.    Post-procedure pain score: 0/10  Follow-up includes:   -f/u phone call in one week  -post-procedure pain medications: none  -f/u with the referring provider      KLEVER HERNANDEZ MD   Pain Management & Addiction Medicine

## 2021-07-09 ENCOUNTER — RADIOLOGY INJECTION OFFICE VISIT (OUTPATIENT)
Dept: PALLIATIVE MEDICINE | Facility: CLINIC | Age: 81
End: 2021-07-09
Payer: COMMERCIAL

## 2021-07-09 VITALS — OXYGEN SATURATION: 96 % | SYSTOLIC BLOOD PRESSURE: 162 MMHG | HEART RATE: 63 BPM | DIASTOLIC BLOOD PRESSURE: 92 MMHG

## 2021-07-09 DIAGNOSIS — M47.816 FACET ARTHROPATHY, LUMBAR: Primary | ICD-10-CM

## 2021-07-09 PROCEDURE — 64635 DESTROY LUMB/SAC FACET JNT: CPT | Mod: 50 | Performed by: ANESTHESIOLOGY

## 2021-07-09 PROCEDURE — 64636 DESTROY L/S FACET JNT ADDL: CPT | Mod: 50 | Performed by: ANESTHESIOLOGY

## 2021-07-09 RX ORDER — FENTANYL CITRATE 50 UG/ML
50 INJECTION, SOLUTION INTRAMUSCULAR; INTRAVENOUS EVERY 5 MIN PRN
Status: CANCELLED | OUTPATIENT
Start: 2021-07-09

## 2021-07-09 NOTE — NURSING NOTE
MD Time IN: 1350  Sedation start time:  NA  Sedation end time:  NA    Medications given: fentanyl 0 mcg IV; versed 0 mg IV; toradol 0 mg IV  Intravenous fluids were administered, normal saline 0 cc's.  Sedation Level Achieved:  No sedation

## 2021-07-09 NOTE — PATIENT INSTRUCTIONS
[facility] Procedure Discharge Instructions       Today you saw:   Dr. Patricia Frias       Your procedure:  Radiofrequency Nerve Ablation     Procedural Medications:  Lidocaine (anesthetic)       You may resume your normal diet and medications.     Avoid strenuous activity for the first 24 hours and resume regular activities after that.     Be cautious with walking as numbness and/or weakness in the lower extremities up to 6-8 hours may occur due to effect of local anesthetic     You may shower, however no swimming or tub baths or hot tubs for 24 hours following your procedure     Anticipate pain for up to 2 weeks after this procedure.    You may use ice packs 10-15 minutes three to four times a day at the injection site for comfort     You may use anti-inflammatory medications (such as Ibuprofen or Aleve or Advil) or Tylenol for pain control if necessary           It may take up to 8 weeks to receive relief from the RFA    If you experience any of the following, call the pain center line during work hours at [phone] or on call physician after hours at [phone]:  -Fever over 100 degree F   -Swelling, bleeding, redness, drainage, warmth at the injection site   -Progressive weakness or numbness in your legs   -Loss of bowel or bladder function   -Unusual headache that is not relieved by Tylenol   -Unusual new onset of pain that is not improving

## 2021-07-09 NOTE — NURSING NOTE
Discharge Information    IV Discontiued Time:  NA    Amount of Fluid Infused:  NA    Discharge Criteria = When patient returns to baseline or as per MD order    Consciousness:  Pt is fully awake    Circulation:  BP +/- 20% of pre-procedure level    Respiration:  Patient is able to breathe deeply    O2 Sat:  Patient is able to maintain O2 Sat >92% on room air    Activity:  Moves 4 extremities on command    Ambulation:  Patient is able to stand and walk or stand and pivot into wheelchair    Dressing:  Clean/dry or No Dressing    Notes:   Discharge instructions and AVS given to patient    Patient meets criteria for discharge?  YES    Admitted to PCU?  No    Responsible adult present to accompany patient home?  Yes    Signature/Title:    Dagmar Truong RN  RN Care Coordinator  [facility] [place]

## 2021-07-09 NOTE — NURSING NOTE
Pre-procedure Intake    Have you been fasting? Yes    If yes, for how long? 6 hours    Are you taking a prescribed blood thinner such as coumadin, Plavix, Xarelto?    No    If yes, when did you take your last dose? NA    Do you take aspirin?  Yes -   ASA Patient has been holding for a little over a week    If cervical procedure, have you held aspirin for 6 days?   NA    Do you have any allergies to contrast dye, iodine, steroid and/or numbing medications?  NO    Are you currently taking antibiotics or have an active infection?  NO    Have you had a fever/elevated temperature within the past week? NO    Are you currently taking oral steroids? NO    Do you have a ? Yes       Are you pregnant or breastfeeding?  Not Applicable    Have you received the COVID-19 vaccine? Yes       If yes, was it your 1st, 2nd or only dose needed? NA    Date of most recent vaccine: March 2021    Notify provider and RNs if systolic BP >170, diastolic BP >100, P >100 or O2 sats < 90%

## 2021-07-09 NOTE — TELEPHONE ENCOUNTER
RFA scheduled 07/09/2021 with Dr Frias. RUTH test Negative.  Closing encounter    Olga Polk RN  Care Coordinator  [facility]

## 2021-07-14 ENCOUNTER — TRANSFERRED RECORDS (OUTPATIENT)
Dept: HEALTH INFORMATION MANAGEMENT | Facility: CLINIC | Age: 81
End: 2021-07-14

## 2021-09-15 DIAGNOSIS — M47.816 LUMBAR FACET ARTHROPATHY: ICD-10-CM

## 2021-09-15 DIAGNOSIS — G89.4 PAIN SYNDROME, CHRONIC: ICD-10-CM

## 2021-09-15 NOTE — TELEPHONE ENCOUNTER
Received fax request from:  [facility] # [id number] [place] [address]  Phone: [phone] Fax: [phone]    Pharmacy requesting refill(s) for gabapentin (NEURONTIN) 300 MG capsule    Last refilled on 08/03/21    Pt last seen on 05/18/21    Next appt scheduled for None    Will facilitate refill.    Agnes Soto [facility] [place]

## 2021-09-16 RX ORDER — GABAPENTIN 300 MG/1
CAPSULE ORAL
Qty: 120 CAPSULE | Refills: 3 | Status: SHIPPED | OUTPATIENT
Start: 2021-09-16 | End: 2022-01-18

## 2021-09-16 NOTE — TELEPHONE ENCOUNTER
Signed Prescriptions:                        Disp   Refills    gabapentin (NEURONTIN) 300 MG capsule      120 ca*3        Sig: Take 300mg in the morning and midday, 600mg at           bedtime.  Authorizing Provider: KRISTEL RODRÍGUEZ APRN [facility]

## 2021-10-24 ENCOUNTER — HEALTH MAINTENANCE LETTER (OUTPATIENT)
Age: 81
End: 2021-10-24

## 2021-11-10 NOTE — PROGRESS NOTES
Lab order is ready.      [facility] - Procedure Note    Date of Visit: 11/5/2019    Pre procedure Diagnosis: Facet arthropathy   Post procedure Diagnosis: Same  Procedure performed: Bilateral L4-5 facet joint injections  Anesthesia: none  Complications: none  Operators: Meka Moon MD    Indications:   iTgre Gillette is a 79 year old male was sent by JERALD Nunez CNP for lumbar facet joint injections.  They have a history of chronic low back pain.  Exam shows pain with  and they have tried conservative treatment including lumbar extension and extension rotation. .    Options/alternatives, benefits and risks were discussed with the patient including bleeding, infection, flared pain, tissue trauma, exposure to radiation, reaction to medications including seizure, spinal cord injury, paralysis, weakness, numbness and headache.    Questions were answered to his satisfaction and he agrees to proceed. Voluntary informed consent was obtained and signed.     Vitals were reviewed: Yes  Allergies were reviewed:  Yes   Medications were reviewed:  Yes     Imaging:   MRI of Lumbar Spine was completed on 8/1/2019 which shows:     FINDINGS: Trace anterolisthesis of L4 on L5. Alignment otherwise is  within normal limits. No fracture. Normal marrow signal. Disc  desiccation at L3-L4, L4-L5 and L5-S1, with only minimal disc height  loss at L4-L5 and L5-S1.     The conus terminates at L1-L2, which is normal. Normal signal  intensity of the conus and cauda equina nerve roots. 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 There is mild thickening of the filum terminale,  which extends just to the right and anterior to the Tarlov cyst. No  visible associated intraspinal lipoma/fatty infiltration appreciated.  Paraspinous soft tissues are normal.     Segmental analysis:  T12-L1, L1-L2, L2-L3:  Normal.     L3-L4: Shallow symmetric disc bulge with minimal facet degenerative  changes. Minimal contact of the posterior leftward aspect of the disc  and the traversing left L4 nerve root, without significant mass  effect/displacement or narrowing of the lateral recess. There is no  spinal or lateral recess stenosis. No neural foraminal stenosis.     L4-L5: Shallow symmetric disc bulge with mild facet arthropathy and  ligamentum flavum thickening results in minimal narrowing of the  lateral recess without central spinal stenosis. No significant neural  foraminal stenosis.     L5-S1: Shallow disc bulge with small superimposed disc protrusion. No  spinal, lateral recess, or neural foraminal stenosis.     Extraspinal findings: Suggestion of sigmoid colonic diverticulosis,  incompletely evaluated. Multiple T2 hyperintense lesions of the right  kidney are incompletely evaluated, but presumably represent cysts.                                                                      IMPRESSION:  1. Mild degenerative changes of the lumbar spine. No significant  spinal or neural foraminal stenosis.  2. 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    Procedure:  After getting informed consent, patient was brought into the procedure suite and was placed in a prone position on the procedure table.   A Pause for the Cause was performed.  Patient was prepped and draped in sterile fashion.     Under AP fluoroscopic guidance the L4-5 facet joints on the right and left sides were identified, and the C-arm was rotated obliquely to the affected side to open the joint space. A total of 2 ml of 1% lidocaine was injected at the needle entry point and needle tract. Then a 22 gauge 3.5 inch quincke type spinal needle was inserted and advanced under fluoroscopic guidance targeting the superior articular pillar of each joint. Once the needle made a contact with SAP, it was rotated and was then advanced into the  joint.    AP fluoroscopic views were obtained to confirm the needle placement. Then,  Omnipaque 300 contrast dye was injected after negative aspiration for heme and CSF in each joint, confirming appropriate placement.  A total of 0.75 mL of Omnipaque was used and 9.25 mL was wasted.    The injection was then accomplished using a solution containing 1ml of 0.25% bupivacaine mixed with 40 mg of kenolog, divided between the 2 joints. The needles were removed..     Hemostasis was achieved, the area was cleaned, and bandaids were placed when appropriate.  The patient tolerated the procedure well, and was taken to the recovery room.    Images were saved to PACS.    Pre-procedure pain score: 6/10  Post-procedure pain score: 1/10    Assessment/Plan: Tirge Gillette is a 79 year old male s/p bilateral L4-5 facet joint injections for chronic back pain.     1. Following today's procedure, the patient was advised to contact the [facility] for any of the following:   Fever, chills, or night sweats   New onset of pain, numbness, or weakness   Any questions/concerns regarding the procedure  If unable to contact the Pain Center, the patient was instructed to go to a local Emergency Room for any complications.   2. The patient will receive a follow-up call in 1 week.   3. Follow-up with the referring provider in 2 weeks for post-procedure evaluation.    Meka Moon MD  [facility] [place]

## 2021-11-19 NOTE — TELEPHONE ENCOUNTER
Pre-screening Questions for Radiology Injections:    Injection to be done at which interventional clinic site? [facility]    Instruct patient to arrive as directed prior to the scheduled appointment time:    [place] minutes before      [place]: 30 minutes before; if IV needed 1 hour before     Procedure ordered by Mesha Henry    Procedure ordered? Lumbar Facet Joint Injection      Transforaminal Cervical LUANNE - Dr. Nadine You ONLY    What insurance would patient like us to bill for this procedure? [employer], Medicare      Worker's comp or MVA (motor vehicle accident) -Any injection DO NOT SCHEDULE and route to Marnie Canas.      [employer] insurance - For SI joint injections, DO NOT SCHEDULE and route Marnie Canas.       [employer] - Any injection besides hip/shoulder/knee joint DO NOT SCHEDULE and route to Marnie Canas. She will obtain PA and call pt back to schedule procedure or notify pt of denial.       [employer]-Route to [place] for review      **BCBS- ALL need to be routed to [place] for review if a PA is needed**      IF SCHEDULING IN [place] AND NEEDS A PA, IT IS OKAY TO SCHEDULE. [place] HANDLES THEIR OWN PA'S AFTER THE PATIENT IS SCHEDULED. PLEASE SCHEDULE AT LEAST 1 WEEK OUT SO A PA CAN BE OBTAINED.    Any chance of pregnancy? NO   If YES, do NOT schedule and route to RN pool    Is an  needed? No     Patient has a drive home? (mandatory) YES: INFORMED    Is patient taking any blood thinners (plavix, coumadin, jantoven, warfarin, heparin, pradaxa or dabigatran )? No   If hold needed, do NOT schedule, route to RN pool     Is patient taking any aspirin products (includes Excedrin and Fiorinal)? No     If more than 325mg/day do NOT schedule; route to RN pool     For CERVICAL procedures, hold all aspirin products for 6 days.     Tell pt that if aspirin product is not held for 6 days, the procedure WILL BE cancelled.      Does the patient have a bleeding or clotting disorder? No     If  YES, okay to schedule AND route to RN nurse pool    For any patients with platelet count <100, must be forwarded to provider    Is patient diabetic?  No  If YES, instruct them to bring their glucometer.    Does patient have an active infection or treated for one within the past week? No - patient has a cold     Is patient currently taking any antibiotics?  No     For patients on chronic, preventative, or prophylactic antibiotics, procedures may be scheduled.     For patients on antibiotics for active or recent infection:antibiotic course must have been completed for 4 days    Is patient currently taking any steroid medications? (i.e. Prednisone, Medrol)  No     For patients on steroid medications, course must have been completed for 4 days    Reviewed with patient:  If you are started on any steroids or antibiotics between now and your appointment, you must contact us because the procedure may need to be cancelled.  Yes    Is patient actively being treated for cancer or immunocompromised? No  If YES, do NOT schedule and route to RN pool     Are you able to get on and off an exam table with minimal or no assistance? Yes  If NO, do NOT schedule and route to RN pool    Are you able to roll over and lay on your stomach with minimal or no assistance? Yes  If NO, do NOT schedule and route to RN pool     Any allergies to contrast dye, iodine, shellfish, or numbing and steroid medications? No  If YES, route to RN pool AND add allergy information to appointment notes    Allergies: Amoxicillin      Has the patient had a flu shot or any other vaccinations within 7 days before or after the procedure.  No     Does patient have an MRI/CT?  YES: MRI  Check Procedure Scheduling Grid to see if required.      Was the MRI done within the last 3 years?  Yes    If yes, where was the MRI done i.e.[facility], [facility], [place], [facility] etc? [place]      If no, do not schedule and route to RN pool    If MRI was not done at  [place], [facility] or [facility] do NOT schedule and route to RN pool.      If pt has an imaging disc, the injection MAY be scheduled but pt has to bring disc to appt.     If they show up without the disc the injection cannot be done    Reminders (please tell patient if applicable):       Instructed pt to arrive 30 minutes early for IV start if required. (Check Procedure Scheduling Grid)  Not Applicable      If celiac plexus block, informed patient NPO for 6 hours and that it is okay to take medications with sips of water, especially blood pressure medications  Not Applicable         If this is for a cervical procedure, informed patient that aspirin needs to be held for 6 days.   Not Applicable      For all patients not having spinal cord stimulator (SCS) trials or radiofrequency ablations (RFAs), informed patient:    IV sedation is not provided for this procedure.  If you feel that an oral anti-anxiety medication is needed, you can discuss this further with your referring provider or primary care provider.  The Pain Clinic provider will discuss specifics of what the procedure includes at your appointment.  Most procedures last 10-20 minutes.  We use numbing medications to help with any discomfort during the procedure.  Not Applicable      Do not schedule procedures requiring IV placement in the first appointment of the day or first appointment after lunch. Do NOT schedule at 0745, 0815 or 1245. N/A      For patients 85 or older we recommend having an adult stay w/ them for the remainder of the day.   N/A    Does the patient have any questions?  NO  Verna Arevalo  [facility]      19-Nov-2021 15:33

## 2022-01-18 DIAGNOSIS — G89.4 PAIN SYNDROME, CHRONIC: ICD-10-CM

## 2022-01-18 DIAGNOSIS — M47.816 LUMBAR FACET ARTHROPATHY: ICD-10-CM

## 2022-01-18 RX ORDER — GABAPENTIN 300 MG/1
CAPSULE ORAL
Qty: 120 CAPSULE | Refills: 3 | Status: SHIPPED | OUTPATIENT
Start: 2022-01-18 | End: 2022-05-18

## 2022-01-18 NOTE — TELEPHONE ENCOUNTER
Received fax request from [email or web address] requesting refill(s) for gabapentin (NEURONTIN) 300 MG capsule    Last refilled on 12/07/21    Pt last seen on 05/18/21  Next appt scheduled for : none    Will facilitate refill.

## 2022-01-18 NOTE — TELEPHONE ENCOUNTER
Called pt.  He continues to take the gabapentin as prescribed.  He states he plans to follow up later this year as radiofrequency ablation needs to be repeated.     He doesn't see primary care regularly. He feels Mesha Henry, JOYCELYN knows him better.  We discussed that he should plan a visit sometime in the next half year, and it is also important to have visits with primary care.    He understands.    Signed Prescriptions:                        Disp   Refills    gabapentin (NEURONTIN) 300 MG capsule      120 ca*3        Sig: Take 300mg in the morning and midday, 600mg at           bedtime.  Authorizing Provider: JESSICA MCNAMARA MD  [facility]

## 2022-04-01 NOTE — PROGRESS NOTES
[facility]     Date of visit: 4/5/2022      Assessment:   Tigre Gillette is a 81 year old male with a past medical history significant for GERD and COPD who presents with complaints of low back pain.      1. Low back pain- etiology likely mild degenerative changes/ bilateral L4-5 facet arthropathy with overlying myofascial component.   2. Mental Health - the patient's mental health concerns affect his experience of pain and contribute to his clinically significant distress.    Visit Diagnoses:  1. Lumbar facet arthropathy    2. Myofascial pain        Plan:   1.  Continue regular physical activity, walking as able.    2.  Pain Psychologist to address relaxation, behavioral change, coping style, and other factors important to improvement.  NO   3.  Diagnostic Studies: Can consider repeating lumbar MRI pending effectiveness of injections.    4.  Medication Management:     1. Continue gabapentin [phone].     2. Discussed targeting his myofascial pain with muscle relaxer. Will initiate Robaxin 500-750mg three times daily as needed for muscle spasm. Discussed common side effects. Monitor pain benefit.    5.  Potential procedures: Patient has had good results with both trigger point injections and his repeat lumbar RFA last year. Will initiate a similar care plan and repeat trigger point injections in the next 1-2 weeks. Ordered today. Will also plan on repeating his lumbar RFA after trigger point injections are complete, which was also ordered today.   6.  Follow up with JERALD Eckert CNP in 6-8 weeks after RFA.      Lila Grimm DNP Student on 4/5/2022 at 3:06 PM    Mesha HERNÁNDEZ [facility]     -------------------------------------------------    Subjective:    Chief complaint:   Pain.      Interval history:  Tigre Gillette is a 81 year old male last seen on 5/11/2021.  He is seen in follow up.     Recommendations/plan at the last visit  included:   1.  Encouraged Tigre continue regular physical activity, walking as able.    2.  Pain Psychologist to address relaxation, behavioral change, coping style, and other factors important to improvement.  NO   3.  Diagnostic Studies:  we may consider repeating lumbar MRI pending effectiveness of injections.    4.  Medication Management:     1. Given worsening of pain we discussed an increase in dose of gabapentin and he is interested. Continue gabapentin at bedtime 600mg but add on 300mg in the morning. If this goes okay, okay to increase to 300mg in the midday for a total of [phone].    5.  Potential procedures: Tigre has significant myofascial tenderness on exam today. I wonder if trigger point injections would be helpful and would like to start with these. Ordered today. They will call to schedule, lets do next week if possible.     6.  Follow up with JERALD Eckert CNP in 4 weeks.       Since his last visit, Tigre Gillette reports:  -His pain is worse than it was at last visit.   -He had a repeat bilateral L3,4,5 RFA with Dr. Frias on 7/9/2021. He had relief until about [place] (5.5-6 months). Reports approximately 60-70% improvement.  -He had trigger point injections with me on 5/18/2021. He reports he had about 3 months of relief with these injections.   -His pain is unchanged in terms of characteristics and locations from his previous visits. It is located on his lower back, R>L. Wraps to the muscle. Pain is worst at the end of the day.    Pain Information:   Pain rating: averages 5/10 on a 0-10 scale.    Current pain medications:    Naproxen 440mg BID- [facility], kidney function normal in March   Tylenol 500mg BID (afternoon and bedtime)- [facility], [place], stopped taking as not helping anymore   Gabapentin 600mg at bedtime- [facility] for restless leg syndrome and pain    Lidocaine cream/roll on prn- [facility]    Current MME: 0    Review of [place] Prescription Monitoring Program (): No concern for abuse or misuse of  controlled medications based on this report.     Annual Controlled Substance Agreement/UDS due date: NA    Past pain treatments:  1. Previous Pain Relevant Medications:               Opiates: Norco- [facility] (given post op)              NSAIDS: ibuprofen- [facility], naproxen- [facility], more so              Muscle Relaxants: no              Anti-migraine mediations: no              Anti-depressants: no              Sleep aids: no              Anxiolytics: no              Neuropathics: no                       Topicals: lidocaine cream/roll on- [facility]              Other medications not covered above: Tylenol- [facility]     2. Physical Therapy: twice as recent as [facility] 2016- [facility]   3. Pain Psychology: no  4. Surgery: no  5. Injections:  repeat bilateral L3,4,5 RFA with Dr. Frias on 7/9/2021 - 60-70% benefit for 6 months  trigger point injections with me on 5/18/2021 - [facility], 3 months  bilateral L3,4,5 RFA with Dr. Frias on 10/23/2020- % benefit for 7 months +  lumbar medial branch blocks  bilateral L4-5 facet joint injections with Dr. Moon on 6/5/2020 80-90% for almost 3 months, better than last  bilateral L4-5 facet joint injections with Dr. Moon on 11/5/19- [facility] 70% 3 months   6. Chiropractic: yes for years with different providers with good benefit, stopped in 2012 as he was starting physical therapy, [facility], some change in insurance  7. Acupuncture: no  8. TENS Unit: no    Medications:  Current Outpatient Medications   Medication Sig Dispense Refill     cetirizine (ZYRTEC) 10 MG tablet Take 10 mg by mouth daily       gabapentin (NEURONTIN) 300 MG capsule Take 300mg in the morning and midday, 600mg at bedtime. 120 capsule 3     hydrocortisone 2.5 % cream        methocarbamol (ROBAXIN) 500 MG tablet Take 1-1.5 tablets (500-750 mg) by mouth 3 times daily as needed for muscle spasms 60 tablet 1     Naproxen Sodium (ALEVE PO) Take 440 mg by mouth 2 times daily (with meals)        TRELEGY ELLIPTA 100-62.5-25  MCG/INH oral inhaler        Acetaminophen (TYLENOL PO) Take 1,000 mg by mouth (Patient not taking: Reported on 4/5/2022)         Medical History: any changes in medical history since they were last seen? Yes    Review of Systems: A 10-point review of systems was negative, with the exception of chronic pain issues.      Objective:    Physical Exam:  Blood pressure 138/72, pulse 66, SpO2 99 %.  Constitutional: Well developed, well nourished, appears stated age.  Gait: Antalgic  HEENT: Head atraumatic, normocephalic. Eyes without conjunctival injection or jaundice. Oropharynx clear. Neck supple. No obvious neck masses.  Skin: No rash, lesions, or petechiae of exposed skin.   Psychiatric/mental status: Alert, without lethargy or stupor. Speech fluent. Appropriate affect. Mood normal. Able to follow commands without difficulty.   MSK exam: tenderness to lumbar paraspinal muscles. Minimal pain with lumbar facet loading. Lumbar flexion 90 degrees and extension 20 degrees.     Imaging:  MRI of lumbar spine was completed on 8/1/19 and shows:  T12-L1, L1-L2, L2-L3: Normal.     L3-L4: Shallow symmetric disc bulge with minimal facet degenerative  changes. Minimal contact of the posterior leftward aspect of the disc  and the traversing left L4 nerve root, without significant mass  effect/displacement or narrowing of the lateral recess. There is no  spinal or lateral recess stenosis. No neural foraminal stenosis.     L4-L5: Shallow symmetric disc bulge with mild facet arthropathy and  ligamentum flavum thickening results in minimal narrowing of the  lateral recess without central spinal stenosis. No significant neural  foraminal stenosis.     L5-S1: Shallow disc bulge with small superimposed disc protrusion. No  spinal, lateral recess, or neural foraminal stenosis.     Extraspinal findings: Suggestion of sigmoid colonic diverticulosis,  incompletely evaluated. Multiple T2 hyperintense lesions of the right  kidney are incompletely  evaluated, but presumably represent cysts.        IMPRESSION:  1. Mild degenerative changes of the lumbar spine. No significant  spinal or neural foraminal stenosis.  2. 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    I saw and examined the patient with the NP student have reviewed and agree with the NP student's note and plan of care and made changes and corrections directly to the body of the note.    TIME SPENT:  BY NP STUDENT ALONE 10 MIN  BY MYSELF AND NP STUDENT TOGETHER 10 MIN  BY MYSELF WITHOUT THE NP STUDENT 5 MIN    BILLING TIME DOCUMENTATION:   The total TIME spent on this patient on the date of the encounter/appointment was 15 minutes.      TOTAL TIME includes:   Time spent preparing to see the patient (reviewing records and tests)   Time spent face to face (or over the phone) with the patient   Time spent ordering tests, medications, procedures and referrals   Time spent Referring and communicating with other healthcare professionals   Time spent documenting clinical information in Epic

## 2022-04-05 ENCOUNTER — OFFICE VISIT (OUTPATIENT)
Dept: PALLIATIVE MEDICINE | Facility: CLINIC | Age: 82
End: 2022-04-05
Payer: COMMERCIAL

## 2022-04-05 VITALS — SYSTOLIC BLOOD PRESSURE: 138 MMHG | DIASTOLIC BLOOD PRESSURE: 72 MMHG | HEART RATE: 66 BPM | OXYGEN SATURATION: 99 %

## 2022-04-05 DIAGNOSIS — M47.816 LUMBAR FACET ARTHROPATHY: Primary | ICD-10-CM

## 2022-04-05 DIAGNOSIS — M79.18 MYOFASCIAL PAIN: ICD-10-CM

## 2022-04-05 PROCEDURE — 99214 OFFICE O/P EST MOD 30 MIN: CPT | Performed by: NURSE PRACTITIONER

## 2022-04-05 RX ORDER — METHOCARBAMOL 500 MG/1
500-750 TABLET, FILM COATED ORAL 3 TIMES DAILY PRN
Qty: 60 TABLET | Refills: 1 | Status: SHIPPED | OUTPATIENT
Start: 2022-04-05 | End: 2022-07-28

## 2022-04-05 ASSESSMENT — PAIN SCALES - GENERAL: PAINLEVEL: MODERATE PAIN (4)

## 2022-04-05 NOTE — PATIENT INSTRUCTIONS
1.  Continue regular physical activity, walking as able.    2.  Pain Psychologist to address relaxation, behavioral change, coping style, and other factors important to improvement.  NO   3.  Diagnostic Studies:  we may consider repeating lumbar MRI pending effectiveness of injections.    4.  Medication Management:     1. Continue gabapentin [phone].     2. Try Robaxin 500-750mg three times daily as needed for muscle spasm. Monitor pain benefit.    5.  Potential procedures: trigger point injections ordered today.  Repeat lumbar RFA ordered. They will call to schedule.   6.  Follow up with JERALD Eckert CNP in 6-8 weeks after your RFA.

## 2022-04-06 ENCOUNTER — TELEPHONE (OUTPATIENT)
Dept: PALLIATIVE MEDICINE | Facility: CLINIC | Age: 82
End: 2022-04-06
Payer: COMMERCIAL

## 2022-04-06 DIAGNOSIS — Z20.822 ENCOUNTER FOR LABORATORY TESTING FOR COVID-19 VIRUS: Primary | ICD-10-CM

## 2022-04-06 NOTE — TELEPHONE ENCOUNTER
Order received: repeat bilateral L3,4,5 radiofrequency ablation         Routing to aguila to review insurance        Jenna Rolle    [facility]

## 2022-04-07 ENCOUNTER — OFFICE VISIT (OUTPATIENT)
Dept: PALLIATIVE MEDICINE | Facility: CLINIC | Age: 82
End: 2022-04-07
Attending: NURSE PRACTITIONER
Payer: COMMERCIAL

## 2022-04-07 VITALS — SYSTOLIC BLOOD PRESSURE: 155 MMHG | OXYGEN SATURATION: 94 % | HEART RATE: 62 BPM | DIASTOLIC BLOOD PRESSURE: 77 MMHG

## 2022-04-07 DIAGNOSIS — M79.18 MYOFASCIAL PAIN: ICD-10-CM

## 2022-04-07 PROCEDURE — 20552 NJX 1/MLT TRIGGER POINT 1/2: CPT | Performed by: NURSE PRACTITIONER

## 2022-04-07 RX ORDER — BUPIVACAINE HYDROCHLORIDE 5 MG/ML
10 INJECTION, SOLUTION EPIDURAL; INTRACAUDAL ONCE
Status: COMPLETED | OUTPATIENT
Start: 2022-04-07 | End: 2022-04-07

## 2022-04-07 RX ADMIN — BUPIVACAINE HYDROCHLORIDE 50 MG: 5 INJECTION, SOLUTION EPIDURAL; INTRACAUDAL at 15:53

## 2022-04-07 ASSESSMENT — PAIN SCALES - GENERAL: PAINLEVEL: MILD PAIN (2)

## 2022-04-07 NOTE — TELEPHONE ENCOUNTER
Phoned pt to schedule repeat bilateral L3,4,5 radiofrequency ablation , pt picked up the phone and hung up.      Kofi Kaur    [facility] [place]

## 2022-04-07 NOTE — TELEPHONE ENCOUNTER
Called [facility] and spoke to [place], she states that there is no PA required and coverage is based on medical necessity.         Call reference #- [id number]        Coverage Guidance-No PA required    Coverage Indications, Limitations, and/or Medical Necessity        Facet Joint Denervation:    The thermal radiofrequency destruction of cervical, thoracic, or lumbar paravertebral facet joint (medial branch) nerves are considered medically reasonable and necessary for patients who meet ALL the following criteria:  i. Repeat thermal facet joint RFA at the same anatomic site is considered medically reasonable and necessary provided the patient had a minimum of consistent 50% improvement in pain for at least six (6) months or at least 50% consistent improvement in the ability to perform previously painful movements and ADLs as compared to baseline measurement using the same scale;      Frequency Limitation: For each covered spinal region no more than two (2) radiofrequency sessions will be reimbursed per rolling 12 months.        Lou to schedule    Marnie ELMORE    [facility]

## 2022-04-07 NOTE — PATIENT INSTRUCTIONS
[facility]  Post Procedure Instructions    Today you had:  trigger point injections   occipital nerve block   bursa injection  Joint Injection    Medications used:  lidocaine   bupivicaine   kenolog   dexamethasone          Go to the emergency room if you develop any shortness of breath    Monitor the injection sites for signs and symptoms of infection-fever, chills, redness, swelling, warmth, or drainage to areas.    You may have soreness at injection sites for up to 24 hours.    It may take up to 14 days for the steroid medication to start working although you may feel the effect as early as a few days after the procedure.       You may apply ice to the painful areas to help minimize the discomfort of the needle pokes.    Do not apply heat to sites for at least 12 hours.    You may use anti-inflammatory medications or Tylenol for pain control if necessary    Pain Clinic phone number during work hours (Monday through Friday 8 am-4:30 pm) at [phone] or the Provider Line after hours at [phone]:

## 2022-04-07 NOTE — TELEPHONE ENCOUNTER
Screening Questions for RFA Procedure      Procedure ordered?  repeat bilateral L3,4,5 radiofrequency ablation     What insurance are we billing for this procedure?  medica  IF SCHEDULING IN [place], IT IS OKAY TO SCHEDULE. [place] HANDLES THEIR OWN PA'S AFTER THE PATIENT IS SCHEDULED. PLEASE SCHEDULE AT LEAST 1 WEEK OUT SO A PA CAN BE OBTAINED.    Has patient had this injection before? No  This procedure requires that a COVID-19 lab test be done within 4 days of the procedure.   If patient asks why? We will not always be able to maintain a 6' distance, the procedure takes a long time, there will be more people in a smaller area, and use of sedation medication increases the risk of respiratory treatments.    Would you still like to move forward with scheduling the procedure?  Yes  If YES, let patient know that someone will call them to schedule the COVID-19 test and that they will only receive a call back if the result is positive. Route to nursing to enter order.   Any chance of pregnancy? NO   If YES, do NOT schedule and route to RN pool     Is  Needed?: No  Will patient have a ?  Yes   If pt is given sedation meds, no driving for 24 hours.  Is pt taking a cab or transportation service? NO        If so will need to be accompanied by an adult too (friend/family member) in order for IV sedation to be given.      Per [place] Policy:  Outpatients are to have responsible adult or family member to accompany them at discharge and drive them home. A service providing medically trained drivers or attendants would be acceptable. Public transportation would not be acceptable unless the patient is accompanied by a responsible adult or family member.  Is patient taking any blood thinners (i.e. plavix, coumadin, jantoven, warfarin, heparin, pradaxa or dabigatran, etc)? No   If YES, do NOT schedule, and route to RN pool    Is patient taking any aspirin products? No     If more than 325mg/day, OK to schedule;  Instruct pt to decrease to less than 325 mg for 7 days AND route to RN pool    For CERVICAL procedures, hold all aspirin products for 6 days.     Tell pt that if aspirin product is not held for 6 days, the procedure WILL BE cancelled.      Does the patient have a bleeding or clotting disorder? No     If YES, it it OKAY to schedule AND route to RN pool    **For any patients with platelet count <100, must be forwarded to provider**    Is patient diabetic? No If YES, have them bring their glucometer.    Does patient have an active infection or treated for one within the past week? No   If YES, do NOT schedule and route to RN nurse pool     Is patient currently taking any antibiotics?  No    For patients on chronic, preventative, or prophylactic antibiotics, procedures may be scheduled.     For patients on antibiotics for active or recent infection:antibiotic course must have been completed for 4 days    Is patient currently taking any steroid medications? (i.e. Prednisone, Medrol)  No     For patients on steroid medications, course must have been completed for 4 days    Is patient actively being treated for cancer or immunocompromised, including the spleen having been removed? No  If YES, do NOT schedule and route to RN pool     Any history of complications with sedation medications?  NO   If YES, OK to schedule AND route to RN pool     Any history of sleep apnea?  NO   If YES, OK to schedule AND route to RN pool     Any cardiac history?  NO   If YES, OK to schedule AND route to RN pool     Do you have an implanted pacemaker, ICD (implanted cardiac device) or AICD (automatic implanted cardiac device)?  NO    If YES, do NOT schedule AND route to RN pool.     Obtain name of device :       Obtain name of cardiologist:       Do you have an implanted stimulator?  NO    If YES, OK to schedule AND route to nursing.     Instruct patient to bring in the remote to the appointment and it will need to be turned off.   reviewed      Does patient have an allergy to contrast dye, iodine or shellfish?  No   If YES, OK to schedule. Route to RN pool AND add allergy information to appointment notes    Are you able to get on and off an exam table with minimal or no assistance? Yes   If NO, do NOT schedule and route to RN pool    Are you able to roll over and lay on your stomach with minimal or no assistance? Yes   If NO, do NOT schedule and route to RN pool    Reminders:    If you are started on any steroids or antibiotics between now and your appointment, you must contact us because it may affect our ability to perform your procedure.  Yes    Informed patient that s/he needs to fast for 6 hours before procedure?  YES    Informed patient that it is OK to take normal medications with sips of water, especially blood pressure medications, before the procedure and must hold blood thinners as instructed.  Yes    Informed patient to arrive 30 minutes before procedure time to have an IV inserted.  reviewed   Do NOT schedule at 0745, 0815 or 1245.  reviewed     All radiofrequency ablations are in a 90 minute time slot.  reviewed

## 2022-04-07 NOTE — PROGRESS NOTES
[facility] - Procedure Note    Date of Visit: 4/7/2022    Pre procedure Diagnosis: myofascial pain/myositis 60.9   Post procedure Diagnosis: Same  Procedure performed: trigger point injections  Complications: none  Operators: Mesha MARQUES CNP    BP (!) 155/77   Pulse 62   SpO2 94%     Indications:   Tigre Gillette is a 81 year old male with a history of low back pain.  Exam shows myofascial pain of the muscle groups listed below and they have tried conservative treatment including PT and medications.    Options/alternatives, benefits and risks were discussed with the patient including bleeding, infection, tissue trauma and pnuemothorax.  Questions were answered to his satisfaction and he agrees to proceed. Voluntary informed consent was obtained and signed.     Vitals allergies and medications were reviewed.    This is a repeat injection. Previous TPI on 5/18/2021 was helpful for 3 months.     Procedure:  After getting informed consent, a Pause for the Cause was performed.    Trigger points were identified by patient, and marked when appropriate.  The area was prepped with Chloroprep.    Using clean technique, injections were completed using a 25G, 1.5 inch needle.  After negative aspiration, injection was completed.  A total of 6 locations were injected.  When possible, tissue was retracted from the chest wall to avoid lung injury.    Muscle groups injected:  Lumbar paraspinals    Injection solution contained:  10ml of 0.5% bupivacaine.    Hemostasis was achieved, the area was cleaned, and bandaids were placed when appropriate.  The patient tolerated the procedure well.  Breath sounds were normal.      Follow-up includes:   -f/u with the referring provider  -repeat as needed      Mesha MARQUES CNP  [facility]

## 2022-04-08 NOTE — TELEPHONE ENCOUNTER
Scheduled 04/21/22.    Please make sure you arrive 30 minutes prior to your scheduled time to have the IV placed.      You will need to have a Covid test prior the appointment, it must be within 4 days of the appointment. The scheduling office should call you to make this appointment but if you do not receive a call, please call them at [phone].     You will need to reschedule appointment if been in contact with some who has been suspected or confirmed of covid-19. New or worsening symptoms of cough, fever, rash or shortness of breath.      You will need to fast for at least 6 hours prior to the appointment.     Reminded patient they will need a  for this appointment and requested that the  stays out of the clinic unless its medically necessary.     Reminded patient that both patient and  must wear a mask during their visit    **Covid lab entered

## 2022-04-18 ENCOUNTER — LAB (OUTPATIENT)
Dept: LAB | Facility: CLINIC | Age: 82
End: 2022-04-18
Attending: ANESTHESIOLOGY
Payer: COMMERCIAL

## 2022-04-18 DIAGNOSIS — Z20.822 ENCOUNTER FOR LABORATORY TESTING FOR COVID-19 VIRUS: ICD-10-CM

## 2022-04-18 PROCEDURE — U0005 INFEC AGEN DETEC AMPLI PROBE: HCPCS

## 2022-04-19 LAB — SARS-COV-2 RNA RESP QL NAA+PROBE: NEGATIVE

## 2022-04-20 NOTE — PROGRESS NOTES
[facility] - Procedure Note    Date of Visit: 4/21/2022    Pre procedure Diagnosis: facet arthropathy   Post procedure Diagnosis: Same  Procedure performed: Bilateral L3,4,5 radiofrequency ablation   Anesthesia: Moderate sedation with 50mcg IV fentanyl  Complications: NONE  Operators: Patricia Frias MD     Indications:   Tigre Gillette is a 82 year old male was sent by Mesha Henry CNP for lumbar RFA.  They have a history of lumbar facet arthropathy.  Exam shows increased discomfort with extension and rotation and they have tried conservative treatment including PT, medications and prior injections.    This is a repeat injection.  Previous Bilateral L3,4,5 RFA done by myself on 7/9/2021 & 10/23/2020 provided good pain relief for a period of 6 months.     Tigre Gillette had medial branch blocks on 9/18/2020 and 10/1/2020 showing appropriate pain relief, therefore radiofrequency ablation will be done.     MRI of the LUMBAR SPINE was done on 8/1/2019 which showed   FINDINGS: Trace anterolisthesis of L4 on L5. Alignment otherwise is  within normal limits. No fracture. Normal marrow signal. Disc  desiccation at L3-L4, L4-L5 and L5-S1, with only minimal disc height  loss at L4-L5 and L5-S1.     The conus terminates at L1-L2, which is normal. Normal signal  intensity of the conus and cauda equina nerve roots. 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 There is mild thickening of the filum terminale,  which extends just to the right and anterior to the Tarlov cyst. No  visible associated intraspinal lipoma/fatty infiltration appreciated.  Paraspinous soft tissues are normal.     Segmental analysis:  T12-L1, L1-L2, L2-L3: Normal.     L3-L4: Shallow symmetric disc bulge with minimal facet degenerative  changes. Minimal contact of the posterior leftward aspect of the  disc  and the traversing left L4 nerve root, without significant mass  effect/displacement or narrowing of the lateral recess. There is no  spinal or lateral recess stenosis. No neural foraminal stenosis.     L4-L5: Shallow symmetric disc bulge with mild facet arthropathy and  ligamentum flavum thickening results in minimal narrowing of the  lateral recess without central spinal stenosis. No significant neural  foraminal stenosis.     L5-S1: Shallow disc bulge with small superimposed disc protrusion. No  spinal, lateral recess, or neural foraminal stenosis.     Extraspinal findings: Suggestion of sigmoid colonic diverticulosis,  incompletely evaluated. Multiple T2 hyperintense lesions of the right  kidney are incompletely evaluated, but presumably represent cysts.                                                                   IMPRESSION:  1. Mild degenerative changes of the lumbar spine. No significant  spinal or neural foraminal stenosis.  2. 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    Allergies:      Allergies   Allergen Reactions     Amoxicillin         Vitals:  BP (!) 196/110   Pulse 68   Resp 17   SpO2 97%     Review of Systems: The patient denies recent fever, chills, illness, use of antibiotics or anticoagulants. All other 10-point review of systems negative.     Physical Exam:   Resp: CTA bilaterally  CV: RRR no murmurs, rubs or gallops  Airway:  Mallampati Class I    The patient does not have a history of obstructive sleep apnea.     Options/alternatives, benefits and risks were discussed with the patient including bleeding, infection, no pain relief, tissue trauma, exposure to radiation, reaction to medications including seizure, spinal cord injury,increased pain after the procedure, weakness, numbness or sensory changes and headache.   We also discussed risks of sedation, including reaction to medications and cardiovascular collapse.    Questions were answered to his  satisfaction and he agrees to proceed. Voluntary informed consent was obtained and signed.     Medications were reviewed:  Yes   Pre-procedure pain score: 9/10    Procedure:  After getting informed consent, patient was brought into the procedure suite and was placed in a prone position on the procedure table.   A Pause for the Cause was performed.  Patient was prepped and draped in sterile fashion.     Tigre Gillette had an IV line placed prior the procedure.  The C-arm was positioned in the contralateral oblique view to afford optimal view of the L4-L5 vertebral bodies. Lidocaine 1% was used to anesthetize the skin at each level.  At each level, a 150mm, 20G curved radiofrequency cannula with a 10mm active tip was positioned, overlying the intersection of the transverse process and pedicle at L4 & L5, and was advanced under intermittent fluoroscopy until it contacted the transverse process and notch, and the tip slightly overran that process, just lateral to the mamillary process.  The position of each cannula was verified and optimized in the oblique view and AP views.    In the AP view, another cannula was placed at the sacral alar notch.      Each position was tested for motor and sensory stimulation, and was positioned so that stimulation was negative for stimuli outside the immediate area of the desired lesion.  Sensory stimulation was completed at 50 Hz, with max stimulation up to 0.8V.  Motor stimulation was completed at 2Hz, up to 2.5V. Lidocaine 1% 0.5 ml was injected at each level, and a 90 second, 80 degree Centigrade lesion was generated.    The needles were then rotated within the pathway of the medial branch, and locations were evaluated with repeat imaging.  Motor testing was again completed, and showed appropriate stimulation.  A second lesion was then generated at each location.    The procedure was then repeated on the left side, using the same technique as described above.    The needles were  withdrawn. Hemostasis was achieved, the area was cleaned, and bandaids were placed when appropriate.  The patient tolerated the procedure well, and was taken to the recovery room.    Images were saved to PACS.    MD Time IN: 1410         Sedation start time:  1408  Sedation end time:  1458    Post-procedure pain score: 1/10  Follow-up includes:   -f/u phone call in one week  -post-procedure pain medications: none  -f/u with the referring provider      KLEVER HERNANDEZ MD   Pain Management & Addiction Medicine

## 2022-04-21 ENCOUNTER — MYC MEDICAL ADVICE (OUTPATIENT)
Dept: PALLIATIVE MEDICINE | Facility: CLINIC | Age: 82
End: 2022-04-21

## 2022-04-21 ENCOUNTER — RADIOLOGY INJECTION OFFICE VISIT (OUTPATIENT)
Dept: PALLIATIVE MEDICINE | Facility: CLINIC | Age: 82
End: 2022-04-21
Attending: NURSE PRACTITIONER
Payer: COMMERCIAL

## 2022-04-21 VITALS
HEART RATE: 68 BPM | SYSTOLIC BLOOD PRESSURE: 145 MMHG | OXYGEN SATURATION: 94 % | RESPIRATION RATE: 17 BRPM | DIASTOLIC BLOOD PRESSURE: 90 MMHG

## 2022-04-21 DIAGNOSIS — M47.816 LUMBAR FACET ARTHROPATHY: ICD-10-CM

## 2022-04-21 DIAGNOSIS — M47.896 OTHER SPONDYLOSIS, LUMBAR REGION: Primary | ICD-10-CM

## 2022-04-21 DIAGNOSIS — G89.18 DISCOMFORT RELATED TO PROCEDURE: ICD-10-CM

## 2022-04-21 DIAGNOSIS — M47.816 FACET ARTHROPATHY, LUMBAR: ICD-10-CM

## 2022-04-21 PROCEDURE — 64635 DESTROY LUMB/SAC FACET JNT: CPT | Mod: 50 | Performed by: ANESTHESIOLOGY

## 2022-04-21 PROCEDURE — 64636 DESTROY L/S FACET JNT ADDL: CPT | Mod: 50 | Performed by: ANESTHESIOLOGY

## 2022-04-21 PROCEDURE — 99153 MOD SED SAME PHYS/QHP EA: CPT | Performed by: ANESTHESIOLOGY

## 2022-04-21 PROCEDURE — 99152 MOD SED SAME PHYS/QHP 5/>YRS: CPT | Performed by: ANESTHESIOLOGY

## 2022-04-21 RX ORDER — FENTANYL CITRATE 50 UG/ML
50 INJECTION, SOLUTION INTRAMUSCULAR; INTRAVENOUS EVERY 5 MIN PRN
Status: DISCONTINUED | OUTPATIENT
Start: 2022-04-21 | End: 2023-01-11

## 2022-04-21 RX ADMIN — Medication 250 ML: at 13:57

## 2022-04-21 RX ADMIN — FENTANYL CITRATE 25 MCG: 50 INJECTION, SOLUTION INTRAMUSCULAR; INTRAVENOUS at 14:10

## 2022-04-21 RX ADMIN — FENTANYL CITRATE 25 MCG: 50 INJECTION, SOLUTION INTRAMUSCULAR; INTRAVENOUS at 14:26

## 2022-04-21 NOTE — NURSING NOTE
MD Time IN: 1410   Sedation start time:  1408  Sedation end time:  1458    Medications given: fentanyl 50 mcg IV; versed 0 mg IV; toradol 0 mg IV  Intravenous fluids were administered, normal saline 75 cc's.  Sedation Level Achieved:  Moderate (conscious) sedation    22g PIV to left hand    Dagmar RODRIGUEZ, RN Care Coordinator  [facility]  Pain Management

## 2022-04-21 NOTE — NURSING NOTE
Discharge Information    IV Discontiued Time: 1457    Amount of Fluid Infused: 75    Discharge Criteria = When patient returns to baseline or as per MD order    Consciousness:  Pt is fully awake    Circulation:  BP +/- 20% of pre-procedure level    Respiration:  Patient is able to breathe deeply    O2 Sat:  Patient is able to maintain O2 Sat >92% on room air    Activity:  Moves 4 extremities on command    Ambulation:  Patient is able to stand and walk or stand and pivot into wheelchair    Dressing:  Clean/dry or No Dressing    Notes:   Discharge instructions and AVS given to patient    Patient meets criteria for discharge?  YES    Admitted to PCU?  No    Responsible adult present to accompany patient home?  Yes    Signature/Title:    Dagmar Truong RN  RN Care Coordinator  [facility] [place]

## 2022-04-21 NOTE — NURSING NOTE
Pre-procedure Intake  If YES to any questions or NO to having a   Please complete laminated checklist and leave on the computer keyboard for Provider, verbally inform provider if able.    For SCS Trial, RFA's or any sedation procedure:  Have you been fasting? Yes    If yes, for how long? 6 hours    Are you taking any any blood thinners such as Coumadin, Warfarin, Jantoven, Pradaxa Xarelto, Eliquis, Edoxaban, Enoxaparin, Lovenox, Heparin, Arixtra, Fondaparinux, or Fragmin? OR Antiplatelet medication such as Plavix, Brilinta, or Effient?   No     If yes, when did you take your last dose?     Do you take aspirin?  No    If cervical procedure, have you held aspirin for 6 days?   NA    Do you have any allergies to contrast dye, iodine, steroid and/or numbing medications?  NO    Are you currently taking antibiotics or have an active infection?  NO    Have you had a fever/elevated temperature within the past week? NO    Are you currently taking oral steroids? NO    Do you have a ? Yes    Are you pregnant or breastfeeding?  Not Applicable    Have you received the COVID-19 vaccine? Yes    If yes, was it your 1st, 2nd or only dose needed? 3rd    Date of most recent vaccine: 10/21/21    Vitals: B/P 152/88    Notify provider and RNs if systolic BP >170, diastolic BP >100, P >100 or O2 sats < 90%      Susie Osorio MA  [facility] [place]

## 2022-04-22 NOTE — TELEPHONE ENCOUNTER
That is from his MRI report from 8/1/2019 - not something I observed just something that was copied and pasted from the radiology read.     I have no idea what the significance is of this cyst is.  Likely nothing.  His primary care physician ordered the MRI for him. I am not sure if she followed up with the radiologist at the time or not.     If he is concerned he could ask Mesha Henry CNP to put in a referral for him to see neurosurgery to discuss it.     Patricia Frias MD

## 2022-04-22 NOTE — TELEPHONE ENCOUNTER
Will leave encounter open for patient response/chart review by nursing.       Below sent to pt  Chente Landeros,     Hope you are doing well after your procedure with us yesterday and that you enjoyed some of your birthday. Dr Frias reviewed your message. She advised that you MRI was from 2019 and not something that she observed. She is unsure what clinical significance, if any, this cyst would be. It looks like your primary care provider originally ordered this and she was not ben if your primary may have followed up with the radiologist who read it at the time or not. But if you are concerned we can ask Mesha Henry CNP to put a referral in to neurosurgery to discuss it further. What are your thoughts on that?     Dagmar RODRIGUEZ, RN Care Coordinator  [facility]  Pain Management

## 2022-04-22 NOTE — TELEPHONE ENCOUNTER
Routing to provider to review, Kathyhart below from pt    Dr. Frias's report on todays procedure noted a cyst on S2.  Can you or Dr. Frias provide me with more information about that?  Should I be seeing a doctor/specialist about that or is it not a concern?  Tigre RODRIGUEZ, RN Care Coordinator  [facility]  Pain Management

## 2022-04-25 NOTE — TELEPHONE ENCOUNTER
FYI to provider  Below from pt      I am not sure of what to do.  I am thinking I will wait until I see Mesha again in the future.  Thanks. Tigre

## 2022-04-26 NOTE — TELEPHONE ENCOUNTER
Thank you, information noted.      JERALD Eckert [facility] Pain Management

## 2022-05-09 ENCOUNTER — MYC MEDICAL ADVICE (OUTPATIENT)
Dept: PALLIATIVE MEDICINE | Facility: CLINIC | Age: 82
End: 2022-05-09
Payer: COMMERCIAL

## 2022-05-09 DIAGNOSIS — M47.816 LUMBAR SPONDYLOSIS: Primary | ICD-10-CM

## 2022-05-10 NOTE — TELEPHONE ENCOUNTER
Routing to provider to review- see 04/21/22    I don't remember.  It is the one Lexy mentioned to me.    Tigre RODRIGUEZ, RN Care Coordinator  [facility]  Pain [facility]

## 2022-05-10 NOTE — TELEPHONE ENCOUNTER
Will leave encounter open for patient response/chart review by nursing.     ----------------Mychart Below from pt----------------  One time you asked me if I wanted to see a neurologist.  I think it is time for me to see a neurologist.   Do I need to come in to see you or can you just arrange for me to see a neurologist?  Tigre Gillette    --------------Mychart below response to pt----------------    Chente Landeros,     I just wanted to clarify a bit. Did you mean neurosurgeon or neurologist? I can see that a few weeks ago a referral to neurosurgery was discussed but am not finding where a referral to neurology was.  Once I hear back I will have Mesha review things for you. Thanks.     Dagmar RODRIGUEZ, RN Care Coordinator  [facility]  Pain Management

## 2022-05-10 NOTE — TELEPHONE ENCOUNTER
It looks like a Neurosurgery referral was discussed with last procedure. Order placed.    JERALD Eckert [facility] Pain Management

## 2022-05-11 ENCOUNTER — OFFICE VISIT (OUTPATIENT)
Dept: NEUROSURGERY | Facility: CLINIC | Age: 82
End: 2022-05-11
Attending: NURSE PRACTITIONER
Payer: COMMERCIAL

## 2022-05-11 ENCOUNTER — ANCILLARY PROCEDURE (OUTPATIENT)
Dept: GENERAL RADIOLOGY | Facility: CLINIC | Age: 82
End: 2022-05-11
Attending: PHYSICIAN ASSISTANT
Payer: COMMERCIAL

## 2022-05-11 VITALS
HEIGHT: 74 IN | HEART RATE: 59 BPM | SYSTOLIC BLOOD PRESSURE: 156 MMHG | BODY MASS INDEX: 21.69 KG/M2 | OXYGEN SATURATION: 97 % | WEIGHT: 169 LBS | DIASTOLIC BLOOD PRESSURE: 67 MMHG

## 2022-05-11 DIAGNOSIS — M47.816 LUMBAR SPONDYLOSIS: Primary | ICD-10-CM

## 2022-05-11 DIAGNOSIS — M47.816 LUMBAR SPONDYLOSIS: ICD-10-CM

## 2022-05-11 PROCEDURE — 99203 OFFICE O/P NEW LOW 30 MIN: CPT | Performed by: PHYSICIAN ASSISTANT

## 2022-05-11 PROCEDURE — 72110 X-RAY EXAM L-2 SPINE 4/>VWS: CPT | Mod: TC | Performed by: RADIOLOGY

## 2022-05-11 ASSESSMENT — PAIN SCALES - GENERAL: PAINLEVEL: SEVERE PAIN (7)

## 2022-05-11 NOTE — PROGRESS NOTES
NEUROSURGERY CLINIC CONSULT NOTE     DATE OF VISIT: 5/11/2022     SUBJECTIVE:     Tigre Gillette is a pleasant 82 year old male who presents to the clinic today for consultation on lumbar spine pain with right leg radiculopahty. He is referred to the Neurosurgery Clinic by Dr. Henry of the Pain Management team in Primary Care.   Today, he reports a multi-year history of symptoms with a two week exacerbation. He describes a daily with fluctuating intensity pain that initiates in the midline low lumbar region and radiates in a belt line distribution as well as distally in what sounds like the right L5 distribution. This pain is accompanied by intermittent numbness and perceived weakness in the same distribution. Prolonged walking, standing and forward flexion seems to aggravate the symptoms, while alleviation is obtained by sitting down. No mechanism of injury such as trauma or a fall is associated with the onset of the pain. There are no bowel or bladder changes. He denies saddle anesthesia. He denies changes in gait, instability, or falling episodes.   He has participated in conservative therapies to include physical therapy, chiropractic care, NSAIDs, gabapentin and multiple steroid injections to include RFA, MBB, TPI, and facet injections that do not seem to be working as well.          Current Outpatient Medications:      Acetaminophen (TYLENOL PO), Take 1,000 mg by mouth , Disp: , Rfl:      cetirizine (ZYRTEC) 10 MG tablet, Take 10 mg by mouth daily, Disp: , Rfl:      gabapentin (NEURONTIN) 300 MG capsule, Take 300mg in the morning and midday, 600mg at bedtime., Disp: 120 capsule, Rfl: 3     hydrocortisone 2.5 % cream, , Disp: , Rfl:      methocarbamol (ROBAXIN) 500 MG tablet, Take 1-1.5 tablets (500-750 mg) by mouth 3 times daily as needed for muscle spasms, Disp: 60 tablet, Rfl: 1     Naproxen Sodium (ALEVE PO), Take 440 mg by mouth 2 times daily (with meals) , Disp: , Rfl:      TRELEGY ELLIPTA 100-62.5-25  "MCG/INH oral inhaler, , Disp: , Rfl:     Current Facility-Administered Medications:      fentaNYL (PF) (SUBLIMAZE) injection 50 mcg, 50 mcg, Intravenous, Q5 Min PRN, Patricia Frias MD, 25 mcg at 04/21/22 1426     midazolam (VERSED) injection 1 mg, 1 mg, Intravenous, Q5 Min PRN, Patricia Frias MD     sodium chloride (PF) 0.9% PF flush 10 mL, 10 mL, Intravenous, Q5 Min PRN, Patricia Frias MD, 10 mL at 04/21/22 1427     Allergies   Allergen Reactions     Amoxicillin         Past Medical History:   Diagnosis Date     COPD (chronic obstructive pulmonary disease) (H)      Degenerative arthritis of lumbar spine     spine     Gastroesophageal reflux disease         ROS: 10 point review of symptoms are negative other than the symptoms noted above in the HPI.     Family History has been reviewed with the patient, there are no pertinent findings to presenting concern.     Past Surgical History:   Procedure Laterality Date     HERNIORRHAPHY EPIGASTRIC N/A 1/30/2018    Procedure: HERNIORRHAPHY EPIGASTRIC;  Epigastric herniorrhaphy with Bard Ventralex ST Hernia Patch ;  Surgeon: Rossana Alas MD;  Location: [facility] OR     HERNIORRHAPHY INGUINAL  2010    open recurrent LIH rpr with mesh     HERNIORRHAPHY INGUINAL  1990    open LIH rpr with mesh     TONSILLECTOMY & ADENOIDECTOMY      t&a        Social History     Tobacco Use     Smoking status: Heavy Tobacco Smoker     Packs/day: 0.50     Types: Cigarettes     Smokeless tobacco: Never Used   Substance Use Topics     Alcohol use: Yes     Comment: rare      Drug use: No        OBJECTIVE:   BP (!) 156/67   Pulse 59   Ht 6' 2\" (1.88 m)   Wt 169 lb (76.7 kg)   SpO2 97%   BMI 21.70 kg/m     Body mass index is 21.7 kg/m .     Imaging:   Findings, per radiologist read, notable for:      LUMBAR SPINE TWO TO THREE VIEWS  7/22/2019 4:10 PM     There is normal alignment of the lumbar vertebrae.  Vertebral body heights of the lumbar spine are normal. There is no  evidence for " fracture of the lumbar spine. Lumbar intervertebral disc  space heights are well maintained. There is facet arthropathy  bilaterally at the L5-S1 level.    MRI LUMBAR SPINE WITHOUT CONTRAST   8/1/2019 3:37 PM    1. Mild degenerative changes of the lumbar spine. No significant  spinal or neural foraminal stenosis.  2. 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    Full radiological report in chart. Imaging was reviewed with with patient today.     Exam:     Patient appears comfortable, conversational, and in no apparent distress.   Head: Normocephalic, without obvious abnormality, atraumatic, no facial asymmetry.   Eyes: conjunctivae/corneas clear. PERRL, EOM's intact.   Throat: lips, mucosa, and tongue normal; teeth and gums normal.   Neck: supple, symmetrical, trachea midline, no adenopathy and thyroid: not enlarged, symmetric, no tenderness/mass/nodules.   Lungs: clear to auscultation bilaterally.   Heart: regular rate and rhythm.   Abdomen: soft, non-tender; bowel sounds normal; no masses, no organomegaly.   Pulses: 2+ and symmetric.   Skin: Skin color, texture, turgor normal. No rashes or lesions.     CN II-XII grossly intact, alert and appropriate with conversation and following commands.   Gait is non-antalgic. Able to tandem walk. Able to walk on toes and heels without difficulty.   Cervical spine is non tender to palpation. Appropriate range of motion of neck, not concerning for lhermitte's phenomenon.   Bilateral bicep 2/4 and tricep reflexes 1/4. Sensation intact throughout upper extremities.     UE muscle strength  Right  Left    Deltoid  5/5  5/5    Biceps  5/5  5/5    Triceps  5/5  5/5    Hand intrinsics  5/5  5/5    Hand grasp  5/5  5/5    O[place]ira signs  neg  neg      Lumbar spine is non tender to palpation.  Intact sensation throughout lower extremities.   Bilateral patellar 2/4 and achilles reflex 1/4.    LE muscle strength  Right  Left    Iliopsoas (hip flexion)  5/5   5/5    Quad (knee extension)  5/5  5/5    Hamstring (knee flexion)  5/5  5/5    Gastrocnemius (PF)  5/5  5/5    Tibialis Ant. (DF)  5/5  5/5    EHL  5/5  5/5      Negative Babinski bilaterally. Negative for clonus.   Calves are soft and non-tender bilaterally.     ASSESSMENT/PLAN:     Tigre Gillette is a 82 year old male who presents to the clinic for consultation on an exacerbating daily with fluctuating intensity pain that initiates in the midline low lumbar region and radiates in a belt line distribution as well as distally in what sounds like the right L5 distribution. This pain is accompanied by intermittent numbness and perceived weakness in the same distribution. The patient's most recent, 2019, imaging was reviewed with him today. It was explained that images show mild degenerative changes of the lumbar spine with no significant spinal or neural foraminal stenosis. On exam, he is noted to have appropriate strength, sensation and range of motion. He has attempted conservative management with physical therapy, chiropractic care, NSAIDs, gabapentin and multiple steroid injections to include RFA, MBB, TPI, and facet injections, without resolution of symptoms.     Based on his physical exam to include weakness, numbness, and exacerbating pain in conjunction to failed conservative management, we do feel that it would be in his best interest to obtain an updated lumbar MRI and upright lumbar x-rays to further assess our concern for lumbar stenosis, a bulging / herniated disk or other concerning pathology. Once the images have been obtained she has agreed to call our office back at [phone] to further discuss possible surgical interventions or other conservative therapies.      We also discussed signs of a worsening problem that he should seek being evaluated.        Respectfully,     ROJELIO Flood, ADITI  [facility]  [facility]  Tel:  [phone]      Exam, imaging, and plan reviewed by Dr. Mahmood.

## 2022-05-11 NOTE — PATIENT INSTRUCTIONS
-Order placed for lumbar MR imaging WO. Please call [facility] at [phone] to schedule the date, time and location that is most convenient for you to obtain your imaging. Once the imaging has been completed, please contact my office the next day to review the images as well as treatment options. You can contact my office at [phone].    -Lumbar x-rays obtained today.      ROJELIO Flood, PA-C  [facility]surgery  [facility]     Tel: [phone]  Fax: [phone]

## 2022-05-11 NOTE — PROGRESS NOTES
"Tigre Gillette is a 82 year old male who presents for:  Chief Complaint   Patient presents with     Consult     Lumbar spondylosis         Initial Vitals:  BP (!) 156/67   Pulse 59   Ht 6' 2\" (1.88 m)   Wt 169 lb (76.7 kg)   SpO2 97%   BMI 21.70 kg/m   Estimated body mass index is 21.7 kg/m  as calculated from the following:    Height as of this encounter: 6' 2\" (1.88 m).    Weight as of this encounter: 169 lb (76.7 kg).. Body surface area is 2 meters squared. BP completed using cuff size: regular  Severe Pain (7)    Nursing Comments:     Heather Eubanks MA    "

## 2022-05-11 NOTE — LETTER
5/11/2022         RE: Tigre Gillette  [address]  [place] [address]        Dear Colleague,    Thank you for referring your patient, Tigre Gillette, to the [facility] [place]. Please see a copy of my visit note below.    NEUROSURGERY CLINIC CONSULT NOTE     DATE OF VISIT: 5/11/2022     SUBJECTIVE:     Tigre Gillette is a pleasant 82 year old male who presents to the clinic today for consultation on lumbar spine pain with right leg radiculopahty. He is referred to the Neurosurgery Clinic by Dr. Henry of the Pain Management team in Primary Care.   Today, he reports a multi-year history of symptoms with a two week exacerbation. He describes a daily with fluctuating intensity pain that initiates in the midline low lumbar region and radiates in a belt line distribution as well as distally in what sounds like the right L5 distribution. This pain is accompanied by intermittent numbness and perceived weakness in the same distribution. Prolonged walking, standing and forward flexion seems to aggravate the symptoms, while alleviation is obtained by sitting down. No mechanism of injury such as trauma or a fall is associated with the onset of the pain. There are no bowel or bladder changes. He denies saddle anesthesia. He denies changes in gait, instability, or falling episodes.   He has participated in conservative therapies to include physical therapy, chiropractic care, NSAIDs, gabapentin and multiple steroid injections to include RFA, MBB, TPI, and facet injections that do not seem to be working as well.          Current Outpatient Medications:      Acetaminophen (TYLENOL PO), Take 1,000 mg by mouth , Disp: , Rfl:      cetirizine (ZYRTEC) 10 MG tablet, Take 10 mg by mouth daily, Disp: , Rfl:      gabapentin (NEURONTIN) 300 MG capsule, Take 300mg in the morning and midday, 600mg at bedtime., Disp: 120 capsule, Rfl: 3     hydrocortisone 2.5 % cream, , Disp: , Rfl:       "methocarbamol (ROBAXIN) 500 MG tablet, Take 1-1.5 tablets (500-750 mg) by mouth 3 times daily as needed for muscle spasms, Disp: 60 tablet, Rfl: 1     Naproxen Sodium (ALEVE PO), Take 440 mg by mouth 2 times daily (with meals) , Disp: , Rfl:      TRELEGY ELLIPTA 100-62.5-25 MCG/INH oral inhaler, , Disp: , Rfl:     Current Facility-Administered Medications:      fentaNYL (PF) (SUBLIMAZE) injection 50 mcg, 50 mcg, Intravenous, Q5 Min PRN, Patricia Frias MD, 25 mcg at 04/21/22 1426     midazolam (VERSED) injection 1 mg, 1 mg, Intravenous, Q5 Min PRN, Patricia Frias MD     sodium chloride (PF) 0.9% PF flush 10 mL, 10 mL, Intravenous, Q5 Min PRN, Patricia Frias MD, 10 mL at 04/21/22 1427     Allergies   Allergen Reactions     Amoxicillin         Past Medical History:   Diagnosis Date     COPD (chronic obstructive pulmonary disease) (H)      Degenerative arthritis of lumbar spine     spine     Gastroesophageal reflux disease         ROS: 10 point review of symptoms are negative other than the symptoms noted above in the HPI.     Family History has been reviewed with the patient, there are no pertinent findings to presenting concern.     Past Surgical History:   Procedure Laterality Date     HERNIORRHAPHY EPIGASTRIC N/A 1/30/2018    Procedure: HERNIORRHAPHY EPIGASTRIC;  Epigastric herniorrhaphy with Bard Ventralex ST Hernia Patch ;  Surgeon: Rossana Alas MD;  Location: [facility] OR     HERNIORRHAPHY INGUINAL  2010    open recurrent LIH rpr with mesh     HERNIORRHAPHY INGUINAL  1990    open LIH rpr with mesh     TONSILLECTOMY & ADENOIDECTOMY      t&a        Social History     Tobacco Use     Smoking status: Heavy Tobacco Smoker     Packs/day: 0.50     Types: Cigarettes     Smokeless tobacco: Never Used   Substance Use Topics     Alcohol use: Yes     Comment: rare      Drug use: No        OBJECTIVE:   BP (!) 156/67   Pulse 59   Ht 6' 2\" (1.88 m)   Wt 169 lb (76.7 kg)   SpO2 97%   BMI 21.70 kg/m     Body mass " index is 21.7 kg/m .     Imaging:   Findings, per radiologist read, notable for:      LUMBAR SPINE TWO TO THREE VIEWS  7/22/2019 4:10 PM     There is normal alignment of the lumbar vertebrae.  Vertebral body heights of the lumbar spine are normal. There is no  evidence for fracture of the lumbar spine. Lumbar intervertebral disc  space heights are well maintained. There is facet arthropathy  bilaterally at the L5-S1 level.    MRI LUMBAR SPINE WITHOUT CONTRAST   8/1/2019 3:37 PM    1. Mild degenerative changes of the lumbar spine. No significant  spinal or neural foraminal stenosis.  2. 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    Full radiological report in chart. Imaging was reviewed with with patient today.     Exam:     Patient appears comfortable, conversational, and in no apparent distress.   Head: Normocephalic, without obvious abnormality, atraumatic, no facial asymmetry.   Eyes: conjunctivae/corneas clear. PERRL, EOM's intact.   Throat: lips, mucosa, and tongue normal; teeth and gums normal.   Neck: supple, symmetrical, trachea midline, no adenopathy and thyroid: not enlarged, symmetric, no tenderness/mass/nodules.   Lungs: clear to auscultation bilaterally.   Heart: regular rate and rhythm.   Abdomen: soft, non-tender; bowel sounds normal; no masses, no organomegaly.   Pulses: 2+ and symmetric.   Skin: Skin color, texture, turgor normal. No rashes or lesions.     CN II-XII grossly intact, alert and appropriate with conversation and following commands.   Gait is non-antalgic. Able to tandem walk. Able to walk on toes and heels without difficulty.   Cervical spine is non tender to palpation. Appropriate range of motion of neck, not concerning for lhermitte's phenomenon.   Bilateral bicep 2/4 and tricep reflexes 1/4. Sensation intact throughout upper extremities.     UE muscle strength  Right  Left    Deltoid  5/5  5/5    Biceps  5/5  5/5    Triceps  5/5  5/5    Hand intrinsics   5/5  5/5    Hand grasp  5/5  5/5    Oliveira signs  neg  neg      Lumbar spine is non tender to palpation.  Intact sensation throughout lower extremities.   Bilateral patellar 2/4 and achilles reflex 1/4.    LE muscle strength  Right  Left    Iliopsoas (hip flexion)  5/5  5/5    Quad (knee extension)  5/5  5/5    Hamstring (knee flexion)  5/5  5/5    Gastrocnemius (PF)  5/5  5/5    Tibialis Ant. (DF)  5/5  5/5    EHL  5/5  5/5      Negative Babinski bilaterally. Negative for clonus.   Calves are soft and non-tender bilaterally.     ASSESSMENT/PLAN:     Tigre Gillette is a 82 year old male who presents to the clinic for consultation on an exacerbating daily with fluctuating intensity pain that initiates in the midline low lumbar region and radiates in a belt line distribution as well as distally in what sounds like the right L5 distribution. This pain is accompanied by intermittent numbness and perceived weakness in the same distribution. The patient's most recent, 2019, imaging was reviewed with him today. It was explained that images show mild degenerative changes of the lumbar spine with no significant spinal or neural foraminal stenosis. On exam, he is noted to have appropriate strength, sensation and range of motion. He has attempted conservative management with physical therapy, chiropractic care, NSAIDs, gabapentin and multiple steroid injections to include RFA, MBB, TPI, and facet injections, without resolution of symptoms.     Based on his physical exam to include weakness, numbness, and exacerbating pain in conjunction to failed conservative management, we do feel that it would be in his best interest to obtain an updated lumbar MRI and upright lumbar x-rays to further assess our concern for lumbar stenosis, a bulging / herniated disk or other concerning pathology. Once the images have been obtained she has agreed to call our office back at [phone] to further discuss possible surgical interventions or  "other conservative therapies.      We also discussed signs of a worsening problem that he should seek being evaluated.        Respectfully,     ROJELIO Flood, ADITI  [facility]  [facility]  Tel: [phone]      Exam, imaging, and plan reviewed by Dr. Mahmood.     Tigre Gillette is a 82 year old male who presents for:  Chief Complaint   Patient presents with     Consult     Lumbar spondylosis         Initial Vitals:  BP (!) 156/67   Pulse 59   Ht 6' 2\" (1.88 m)   Wt 169 lb (76.7 kg)   SpO2 97%   BMI 21.70 kg/m   Estimated body mass index is 21.7 kg/m  as calculated from the following:    Height as of this encounter: 6' 2\" (1.88 m).    Weight as of this encounter: 169 lb (76.7 kg).. Body surface area is 2 meters squared. BP completed using cuff size: regular  Severe Pain (7)    Nursing Comments:     Heather Eubanks MA        Again, thank you for allowing me to participate in the care of your patient.        Sincerely,        Remberto Bains PA-C    "

## 2022-05-16 NOTE — TELEPHONE ENCOUNTER
Chart review: now has appt with neurosurgery. Closing    Dagmar RODRIGUEZ, RN Care Coordinator  [facility]  Pain Management

## 2022-05-18 DIAGNOSIS — M47.816 LUMBAR FACET ARTHROPATHY: ICD-10-CM

## 2022-05-18 DIAGNOSIS — G89.4 PAIN SYNDROME, CHRONIC: ICD-10-CM

## 2022-05-18 RX ORDER — GABAPENTIN 300 MG/1
CAPSULE ORAL
Qty: 120 CAPSULE | Refills: 3 | Status: SHIPPED | OUTPATIENT
Start: 2022-05-18 | End: 2022-09-07

## 2022-05-18 NOTE — TELEPHONE ENCOUNTER
Received fax request from   [facility] # [id number] - [place] - [address]  [address] [place] [place] [address]  Phone: [phone] Fax: [phone]     pharmacy requesting refill(s) for gabapentin (NEURONTIN) 300 MG capsule    Last refilled on 04/10/22    Pt last seen on 04/07/22    Next appt scheduled for None    Will facilitate refill.    Agnes Soto [facility] Pain Management [place]

## 2022-05-24 ENCOUNTER — HOSPITAL ENCOUNTER (OUTPATIENT)
Dept: MRI IMAGING | Facility: CLINIC | Age: 82
Discharge: HOME | End: 2022-05-24
Attending: PHYSICIAN ASSISTANT | Admitting: PHYSICIAN ASSISTANT
Payer: COMMERCIAL

## 2022-05-24 DIAGNOSIS — M47.816 LUMBAR SPONDYLOSIS: ICD-10-CM

## 2022-05-24 PROCEDURE — 72148 MRI LUMBAR SPINE W/O DYE: CPT

## 2022-05-25 ENCOUNTER — OFFICE VISIT (OUTPATIENT)
Dept: NEUROSURGERY | Facility: CLINIC | Age: 82
End: 2022-05-25
Attending: PHYSICIAN ASSISTANT
Payer: COMMERCIAL

## 2022-05-25 VITALS
WEIGHT: 169 LBS | DIASTOLIC BLOOD PRESSURE: 83 MMHG | HEIGHT: 74 IN | HEART RATE: 62 BPM | SYSTOLIC BLOOD PRESSURE: 156 MMHG | OXYGEN SATURATION: 99 % | BODY MASS INDEX: 21.69 KG/M2

## 2022-05-25 DIAGNOSIS — M47.816 LUMBAR SPONDYLOSIS: Primary | ICD-10-CM

## 2022-05-25 PROCEDURE — G0463 HOSPITAL OUTPT CLINIC VISIT: HCPCS

## 2022-05-25 PROCEDURE — 99213 OFFICE O/P EST LOW 20 MIN: CPT | Performed by: PHYSICIAN ASSISTANT

## 2022-05-25 ASSESSMENT — PAIN SCALES - GENERAL: PAINLEVEL: MODERATE PAIN (4)

## 2022-05-25 NOTE — PROGRESS NOTES
"Tigre Gillette is a 82 year old male who presents for:  Chief Complaint   Patient presents with     Results     MRI F/U         Initial Vitals:  BP (!) 156/83   Pulse 62   Ht 6' 2\" (1.88 m)   Wt 169 lb (76.7 kg)   SpO2 99%   BMI 21.70 kg/m   Estimated body mass index is 21.7 kg/m  as calculated from the following:    Height as of this encounter: 6' 2\" (1.88 m).    Weight as of this encounter: 169 lb (76.7 kg).. Body surface area is 2 meters squared. BP completed using cuff size: large  Moderate Pain (4)    Nursing Comments:     Heather Eubanks MA    "

## 2022-05-25 NOTE — LETTER
5/25/2022         RE: Tigre Gillette  [address]  [place] [address]        Dear Colleague,    Thank you for referring your patient, Tigre Gillette, to the [facility] [place]. Please see a copy of my visit note below.    NEUROSURGERY CLINIC PROGRESS NOTE    DATE OF VISIT: 5/25/2022    HPI:     Tigre Gillette is a pleasant 82 year old male who presents to the clinic today for a  follow-up visit. We last evaluated him on 05/11/2022 for consultation on lumbar spine pain with right leg radiculopathy. His primary concern is lumbar spine pain. He describes a daily with fluctuating intensity pain that initiates in the midline low lumbar region and radiates in a belt line distribution as well as distally in what sounds like the right L5 distribution. This pain is accompanied by intermittent numbness, paresthesia, and perceived weakness    He has participated in conservative therapies to include physical therapy, chiropractic care, NSAIDs, gabapentin and multiple steroid injections to include RFA, MBB, TPI, and facet injections that do not seem to be working as well.     His most recent were bilateral L3,4,5 radiofrequency ablations performed on 04/21/2022.    At our last meeting we felt that it would be in his best interest to obtain an updated lumbar MRI and upright lumbar x-rays to further assess our concern for lumbar stenosis, a bulging / herniated disk or other concerning pathology.    Current Outpatient Medications   Medication     Acetaminophen (TYLENOL PO)     cetirizine (ZYRTEC) 10 MG tablet     gabapentin (NEURONTIN) 300 MG capsule     hydrocortisone 2.5 % cream     methocarbamol (ROBAXIN) 500 MG tablet     Naproxen Sodium (ALEVE PO)     TRELEGY ELLIPTA 100-62.5-25 MCG/INH oral inhaler     Current Facility-Administered Medications   Medication     fentaNYL (PF) (SUBLIMAZE) injection 50 mcg     midazolam (VERSED) injection 1 mg     sodium chloride (PF) 0.9% PF  "flush 10 mL       Allergies   Allergen Reactions     Amoxicillin        Past Medical History:   Diagnosis Date     COPD (chronic obstructive pulmonary disease) (H)      Degenerative arthritis of lumbar spine     spine     Gastroesophageal reflux disease        Review Of Systems    Skin: negative  Eyes: negative  Ears/Nose/Throat: negative  Respiratory: No shortness of breath, dyspnea on exertion, cough, or hemoptysis  Cardiovascular: negative  Gastrointestinal: negative  Musculoskeletal: back pain, arthritis and joint pain  Neurologic: numbness or tingling of feet and involuntary movements  Psychiatric: negative  Hematologic/Lymphatic/Immunologic: negative  Endocrine: negative    OBJECTIVE:    BP (!) 156/83   Pulse 62   Ht 6' 2\" (1.88 m)   Wt 169 lb (76.7 kg)   SpO2 99%   BMI 21.70 kg/m      Imaging:    LUMBAR SPINE FOUR OR MORE VIEWS   5/11/2022 11:44 AM    No fracture is identified. Multilevel mild degenerative disc disease. Moderate to severe lower lumbar spine facet arthropathy. Subtle grade 1 anterolisthesis of L4 on L5 worse in flexion compared to extension. Vascular calcifications. Presumed atelectasis at the lung bases.    MR LUMBAR SPINE W/O CONTRAST - 5/24/2022 6:25 PM    1.  Thin fatty filum terminale.  2.  Mild degenerative lumbar spondylosis with level by level analysis as described above without evidence of high-grade spinal canal or neural foraminal narrowing at any level.    Radiographic Findings: Full radiological report in chart. I personally reviewed the images with the patient today.    Exam:    Patient appears comfortable and in no apparent distress. Moving all extremities.  Gait is non-antalgic.  CN II-XII grossly intact, alert and appropriate with conversation and following  commands  Bilateral upper extremities with full strength including hand intrinsics and grasp.  Sensation intact throughout.  Bilateral lower extremities 5/5 strength including plantar and dorsiflexion.  Normal " "sensation throughout bilaterally.      ASSESSMENT:    1. Lumbar spondylosis        PLAN:    After reviewing his x-rays and MRI with him and his wife today, they are not interested in pursing surgical intervention at this time. We briefly discussed a MIS decompression for his right leg radicular symptoms. He states that his primary concern is his lumbar spine pain that is most likely attributed to his moderate to severe lower lumbar spine facet arthropathy. At this time we would like to return to the Pain Management team to further discuss possible non-operative treatment options to include different types of steroid injections/ablations/blocks. A referral was placed.     The patient gave verbal understanding and is in agreement with the above plan. He  will call or return to the clinic for any worsening or changes in symptoms.    Respectfully,     ROJELIO Copeland PA-C    Tigre Gillette is a 82 year old male who presents for:  Chief Complaint   Patient presents with     Results     MRI F/U         Initial Vitals:  BP (!) 156/83   Pulse 62   Ht 6' 2\" (1.88 m)   Wt 169 lb (76.7 kg)   SpO2 99%   BMI 21.70 kg/m   Estimated body mass index is 21.7 kg/m  as calculated from the following:    Height as of this encounter: 6' 2\" (1.88 m).    Weight as of this encounter: 169 lb (76.7 kg).. Body surface area is 2 meters squared. BP completed using cuff size: large  Moderate Pain (4)    Nursing Comments:     Heather Eubanks MA        Again, thank you for allowing me to participate in the care of your patient.        Sincerely,        Remberto Bians PA-C    "

## 2022-05-25 NOTE — PROGRESS NOTES
NEUROSURGERY CLINIC PROGRESS NOTE    DATE OF VISIT: 5/25/2022    HPI:     Tigre Gillette is a pleasant 82 year old male who presents to the clinic today for a  follow-up visit. We last evaluated him on 05/11/2022 for consultation on lumbar spine pain with right leg radiculopathy. His primary concern is lumbar spine pain. He describes a daily with fluctuating intensity pain that initiates in the midline low lumbar region and radiates in a belt line distribution as well as distally in what sounds like the right L5 distribution. This pain is accompanied by intermittent numbness, paresthesia, and perceived weakness    He has participated in conservative therapies to include physical therapy, chiropractic care, NSAIDs, gabapentin and multiple steroid injections to include RFA, MBB, TPI, and facet injections that do not seem to be working as well.     His most recent were bilateral L3,4,5 radiofrequency ablations performed on 04/21/2022.    At our last meeting we felt that it would be in his best interest to obtain an updated lumbar MRI and upright lumbar x-rays to further assess our concern for lumbar stenosis, a bulging / herniated disk or other concerning pathology.    Current Outpatient Medications   Medication     Acetaminophen (TYLENOL PO)     cetirizine (ZYRTEC) 10 MG tablet     gabapentin (NEURONTIN) 300 MG capsule     hydrocortisone 2.5 % cream     methocarbamol (ROBAXIN) 500 MG tablet     Naproxen Sodium (ALEVE PO)     TRELEGY ELLIPTA 100-62.5-25 MCG/INH oral inhaler     Current Facility-Administered Medications   Medication     fentaNYL (PF) (SUBLIMAZE) injection 50 mcg     midazolam (VERSED) injection 1 mg     sodium chloride (PF) 0.9% PF flush 10 mL       Allergies   Allergen Reactions     Amoxicillin        Past Medical History:   Diagnosis Date     COPD (chronic obstructive pulmonary disease) (H)      Degenerative arthritis of lumbar spine     spine     Gastroesophageal reflux disease        Review Of  "Systems    Skin: negative  Eyes: negative  Ears/Nose/Throat: negative  Respiratory: No shortness of breath, dyspnea on exertion, cough, or hemoptysis  Cardiovascular: negative  Gastrointestinal: negative  Musculoskeletal: back pain, arthritis and joint pain  Neurologic: numbness or tingling of feet and involuntary movements  Psychiatric: negative  Hematologic/Lymphatic/Immunologic: negative  Endocrine: negative    OBJECTIVE:    BP (!) 156/83   Pulse 62   Ht 6' 2\" (1.88 m)   Wt 169 lb (76.7 kg)   SpO2 99%   BMI 21.70 kg/m      Imaging:    LUMBAR SPINE FOUR OR MORE VIEWS   5/11/2022 11:44 AM    No fracture is identified. Multilevel mild degenerative disc disease. Moderate to severe lower lumbar spine facet arthropathy. Subtle grade 1 anterolisthesis of L4 on L5 worse in flexion compared to extension. Vascular calcifications. Presumed atelectasis at the lung bases.    MR LUMBAR SPINE W/O CONTRAST - 5/24/2022 6:25 PM    1.  Thin fatty filum terminale.  2.  Mild degenerative lumbar spondylosis with level by level analysis as described above without evidence of high-grade spinal canal or neural foraminal narrowing at any level.    Radiographic Findings: Full radiological report in chart. I personally reviewed the images with the patient today.    Exam:    Patient appears comfortable and in no apparent distress. Moving all extremities.  Gait is non-antalgic.  CN II-XII grossly intact, alert and appropriate with conversation and following  commands  Bilateral upper extremities with full strength including hand intrinsics and grasp.  Sensation intact throughout.  Bilateral lower extremities 5/5 strength including plantar and dorsiflexion.  Normal sensation throughout bilaterally.      ASSESSMENT:    1. Lumbar spondylosis        PLAN:    After reviewing his x-rays and MRI with him and his wife today, they are not interested in pursing surgical intervention at this time. We briefly discussed a MIS decompression for his " right leg radicular symptoms. He states that his primary concern is his lumbar spine pain that is most likely attributed to his moderate to severe lower lumbar spine facet arthropathy. At this time we would like to return to the Pain Management team to further discuss possible non-operative treatment options to include different types of steroid injections/ablations/blocks. A referral was placed.     The patient gave verbal understanding and is in agreement with the above plan. He  will call or return to the clinic for any worsening or changes in symptoms.    Respectfully,     ROJELIO Copeland, ADITI

## 2022-05-30 ENCOUNTER — MYC MEDICAL ADVICE (OUTPATIENT)
Dept: PALLIATIVE MEDICINE | Facility: CLINIC | Age: 82
End: 2022-05-30
Payer: COMMERCIAL

## 2022-05-31 NOTE — PROGRESS NOTES
[facility] Pain Management     Date of visit: 6/2/2022      Assessment:   Tigre Gillette is a 82 year old male with a past medical history significant for GERD and COPD who presents with complaints of low back pain.      1. Low back pain- etiology likely mild degenerative changes/ bilateral L4-5 and L5-S1 facet arthropathy with overlying myofascial component.   2. Mental Health - the patient's mental health concerns affect his experience of pain and contribute to his clinically significant distress.    Visit Diagnoses:  1. Lumbar spondylosis    2. Myofascial pain        Plan:     1.  Continue regular physical activity, walking as able.    2.  Pain Psychologist to address relaxation, behavioral change, coping style, and other factors important to improvement.  NO   3.  Diagnostic Studies: Can consider repeating lumbar MRI pending effectiveness of injections.    4.  Medication Management:     1. Continue gabapentin [phone].     2. Continue Robaxin 500-750mg three times daily as needed for muscle spasm. Take most days in the afternoon for now.    5.  Potential procedures: unfortunately unclear benefit with recent RFA. He is still struggling with pain so repeat trigger point injections done today. I advised he monitor benefit from this and from RFA. Pending benefit, may repeat lumbar facet joint injections next.   6.  Follow up with JERALD Eckert CNP in 4-6 weeks.     Mesha MARQUES CNP  [facility] Pain Management     -------------------------------------------------    Subjective:    Chief complaint:   Pain.      Interval history:  Tigre Gillette is a 82 year old male last seen on 4/5/2022.  He is seen in follow up.     Recommendations/plan at the last visit included:   1.  Continue regular physical activity, walking as able.    2.  Pain Psychologist to address relaxation, behavioral change, coping style, and other factors important to improvement.  NO   3.  Diagnostic Studies: Can  "consider repeating lumbar MRI pending effectiveness of injections.    4.  Medication Management:     1. Continue gabapentin [phone].     2. Discussed targeting his myofascial pain with muscle relaxer. Will initiate Robaxin 500-750mg three times daily as needed for muscle spasm. Discussed common side effects. Monitor pain benefit.    5.  Potential procedures: Patient has had good results with both trigger point injections and his repeat lumbar RFA last year. Will initiate a similar care plan and repeat trigger point injections in the next 1-2 weeks. Ordered today. Will also plan on repeating his lumbar RFA after trigger point injections are complete, which was also ordered today.   6.  Follow up with JERALD Eckert CNP in 6-8 weeks after RFA.        Since his last visit, Tigre Gillette reports:  -His pain is somewhat better than it was at last visit.   -He continues to struggle with low back pain, feels as though he has subjective weakness at times related to pain.   -He does sometimes take Robaxin 500-750mg as needed, states this is somewhat helpful. He forgets to take this medication more often. He also takes naproxen as needed as well.   -He had trigger point injections with me on 4/7/2022. He isn't sure how much these injections helped but does remember having some benefit.   -He had a repeat bilateral L3,4,5 RFA with Dr. Frias on 4/21/2022, its been 6 weeks today. He has a hard time quantifying how much this helped, \"I honestly don't know, I'm still waiting for this one.\" In hindsight he reports decreasing benefit with RFA over time. He wonders about repeating facet joint injections.   -He had a visit with Neurosurgery, Fito Bains PA-C. Surgery was recommended.   -His pain is unchanged in terms of characteristics and locations from his previous visits. It is located on his lower back, R>L. Wraps to the muscle. Pain is worst at the end of the day.    Pain Information:   Pain rating: averages 4/10 " on a 0-10 scale.    Current pain medications:    Naproxen 440mg BID- [facility], kidney function normal in March   Tylenol 500mg BID (afternoon and bedtime)- [facility], [facility], stopped taking as not helping anymore   Gabapentin 600mg at bedtime- [facility] for restless leg syndrome and pain    Lidocaine cream/roll on prn- [facility]    Current MME: 0    Review of [place] Prescription Monitoring Program (): No concern for abuse or misuse of controlled medications based on this report.     Annual Controlled Substance Agreement/UDS due date: NA    Past pain treatments:  1. Previous Pain Relevant Medications:               Opiates: Norco- [facility] (given post op)              NSAIDS: ibuprofen- [facility], naproxen- [facility], more so              Muscle Relaxants: no              Anti-migraine mediations: no              Anti-depressants: no              Sleep aids: no              Anxiolytics: no              Neuropathics: no                       Topicals: lidocaine cream/roll on- [facility]              Other medications not covered above: Tylenol- [facility]     2. Physical Therapy: twice as recent as [facility] 2016- [facility]   3. Pain Psychology: no  4. Surgery: no  5. Injections:  repeat bilateral L3,4,5 RFA with Dr. Frias on 4/21/2022- ?  trigger point injections with me on 4/7/2022- [facility]  repeat bilateral L3,4,5 RFA with Dr. Frias on 7/9/2021 - 60-70% benefit for 6 months  trigger point injections with me on 5/18/2021 - [facility], 3 months  bilateral L3,4,5 RFA with Dr. Frias on 10/23/2020- % benefit for 7 months  bilateral L4-5 facet joint injections with Dr. Moon on 6/5/2020 80-90% for almost 3 months, better than last  bilateral L4-5 facet joint injections with Dr. Moon on 11/5/19- [facility] 70% 3 months   6. Chiropractic: yes for years with different providers with good benefit, stopped in 2012 as he was starting physical therapy, [facility]- , some change in insurance  7. Acupuncture: no  8. TENS Unit: no    Medications:  Current Outpatient Medications    Medication Sig Dispense Refill     Acetaminophen (TYLENOL PO) Take 1,000 mg by mouth        cetirizine (ZYRTEC) 10 MG tablet Take 10 mg by mouth daily       gabapentin (NEURONTIN) 300 MG capsule Take 300mg in the morning and midday, 600mg at bedtime. 120 capsule 3     hydrocortisone 2.5 % cream        methocarbamol (ROBAXIN) 500 MG tablet Take 1-1.5 tablets (500-750 mg) by mouth 3 times daily as needed for muscle spasms 60 tablet 1     Naproxen Sodium (ALEVE PO) Take 440 mg by mouth 2 times daily (with meals)        TRELEGY ELLIPTA 100-62.5-25 MCG/INH oral inhaler          Medical History: any changes in medical history since they were last seen? Yes    Review of Systems: A 10-point review of systems was negative, with the exception of chronic pain issues.      Objective:    Physical Exam:  Blood pressure (!) 162/89, pulse 62, SpO2 98 %.  Constitutional: Well developed, well nourished, appears stated age.  Gait: Antalgic  HEENT: Head atraumatic, normocephalic. Eyes without conjunctival injection or jaundice. Oropharynx clear. Neck supple. No obvious neck masses.  Skin: No rash, lesions, or petechiae of exposed skin.   Psychiatric/mental status: Alert, without lethargy or stupor. Speech fluent. Appropriate affect. Mood normal. Able to follow commands without difficulty.   MSK exam: tenderness to lumbar paraspinal muscles. Minimal pain with lumbar facet loading. Lumbar flexion 90 degrees and extension 20 degrees.     Imaging:  X-ray of lumbar spine was completed on 5/11/2022 and shows:  IMPRESSION: No fracture is identified. Multilevel mild degenerative  disc disease. Moderate to severe lower lumbar spine facet arthropathy.  Subtle grade 1 anterolisthesis of L4 on L5 worse in flexion compared  to extension. Vascular calcifications. Presumed atelectasis at the  lung bases      MRI of lumbar spine was completed on 5/24/2022 and shows:  FINDINGS:   Nomenclature is based on 5 lumbar type vertebral bodies.  Anterolisthesis of L4 and L5 measuring 3 mm. The vertebral bodies of the lumbar spine otherwise have normal stature, alignment, and marrow signal intensity. Normal distal spinal cord and cauda   equina with conus medullaris at the superior plate of L2. Thin fatty filum terminale. T2 hyperintense lesion within the right kidney which consistent most commonly represents a benign cyst. Unremarkable visualized bony pelvis.     T12-L1: Normal disc signal and disc height. No posterior disc bulge or spinal canal narrowing. No neural foraminal narrowing.      L1-L2: Normal disc signal and disc height. No posterior disc bulge or spinal canal narrowing. No neural foraminal narrowing.     L2-L3: Normal disc signal and disc height. No posterior disc bulge or spinal canal narrowing. No neural foraminal narrowing.      L3-L4: Normal disc signal and disc height. No posterior disc bulge or spinal canal narrowing. Mild bilateral facet arthropathy. No neural foraminal narrowing.     L4-L5: Mild loss of disc signal and disc height. Symmetric disc bulge and mild facet arthropathy with mild spinal canal narrowing. Mild bilateral neural foraminal narrowing.     L5-S1: Mild loss of disc signal and disc height. No posterior disc bulge or spinal canal narrowing. No neural foraminal narrowing.                                                                      IMPRESSION:  1.  Thin fatty filum terminale.  2.  Mild degenerative lumbar spondylosis with level by level analysis as described above without evidence of high-grade spinal canal or neural foraminal narrowing at any level.    Tigre to follow up with Primary Care provider regarding elevated blood pressure.    BILLING TIME DOCUMENTATION:   The total TIME spent on this patient on the date of the encounter/appointment was 30 minutes.      TOTAL TIME includes:   Time spent preparing to see the patient (reviewing records and tests)   Time spent face to face (or over the phone) with the  patient   Time spent ordering tests, medications, procedures and referrals   Time spent Referring and communicating with other healthcare professionals   Time spent documenting clinical information in [facility] - Procedure Note    Date of Visit: 6/2/2022    Pre procedure Diagnosis: myofascial pain/myositis 60.9   Post procedure Diagnosis: Same  Procedure performed: trigger point injections  Complications: none  Operators: Mesha Henry APRN CNP    BP (!) 162/89   Pulse 62   SpO2 98%     Indications:   Tigre Gillette is a 82 year old male with a history of low back pain.  Exam shows myofascial pain of the muscle groups listed below and they have tried conservative treatment including PT and medications.    Options/alternatives, benefits and risks were discussed with the patient including bleeding, infection, tissue trauma and pnuemothorax.  Questions were answered to his satisfaction and he agrees to proceed. Voluntary informed consent was obtained and signed.     Vitals allergies and medications were reviewed.    This is a repeat injection. Previous TPI on 4/7/2022 was helpful for weeks.     Procedure:  After getting informed consent, a Pause for the Cause was performed.    Trigger points were identified by patient, and marked when appropriate.  The area was prepped with Chloroprep.    Using clean technique, injections were completed using a 25G, 1.5 inch needle.  After negative aspiration, injection was completed.  A total of 6 locations were injected.  When possible, tissue was retracted from the chest wall to avoid lung injury.    Muscle groups injected:  Lumbar paraspinals.     Injection solution contained:  10ml of 0.5% bupivacaine.    Hemostasis was achieved, the area was cleaned, and bandaids were placed when appropriate.  The patient tolerated the procedure well.  Respirations were equal and unlabored.     Follow-up includes:   -f/u with the referring  provider  -repeat as needed      Mesha Henry APRN CNP  [facility] [place]

## 2022-05-31 NOTE — TELEPHONE ENCOUNTER
Received Mychart per below. Called pt and offered newly opened spot 06/02 at 930.  Pt agreeable.     Pito Ibrahim referred me back to Pain Management.  Yesterday I got a call from a  and she scheduled an appointment for June 28th.  I really need to see you sooner for facet joint injections or perhaps approval to take more Naproxen or aspirin or something.  Normal activity is causing great pain in my lower spine.   Currently I am taking  three Naproxen at bedtime or two Naproxen and an aspirin at bedtime.  I also take an aspirin during the  mid-afternoon.  Perhaps I should take two Naproxen in the morning.  I am willing to come in  on a 20 minute notice if someone cancels an appointment.    iTgre RODRIGUEZ, RN Care Coordinator  [facility]  Pain [facility]

## 2022-06-02 ENCOUNTER — OFFICE VISIT (OUTPATIENT)
Dept: PALLIATIVE MEDICINE | Facility: CLINIC | Age: 82
End: 2022-06-02
Payer: COMMERCIAL

## 2022-06-02 VITALS — HEART RATE: 62 BPM | OXYGEN SATURATION: 98 % | DIASTOLIC BLOOD PRESSURE: 89 MMHG | SYSTOLIC BLOOD PRESSURE: 162 MMHG

## 2022-06-02 DIAGNOSIS — M47.816 LUMBAR SPONDYLOSIS: Primary | ICD-10-CM

## 2022-06-02 DIAGNOSIS — M79.18 MYOFASCIAL PAIN: ICD-10-CM

## 2022-06-02 PROCEDURE — 99213 OFFICE O/P EST LOW 20 MIN: CPT | Mod: 25 | Performed by: NURSE PRACTITIONER

## 2022-06-02 PROCEDURE — 20552 NJX 1/MLT TRIGGER POINT 1/2: CPT | Performed by: NURSE PRACTITIONER

## 2022-06-02 RX ORDER — BUPIVACAINE HYDROCHLORIDE 5 MG/ML
10 INJECTION, SOLUTION EPIDURAL; INTRACAUDAL ONCE
Status: COMPLETED | OUTPATIENT
Start: 2022-06-02 | End: 2022-06-02

## 2022-06-02 RX ADMIN — BUPIVACAINE HYDROCHLORIDE 50 MG: 5 INJECTION, SOLUTION EPIDURAL; INTRACAUDAL at 12:36

## 2022-06-02 ASSESSMENT — PAIN SCALES - GENERAL: PAINLEVEL: MODERATE PAIN (4)

## 2022-06-02 NOTE — PATIENT INSTRUCTIONS
1.  Continue regular physical activity, walking as able.    2.  Pain Psychologist to address relaxation, behavioral change, coping style, and other factors important to improvement.  NO   3.  Diagnostic Studies: Can consider repeating lumbar MRI pending effectiveness of injections.    4.  Medication Management:     1. Continue gabapentin [phone].     2.Continue Robaxin 500-750mg three times daily as needed for muscle spasm. Take most days in the afternoon for now.    5.  Potential procedures: repeat trigger point injections done today. MOnitor benefit from this and from RFA. Pending benefit, may repeat lumbar facet joint injections.    6.  Follow up with JERALD Eckert CNP in 4-6 weeks.     ----------------------------------------------------------------  Clinic Number:  [phone]   Call with any questions about your care and for scheduling assistance.   Calls are returned Monday through Friday between 8 AM and 4:30 PM. We usually get back to you within 2 business days depending on the issue/request.    If we are prescribing your medications:  For opioid medication refills, call the clinic or send a [facility] message 7 days in advance.  Please include:  Name of requested medication  Name of the pharmacy.  For non-opioid medications, call your pharmacy directly to request a refill. Please allow 3-4 days to be processed.   Per [place] State Law:  All controlled substance prescriptions must be filled within 30 days of being written.    For those controlled substances allowing refills, pickup must occur within 30 days of last fill.      We believe regular attendance is key to your success in our program!    Any time you are unable to keep your appointment we ask that you call us at least 24 hours in advance to cancel.This will allow us to offer the appointment time to another patient.   Multiple missed appointments may lead to dismissal from the clinic.

## 2022-06-05 ENCOUNTER — HEALTH MAINTENANCE LETTER (OUTPATIENT)
Age: 82
End: 2022-06-05

## 2022-06-06 ENCOUNTER — MYC MEDICAL ADVICE (OUTPATIENT)
Dept: PALLIATIVE MEDICINE | Facility: CLINIC | Age: 82
End: 2022-06-06
Payer: COMMERCIAL

## 2022-06-06 DIAGNOSIS — Z51.81 ENCOUNTER FOR THERAPEUTIC DRUG MONITORING: Primary | ICD-10-CM

## 2022-06-07 NOTE — TELEPHONE ENCOUNTER
Noted. Order placed for CBC and BMP due to naproxen use. He can have drawn in the next few weeks, will just need to schedule an appointment,     JERALD Eckert CNP  [facility] Pain Management

## 2022-06-07 NOTE — TELEPHONE ENCOUNTER
Will leave encounter open for patient response/chart review by nursing.     Below sent to pt  Good Afternoon-    Thanks for reaching out. Mesha did go ahead and order some lab work for you (CBC and BMP) just due to your naproxen use. You can have these drawn in the next few weeks, will just need to schedule an appointment at any lab/your primary care office.      Dagmar RODRIGUEZ, RN Care Coordinator  [facility]  Pain [facility]

## 2022-06-07 NOTE — TELEPHONE ENCOUNTER
Will keep encounter open at this time for pt communication and nursing response.    Jeremiah DILL RN Care Coordinator  [facility]      Lower back  6/7/2022 2:09 PM Reply    To: PAIN NURSE      From: Tigre Gillette      Created: 6/7/2022 2:09 PM        *-*-*This message was handled on 6/7/2022 2:19 PM by JEREMIAH NORMAN*-*-*    Please specify a lab I should go to and where it is located.        --------------------      Lower back  6/7/2022 2:19 PM     To: Tigre Gillette      From: Jeremiah Norman RN      Created: 6/7/2022 2:19 PM        Tigre     [facility] can draw these labs for you.     Thanks,  Jeremiah DILL RN Care Coordinator  [facility] Pain [facility] [place] [facility]

## 2022-06-07 NOTE — TELEPHONE ENCOUNTER
Routing to provider to review    At my visit on June 2, you told me to schedule an annual visit with my primary.  Today I called [facility] to schedule with Kiarra Schumacher.  I was told that my March visit with Mr. Schumacher was combined with an annual physical.  Therefore I have not scheduled an appointment with Mr. Schumacher.    Is there something else you want me to do?  For example: blood tests.  Tigre

## 2022-06-08 ENCOUNTER — LAB (OUTPATIENT)
Dept: LAB | Facility: CLINIC | Age: 82
End: 2022-06-08
Payer: COMMERCIAL

## 2022-06-08 DIAGNOSIS — Z51.81 ENCOUNTER FOR THERAPEUTIC DRUG MONITORING: ICD-10-CM

## 2022-06-08 LAB
BASOPHILS # BLD AUTO: 0.1 10E3/UL (ref 0–0.2)
BASOPHILS NFR BLD AUTO: 1 %
EOSINOPHIL # BLD AUTO: 0.2 10E3/UL (ref 0–0.7)
EOSINOPHIL NFR BLD AUTO: 3 %
ERYTHROCYTE [DISTWIDTH] IN BLOOD BY AUTOMATED COUNT: 12.9 % (ref 10–15)
HCT VFR BLD AUTO: 43.9 % (ref 40–53)
HGB BLD-MCNC: 14.7 G/DL (ref 13.3–17.7)
IMM GRANULOCYTES # BLD: 0 10E3/UL
IMM GRANULOCYTES NFR BLD: 0 %
LYMPHOCYTES # BLD AUTO: 1.5 10E3/UL (ref 0.8–5.3)
LYMPHOCYTES NFR BLD AUTO: 21 %
MCH RBC QN AUTO: 31.1 PG (ref 26.5–33)
MCHC RBC AUTO-ENTMCNC: 33.5 G/DL (ref 31.5–36.5)
MCV RBC AUTO: 93 FL (ref 78–100)
MONOCYTES # BLD AUTO: 0.6 10E3/UL (ref 0–1.3)
MONOCYTES NFR BLD AUTO: 9 %
NEUTROPHILS # BLD AUTO: 4.7 10E3/UL (ref 1.6–8.3)
NEUTROPHILS NFR BLD AUTO: 66 %
PLATELET # BLD AUTO: 253 10E3/UL (ref 150–450)
RBC # BLD AUTO: 4.72 10E6/UL (ref 4.4–5.9)
WBC # BLD AUTO: 7 10E3/UL (ref 4–11)

## 2022-06-08 PROCEDURE — 85025 COMPLETE CBC W/AUTO DIFF WBC: CPT

## 2022-06-08 PROCEDURE — 36415 COLL VENOUS BLD VENIPUNCTURE: CPT

## 2022-06-08 PROCEDURE — 80048 BASIC METABOLIC PNL TOTAL CA: CPT

## 2022-06-09 LAB
ANION GAP SERPL CALCULATED.3IONS-SCNC: 4 MMOL/L (ref 3–14)
BUN SERPL-MCNC: 10 MG/DL (ref 7–30)
CALCIUM SERPL-MCNC: 9.4 MG/DL (ref 8.5–10.1)
CHLORIDE BLD-SCNC: 103 MMOL/L (ref 94–109)
CO2 SERPL-SCNC: 27 MMOL/L (ref 20–32)
CREAT SERPL-MCNC: 0.94 MG/DL (ref 0.66–1.25)
GFR SERPL CREATININE-BSD FRML MDRD: 82 ML/MIN/1.73M2
GLUCOSE BLD-MCNC: 96 MG/DL (ref 70–99)
POTASSIUM BLD-SCNC: 4.4 MMOL/L (ref 3.4–5.3)
SODIUM SERPL-SCNC: 134 MMOL/L (ref 133–144)

## 2022-06-28 NOTE — PROGRESS NOTES
[facility]     Date of visit: 6/30/2022      Assessment:   Tigre Gillette is a 82 year old male with a past medical history significant for GERD and COPD who presents with complaints of low back pain.      1. Low back pain- etiology likely mild degenerative changes/ bilateral L4-5 and L5-S1 facet arthropathy with overlying myofascial component.   2. Mental Health - the patient's mental health concerns affect his experience of pain and contribute to his clinically significant distress.    Visit Diagnoses:  1. Myofascial pain    2. Lumbar spondylosis        Plan:     1.  Continue regular physical activity, walking as able. We discussed finding balance with activity and rest to help prevent pain flares.               2.  Pain Psychologist to address relaxation, behavioral change, coping style, and other factors important to improvement.  NO              3.  Diagnostic Studies: Can consider repeating lumbar MRI pending effectiveness of injections - revisit at next appointment.               4.  Medication Management:     1. Stop naproxen and aspirin. Start diclofenac 50 mg twice daily for pain. Recommend taking with food to reduce stomach upset. It is okay to take this medication along with gabapentin and methocarbamol.                           2. Continue gabapentin [phone].                           3. Continue Robaxin 500-750mg three times daily as needed for muscle spasm. Take most days in the afternoon for now.               5.  Potential procedures: Trigger point injections performed today in clinic. Call us with any concerns for infection: redness and drainage at the injection site, fever, chills, sweats. We may consider lumbar facet joint injections in the future, will discuss at next visit.               6.  Follow up with JERALD Eckert CNP in 6-8 weeks.         Mesha MARQUES CNP  [facility]  "    -------------------------------------------------    Subjective:    Chief complaint:   Pain.      Interval history:  Tigre Gillette is a 82 year old male last seen on 6/2/2022.  He is seen in follow up.     Recommendations/plan at the last visit included:   1.  Continue regular physical activity, walking as able.    2.  Pain Psychologist to address relaxation, behavioral change, coping style, and other factors important to improvement.  NO   3.  Diagnostic Studies: Can consider repeating lumbar MRI pending effectiveness of injections.    4.  Medication Management:     1. Continue gabapentin [phone].     2. Continue Robaxin 500-750mg three times daily as needed for muscle spasm. Take most days in the afternoon for now.    5.  Potential procedures: unfortunately unclear benefit with recent RFA. He is still struggling with pain so repeat trigger point injections done today. I advised he monitor benefit from this and from RFA. Pending benefit, may repeat lumbar facet joint injections next.   6.  Follow up with JERALD Eckert CNP in 4-6 weeks.         Since his last visit, Tigre Gillette reports:  -His pain initially improved after his last visit, though he has been working outside more in his yard and presents today with increased pain.  -He had trigger point injections at last visit, notes he had some benefit that lasted almost a month, wondering if these could be repeated again. He is leaving July 11th for a road trip, would like to consider repeat injections before he leaves.   -He has not appreciated any benefit from most recent (third) RFA, has had two prior; first RFA was helpful 6-12 months, second RFA not as helpful as first time,  -Has been taking methocarbamol 500 mg consistently since last visit in afternoon and at bedtime and finds this helpful. He held afternoon dose a few days ago due to concerns for GI upset, took  mg instead and found this helpful. States ASA is \"quite helpful\", " "though knows he cannot take too much due to increased bleeding risks  -Topical lidocaine cream to back PRN and finds this helpful, \"enough to help go to sleep\"  -Takes naproxen at bedtime, has found 3 tablets helpful with increased pain, though he does not try to make a habit of taking more than 2 tablets; had a conversation with neurosurgery and was advised that he is taking \"low dose\" of naproxen which led to self-escalating dose.  -Gabapentin 300-300-600, does think this is helpful for him, particularly at night for restless legs   -Remains active as tolerated, gardening/yard work; he notices increased pain when he has been immobile or too much activity.      Pain Information:   Pain rating: averages 4/10 on a 0-10 scale.    Current pain medications:    Naproxen 440mg BID- [facility], kidney function normal in March   Tylenol 500mg BID (afternoon and bedtime)- [facility], [facility], stopped taking as not helping anymore   Gabapentin 600mg at bedtime- [facility] for restless leg syndrome and pain    Lidocaine cream/roll on prn- [facility]    Current MME: 0    Review of [place] Prescription Monitoring Program (): No concern for abuse or misuse of controlled medications based on this report.     Annual Controlled Substance Agreement/UDS due date: NA    Past pain treatments:  1. Previous Pain Relevant Medications:               Opiates: Norco- [facility] (given post op)              NSAIDS: ibuprofen- [facility], naproxen- [facility], more so              Muscle Relaxants: no              Anti-migraine mediations: no              Anti-depressants: no              Sleep aids: no              Anxiolytics: no              Neuropathics: no                       Topicals: lidocaine cream/roll on- [facility]              Other medications not covered above: Tylenol- [facility]     2. Physical Therapy: twice as recent as [facility] 2016- [facility]   3. Pain Psychology: no  4. Surgery: no  5. Injections:  repeat bilateral L3,4,5 RFA with Dr. Frias on 4/21/2022- ?  trigger point injections with me on " 4/7/2022- [facility]  repeat bilateral L3,4,5 RFA with Dr. Frias on 7/9/2021 - 60-70% benefit for 6 months  trigger point injections with me on 5/18/2021 - [facility], 3 months  bilateral L3,4,5 RFA with Dr. Frias on 10/23/2020- % benefit for 7 months  bilateral L4-5 facet joint injections with Dr. Moon on 6/5/2020 80-90% for almost 3 months, better than last  bilateral L4-5 facet joint injections with Dr. Moon on 11/5/19- [facility] 70% 3 months   6. Chiropractic: yes for years with different providers with good benefit, stopped in 2012 as he was starting physical therapy, [facility], some change in insurance  7. Acupuncture: no  8. TENS Unit: no    Medications:  Current Outpatient Medications   Medication Sig Dispense Refill     Acetaminophen (TYLENOL PO) Take 1,000 mg by mouth        cetirizine (ZYRTEC) 10 MG tablet Take 10 mg by mouth daily       diclofenac (VOLTAREN) 50 MG EC tablet Take 1 tablet (50 mg) by mouth 2 times daily 60 tablet 1     gabapentin (NEURONTIN) 300 MG capsule Take 300mg in the morning and midday, 600mg at bedtime. 120 capsule 3     hydrocortisone 2.5 % cream        methocarbamol (ROBAXIN) 500 MG tablet Take 1-1.5 tablets (500-750 mg) by mouth 3 times daily as needed for muscle spasms 60 tablet 1     Naproxen Sodium (ALEVE PO) Take 440 mg by mouth 2 times daily (with meals)        TRELEGY ELLIPTA 100-62.5-25 MCG/INH oral inhaler          Medical History: any changes in medical history since they were last seen? Yes    Review of Systems: A 10-point review of systems was negative, with the exception of chronic pain issues.      Objective:    Physical Exam:  Blood pressure 139/81, pulse 72, SpO2 98 %.  Constitutional: Well developed, well nourished, appears stated age.  Gait: Antalgic  HEENT: Head atraumatic, normocephalic. Eyes without conjunctival injection or jaundice. Oropharynx clear. Neck supple. No obvious neck masses.  Skin: No rash, lesions, or petechiae of exposed skin.    Psychiatric/mental status: Alert, without lethargy or stupor. Speech fluent. Appropriate affect. Mood normal. Able to follow commands without difficulty.   MSK exam: tenderness to lumbar paraspinal muscles. Minimal pain with lumbar facet loading. Lumbar flexion 90 degrees and extension 20 degrees.     Imaging:  X-ray of lumbar spine was completed on 5/11/2022 and shows:  IMPRESSION: No fracture is identified. Multilevel mild degenerative  disc disease. Moderate to severe lower lumbar spine facet arthropathy.  Subtle grade 1 anterolisthesis of L4 on L5 worse in flexion compared  to extension. Vascular calcifications. Presumed atelectasis at the  lung bases      MRI of lumbar spine was completed on 5/24/2022 and shows:  FINDINGS:   Nomenclature is based on 5 lumbar type vertebral bodies. Anterolisthesis of L4 and L5 measuring 3 mm. The vertebral bodies of the lumbar spine otherwise have normal stature, alignment, and marrow signal intensity. Normal distal spinal cord and cauda   equina with conus medullaris at the superior plate of L2. Thin fatty filum terminale. T2 hyperintense lesion within the right kidney which consistent most commonly represents a benign cyst. Unremarkable visualized bony pelvis.     T12-L1: Normal disc signal and disc height. No posterior disc bulge or spinal canal narrowing. No neural foraminal narrowing.      L1-L2: Normal disc signal and disc height. No posterior disc bulge or spinal canal narrowing. No neural foraminal narrowing.     L2-L3: Normal disc signal and disc height. No posterior disc bulge or spinal canal narrowing. No neural foraminal narrowing.      L3-L4: Normal disc signal and disc height. No posterior disc bulge or spinal canal narrowing. Mild bilateral facet arthropathy. No neural foraminal narrowing.     L4-L5: Mild loss of disc signal and disc height. Symmetric disc bulge and mild facet arthropathy with mild spinal canal narrowing. Mild bilateral neural foraminal  narrowing.     L5-S1: Mild loss of disc signal and disc height. No posterior disc bulge or spinal canal narrowing. No neural foraminal narrowing.                                                                      IMPRESSION:  1.  Thin fatty filum terminale.  2.  Mild degenerative lumbar spondylosis with level by level analysis as described above without evidence of high-grade spinal canal or neural foraminal narrowing at any level.      BILLING TIME DOCUMENTATION:   The total TIME spent on this patient on the date of the encounter/appointment was 22 minutes.      TOTAL TIME includes:   Time spent preparing to see the patient (reviewing records and tests)   Time spent face to face (or over the phone) with the patient   Time spent ordering tests, medications, procedures and referrals   Time spent Referring and communicating with other healthcare professionals   Time spent documenting clinical information in [facility] - Procedure Note    Date of Visit: 6/30/2022    Pre procedure Diagnosis: myofascial pain/myositis 60.9   Post procedure Diagnosis: Same  Procedure performed: trigger point injections  Complications: none  Operators: Mesha Henry APRN CNP    /81   Pulse 72   SpO2 98%     Indications:   Tigre Gillette is a 82 year old male with a history of low back pain.  Exam shows myofascial pain of the muscle groups listed below and they have tried conservative treatment including PT and medications.    Options/alternatives, benefits and risks were discussed with the patient including bleeding, infection, tissue trauma and pnuemothorax.  Questions were answered to his satisfaction and he agrees to proceed. Voluntary informed consent was obtained and signed.     Vitals allergies and medications were reviewed.    This is a repeat injection. Previous TPI on 6/2/2022 was helpful for weeks.     Procedure:  After getting informed consent, a Pause for the Cause was  performed.    Trigger points were identified by patient, and marked when appropriate.  The area was prepped with Chloroprep.    Using clean technique, injections were completed using a 25G, 1.5 inch needle.  After negative aspiration, injection was completed.  A total of 6 locations were injected.  When possible, tissue was retracted from the chest wall to avoid lung injury.    Muscle groups injected:  Lumbar paraspinals.     Injection solution contained:  10ml of 0.5% bupivacaine.    Hemostasis was achieved, the area was cleaned, and bandaids were placed when appropriate.  The patient tolerated the procedure well.  Respirations were equal and unlabored.     Follow-up includes:   -f/u with the referring provider  -repeat as needed    Leeanna DILL, MALORIE, APRN, AGNP-C, worked under the guidance of JERALD Eckert, CNP.     Mesha MARQUES CNP  [facility] Management [place]

## 2022-06-30 ENCOUNTER — OFFICE VISIT (OUTPATIENT)
Dept: PALLIATIVE MEDICINE | Facility: CLINIC | Age: 82
End: 2022-06-30
Payer: COMMERCIAL

## 2022-06-30 VITALS — DIASTOLIC BLOOD PRESSURE: 81 MMHG | OXYGEN SATURATION: 98 % | SYSTOLIC BLOOD PRESSURE: 139 MMHG | HEART RATE: 72 BPM

## 2022-06-30 DIAGNOSIS — M47.816 LUMBAR SPONDYLOSIS: ICD-10-CM

## 2022-06-30 DIAGNOSIS — M79.18 MYOFASCIAL PAIN: Primary | ICD-10-CM

## 2022-06-30 PROCEDURE — 99213 OFFICE O/P EST LOW 20 MIN: CPT | Performed by: NURSE PRACTITIONER

## 2022-06-30 RX ORDER — BUPIVACAINE HYDROCHLORIDE 5 MG/ML
10 INJECTION, SOLUTION PERINEURAL ONCE
Status: COMPLETED | OUTPATIENT
Start: 2022-06-30 | End: 2022-06-30

## 2022-06-30 RX ADMIN — BUPIVACAINE HYDROCHLORIDE 50 MG: 5 INJECTION, SOLUTION PERINEURAL at 15:55

## 2022-06-30 ASSESSMENT — PAIN SCALES - GENERAL: PAINLEVEL: EXTREME PAIN (8)

## 2022-06-30 NOTE — PATIENT INSTRUCTIONS
1.  Continue regular physical activity, walking as able.               2.  Pain Psychologist to address relaxation, behavioral change, coping style, and other factors important to improvement.  NO              3.  Diagnostic Studies: Can consider repeating lumbar MRI pending effectiveness of injections - revisit at next appointment.               4.  Medication Management:     1. Stop naproxen and aspiring. Start diclofenac 50 mg twice daily for pain. Recommend taking with food to reduce stomach upset. It is okay to take this medication along with gabapentin and methocarbamol.                           2. Continue gabapentin [phone].                           3. Continue Robaxin 500-750mg three times daily as needed for muscle spasm. Take most days in the afternoon for now.               5.  Potential procedures: Trigger point injections performed today in clinic. Call us with any concerns for infection: redness and drainage at the injection site, fever, chills, sweats. We may consider lumbar facet joint injections in the future, will discuss at your next visit.               6.  Follow up with JERALD Eckert CNP in 6-8 weeks.     ----------------------------------------------------------------  Clinic Number:  [phone]   Call with any questions about your care and for scheduling assistance.   Calls are returned Monday through Friday between 8 AM and 4:30 PM. We usually get back to you within 2 business days depending on the issue/request.    If we are prescribing your medications:  For opioid medication refills, call the clinic or send a [facility] message 7 days in advance.  Please include:  Name of requested medication  Name of the pharmacy.  For non-opioid medications, call your pharmacy directly to request a refill. Please allow 3-4 days to be processed.   Per [place] State Law:  All controlled substance prescriptions must be filled within 30 days of being written.    For those controlled substances  allowing refills, pickup must occur within 30 days of last fill.      We believe regular attendance is key to your success in our program!    Any time you are unable to keep your appointment we ask that you call us at least 24 hours in advance to cancel.This will allow us to offer the appointment time to another patient.   Multiple missed appointments may lead to dismissal from the clinic.

## 2022-06-30 NOTE — NURSING NOTE
PEG Score 4/5/2022 6/30/2022   PEG Total Score 6.67 8.33         Susie Osorio MA  [facility] Pain Management [place]

## 2022-07-27 ENCOUNTER — MYC MEDICAL ADVICE (OUTPATIENT)
Dept: PALLIATIVE MEDICINE | Facility: CLINIC | Age: 82
End: 2022-07-27

## 2022-07-27 DIAGNOSIS — M79.18 MYOFASCIAL PAIN: ICD-10-CM

## 2022-07-28 RX ORDER — METHOCARBAMOL 500 MG/1
500-750 TABLET, FILM COATED ORAL 3 TIMES DAILY PRN
Qty: 60 TABLET | Refills: 1 | Status: SHIPPED | OUTPATIENT
Start: 2022-07-28 | End: 2022-08-29

## 2022-07-28 NOTE — TELEPHONE ENCOUNTER
Routing to provider for sig    Last OV 6/30/22  Next OV: TBD     Last OV note 6/30/22: Continue Robaxin 500-750mg three times daily as needed for muscle spasm. Take most days in the afternoon for now.     Rafaela DILL RN Care Coordinator  [facility]

## 2022-08-23 DIAGNOSIS — M79.18 MYOFASCIAL PAIN: ICD-10-CM

## 2022-08-23 NOTE — TELEPHONE ENCOUNTER
Received fax from pharmacy requesting refill(s) for diclofenac (VOLTAREN) 50 MG EC tablet     Last refilled on 7/25/22    Pt last seen on 6/28/22  Next appt scheduled for none    E-prescribe to:    [id number] - [place], [place] - [address]     Will facilitate refill.

## 2022-08-26 DIAGNOSIS — M79.18 MYOFASCIAL PAIN: ICD-10-CM

## 2022-08-27 NOTE — TELEPHONE ENCOUNTER
Pending Prescriptions:                       Disp   Refills    methocarbamol (ROBAXIN) 500 MG tablet      60 tab*1        Sig: Take 1-1.5 tablets (500-750 mg) by mouth 3 times           daily as needed for muscle spasms    Routing refill request to provider for review/approval because:  Drug not on the [facility] refill protocol

## 2022-08-27 NOTE — TELEPHONE ENCOUNTER
I have not seen this patient since 7/22/2019.  This prescription has been filled in the pain clinic, will forward to them.

## 2022-08-29 RX ORDER — METHOCARBAMOL 500 MG/1
500-750 TABLET, FILM COATED ORAL 3 TIMES DAILY PRN
Qty: 60 TABLET | Refills: 1 | Status: SHIPPED | OUTPATIENT
Start: 2022-08-29 | End: 2022-09-22 | Stop reason: DRUGHIGH

## 2022-08-29 NOTE — TELEPHONE ENCOUNTER
Signed Prescriptions:                        Disp   Refills    methocarbamol (ROBAXIN) 500 MG tablet      60 tab*1        Sig: Take 1-1.5 tablets (500-750 mg) by mouth 3 times           daily as needed for muscle spasms  Authorizing Provider: KRISTEL RODRÍGUEZ, JERALD BERG  [facility]

## 2022-09-07 DIAGNOSIS — G89.4 PAIN SYNDROME, CHRONIC: ICD-10-CM

## 2022-09-07 DIAGNOSIS — M47.816 LUMBAR FACET ARTHROPATHY: ICD-10-CM

## 2022-09-07 RX ORDER — GABAPENTIN 300 MG/1
CAPSULE ORAL
Qty: 120 CAPSULE | Refills: 3 | Status: SHIPPED | OUTPATIENT
Start: 2022-09-07 | End: 2023-01-09

## 2022-09-07 NOTE — TELEPHONE ENCOUNTER
Fax received from: [id number] [place], [place] Refill authorization requested    Drug: gabapentin (NEURONTIN) 300 MG capsulegabapentin (NEURONTIN) 300 MG capsule  Qty: 120 capsule   Last filled 8/8/22  Last seen: 6/30/22  Next appointment 9/19/22     José Miguel Lezama MA

## 2022-09-08 ENCOUNTER — MYC MEDICAL ADVICE (OUTPATIENT)
Dept: PALLIATIVE MEDICINE | Facility: CLINIC | Age: 82
End: 2022-09-08

## 2022-09-08 NOTE — TELEPHONE ENCOUNTER
Duplicate request. Was already sent to provider to review    Dagmar RODRIGUEZ, RN Care Coordinator  [facility]  Pain [facility]

## 2022-09-16 ASSESSMENT — PAIN SCALES - PAIN ENJOYMENT GENERAL ACTIVITY SCALE (PEG)
PEG_TOTALSCORE: 7.33
INTERFERED_ENJOYMENT_LIFE: 8
AVG_PAIN_PASTWEEK: 6
INTERFERED_GENERAL_ACTIVITY: 8

## 2022-09-16 ASSESSMENT — ANXIETY QUESTIONNAIRES
2. NOT BEING ABLE TO STOP OR CONTROL WORRYING: NOT AT ALL
6. BECOMING EASILY ANNOYED OR IRRITABLE: NOT AT ALL
3. WORRYING TOO MUCH ABOUT DIFFERENT THINGS: NOT AT ALL
7. FEELING AFRAID AS IF SOMETHING AWFUL MIGHT HAPPEN: NOT AT ALL
5. BEING SO RESTLESS THAT IT IS HARD TO SIT STILL: NOT AT ALL
4. TROUBLE RELAXING: NOT AT ALL
GAD7 TOTAL SCORE: 0
1. FEELING NERVOUS, ANXIOUS, OR ON EDGE: NOT AT ALL

## 2022-09-19 ASSESSMENT — ENCOUNTER SYMPTOMS
MUSCLE WEAKNESS: 0
FEVER: 0
DIFFICULTY URINATING: 0
SPUTUM PRODUCTION: 1
SEIZURES: 0
FLANK PAIN: 0
DYSPNEA ON EXERTION: 1
NIGHT SWEATS: 1
DECREASED CONCENTRATION: 0
WEIGHT LOSS: 0
SHORTNESS OF BREATH: 1
TREMORS: 0
FATIGUE: 0
BACK PAIN: 1
NERVOUS/ANXIOUS: 0
MEMORY LOSS: 0
DYSURIA: 1
ARTHRALGIAS: 0
ALTERED TEMPERATURE REGULATION: 1
SNORES LOUDLY: 1
SINUS CONGESTION: 1
HALLUCINATIONS: 0
NECK PAIN: 0
DECREASED APPETITE: 0
TROUBLE SWALLOWING: 0
INSOMNIA: 1
HEMOPTYSIS: 0
WEIGHT GAIN: 0
COUGH: 1
MYALGIAS: 0
WHEEZING: 1
JOINT SWELLING: 0
WEAKNESS: 1
HEADACHES: 0
SPEECH CHANGE: 0
POLYDIPSIA: 1
DISTURBANCES IN COORDINATION: 0
TASTE DISTURBANCE: 0
TINGLING: 1
POSTURAL DYSPNEA: 1
COUGH DISTURBING SLEEP: 1
POLYPHAGIA: 0
STIFFNESS: 0
SINUS PAIN: 0
PARALYSIS: 0
INCREASED ENERGY: 0
LOSS OF CONSCIOUSNESS: 0
CHILLS: 1
DIZZINESS: 1
NUMBNESS: 1
NECK MASS: 0
HEMATURIA: 0
HOARSE VOICE: 1
DEPRESSION: 0
SMELL DISTURBANCE: 0
SORE THROAT: 0
PANIC: 0
MUSCLE CRAMPS: 1

## 2022-09-19 ASSESSMENT — ANXIETY QUESTIONNAIRES
7. FEELING AFRAID AS IF SOMETHING AWFUL MIGHT HAPPEN: NOT AT ALL
GAD7 TOTAL SCORE: 0
GAD7 TOTAL SCORE: 0

## 2022-09-19 ASSESSMENT — PAIN SCALES - PAIN ENJOYMENT GENERAL ACTIVITY SCALE (PEG)
INTERFERED_ENJOYMENT_LIFE: 8
INTERFERED_GENERAL_ACTIVITY: 8
AVG_PAIN_PASTWEEK: 6
PEG_TOTALSCORE: 7.33

## 2022-09-20 NOTE — PROGRESS NOTES
[facility]     Date of visit: 9/22/2022      Assessment:   Tigre Gillette is a 82 year old male with a past medical history significant for GERD and COPD who presents with complaints of low back pain.      1. Low back pain- etiology likely mild degenerative changes/ bilateral L4-5 and L5-S1 facet arthropathy with overlying myofascial component.   2. Mental Health - the patient's mental health concerns affect his experience of pain and contribute to his clinically significant distress.    Visit Diagnoses:  1. Lumbar spondylosis    2. Myofascial pain        Plan:     1.  Continue regular physical activity, walking as able.               2.  Pain Psychologist to address relaxation, behavioral change, coping style, and other factors important to improvement.  NO              3.  Diagnostic Studies: Can consider repeating lumbar MRI pending effectiveness of injections - revisit at next appointment.               4.  Medication Management:   1. Okay to continue diclofenac 50 mg twice daily for pain. Recommend taking with food to reduce stomach upset. It is okay to take this medication along with gabapentin and methocarbamol. Again reminded Tigre discontinue aspirin.                           2. Continue gabapentin [phone].                           3. Continue Robaxin 500-750mg three times daily as needed for muscle spasm. Refilled.    4. Advised he check out hemp CBD products.              5.  Potential procedures: It appears Tigre had longer lasting benefit from recent trigger point injections, possibly due to being done in close proximity to previous injections (6/2 and 6/30). Repeat trigger point injections performed today in clinic. We can repeat in 1 months or so if decreasing benefit but with anesthetic only.               6.  I will be leaving [facility] pain management and my last day will be on 10/19. Plan will be to follow up with Olga MARQUES CNP.       Mesha Henry  APRN CNP  [facility]     -------------------------------------------------    Subjective:    Chief complaint:   Pain.      Interval history:  Tigre Gillette is a 82 year old male last seen on 6/30/2022.  He is seen in follow up.     Recommendations/plan at the last visit included:   1.  Continue regular physical activity, walking as able. We discussed finding balance with activity and rest to help prevent pain flares.               2.  Pain Psychologist to address relaxation, behavioral change, coping style, and other factors important to improvement.  NO              3.  Diagnostic Studies: Can consider repeating lumbar MRI pending effectiveness of injections - revisit at next appointment.               4.  Medication Management:            1. Stop naproxen and aspirin. Start diclofenac 50 mg twice daily for pain. Recommend taking with food to reduce stomach upset. It is okay to take this medication along with gabapentin and methocarbamol.                           2. Continue gabapentin [phone].                           3. Continue Robaxin 500-750mg three times daily as needed for muscle spasm. Take most days in the afternoon for now.               5.  Potential procedures: Trigger point injections performed today in clinic. Call us with any concerns for infection: redness and drainage at the injection site, fever, chills, sweats. We may consider lumbar facet joint injections in the future, will discuss at next visit.               6.  Follow up with JERALD Eckert CNP in 6-8 weeks.       Since his last visit, Tigre Gillette reports:  -His pain is somewhat worse as it was at last visit.  -He attributes this to be due to being due for injections. He had repeat trigger point injections on 6/30/2022, states he had very good pain relief that lasted almost 3 months.  -He discontinued naproxen and aspirin and has been taking diclofenac 50mg BID. Recently he restarted aspirin at bedtime  due to uncontrolled pain.   -He continues gabapentin [phone] with some benefit. He also continued Robaxin 500mg BID or TID as needed with some benefit.   -He has not appreciated any benefit from most recent (third) RFA, has had two prior; first RFA was helpful 6-12 months, second RFA not as helpful as first time.      Pain Information:   Pain rating: averages 8/10 on a 0-10 scale.    Current pain medications:    Diclofenac 50mg BID- [facility], CBC and BMP normal in March   Tylenol 500mg BID (afternoon and bedtime)- [facility], [place], stopped taking as not helping anymore   Gabapentin [phone]- [facility] for restless leg syndrome and pain    Lidocaine cream/roll on prn- [facility]    Current MME: 0    Review of [place] Prescription Monitoring Program (): No concern for abuse or misuse of controlled medications based on this report.     Annual Controlled Substance Agreement/UDS due date: NA    Past pain treatments:  1. Previous Pain Relevant Medications:               Opiates: Norco- [facility] (given post op)              NSAIDS: ibuprofen- [facility], naproxen- [facility], more so              Muscle Relaxants: no              Anti-migraine mediations: no              Anti-depressants: no              Sleep aids: no              Anxiolytics: no              Neuropathics: no                       Topicals: lidocaine cream/roll on- [facility]              Other medications not covered above: Tylenol- [facility]     2. Physical Therapy: twice as recent as [facility] 2016- [facility]   3. Pain Psychology: no  4. Surgery: no  5. Injections:  trigger point injections with me on 6/30/2022- 60-75% benefit for almost 3 months   repeat bilateral L3,4,5 RFA with Dr. Frias on 4/21/2022- ?  trigger point injections with me on 4/7/2022- [facility]  repeat bilateral L3,4,5 RFA with Dr. Frias on 7/9/2021 - 60-70% benefit for 6 months  trigger point injections with me on 5/18/2021 - [facility], 3 months  bilateral L3,4,5 RFA with Dr. Frias on 10/23/2020- % benefit for 7 months  bilateral L4-5 facet  joint injections with Dr. Moon on 6/5/2020 80-90% for almost 3 months, better than last  bilateral L4-5 facet joint injections with Dr. Moon on 11/5/19- [facility] 70% 3 months   6. Chiropractic: yes for years with different providers with good benefit, stopped in 2012 as he was starting physical therapy, [facility], some change in insurance  7. Acupuncture: no  8. TENS Unit: no    Medications:  Current Outpatient Medications   Medication Sig Dispense Refill     Acetaminophen (TYLENOL PO) Take 1,000 mg by mouth        cetirizine (ZYRTEC) 10 MG tablet Take 10 mg by mouth daily       diclofenac (VOLTAREN) 1 % topical gel Apply 4 g topically 4 times daily as needed for moderate pain (4-6) 150 g 1     diclofenac (VOLTAREN) 50 MG EC tablet Take 1 tablet (50 mg) by mouth 2 times daily 60 tablet 1     gabapentin (NEURONTIN) 300 MG capsule Take 300mg in the morning and midday, 600mg at bedtime. 120 capsule 3     hydrocortisone 2.5 % cream        methocarbamol (ROBAXIN) 500 MG tablet Take 1-1.5 tablets (500-750 mg) by mouth 3 times daily as needed for muscle spasms 90 tablet 3     TRELEGY ELLIPTA 100-62.5-25 MCG/INH oral inhaler          Medical History: any changes in medical history since they were last seen? No    Review of Systems: A 10-point review of systems was negative, with the exception of chronic pain issues.      Objective:    Physical Exam:  Blood pressure (!) 164/89, pulse 58, SpO2 98 %.  Constitutional: Well developed, well nourished, appears stated age.  Gait: Antalgic  HEENT: Head atraumatic, normocephalic. Eyes without conjunctival injection or jaundice. Oropharynx clear. Neck supple. No obvious neck masses.  Skin: No rash, lesions, or petechiae of exposed skin.   Psychiatric/mental status: Alert, without lethargy or stupor. Speech fluent. Appropriate affect. Mood normal. Able to follow commands without difficulty.     MSK exam: tenderness to lumbar paraspinal muscles. Minimal pain with  lumbar facet loading.     Imaging:  X-ray of lumbar spine was completed on 5/11/2022 and shows:  IMPRESSION: No fracture is identified. Multilevel mild degenerative  disc disease. Moderate to severe lower lumbar spine facet arthropathy.  Subtle grade 1 anterolisthesis of L4 on L5 worse in flexion compared  to extension. Vascular calcifications. Presumed atelectasis at the  lung bases      MRI of lumbar spine was completed on 5/24/2022 and shows:  FINDINGS:   Nomenclature is based on 5 lumbar type vertebral bodies. Anterolisthesis of L4 and L5 measuring 3 mm. The vertebral bodies of the lumbar spine otherwise have normal stature, alignment, and marrow signal intensity. Normal distal spinal cord and cauda   equina with conus medullaris at the superior plate of L2. Thin fatty filum terminale. T2 hyperintense lesion within the right kidney which consistent most commonly represents a benign cyst. Unremarkable visualized bony pelvis.     T12-L1: Normal disc signal and disc height. No posterior disc bulge or spinal canal narrowing. No neural foraminal narrowing.      L1-L2: Normal disc signal and disc height. No posterior disc bulge or spinal canal narrowing. No neural foraminal narrowing.     L2-L3: Normal disc signal and disc height. No posterior disc bulge or spinal canal narrowing. No neural foraminal narrowing.      L3-L4: Normal disc signal and disc height. No posterior disc bulge or spinal canal narrowing. Mild bilateral facet arthropathy. No neural foraminal narrowing.     L4-L5: Mild loss of disc signal and disc height. Symmetric disc bulge and mild facet arthropathy with mild spinal canal narrowing. Mild bilateral neural foraminal narrowing.     L5-S1: Mild loss of disc signal and disc height. No posterior disc bulge or spinal canal narrowing. No neural foraminal narrowing.                                                                      IMPRESSION:  1.  Thin fatty filum terminale.  2.  Mild degenerative  lumbar spondylosis with level by level analysis as described above without evidence of high-grade spinal canal or neural foraminal narrowing at any level.      BILLING TIME DOCUMENTATION:   The total TIME spent on this patient on the date of the encounter/appointment was 22 minutes.      TOTAL TIME includes:   Time spent preparing to see the patient (reviewing records and tests)   Time spent face to face (or over the phone) with the patient   Time spent ordering tests, medications, procedures and referrals   Time spent Referring and communicating with other healthcare professionals   Time spent documenting clinical information in [facility] - Procedure Note    Date of Visit: 9/22//2022    Pre procedure Diagnosis: myofascial pain/myositis 60.9   Post procedure Diagnosis: Same  Procedure performed: trigger point injections  Complications: none  Operators: Mesha MARQUES CNP    BP (!) 164/89   Pulse 58   SpO2 98%     Indications:   Tigre Gillette is a 82 year old male with a history of low back pain.  Exam shows myofascial pain of the muscle groups listed below and they have tried conservative treatment including PT and medications.    Options/alternatives, benefits and risks were discussed with the patient including bleeding, infection, tissue trauma and pnuemothorax.  Questions were answered to his satisfaction and he agrees to proceed. Voluntary informed consent was obtained and signed.     Vitals allergies and medications were reviewed.    This is a repeat injection. Previous TPI on 6/30/2022 was helpful for almost 3 months.    Procedure:  After getting informed consent, a Pause for the Cause was performed.    Trigger points were identified by patient, and marked when appropriate.  The area was prepped with Chloroprep.    Using clean technique, injections were completed using a 25G, 1.5 inch needle.  After negative aspiration, injection was completed.  A total of 6  locations were injected.  When possible, tissue was retracted from the chest wall to avoid lung injury.    Muscle groups injected:  Lumbar paraspinals.     Injection solution contained:  10ml of 0.5% bupivacaine.    Hemostasis was achieved, the area was cleaned, and bandaids were placed when appropriate.  The patient tolerated the procedure well.  Respirations were equal and unlabored.     Follow-up includes:   -f/u with the referring provider  -repeat as needed    Mesha MARQUES [facility]     Answers for HPI/ROS submitted by the patient on 9/19/2022  CHRISTINA 7 TOTAL SCORE: 0  General Symptoms: Yes  Skin Symptoms: No  HENT Symptoms: Yes  EYE SYMPTOMS: No  HEART SYMPTOMS: No  LUNG SYMPTOMS: Yes  INTESTINAL SYMPTOMS: No  URINARY SYMPTOMS: Yes  REPRODUCTIVE SYMPTOMS: No  SKELETAL SYMPTOMS: Yes  BLOOD SYMPTOMS: No  NERVOUS SYSTEM SYMPTOMS: Yes  MENTAL HEALTH SYMPTOMS: Yes  Ear pain: No  Ear discharge: No  Hearing loss: No  Tinnitus: No  Nosebleeds: No  Congestion: Yes  Sinus pain: No  Trouble swallowing: No   Voice hoarseness: Yes  Mouth sores: No  Sore throat: No  Tooth pain: No  Gum tenderness: No  Bleeding gums: No  Change in taste: No  Change in sense of smell: No  Dry mouth: Yes  Hearing aid used: Yes  Neck lump: No  Fever: No  Loss of appetite: No  Weight loss: No  Weight gain: No  Fatigue: No  Night sweats: Yes  Chills: Yes  Increased stress: No  Excessive hunger: No  Excessive thirst: Yes  Feeling hot or cold when others believe the temperature is normal: Yes  Loss of height: No  Post-operative complications: No  Surgical site pain: No  Hallucinations: No  Change in or Loss of Energy: No  Hyperactivity: No  Confusion: No  Cough: Yes  Sputum or phlegm: Yes  Coughing up blood: No  Difficulty breating or shortness of breath: Yes  Snoring: Yes  Wheezing: Yes  Difficulty breathing on exertion: Yes  Nighttime Cough: Yes  Difficulty breathing when lying flat: Yes  Trouble holding  urine or incontinence: No  Pain or burning: Yes  Trouble starting or stopping: No  Increased frequency of urination: No  Blood in urine: No  Decreased frequency of urination: No  Frequent nighttime urination: Yes  Flank pain: No  Difficulty emptying bladder: No  Back pain: Yes  Muscle aches: No  Neck pain: No  Swollen joints: No  Joint pain: No  Bone pain: No  Muscle cramps: Yes  Muscle weakness: No  Joint stiffness: No  Bone fracture: No  Trouble with coordination: No  Dizziness or trouble with balance: Yes  Fainting or black-out spells: No  Memory loss: No  Headache: No  Seizures: No  Speech problems: No  Tingling: Yes  Tremor: No  Weakness: Yes  Difficulty walking: Yes  Paralysis: No  Numbness: Yes  Nervous or Anxious: No  Depression: No  Trouble sleeping: Yes  Trouble thinking or concentrating: No  Mood changes: No  Panic attacks: No

## 2022-09-22 ENCOUNTER — OFFICE VISIT (OUTPATIENT)
Dept: PALLIATIVE MEDICINE | Facility: CLINIC | Age: 82
End: 2022-09-22
Payer: COMMERCIAL

## 2022-09-22 VITALS — SYSTOLIC BLOOD PRESSURE: 164 MMHG | OXYGEN SATURATION: 98 % | DIASTOLIC BLOOD PRESSURE: 89 MMHG | HEART RATE: 58 BPM

## 2022-09-22 DIAGNOSIS — M79.18 MYOFASCIAL PAIN: ICD-10-CM

## 2022-09-22 DIAGNOSIS — M47.816 LUMBAR SPONDYLOSIS: Primary | ICD-10-CM

## 2022-09-22 PROCEDURE — 99213 OFFICE O/P EST LOW 20 MIN: CPT | Mod: 25 | Performed by: NURSE PRACTITIONER

## 2022-09-22 PROCEDURE — 20552 NJX 1/MLT TRIGGER POINT 1/2: CPT | Performed by: NURSE PRACTITIONER

## 2022-09-22 RX ORDER — BUPIVACAINE HYDROCHLORIDE 5 MG/ML
10 INJECTION, SOLUTION PERINEURAL ONCE
Status: COMPLETED | OUTPATIENT
Start: 2022-09-22 | End: 2022-09-22

## 2022-09-22 RX ORDER — METHOCARBAMOL 500 MG/1
500-750 TABLET, FILM COATED ORAL 3 TIMES DAILY PRN
Qty: 90 TABLET | Refills: 3 | Status: SHIPPED | OUTPATIENT
Start: 2022-09-22 | End: 2023-01-11

## 2022-09-22 RX ADMIN — BUPIVACAINE HYDROCHLORIDE 50 MG: 5 INJECTION, SOLUTION PERINEURAL at 15:04

## 2022-09-22 ASSESSMENT — PAIN SCALES - GENERAL: PAINLEVEL: EXTREME PAIN (8)

## 2022-09-22 NOTE — PATIENT INSTRUCTIONS
1.  Continue regular physical activity, walking as able. W              2.  Pain Psychologist to address relaxation, behavioral change, coping style, and other factors important to improvement.  NO              3.  Diagnostic Studies: Can consider repeating lumbar MRI pending effectiveness of injections - revisit at next appointment.               4.  Medication Management:   1. Continue diclofenac 50 mg twice daily for pain. Recommend taking with food to reduce stomach upset. It is okay to take this medication along with gabapentin and methocarbamol.                           2. Continue gabapentin [phone].                           3. Continue Robaxin 500-750mg three times daily as needed for muscle spasm. Take most days in the afternoon for now.     4. Check out hemp CBD products, see if this is something you would like to try.               5.  Potential procedures: repeat trigger point injections performed today in clinic. We can repeat in 1 months or so if decreasing benefit.               6.  I will be leaving [facility] pain management and my last day will be on 10/19. Plan will be to follow up with Olga MARQUES CNP.       [facility]  Post Procedure Instructions    Today you had:  trigger point injections   occipital nerve block   bursa injection  Joint Injection    Medications used:  lidocaine   bupivicaine   kenolog   dexamethasone        Go to the emergency room if you develop any shortness of breath  Monitor the injection sites for signs and symptoms of infection-fever, chills, redness, swelling, warmth, or drainage to areas.  You may have soreness at injection sites for up to 24 hours.  It may take up to 14 days for the steroid medication to start working although you may feel the effect as early as a few days after the procedure.     You may apply ice to the painful areas to help minimize the discomfort of the needle pokes.  Do not apply heat to  sites for at least 12 hours.  You may use anti-inflammatory medications or Tylenol for pain control if necessary    Pain Clinic phone number during work hours (Monday through Friday 8 am-4:30 pm) at [phone] or the Provider Line after hours at [phone]:

## 2022-10-06 ENCOUNTER — TRANSFERRED RECORDS (OUTPATIENT)
Dept: HEALTH INFORMATION MANAGEMENT | Facility: CLINIC | Age: 82
End: 2022-10-06

## 2022-10-15 ENCOUNTER — HEALTH MAINTENANCE LETTER (OUTPATIENT)
Age: 82
End: 2022-10-15

## 2022-10-17 ENCOUNTER — MYC MEDICAL ADVICE (OUTPATIENT)
Dept: PALLIATIVE MEDICINE | Facility: CLINIC | Age: 82
End: 2022-10-17

## 2022-10-17 DIAGNOSIS — M79.18 MYOFASCIAL PAIN: ICD-10-CM

## 2022-10-17 NOTE — TELEPHONE ENCOUNTER
Received request from patient requesting refill(s) for diclofenac (VOLTAREN) 50 MG EC tablet    Last refilled on 09/20/22    Pt last seen on 09/22/22  Next appt scheduled for : none    Will facilitate refill.

## 2022-10-17 NOTE — TELEPHONE ENCOUNTER
Will route to MA pool for assistance with gathering/prep of refill information.    Rafaela DILL RN Care Coordinator  [facility]      Diclofenac  10/17/2022 11:47 AM Reply    To: PAIN NURSE      From: Tigre Gillette      Created: 10/17/2022 11:47 AM        *-*-*This message has not been handled.*-*-*    Please renew my prescription for Diclofenac Sodium Dr 50 mg Tab'  Tigre Gillette

## 2022-10-18 NOTE — TELEPHONE ENCOUNTER
Routing to provider to review.     Prepped diclofenac 50mg  Tab, #60, refill:1    09/22/22:Medication Management:   1. Continue diclofenac 50 mg twice daily for pain. Recommend taking with food to reduce stomach upset. It is okay to take this medication along with gabapentin and methocarbamol.

## 2022-10-18 NOTE — TELEPHONE ENCOUNTER
Signed Prescriptions:                        Disp   Refills    diclofenac (VOLTAREN) 50 MG EC tablet      60 tab*1        Sig: Take 1 tablet (50 mg) by mouth 2 times daily  Authorizing Provider: KRISTEL RODRÍGUEZ    diclofenac (VOLTAREN) 50 MG EC tablet      60 tab*1        Sig: Take 1 tablet (50 mg) by mouth 2 times daily  Authorizing Provider: KRISTEL RODRÍGUEZ APRN [facility]

## 2022-12-19 ENCOUNTER — MYC MEDICAL ADVICE (OUTPATIENT)
Dept: PALLIATIVE MEDICINE | Facility: CLINIC | Age: 82
End: 2022-12-19

## 2022-12-19 DIAGNOSIS — M79.18 MYOFASCIAL PAIN: ICD-10-CM

## 2022-12-20 NOTE — TELEPHONE ENCOUNTER
Routed to MA pool to gather required information for refill.    Below from pt  Please renew my prescription for Diclofenac Sodium Dr 50 mg.  Tigre Gillette

## 2022-12-21 NOTE — TELEPHONE ENCOUNTER
Received  request from patient requesting refill(s) for diclofenac (VOLTAREN) 50 MG EC tablet    Last refilled on 12/19/22 with one refill left    Pt last seen on 09/22/22-[place]  Next appt scheduled for 01/11/22-[place]    Will facilitate refill.    [employer] message sent to patient.

## 2023-01-06 DIAGNOSIS — M47.816 LUMBAR FACET ARTHROPATHY: ICD-10-CM

## 2023-01-06 DIAGNOSIS — G89.4 PAIN SYNDROME, CHRONIC: ICD-10-CM

## 2023-01-09 RX ORDER — GABAPENTIN 300 MG/1
CAPSULE ORAL
Qty: 120 CAPSULE | Refills: 3 | Status: SHIPPED | OUTPATIENT
Start: 2023-01-09 | End: 2023-01-11

## 2023-01-09 NOTE — TELEPHONE ENCOUNTER
Routing to [facility]. Not filled by any provider     Routing to provider on script  Shade RADER RN, BSN

## 2023-01-09 NOTE — TELEPHONE ENCOUNTER
Script Eprescribed to pharmacy  [place] Prescription Monitoring Program checked    Has appointment with me scheduled for 1/11/23.    Will send this to MA team to notify patient.    Signed Prescriptions:                        Disp   Refills    gabapentin (NEURONTIN) 300 MG capsule      120 ca*3        Sig: Take 300mg in the morning and midday, 600mg at           bedtime.  Authorizing Provider: FATOUMATA ORDONEZ NP

## 2023-01-11 ENCOUNTER — OFFICE VISIT (OUTPATIENT)
Dept: PALLIATIVE MEDICINE | Facility: CLINIC | Age: 83
End: 2023-01-11
Payer: COMMERCIAL

## 2023-01-11 VITALS
SYSTOLIC BLOOD PRESSURE: 176 MMHG | DIASTOLIC BLOOD PRESSURE: 84 MMHG | BODY MASS INDEX: 21.58 KG/M2 | OXYGEN SATURATION: 99 % | WEIGHT: 168.1 LBS | HEART RATE: 61 BPM

## 2023-01-11 DIAGNOSIS — M54.50 CHRONIC BILATERAL LOW BACK PAIN WITHOUT SCIATICA: Primary | ICD-10-CM

## 2023-01-11 DIAGNOSIS — M79.18 MYOFASCIAL PAIN: ICD-10-CM

## 2023-01-11 DIAGNOSIS — G89.4 PAIN SYNDROME, CHRONIC: ICD-10-CM

## 2023-01-11 DIAGNOSIS — M51.369 DDD (DEGENERATIVE DISC DISEASE), LUMBAR: ICD-10-CM

## 2023-01-11 DIAGNOSIS — M47.816 LUMBAR FACET ARTHROPATHY: ICD-10-CM

## 2023-01-11 DIAGNOSIS — G89.29 CHRONIC BILATERAL LOW BACK PAIN WITHOUT SCIATICA: Primary | ICD-10-CM

## 2023-01-11 PROCEDURE — 99214 OFFICE O/P EST MOD 30 MIN: CPT | Mod: 25 | Performed by: NURSE PRACTITIONER

## 2023-01-11 PROCEDURE — 20552 NJX 1/MLT TRIGGER POINT 1/2: CPT | Performed by: NURSE PRACTITIONER

## 2023-01-11 RX ORDER — GABAPENTIN 300 MG/1
CAPSULE ORAL
Qty: 360 CAPSULE | Refills: 3 | Status: SHIPPED | OUTPATIENT
Start: 2023-01-11 | End: 2023-07-11

## 2023-01-11 RX ORDER — BUPIVACAINE HYDROCHLORIDE 5 MG/ML
10 INJECTION, SOLUTION EPIDURAL; INTRACAUDAL ONCE
Status: COMPLETED | OUTPATIENT
Start: 2023-01-11 | End: 2023-01-11

## 2023-01-11 RX ORDER — METHOCARBAMOL 500 MG/1
500 TABLET, FILM COATED ORAL 3 TIMES DAILY PRN
Qty: 90 TABLET | Refills: 3 | Status: SHIPPED | OUTPATIENT
Start: 2023-01-11 | End: 2023-05-08

## 2023-01-11 RX ADMIN — BUPIVACAINE HYDROCHLORIDE 50 MG: 5 INJECTION, SOLUTION EPIDURAL; INTRACAUDAL at 14:29

## 2023-01-11 ASSESSMENT — PAIN SCALES - GENERAL: PAINLEVEL: MODERATE PAIN (4)

## 2023-01-11 NOTE — PROGRESS NOTES
"[facility] Management     Date of visit: 1/11/2023    Reason for consultation:    Tigre Gillette is a 82 year old male with PMH significant for COPD, and chronic lower back pain who is seen today for transfer of care from Mesha Henry CNP for ongoing management of chronic pain. He was last seen in the pain clinic on 9/22/22.     History:  1. Low back pain- etiology likely mild degenerative changes/ bilateral L4-5 and L5-S1 facet arthropathy with overlying myofascial component. , Has had three RFA procedures;  first RFA was helpful 6-12 months, second RFA not as helpful as first time and no appreciated benefit from(third) RFA in April of 2021. Has had imporvement in symptoms with intermittent TPIs, gabapentin, robaxin and diclofenac.  2. Mental Health - the patient's mental health concerns affect his experience of pain and contribute to his clinically significant distress.     _______________________________________________________   Chief Complaint:    Chief Complaint   Patient presents with     Pain     Pain History:  Pain seems to change over time. Can be minute to minute or hour to hour, depending on what activities he does and if it can be adapted to his taller frame.   \"do it all\" kind of person, likes to fix and maintain his home. 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 Pain can limit his ability to do things he likes, such as fishing, snowmobiling. Has started to read more since detention, especially about [place]. Has a [place] who lives there. 4 children (B-G-G-B). Occasional weakness, but no falls due to pain. Sleeps well with a sleep number bed. Overall feels that things are going well for him.     Pain description:  Location: lower back,   Quality: dull aching to sharp  Duration: fluctuates, but only notices no pain on a very rare morning when he gets up.   Severity/Intensity: 4/10 on " "average  Aggravating factors include: not having regular bowel movements, yard work/snow removal  Relieving factors include: pacing activities, ASA at bedtime if needed, using \"yak tracks\" for stability on his boots, moving slowly, sleep number bed     Current pain medications:  Diclofenac 50mg BID- [facility]  Gabapentin [phone]  Lidocaine cream/roll on prn  Robaxin 500-750 mg TID PRN   mg tablet on average 1 a week.  CBD/Delta 9 at bedtime helps with sleep and pain     Review of [place] Prescription Monitoring Program ():   No concern for abuse or misuse of controlled medications based on this report. Viewed on 1/11/2023.  Controlled medications are not being prescribed.  Has been filling gabapentin regularly at [facility], it is not appearing on his  however.     PAIN MANAGEMENT TREATMENT HISTORY  ([facility]--helped; [facility]--Somewhat helpful; [facility]--No help; [facility]--worse; [facility]--side effects)  1. MEDICATIONS:  Opiates: Norco- [facility] (given post-op, no chronic use)  NSAIDS: ibuprofen- [facility], naproxen- [facility], Diclofenac- [facility] (CBC and BMP normal in March)  Muscle Relaxants: Robaxin - [facility]  Anti-depressants: no  Sleep aids: no  Anxiolytics: no  Neuropathics: Gabapentin- H for restless leg syndrome and pain   Topicals: lidocaine -H  Adjuvant pain medications: Tylenol [facility], then [facility], stopped taking  2. PHYSICAL THERAPY: [facility] 2016- SW. Continues to do HEP intermittently.   3. PAIN PSYCHOLOGY: no   4. SURGERY: no   5. INJECTIONS:   trigger point injections with Mesha Henry on 9/22/2022- very helpful  trigger point injections with Mesha Henry on 6/30/2022- 60-75% benefit for almost 3 months   repeat bilateral L3,4,5 RFA with Dr. Frias on 4/21/2022-[facility]  trigger point injections with Mesha Henry on 4/7/2022- [facility]  repeat bilateral L3,4,5 RFA with Dr. Frias on 7/9/2021 - 60-70% benefit for 6 months  trigger point injections with Mesha Henry on 5/18/2021 - H, 3 months  bilateral L3,4,5 RFA with Dr. Frias on 10/23/2020- % " benefit for 7 months  bilateral L4-5 facet joint injections with Dr. Moon on 2020 80-90% for almost 3 months, better than last  bilateral L4-5 facet joint injections with Dr. Moon on 19- H 70% 3 months   6. COMPLEMENTARY THERAPY:  Chiropractic: intermittent for many years with benefit, stopped because insurance not longer covered.  Acupuncture: no  TENS Unit: no  Imagin2022 MRI of lumbar spine   FINDINGS:   Nomenclature is based on 5 lumbar type vertebral bodies. Anterolisthesis of L4 and L5 measuring 3 mm. The vertebral bodies of the lumbar spine otherwise have normal stature, alignment, and marrow signal intensity. Normal distal spinal cord and cauda equina with conus medullaris at the superior plate of L2. Thin fatty filum terminale. T2 hyperintense lesion within the right kidney which consistent most commonly represents a benign cyst. Unremarkable visualized bony pelvis.  T12-L1: Normal disc signal and disc height. No posterior disc bulge or spinal canal narrowing. No neural foraminal narrowing.   L1-L2: Normal disc signal and disc height. No posterior disc bulge or spinal canal narrowing. No neural foraminal narrowing.  L2-L3: Normal disc signal and disc height. No posterior disc bulge or spinal canal narrowing. No neural foraminal narrowing.   L3-L4: Normal disc signal and disc height. No posterior disc bulge or spinal canal narrowing. Mild bilateral facet arthropathy. No neural foraminal narrowing.  L4-L5: Mild loss of disc signal and disc height. Symmetric disc bulge and mild facet arthropathy with mild spinal canal narrowing. Mild bilateral neural foraminal narrowing.  L5-S1: Mild loss of disc signal and disc height. No posterior disc bulge or spinal canal narrowing. No neural foraminal narrowing.                                                                 IMPRESSION:  1.  Thin fatty filum terminale.  2.  Mild degenerative lumbar spondylosis with level by level analysis as  described above without evidence of high-grade spinal canal or neural foraminal narrowing at any level.    5/11/2022 X-ray of lumbar spine  IMPRESSION: No fracture is identified. Multilevel mild degenerative disc disease. Moderate to severe lower lumbar spine facet arthropathy. Subtle grade 1 anterolisthesis of L4 on L5 worse in flexion compared to extension. Vascular calcifications. Presumed atelectasis at the lung bases    Past Medical History:  Past Medical History:   Diagnosis Date     Chronic lower back pain      COPD (chronic obstructive pulmonary disease) (H)      Degenerative arthritis of lumbar spine     spine     Eczema     uses topical low dose steroid and cetaphil lotion     Past Surgical History:  Past Surgical History:   Procedure Laterality Date     HERNIORRHAPHY EPIGASTRIC N/A 1/30/2018    Procedure: HERNIORRHAPHY EPIGASTRIC;  Epigastric herniorrhaphy with Bard Ventralex ST Hernia Patch ;  Surgeon: Rossana Alas MD;  Location: [facility] OR     HERNIORRHAPHY INGUINAL  2010    open recurrent LIH rpr with mesh     HERNIORRHAPHY INGUINAL  1990    open LIH rpr with mesh     TONSILLECTOMY & ADENOIDECTOMY      t&a     Past Social History:  Social History     Tobacco Use     Smoking status: Heavy Smoker     Packs/day: 0.50     Types: Cigarettes     Smokeless tobacco: Never   Substance Use Topics     Alcohol use: Yes     Comment: rare      Drug use: No      Social History     Social History Narrative    Lives with his wife ( '64), daughter and her boyfriend. [place] retiring in 2000. 4 adult children (B-G-G-B).    Enjoys fishing, fixing things and being outdoors.        Last updated 1/11/2023       Medications:  Current Outpatient Medications   Medication Sig Dispense Refill     diclofenac (VOLTAREN) 1 % topical gel Apply 4 g topically 4 times daily as needed for other (pain) 350 g 0     diclofenac (VOLTAREN) 50 MG EC  tablet Take 1 tablet (50 mg) by mouth 2 times daily 180 tablet 3     gabapentin (NEURONTIN) 300 MG capsule Take 300mg in the morning and midday, 600mg at bedtime. 360 capsule 3     methocarbamol (ROBAXIN) 500 MG tablet Take 1 tablet (500 mg) by mouth 3 times daily as needed for muscle spasms 90 tablet 3     Acetaminophen (TYLENOL PO) Take 1,000 mg by mouth        cetirizine (ZYRTEC) 10 MG tablet Take 10 mg by mouth daily       hydrocortisone 2.5 % cream        TRELEGY ELLIPTA 100-62.5-25 MCG/INH oral inhaler        Allergies:  Allergies   Allergen Reactions     Amoxicillin      Family history:  Family History   Family history unknown: Yes     OBJECTIVE  Vitals:    01/11/23 1253   BP: (!) 176/84   Pulse: 61   SpO2: 99%   Weight: 76.2 kg (168 lb 1.6 oz)     Constitutional: Well developed, well nourished, appears stated age. No acute distress.  Gait is normal and steady  HEENT: Head atraumatic, normocephalic. Eyes without conjunctival injection or jaundice. Oropharynx clear.  Skin: No rash, lesions, or petechiae of exposed skin.   Extremities: Peripheral pulses intact. No clubbing, cyanosis, or edema.  Psychiatric/mental status: Alert, without lethargy or stupor. Speech fluent. Appropriate affect. Mood normal. Able to follow commands without difficulty.     Musculoskeletal exam:  Normal bulk and tone. Unremarkable spinal curvature.   Mildly restricted ROM in lumbar flexion, extension  Nontender to palpation of the midline lumbar spine, but significant spasms noted over lumbar paraspinals bilaterally  Negative SI joint tenderness bilaterally  Negative greater trochanter tenderness bilaterally  Normal 5/5 strength in BLE   Normal sensation to light touch in the lower extremities bilaterally      ASSESSMENT AND PLAN:  The following recommendations were given to the patient. Diagnosis, treatment options, risks, benefits, and alternatives were discussed, and all questions were answered. The patient expressed understanding  of the plan for pain management. I am recommending a multidisciplinary treatment plan to help this patient better manage his pain.      1. Chronic bilateral low back pain without sciatica  2. DDD (degenerative disc disease), lumbar  3. Myofascial pain  4. Lumbar facet arthropathy   Medications continued, renewed for 90 day supply where appropriate. TPIs repeated today with bupivacaine, see procedure note below. Follow-up in 3 months, sooner if needed.     - diclofenac (VOLTAREN) 1 % topical gel; Apply 4 g topically 4 times daily as needed for other (pain)  Dispense: 350 g; Refill: 0  - diclofenac (VOLTAREN) 50 MG EC tablet; Take 1 tablet (50 mg) by mouth 2 times daily  Dispense: 180 tablet; Refill: 3  - methocarbamol (ROBAXIN) 500 MG tablet; Take 1 tablet (500 mg) by mouth 3 times daily as needed for muscle spasms  Dispense: 90 tablet; Refill: 3  - bupivacaine (MARCAINE) 0.5% preservative free injection  - gabapentin (NEURONTIN) 300 MG capsule; Take 300mg in the morning and midday, 600mg at bedtime.  Dispense: 360 capsule; Refill: 3      Olga Chowdhury, CNP-BC, PMGT-BC, AP-PMN  [facility], [facility] - Procedure Note    Date of Visit: 1/11/2023    Pre procedure Diagnosis: myofascial pain/myositis 60.9   Post procedure Diagnosis: Same  Procedure performed: trigger point injections  Complications: none  Operators: Olga Chowdhury NP    BP (!) 176/84   Pulse 61   Wt 76.2 kg (168 lb 1.6 oz)   SpO2 99%   BMI 21.58 kg/m      Indications:   Tigre Gillette is a 82 year old male with a history of lower back pain and myofacial pain/spasms.  Exam shows myofascial pain of the muscle groups listed below and they have tried conservative treatment including PT and medications.    Options/alternatives, benefits and risks were discussed with the patient including bleeding, infection, tissue trauma.  Questions were answered to his satisfaction  and he agrees to proceed. Voluntary informed consent was obtained and signed.     Vitals allergies and medications were reviewed.    Procedure:  After obtaining informed consent, a Pause for the Cause was performed.    Trigger points were identified by patient, and marked when appropriate.  The area was prepped with Chloroprep.    Using clean technique, injections were completed using a 25G, 1.5 inch needle.  After negative aspiration, injection was completed.  A total of 6 locations were injected.      Muscle groups injected:  Bilateral lumbar paraspinals    Injection solution contained:  10ml of 0.5% bupivacain    Hemostasis was achieved, the area was cleaned, and bandaids were placed when appropriate.The patient tolerated the procedure well.      Follow-up includes:   -f/u with me to repeat as needed    Olga Chowdhury, CNP-BC, PMGT-BC, AP-PMN  [facility]

## 2023-01-11 NOTE — PATIENT INSTRUCTIONS
[facility]  Post Procedure Instructions    Today you had:  trigger point injections     Medications used: bupivacaine      Go to the emergency room if you develop any shortness of breath  Monitor the injection sites for signs and symptoms of infection-fever, chills, redness, swelling, warmth, or drainage to areas.  You may have soreness at injection sites for up to 24 hours.  It may take up to 14 days for the steroid medication to start working although you may feel the effect as early as a few days after the procedure.     You may apply ice to the painful areas to help minimize the discomfort of the needle pokes.  Do not apply heat to sites for at least 12 hours.  You may use anti-inflammatory medications or Tylenol for pain control if necessary    Pain Clinic phone number during work hours (Monday through Friday 8 am-4:30 pm) at [phone] or the Provider Line after hours at [phone]:  ----------------------------------------------------------------  Clinic Number:  [phone]   Call with any questions about your care and for scheduling assistance.   Calls are returned Monday through Friday between 8 AM and 4:30 PM. We usually get back to you within 2 business days depending on the issue/request.    If we are prescribing your medications:  For non-opioid medications, call your pharmacy directly to request a refill. Please allow 3-4 days to be processed.     We believe regular attendance is key to your success in our program!    Any time you are unable to keep your appointment we ask that you call us at least 24 hours in advance to cancel.This will allow us to offer the appointment time to another patient.   Multiple missed appointments may lead to dismissal from the clinic.

## 2023-02-17 ENCOUNTER — TELEPHONE (OUTPATIENT)
Dept: PALLIATIVE MEDICINE | Facility: CLINIC | Age: 83
End: 2023-02-17
Payer: COMMERCIAL

## 2023-03-27 ENCOUNTER — MYC MEDICAL ADVICE (OUTPATIENT)
Dept: PALLIATIVE MEDICINE | Facility: CLINIC | Age: 83
End: 2023-03-27
Payer: COMMERCIAL

## 2023-03-29 NOTE — TELEPHONE ENCOUNTER
Pt scheduled 3/30    Closing    Rafaela DILL RN Care Coordinator  [facility]

## 2023-03-30 ENCOUNTER — OFFICE VISIT (OUTPATIENT)
Dept: PALLIATIVE MEDICINE | Facility: CLINIC | Age: 83
End: 2023-03-30
Payer: COMMERCIAL

## 2023-03-30 VITALS — HEART RATE: 60 BPM | SYSTOLIC BLOOD PRESSURE: 164 MMHG | OXYGEN SATURATION: 98 % | DIASTOLIC BLOOD PRESSURE: 82 MMHG

## 2023-03-30 DIAGNOSIS — M79.18 MYOFASCIAL PAIN: Primary | ICD-10-CM

## 2023-03-30 PROCEDURE — 20552 NJX 1/MLT TRIGGER POINT 1/2: CPT | Performed by: NURSE PRACTITIONER

## 2023-03-30 RX ORDER — BUPIVACAINE HYDROCHLORIDE 5 MG/ML
10 INJECTION, SOLUTION EPIDURAL; INTRACAUDAL ONCE
Status: COMPLETED | OUTPATIENT
Start: 2023-03-30 | End: 2023-03-30

## 2023-03-30 RX ADMIN — BUPIVACAINE HYDROCHLORIDE 50 MG: 5 INJECTION, SOLUTION EPIDURAL; INTRACAUDAL at 12:46

## 2023-03-30 ASSESSMENT — PAIN SCALES - GENERAL: PAINLEVEL: SEVERE PAIN (6)

## 2023-03-30 NOTE — PROGRESS NOTES
[facility] - Procedure Note    Date of Visit: 3/30/2023    Pre procedure Diagnosis: myofascial pain/myositis   Post procedure Diagnosis: Same  Procedure performed: trigger point injections  Complications: none  Operators: Olga Chowdhury NP    BP (!) 164/82   Pulse 60   SpO2 98%     Indications:   Tigre Gillette is an 82 year old male with a history of lower back pain and myofacial pain/spasms.  Exam shows myofascial pain of the muscle groups listed below and they have tried conservative treatment including PT and medications.    Options/alternatives, benefits and risks were discussed with the patient including bleeding, infection, tissue trauma and pnuemothorax.  Questions were answered to his satisfaction and he agrees to proceed. Voluntary informed consent was obtained and signed.     Vitals allergies and medications were reviewed.    This is a repeat injection. Previous TPI on 1/11/23 was helpful for almost 3 months.     Procedure:  After obtaining informed consent, a Pause for the Cause was performed.    Trigger points were identified by patient, and marked when appropriate.  The area was prepped with Chloroprep.    Using clean technique, injections were completed using a 25G, 1.5 inch needle.  After negative aspiration, injection was completed.  A total of 6 locations were injected.  When possible, tissue was retracted from the chest wall to avoid lung injury.    Muscle groups injected:  Bilateral lumbar paraspinals    Injection solution contained:  10 ml of 0.5% bupivacaine    Hemostasis was achieved, the area was cleaned, and bandaids were placed when appropriate.  The patient tolerated the procedure well.      Follow-up includes:   -f/u with the referring provider  -repeat as needed    Olga Chowdhury, CNP-BC, PMGT-BC, AP-PMN  [facility]

## 2023-03-30 NOTE — PATIENT INSTRUCTIONS
[facility]  Post Procedure Instructions    Today you had:  trigger point injections     Medications used:  lidocaine   bupivicaine   kenolog   dexamethasone        Go to the emergency room if you develop any shortness of breath  Monitor the injection sites for signs and symptoms of infection-fever, chills, redness, swelling, warmth, or drainage to areas.  You may have soreness at injection sites for up to 24 hours.  It may take up to 14 days for the steroid medication to start working although you may feel the effect as early as a few days after the procedure.     You may apply ice to the painful areas to help minimize the discomfort of the needle pokes.  Do not apply heat to sites for at least 12 hours.  You may use anti-inflammatory medications or Tylenol for pain control if necessary    Pain Clinic phone number during work hours (Monday through Friday 8 am-4:30 pm) at [phone] or the Provider Line after hours at [phone]:

## 2023-03-30 NOTE — ADDENDUM NOTE
Addended by: FATOUMATA ORDONEZ on: 3/30/2023 12:49 PM     Modules accepted: Orders, Level of Service

## 2023-05-08 DIAGNOSIS — M79.18 MYOFASCIAL PAIN: ICD-10-CM

## 2023-05-08 RX ORDER — METHOCARBAMOL 500 MG/1
500 TABLET, FILM COATED ORAL 3 TIMES DAILY PRN
Qty: 90 TABLET | Refills: 3 | Status: SHIPPED | OUTPATIENT
Start: 2023-05-08 | End: 2023-08-30

## 2023-05-08 NOTE — TELEPHONE ENCOUNTER
Received fax request from [facility] (acquired by [facility] in 2014) pharmacy requesting refill(s) for methocarbamol (ROBAXIN) 500 MG tablet    Last refilled on 04/06/23    Pt last seen on 03/30/23  Next appt scheduled for : none    Will facilitate refill.

## 2023-06-01 ENCOUNTER — HEALTH MAINTENANCE LETTER (OUTPATIENT)
Age: 83
End: 2023-06-01

## 2023-06-06 ENCOUNTER — OFFICE VISIT (OUTPATIENT)
Dept: PALLIATIVE MEDICINE | Facility: CLINIC | Age: 83
End: 2023-06-06
Payer: COMMERCIAL

## 2023-06-06 ENCOUNTER — TELEPHONE (OUTPATIENT)
Dept: PALLIATIVE MEDICINE | Facility: CLINIC | Age: 83
End: 2023-06-06

## 2023-06-06 VITALS
DIASTOLIC BLOOD PRESSURE: 67 MMHG | BODY MASS INDEX: 21.58 KG/M2 | SYSTOLIC BLOOD PRESSURE: 112 MMHG | OXYGEN SATURATION: 98 % | HEART RATE: 72 BPM | WEIGHT: 168.1 LBS

## 2023-06-06 DIAGNOSIS — M51.369 DDD (DEGENERATIVE DISC DISEASE), LUMBAR: Primary | ICD-10-CM

## 2023-06-06 DIAGNOSIS — M47.896 OTHER SPONDYLOSIS, LUMBAR REGION: ICD-10-CM

## 2023-06-06 PROCEDURE — 99214 OFFICE O/P EST MOD 30 MIN: CPT | Performed by: NURSE PRACTITIONER

## 2023-06-06 RX ORDER — ASPIRIN 325 MG
325 TABLET, DELAYED RELEASE (ENTERIC COATED) ORAL 3 TIMES DAILY PRN
Status: ON HOLD | COMMUNITY
End: 2024-05-01

## 2023-06-06 ASSESSMENT — PAIN SCALES - GENERAL: PAINLEVEL: MODERATE PAIN (5)

## 2023-06-06 NOTE — PATIENT INSTRUCTIONS
Injections: Caudal Epidural injection ordered, you will be called to set this up.  Follow up with me in 3-4 weeks to reassess symptoms and response to treatment.   _____________________________________________________  Scheduling/Clinic telephone number for ALL locations:  [phone]    After Hours On-Call Service for Emergencies:  [phone]  Call with any questions about your care and for scheduling assistance.   Calls are returned Monday through Friday between 8 AM and 4:00 PM. We usually get back to you within 2-3 business days depending on the issue/request. I am not in the clinic on Fridays.   If we are prescribing your medications:  For medication refills, call the clinic or send a Nuenz message 7 days in advance.  Please include the name of the requested medication and your preferred pharmacy. Please allow 3-4 days to be processed.   Per [place] State Law, all controlled substance prescriptions must be filled within 30 days of being written. For those controlled substances allowing refills, pickup must occur within 30 days of last fill.    We believe regular attendance is key to your success in our program!    If you are unable to keep your appointment we ask that you call us at least 24 hours in advance to cancel.This will allow us to offer the appointment time to another patient. Multiple missed appointments may lead to dismissal from the clinic.

## 2023-06-06 NOTE — PROGRESS NOTES
"[facility] Pain Management   Date of Visit: 6/6/2023  Last visit: 1/11/2023, TPIs on 3/30/23  Original Consult: 8/6/2019    Reason for visit:    Follow-up: Tigre Gillette is an 83 year old male with PMH significant for COPD, and chronic lower back pain who is seen today for ongoing management of chronic pain.     History:  1. Low back pain- etiology likely mild degenerative changes/ bilateral L4-5 and L5-S1 facet arthropathy with overlying myofascial component. , Has had three RFA procedures;  first RFA was helpful 6-12 months, second RFA not as helpful as first time and no appreciated benefit from(third) RFA in April of 2021. Has had imporvement in symptoms with intermittent TPIs, gabapentin, robaxin and diclofenac.  2. Mental Health - the patient's mental health concerns affect his experience of pain and contribute to his clinically significant distress.    Recommendations from last visit:  Medications continued, renewed for 90 day supply where appropriate. TPIs repeated with bupivacaine. Follow-up in 3 months, sooner if needed.   _______________________________________________________   Since last seen, Tigre reports:  - Lower back pain has been worse with standing and bending. Bending forward is \"murder.\" Almost feels nauseated after standing too long. The longer he stands, the more his back hurts and he feels weak. Sitting and resting improves his pain. Pain is not worse with extension and rotation of his lower back.    - Roll on lidocaine can help at times.  -Sleeps okay, wakes up and feels alright. Pain is okay in the morning, but builds as the day goes on.    - \"do it all\" kind of person, likes to fix and maintain his home. 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  Occasional weakness, but no falls due to pain. Sleeps well with a sleep number bed. Overall feels that things are going well for him.     Pain " "description:  Location: lower back,   Quality: dull aching to sharp  Duration: fluctuates, but only notices no pain on a very rare morning when he gets up.   Severity/Intensity: 5/10 on average  Aggravating factors include: standing, not having regular bowel movements, yard work/snow removal  Relieving factors include: pacing activities, ASA at bedtime if needed, using \"yak tracks\" for stability on his boots, moving slowly, sleep number bed     Current pain medications:  Diclofenac 50mg BID  Gabapentin [phone]  Lidocaine cream/roll on prn  Robaxin 500-750 mg TID PRN   mg tablet on average 1 a week.  CBD/Delta 9 at bedtime helps with sleep and pain     Review of [place] Prescription Monitoring Program ():   No concern for abuse or misuse of controlled medications based on this report. Viewed on 6/6/2023    PAIN MANAGEMENT TREATMENT HISTORY  1. MEDICATIONS:  Opiates: Norco (given post-op, no chronic use)  NSAIDS: ibuprofen, naproxen, Diclofenac, - all are somewhat helpful   Muscle Relaxants: Robaxin - somewhat helpful  Anti-depressants: -not tried  Neuropathics: Gabapentin -helpful for restless leg syndrome and pain   Topicals: lidocaine -helpful  Adjuvant pain medications: Tylenol - somewhat helpful, then -not helpful stopped taking  2. PHYSICAL THERAPY:   [facility] 2016 - somewhat helpful Continues to do HEP intermittently.   3. PAIN PSYCHOLOGY:   -not tried   4. SURGERY:   no pain related surgeries  5. INJECTIONS:   trigger point injections with Olga Chowdhury 3/30/2023- helped some   trigger point injections with Olga Chowdhury 1/11/2023 -helpful  trigger point injections with Mesha Henry on 9/22/2022- very helpful  trigger point injections with Mesha Henry on 6/30/2022- 60-75% benefit for almost 3 months   repeat bilateral L3,4,5 RFA with Dr. Frias on 4/21/2022 -not helpful  trigger point injections with Mesha Henry on 4/7/2022 - somewhat helpful  repeat bilateral L3,4,5 RFA with " Dr. Frias on 2021 - 60-70% benefit for 6 months  trigger point injections with Mesha Henry on 2021 -helpful, 3 months  bilateral L3,4,5 RFA with Dr. Frias on 10/23/2020- % benefit for 7 months  bilateral L4-5 facet joint injections with Dr. Moon on 2020 80-90% for almost 3 months, better than last  bilateral L4-5 facet joint injections with Dr. Moon on 19 -helpful 70% 3 months   6. COMPLEMENTARY THERAPY:  Chiropractic: intermittent for many years with benefit, stopped because insurance not longer covered.  Acupuncture: -not tried  TENS Unit: -not tried    Imagin2022 MRI of lumbar spine   FINDINGS:   Nomenclature is based on 5 lumbar type vertebral bodies. Anterolisthesis of L4 and L5 measuring 3 mm. The vertebral bodies of the lumbar spine otherwise have normal stature, alignment, and marrow signal intensity. Normal distal spinal cord and cauda equina with conus medullaris at the superior plate of L2. Thin fatty filum terminale. T2 hyperintense lesion within the right kidney which consistent most commonly represents a benign cyst. Unremarkable visualized bony pelvis.  T12-L1: Normal disc signal and disc height. No posterior disc bulge or spinal canal narrowing. No neural foraminal narrowing.   L1-L2: Normal disc signal and disc height. No posterior disc bulge or spinal canal narrowing. No neural foraminal narrowing.  L2-L3: Normal disc signal and disc height. No posterior disc bulge or spinal canal narrowing. No neural foraminal narrowing.   L3-L4: Normal disc signal and disc height. No posterior disc bulge or spinal canal narrowing. Mild bilateral facet arthropathy. No neural foraminal narrowing.  L4-L5: Mild loss of disc signal and disc height. Symmetric disc bulge and mild facet arthropathy with mild spinal canal narrowing. Mild bilateral neural foraminal narrowing.  L5-S1: Mild loss of disc signal and disc height. No posterior disc bulge or spinal canal narrowing. No  neural foraminal narrowing.                                                                 IMPRESSION:  1.  Thin fatty filum terminale.  2.  Mild degenerative lumbar spondylosis with level by level analysis as described above without evidence of high-grade spinal canal or neural foraminal narrowing at any level.    5/11/2022 X-ray of lumbar spine  IMPRESSION: No fracture is identified. Multilevel mild degenerative disc disease. Moderate to severe lower lumbar spine facet arthropathy. Subtle grade 1 anterolisthesis of L4 on L5 worse in flexion compared to extension. Vascular calcifications. Presumed atelectasis at the lung bases    Social History:  Social History     Tobacco Use     Smoking status: Heavy Smoker     Packs/day: 0.50     Types: Cigarettes     Smokeless tobacco: Never   Substance Use Topics     Alcohol use: Yes     Comment: rare      Drug use: No      Social History     Social History Narrative    Lives with his wife ( '64), daughter and her boyfriend. [place] before retiring in 2000. 4 adult children (B-G-G-B).    Enjoys fishing, fixing things and being outdoors.        Last updated 1/11/2023      Medications and Allergies reviewed.    OBJECTIVE  Vitals:    06/06/23 1030   BP: 112/67   Pulse: 72   SpO2: 98%   Weight: 76.2 kg (168 lb 1.6 oz)     Constitutional: Well developed, well nourished, appears stated age. No acute distress.  Gait is normal and steady  HEENT: Head atraumatic, normocephalic. Eyes without conjunctival injection or jaundice. Oropharynx clear.  Skin: No rash, lesions, or petechiae of exposed skin.   Extremities: Peripheral pulses intact. No clubbing, cyanosis, or edema.  Psychiatric/mental status: Alert, without lethargy or stupor. Speech fluent. Appropriate affect. Mood normal. Able to follow commands without difficulty.     Musculoskeletal exam:  Normal bulk and tone. Unremarkable spinal curvature.    Mildly restricted ROM in lumbar flexion, extension. No pain with rotation while in extension  Nontender to palpation of the midline lumbar spine, but significant spasms noted over lumbar paraspinals bilaterally  Negative SI joint tenderness bilaterally  Negative greater trochanter tenderness bilaterally  Normal 5/5 strength in BLE   Normal sensation to light touch in the lower extremities bilaterally      ASSESSMENT AND PLAN:  1. DDD (degenerative disc disease), lumbar  2. Other spondylosis, lumbar region   Discussed prior RFA of lumbar facets. Based on his history and exam, appears that his current pain could be be more related to mild central stenosis than facet joints at this time. Recommend caudal LUANNE. Follow up with me in 3-4 weeks after the injection to reassess symptoms and response to treatment.       Olga Chowdhury, CNP-BC, PMGT-BC, AP-PMN  [facility]

## 2023-06-06 NOTE — NURSING NOTE
4/5/2022     2:00 PM 6/30/2022     2:52 PM 6/6/2023    10:33 AM   PEG Score   PEG Total Score 6.67 8.33 8.67

## 2023-06-14 NOTE — PROGRESS NOTES
[facility] - Procedure Note    Date of Service: 6/15/2023    Procedure performed: Caudal epidural steroid injection with fluoroscopic guidance  Diagnosis: Lumbar spondylosis; Lumbar radiculitis/radiculopathy  : Patricia Frias MD   Anesthesia: none    Indications: Tigre Gillette is a 83 year old male who is seen at the request of Olga Chowdhury CNP for a caudal epidural steroid injection. The patient describes low back pain without radiation to the legs. The patient has been exhibiting symptoms consistent with lumbar intraspinal inflammation and radiculopathy. Symptoms have been persistent, disabling, and intermittently severe. The patient reports minimal improvement with conservative treatment, including previous injections, PT and medications.    S/P lumbar RFA 4/21/2021    LUMBAR MRI was done on 5/24/2022 which showed   FINDINGS:   Nomenclature is based on 5 lumbar type vertebral bodies. Anterolisthesis of L4 and L5 measuring 3 mm. The vertebral bodies of the lumbar spine otherwise have normal stature, alignment, and marrow signal intensity. Normal distal spinal cord and cauda   equina with conus medullaris at the superior plate of L2. Thin fatty filum terminale. T2 hyperintense lesion within the right kidney which consistent most commonly represents a benign cyst. Unremarkable visualized bony pelvis.     T12-L1: Normal disc signal and disc height. No posterior disc bulge or spinal canal narrowing. No neural foraminal narrowing.      L1-L2: Normal disc signal and disc height. No posterior disc bulge or spinal canal narrowing. No neural foraminal narrowing.     L2-L3: Normal disc signal and disc height. No posterior disc bulge or spinal canal narrowing. No neural foraminal narrowing.      L3-L4: Normal disc signal and disc height. No posterior disc bulge or spinal canal narrowing. Mild bilateral facet arthropathy. No neural foraminal narrowing.     L4-L5: Mild loss of disc  signal and disc height. Symmetric disc bulge and mild facet arthropathy with mild spinal canal narrowing. Mild bilateral neural foraminal narrowing.     L5-S1: Mild loss of disc signal and disc height. No posterior disc bulge or spinal canal narrowing. No neural foraminal narrowing.                                                                     IMPRESSION:  1.  Thin fatty filum terminale.  2.  Mild degenerative lumbar spondylosis with level by level analysis as described above without evidence of high-grade spinal canal or neural foraminal narrowing at any level.    Allergies:      Allergies   Allergen Reactions     Amoxicillin         Vitals:  /76   Pulse 60   SpO2 98%     Review of Systems: The patient denies recent fever, chills, illness, use of antibiotics or anticoagulants. All other 10-point review of systems negative.       Procedure: The procedure and risks were explained, and informed written consent was obtained from the patient. Risks include but are not limited to: infection, bleeding, increased pain, and damage to soft tissue, nerve, muscle, and vasculature structures. After getting informed consent, patient was brought into the procedure suite and was placed in a prone position on the procedure table. A Pause for the Cause was performed. Patient was prepped and draped in sterile fashion.     The sacral hiatus and cornua were palpated.  Under lateral fluoroscopic guidance sacral hiatus was identified.  A total of 4.5 mL of Lidocaine 1% with 0.5 mL 8.4% sodium bicarbonate was used to anesthetize the skin and the needle track at a skin entry site.  A 22 gauge 5 inch spinal needle was advanced through the sacral hiatus using intermittent fluoroscopy. The needle angle was decreased to allow entrance into the sacral canal and epidural space.  This was verified in both the AP and lateral view for correct alignment.       The position was then inspected from anteroposterior and lateral views, and  the needle adjusted appropriately.  After negative aspiration for heme and CSF, a total of 1 mL of Omnipaque-300 was injected using static and continuous fluoroscopy confirming appropriate position, into the epidural space, with no intravascular or intrathecal uptake. 0 mL of Omnipaque-300 was wasted.    2 mL of 1% lidocaine, 3 mL of preservative free saline, with 80 mg of triamcinolone was injected.  The needle was removed. Hemostasis was achieved, the area was cleaned, and bandaids were placed when appropriate. Images were saved to PACS.    The patient tolerated the procedure well, and was taken to the recovery room, and there was no evidence of procedural complications. No new sensory or motor deficits were noted following the procedure. The patient was stable and able to ambulate on discharge home. Post-procedure instructions were provided.     Pre-procedure pain score: 3/10 in the back, 3/10 in the leg  Post-procedure pain score: 2/10 in the back, 2/10 in the leg    Assessment/Plan: Tigre Gillette is a 83 year old male s/p caudal epidural steroid injection today for lumbar spondylosis, radiculitis/radiculopathy.     1. Following today's procedure, the patient was advised to contact the Pain Management Center for any of the following:   Fever, chills, or night sweats   New onset of pain, numbness, or weakness   Any questions/concerns regarding the procedure  If unable to contact the Pain Center, the patient was instructed to go to a local Emergency Room for any complications.   2. The patient should follow-up with the referring provider in 2 weeks for post-procedure evaluation.    KLEVER HERNANDEZ MD   Pain Management

## 2023-06-15 ENCOUNTER — RADIOLOGY INJECTION OFFICE VISIT (OUTPATIENT)
Dept: PALLIATIVE MEDICINE | Facility: CLINIC | Age: 83
End: 2023-06-15
Attending: NURSE PRACTITIONER
Payer: COMMERCIAL

## 2023-06-15 VITALS — HEART RATE: 64 BPM | OXYGEN SATURATION: 99 % | DIASTOLIC BLOOD PRESSURE: 93 MMHG | SYSTOLIC BLOOD PRESSURE: 140 MMHG

## 2023-06-15 DIAGNOSIS — M54.16 LUMBAR RADICULOPATHY: ICD-10-CM

## 2023-06-15 PROCEDURE — 62323 NJX INTERLAMINAR LMBR/SAC: CPT | Performed by: ANESTHESIOLOGY

## 2023-06-15 RX ORDER — TRIAMCINOLONE ACETONIDE 40 MG/ML
40 INJECTION, SUSPENSION INTRA-ARTICULAR; INTRAMUSCULAR ONCE
Status: COMPLETED | OUTPATIENT
Start: 2023-06-15 | End: 2023-06-15

## 2023-06-15 RX ADMIN — TRIAMCINOLONE ACETONIDE 40 MG: 40 INJECTION, SUSPENSION INTRA-ARTICULAR; INTRAMUSCULAR at 13:53

## 2023-06-15 NOTE — NURSING NOTE
Discharge Information    IV Discontiued Time:  NA    Amount of Fluid Infused:  NA    Discharge Criteria = When patient returns to baseline or as per MD order    Consciousness:  Pt is fully awake    Circulation:  BP +/- 20% of pre-procedure level    Respiration:  Patient is able to breathe deeply    O2 Sat:  Patient is able to maintain O2 Sat >92% on room air    Activity:  Moves 4 extremities on command    Ambulation:  Patient is able to stand and walk or stand and pivot into wheelchair    Dressing:  Clean/dry or No Dressing    Notes:   Discharge instructions and AVS given to patient    Patient meets criteria for discharge?  YES    Admitted to PCU?  No    Responsible adult present to accompany patient home?  Yes    Signature/Title:    Rafaela Yo RN  RN Care Coordinator  [facility] [place]

## 2023-06-15 NOTE — NURSING NOTE
Pre-procedure Intake  If YES to any questions or NO to having a   Please complete laminated checklist and leave on the computer keyboard for Provider, verbally inform provider if able.    For SCS Trial, RFA's or any sedation procedure:  Have you been fasting? NA    If yes, for how long?     Are you taking any any blood thinners such as Coumadin, Warfarin, Jantoven, Pradaxa Xarelto, Eliquis, Edoxaban, Enoxaparin, Lovenox, Heparin, Arixtra, Fondaparinux, or Fragmin? OR Antiplatelet medication such as Plavix, Brilinta, or Effient?   No     If yes, when did you take your last dose?     Do you take aspirin?  Yes -   ASA    If cervical procedure, have you held aspirin for 6 days?   NA    Do you have any allergies to contrast dye, iodine, steroid and/or numbing medications?  NO    Are you currently taking antibiotics or have an active infection?  NO    Have you had a fever/elevated temperature within the past week? NO    Are you currently taking oral steroids? NO    Do you have a ? Yes    Are you pregnant or breastfeeding?  Not Applicable    Have you received the COVID-19 vaccine? Yes    If yes, was it your 1st, 2nd or only dose needed? 4th    Date of most recent vaccine: 10/05/22    Notify provider and RNs if systolic BP >170, diastolic BP >100, P >100 or O2 sats < 90%      Susie Osorio MA  [facility]

## 2023-06-15 NOTE — PATIENT INSTRUCTIONS
[facility] Procedure Discharge Instructions    Today you saw:   Dr. Patricia Frias      Your procedure:  Epidural steroid injection       Medications used:  Lidocaine (anesthetic)  Kenalog (steroid) Normal Saline Omnipaque (contrast)           Be cautious when walking as numbness and/or weakness in the legs may occur up to 6-8 hours after the procedure due to effect of the local anesthetic  Do not drive for 6 hours. The effect of the local anesthetic could slow your reflexes.   Avoid strenuous activity for the first 24 hours. You may resume your regular activities after that.   You may shower, however avoid swimming, tub baths or hot tubs for 24 hours following your procedure  You may have a mild to moderate increase in pain for several days following the injection.    You may use ice packs for 10-15 minutes, 3 to 4 times a day at the injection site for comfort  Do not use heat to painful areas for 6 to 8 hours. This will give the local anesthetic time to wear off and prevent you from accidentally burning your skin.  Unless you have been directed to avoid the use of anti-inflammatory medications (NSAIDS-ibuprofen, Aleve, Motrin), you may use these medications or Tylenol for pain control if needed.   With diabetes, check your blood sugar more frequently than usual as your blood sugar may be higher than normal for 10-14 days following a steroid injection. Contact your doctor who manages your diabetes if your blood sugar is higher than usual  Possible side effects of steroids that you may experience include flushing, elevated blood pressure, increased appetite, mild headaches and restlessness.  All of these symptoms will get better with time.  It may take up to 14 days for the steroid medication to start working although you may feel the effect as early as a few days after the procedure.   Follow up with your referring provider in 2-3 weeks- Olga Chowdhury    If you experience any of the following,  call the pain center line during work hours at [phone] or on-call physician after hours at [phone]:  -Fever over 100 degree F  -Swelling, bleeding, redness, drainage, warmth at the injection site  -Progressive weakness or numbness in your legs   -Loss of bowel or bladder function  -Unusual headache that is not relieved by Tylenol or your regular headache medication  -Unusual new onset of pain that is not improving

## 2023-07-09 ENCOUNTER — MYC MEDICAL ADVICE (OUTPATIENT)
Dept: PALLIATIVE MEDICINE | Facility: CLINIC | Age: 83
End: 2023-07-09
Payer: COMMERCIAL

## 2023-07-10 NOTE — TELEPHONE ENCOUNTER
BRANDONI to provider. Close when reviewed.         This is to let you know that the Angelica 15 injection has not solved my pain level.  I have resorted to taking aspirin lately.  Even ice has only helped a little.   Laying on my stomach for 20 to 30 minutes  relieves the pain for a while.   I just thought I would tell you the above ahead of my upcoming appointment on Tuesday the 11th.   Tigre RODRIGUEZ, RN Care Coordinator  [facility]  Pain Management

## 2023-07-11 ENCOUNTER — OFFICE VISIT (OUTPATIENT)
Dept: PALLIATIVE MEDICINE | Facility: CLINIC | Age: 83
End: 2023-07-11
Attending: NURSE PRACTITIONER
Payer: COMMERCIAL

## 2023-07-11 VITALS — OXYGEN SATURATION: 91 % | SYSTOLIC BLOOD PRESSURE: 121 MMHG | DIASTOLIC BLOOD PRESSURE: 74 MMHG | HEART RATE: 73 BPM

## 2023-07-11 DIAGNOSIS — M54.50 CHRONIC BILATERAL LOW BACK PAIN WITHOUT SCIATICA: ICD-10-CM

## 2023-07-11 DIAGNOSIS — G89.4 PAIN SYNDROME, CHRONIC: Primary | ICD-10-CM

## 2023-07-11 DIAGNOSIS — R25.2 CRAMPING OF HANDS: ICD-10-CM

## 2023-07-11 DIAGNOSIS — M51.369 DDD (DEGENERATIVE DISC DISEASE), LUMBAR: ICD-10-CM

## 2023-07-11 DIAGNOSIS — M54.12 CERVICAL RADICULOPATHY: ICD-10-CM

## 2023-07-11 DIAGNOSIS — M47.816 LUMBAR FACET ARTHROPATHY: ICD-10-CM

## 2023-07-11 DIAGNOSIS — G89.29 CHRONIC BILATERAL LOW BACK PAIN WITHOUT SCIATICA: ICD-10-CM

## 2023-07-11 DIAGNOSIS — M50.30 DDD (DEGENERATIVE DISC DISEASE), CERVICAL: ICD-10-CM

## 2023-07-11 PROCEDURE — 99214 OFFICE O/P EST MOD 30 MIN: CPT | Performed by: NURSE PRACTITIONER

## 2023-07-11 RX ORDER — GABAPENTIN 300 MG/1
600 CAPSULE ORAL 3 TIMES DAILY
Qty: 540 CAPSULE | Refills: 3 | Status: SHIPPED | OUTPATIENT
Start: 2023-07-11 | End: 2024-08-14

## 2023-07-11 ASSESSMENT — PAIN SCALES - PAIN ENJOYMENT GENERAL ACTIVITY SCALE (PEG)
INTERFERED_GENERAL_ACTIVITY: 9
INTERFERED_ENJOYMENT_LIFE: 10 - COMPLETELY INTERFERES
PEG_TOTALSCORE: 9.33
AVG_PAIN_PASTWEEK: 9

## 2023-07-11 ASSESSMENT — PAIN SCALES - GENERAL: PAINLEVEL: SEVERE PAIN (7)

## 2023-07-11 NOTE — PATIENT INSTRUCTIONS
Increase gabapentin to 600 mg three times a day. Start by increasing your morning dose, then after 5-7 days, you can increase your midday dose.  Cervical (neck) MRI ordered, I will send the results to you when I receive them.  You are okay to wait on this order.   _____________________________________________________  Scheduling/Clinic telephone number for ALL locations:  [phone]    After Hours On-Call Service for Emergencies:  [phone]  Call with any questions about your care and for scheduling assistance.   Calls are returned Monday through Friday between 8 AM and 4:00 PM. We usually get back to you within 2-3 business days depending on the issue/request. I am not in the clinic on Fridays.   If we are prescribing your medications:  For medication refills, call the clinic or send a Verimed message 7 days in advance.  Please include the name of the requested medication and your preferred pharmacy. Please allow 3-4 days to be processed.   Per [place] State Law, all controlled substance prescriptions must be filled within 30 days of being written. For those controlled substances allowing refills, pickup must occur within 30 days of last fill.    We believe regular attendance is key to your success in our program!    If you are unable to keep your appointment we ask that you call us at least 24 hours in advance to cancel.This will allow us to offer the appointment time to another patient. Multiple missed appointments may lead to dismissal from the clinic.

## 2023-07-11 NOTE — PROGRESS NOTES
"[facility] Pain Management   Date of Visit: 7/11/2023  Last visit: 6/6/2023  Original Consult: 8/6/2019    Reason for visit:    Follow-up: Tigre Gillette is an 83 year old male with PMH significant for COPD, and chronic lower back pain who is seen today for ongoing management of chronic pain.     History:  1. Low back pain- etiology likely mild degenerative changes/ bilateral L4-5 and L5-S1 facet arthropathy with overlying myofascial component. , Has had three RFA procedures;  first RFA was helpful 6-12 months, second RFA not as helpful as first time and no appreciated benefit from(third) RFA in April of 2021. Has had imporvement in symptoms with intermittent TPIs, gabapentin, robaxin and diclofenac.  2. Mental Health - the patient's mental health concerns affect his experience of pain and contribute to his clinically significant distress.    Recommendations from last visit:  Discussed prior RFA of lumbar facets. Based on his history and exam, appears that his current pain could be be more related to mild central stenosis than facet joints at this time. Recommend caudal LUANNE. Follow up with me in 3-4 weeks after the injection to reassess symptoms and response to treatment.   _______________________________________________________   Since last seen, Tigre reports:  - Had caudal LUANNE on 6/15/2023 with Dr. Frias. Had some relief initially, then in the past week has had less relief.  - He notices that taking his afternoon dose of gabapentin is helpful.  - Occasionally has a cramp in his hand, they \"turn into claws.\" and has pain in his forearms. This can last for a few minutes, but tends to get better when he \"gets his back in a good position.\"   - Can't stay in bed longer than 7-8 hours causes him to have more stiffness. Lower back pain has been worse with standing and bending. Bending forward is \"murder.\"  The longer he stands, the more his back hurts; sitting and resting improves his pain, especially if he has " "lumbar support (often dose this with his arm behind his back.    Was nauseated after standing too long, this is no longer present. Occasional weakness, but no falls due to pain.  - Roll on lidocaine can help at times.  -Sleeps okay, wakes up and feels alright. Pain is okay in the morning, but builds as the day goes on. Has tried THC/CBD gummy before bed at night and this has helped him sleep and reduced the \"nerve jerks\" he was getting at night. Sleeps well with a sleep number bed.    Pain description:  Location: lower back, hands  Quality: dull aching to sharp  Duration: fluctuates, but only notices no pain on a very rare morning when he gets up.   Severity/Intensity: 7/10 today  Aggravating factors include: standing, not having regular bowel movements, yard work/snow removal  Relieving factors include: pacing activities, ASA at bedtime if needed, using \"yak tracks\" for stability on his boots, moving slowly, sleep number bed     Current pain medications:  Diclofenac 50mg BID  Gabapentin [phone]  Lidocaine cream/roll on prn  Robaxin 500-750 mg TID PRN   mg tablet on average 1 a week.  CBD/Delta 9 at bedtime helps with sleep and pain     Review of [place] Prescription Monitoring Program ():   No concern for abuse or misuse of controlled medications based on this report. Viewed on 7/11/2023.    PAIN MANAGEMENT TREATMENT HISTORY  1. MEDICATIONS:  Opiates: Norco (given post-op, no chronic use)  NSAIDS: ibuprofen, naproxen, Diclofenac, - all are somewhat helpful   Muscle Relaxants: Robaxin - somewhat helpful  Anti-depressants: -not tried  Neuropathics: Gabapentin -helpful for restless leg syndrome and pain   Topicals: lidocaine -helpful  Adjuvant pain medications: Tylenol - somewhat helpful, then -not helpful stopped taking  2. PHYSICAL THERAPY:   [facility] 2016 - somewhat helpful Continues to do HEP intermittently.   3. PAIN PSYCHOLOGY:   -not tried   4. SURGERY:   no pain related surgeries  5. " INJECTIONS:   caudal LUANNE on 6/15/2023 with Dr. Frias  trigger point injections with Olga Chowdhury 3/30/2023- helped some   trigger point injections with Olga Chowdhury 2023 -helpful  trigger point injections with Mesha Henry on 2022- very helpful  trigger point injections with Mesha Henry on 2022- 60-75% benefit for almost 3 months   repeat bilateral L3,4,5 RFA with Dr. Frias on 2022 -not helpful  trigger point injections with Mesha Henry on 2022 - somewhat helpful  repeat bilateral L3,4,5 RFA with Dr. Frias on 2021 - 60-70% benefit for 6 months  trigger point injections with Mesha Henry on 2021 -helpful, 3 months  bilateral L3,4,5 RFA with Dr. Frias on 10/23/2020- % benefit for 7 months  bilateral L4-5 facet joint injections with Dr. Moon on 2020 80-90% for almost 3 months, better than last  bilateral L4-5 facet joint injections with Dr. Moon on 19 -helpful 70% 3 months   6. COMPLEMENTARY THERAPY:  Chiropractic: intermittent for many years with benefit, stopped because insurance not longer covered.  Acupuncture: -not tried  TENS Unit: -not tried    Imagin2022 MRI of lumbar spine   FINDINGS:   Nomenclature is based on 5 lumbar type vertebral bodies. Anterolisthesis of L4 and L5 measuring 3 mm. The vertebral bodies of the lumbar spine otherwise have normal stature, alignment, and marrow signal intensity. Normal distal spinal cord and cauda equina with conus medullaris at the superior plate of L2. Thin fatty filum terminale. T2 hyperintense lesion within the right kidney which consistent most commonly represents a benign cyst. Unremarkable visualized bony pelvis.  T12-L1: Normal disc signal and disc height. No posterior disc bulge or spinal canal narrowing. No neural foraminal narrowing.   L1-L2: Normal disc signal and disc height. No posterior disc bulge or spinal canal narrowing. No neural foraminal narrowing.  L2-L3: Normal disc signal  and disc height. No posterior disc bulge or spinal canal narrowing. No neural foraminal narrowing.   L3-L4: Normal disc signal and disc height. No posterior disc bulge or spinal canal narrowing. Mild bilateral facet arthropathy. No neural foraminal narrowing.  L4-L5: Mild loss of disc signal and disc height. Symmetric disc bulge and mild facet arthropathy with mild spinal canal narrowing. Mild bilateral neural foraminal narrowing.  L5-S1: Mild loss of disc signal and disc height. No posterior disc bulge or spinal canal narrowing. No neural foraminal narrowing.                                                                 IMPRESSION:  1.  Thin fatty filum terminale.  2.  Mild degenerative lumbar spondylosis with level by level analysis as described above without evidence of high-grade spinal canal or neural foraminal narrowing at any level.    5/11/2022 X-ray of lumbar spine  IMPRESSION: No fracture is identified. Multilevel mild degenerative disc disease. Moderate to severe lower lumbar spine facet arthropathy. Subtle grade 1 anterolisthesis of L4 on L5 worse in flexion compared to extension. Vascular calcifications. Presumed atelectasis at the lung bases    Social History:  Social History     Tobacco Use     Smoking status: Heavy Smoker     Packs/day: 0.50     Types: Cigarettes     Smokeless tobacco: Never   Substance Use Topics     Alcohol use: Yes     Comment: rare      Drug use: No      Social History     Social History Narrative    Lives with his wife ( '64), daughter and her boyfriend. [place] retiring in 2000. 4 adult children (B-G-G-B).    Enjoys fishing, fixing things and being outdoors. Pain can limit his ability to do things he likes, such as fishing, snowmobiling. Has started to read more since senior living, especially about [place]. Has a [place]AR who lives there.    Last updated 6/6/2023      Medications and Allergies  reviewed.    OBJECTIVE  Vitals:    07/11/23 1408   BP: 121/74   BP Location: Right arm   Cuff Size: Adult Regular   Pulse: 73   SpO2: 91%     Constitutional: Well developed, well nourished, appears stated age. No acute distress.  Gait is normal and steady  HEENT: Head atraumatic, normocephalic. Eyes without conjunctival injection or jaundice. Oropharynx clear.  Skin: No rash, lesions, or petechiae of exposed skin.   Extremities: Peripheral pulses intact. No clubbing, cyanosis, or edema.  Psychiatric/mental status: Alert, without lethargy or stupor. Speech fluent. Appropriate affect. Mood normal. Able to follow commands without difficulty.     Musculoskeletal exam:  Normal bulk and tone. Unremarkable spinal curvature.   Mildly restricted ROM in lumbar flexion, extension. No pain with rotation while in extension  Nontender to palpation of the midline lumbar spine, but significant spasms noted over lumbar paraspinals bilaterally  No LE clonus noted.  Cervical spine:  ROM:mildly restricted  Myofascial tenderness: none  Normal 5/5 UE strength bilaterally   Normal sensation to light touch in the C4-T1 distributions bilaterally   No allodynia, dysesthesia, or hyperalgesia in the upper extremities bilaterally         ASSESSMENT AND PLAN:  1. Pain syndrome, chronic  2. DDD (degenerative disc disease), lumbar  3. Chronic bilateral low back pain without sciatica  4. Lumbar facet arthropathy  Increase gabapentin to 600 mg three times a day. If symptoms do not improve in his hands, I have asked him to get a cervical MRI to further evaluate his symptoms.   - gabapentin (NEURONTIN) 300 MG capsule; Take 2 capsules (600 mg) by mouth 3 times daily  Dispense: 540 capsule; Refill: 3    5. DDD (degenerative disc disease), cervical  6. Cervical radiculopathy  Unclear if hand symptoms are related to cervical DDD  - MR Cervical Spine w/o Contrast; Future        Olga Chowdhury, CNP-BC, PMGT-BC, AP-PMN  [facility]  [facility], [place]

## 2023-07-13 ENCOUNTER — HOSPITAL ENCOUNTER (OUTPATIENT)
Dept: MRI IMAGING | Facility: CLINIC | Age: 83
Discharge: HOME | End: 2023-07-13
Attending: NURSE PRACTITIONER | Admitting: NURSE PRACTITIONER
Payer: COMMERCIAL

## 2023-07-13 DIAGNOSIS — M50.30 DDD (DEGENERATIVE DISC DISEASE), CERVICAL: ICD-10-CM

## 2023-07-13 DIAGNOSIS — M54.12 CERVICAL RADICULOPATHY: ICD-10-CM

## 2023-07-13 PROCEDURE — 72141 MRI NECK SPINE W/O DYE: CPT

## 2023-07-18 ENCOUNTER — MYC MEDICAL ADVICE (OUTPATIENT)
Dept: PALLIATIVE MEDICINE | Facility: CLINIC | Age: 83
End: 2023-07-18

## 2023-07-18 NOTE — TELEPHONE ENCOUNTER
Will leave encounter open for patient response/chart review by nursing.     ----------------Mychart Below from pt----------------  What is EMG?    --------------Mychart below response to pt----------------  Glenn Landeros,     An EMG (Electromyography) is a way that they are able to test you how well your nerves are working and will be helpful to determine if what is going on in your hands is related to your nerves.     Dagmar RODRIGUEZ, RN Care Coordinator  [facility]  Pain Management

## 2023-07-24 ENCOUNTER — MYC MEDICAL ADVICE (OUTPATIENT)
Dept: PALLIATIVE MEDICINE | Facility: CLINIC | Age: 83
End: 2023-07-24

## 2023-07-24 DIAGNOSIS — R25.2 CRAMPING OF HANDS: Primary | ICD-10-CM

## 2023-07-25 NOTE — TELEPHONE ENCOUNTER
Routing for external order    Tigre RAO Pain Nurse (supporting Olga Chowdhury NP)12 hours ago (10:15 PM)       A  called and said I would have to go to the  for an EMG. I told her I didn't want to drive there.  She then said I could go to the [facility] at [address] in [place].  I told her that I wanted to go there as it is 3 miles from my home.  She said she would send you a message about the above.  Can you send a referral to the [facility] in [place]?     Incidentally, I increased the gabapentin in the morning and I have not needed to increase the afternoon dosage.   The added morning gabapentin has done wonders.   Thanks a bunch.  Tigre DILL RN Care Coordinator  [facility]      You  Tigre GilletteJu now (10:37 AM)     MARIZOL Landeros,     Yes, Olga is out this week but we will have her modify this first thing when she is back in the office and get it sent off.     Great to hear you are having such good success with the gabapentin! We will let he know as well.     Take care,  Rafaela DILL RN Care Coordinator  [facility] Pain [facility]    This MyChart message has not been read.

## 2023-08-07 ENCOUNTER — MYC MEDICAL ADVICE (OUTPATIENT)
Dept: PALLIATIVE MEDICINE | Facility: CLINIC | Age: 83
End: 2023-08-07

## 2023-08-30 DIAGNOSIS — M79.18 MYOFASCIAL PAIN: ICD-10-CM

## 2023-08-30 RX ORDER — METHOCARBAMOL 500 MG/1
500 TABLET, FILM COATED ORAL 3 TIMES DAILY PRN
Qty: 90 TABLET | Refills: 3 | Status: SHIPPED | OUTPATIENT
Start: 2023-08-30 | End: 2024-02-07

## 2023-08-30 NOTE — TELEPHONE ENCOUNTER
Received fax from pharmacy requesting refill(s) for     methocarbamol (ROBAXIN) 500 MG tablet     Last refilled on 8/2/2023.    Pt last seen on 7/11/2023.  Next appt scheduled for NONE.    E-prescribe to:    [facility] # [id number] - [place], [place] - [address]     Will facilitate refill.

## 2023-08-31 ENCOUNTER — TELEPHONE (OUTPATIENT)
Dept: PALLIATIVE MEDICINE | Facility: CLINIC | Age: 83
End: 2023-08-31
Payer: COMMERCIAL

## 2023-08-31 NOTE — TELEPHONE ENCOUNTER
Prior Authorization Retail Medication Request    Medication/Dose: methocarbamol (ROBAXIN) 500 MG tablet  ICD code (if different than what is on RX):    Previously Tried and Failed:    Rationale:      Insurance Name:  [employer]  Insurance ID:  [id number]       Pharmacy Information (if different than what is on RX)    [facility] [id number] - [place] - [address]  [address] [place] [address]  Phone: [phone] Fax: [phone]    Will Route to [facility] Team.      Susie Osorio MA  [facility]

## 2023-09-05 NOTE — TELEPHONE ENCOUNTER
PRIOR AUTHORIZATION DENIED    Medication: METHOCARBAMOL 500 MG PO TABS  Insurance Company: [employer] Non-Specialty PA's - Phone [phone] Fax [phone]  Denial Date: 9/5/2023  Denial Rational: Patient must try/fail tizanidine    Appeal Information:     Patient Notified: No

## 2023-09-05 NOTE — TELEPHONE ENCOUNTER
Covering for provider out of office.  There is a note indicating prior authorization required for methocarbamol and to try tizanidine.  I called the patient.  Did  and started methocarbamol but was not sure what the issue was about.    Also notes that his provider wanted to the EMG and there is a call from the [facility].  He declined to go there so there was going to be an order placed and in neurology clinic near him.  He has not heard about that.  I will forward that to the providers office

## 2023-09-05 NOTE — TELEPHONE ENCOUNTER
PA Initiation    Medication: METHOCARBAMOL 500 MG PO TABS  Insurance Company: [employer] Non-Specialty PA's - Phone [phone] Fax [phone]  Pharmacy Filling the Rx: [facility] # [id number] - [place], [place] - [address]  Filling Pharmacy Phone: [phone]  Filling Pharmacy Fax: [phone]  Start Date: 9/5/2023

## 2023-09-06 NOTE — TELEPHONE ENCOUNTER
Can we follow-up on the EMG order? Pt states [facility] has not received it    Rafaela DILL RN Care Coordinator  [facility] Pain [facility]

## 2023-11-01 ENCOUNTER — OFFICE VISIT (OUTPATIENT)
Dept: PALLIATIVE MEDICINE | Facility: CLINIC | Age: 83
End: 2023-11-01
Payer: COMMERCIAL

## 2023-11-01 VITALS — HEART RATE: 62 BPM | SYSTOLIC BLOOD PRESSURE: 181 MMHG | OXYGEN SATURATION: 97 % | DIASTOLIC BLOOD PRESSURE: 78 MMHG

## 2023-11-01 DIAGNOSIS — M54.50 CHRONIC BILATERAL LOW BACK PAIN WITHOUT SCIATICA: ICD-10-CM

## 2023-11-01 DIAGNOSIS — G89.4 PAIN SYNDROME, CHRONIC: Primary | ICD-10-CM

## 2023-11-01 DIAGNOSIS — M51.369 DDD (DEGENERATIVE DISC DISEASE), LUMBAR: ICD-10-CM

## 2023-11-01 DIAGNOSIS — G89.29 CHRONIC BILATERAL LOW BACK PAIN WITHOUT SCIATICA: ICD-10-CM

## 2023-11-01 DIAGNOSIS — M79.18 MYOFASCIAL PAIN: ICD-10-CM

## 2023-11-01 DIAGNOSIS — M54.12 CERVICAL RADICULOPATHY: ICD-10-CM

## 2023-11-01 PROCEDURE — 99214 OFFICE O/P EST MOD 30 MIN: CPT | Mod: 25 | Performed by: NURSE PRACTITIONER

## 2023-11-01 PROCEDURE — 20552 NJX 1/MLT TRIGGER POINT 1/2: CPT | Performed by: NURSE PRACTITIONER

## 2023-11-01 RX ORDER — TRIAMCINOLONE ACETONIDE 40 MG/ML
40 INJECTION, SUSPENSION INTRA-ARTICULAR; INTRAMUSCULAR ONCE
Status: COMPLETED | OUTPATIENT
Start: 2023-11-01 | End: 2023-11-01

## 2023-11-01 RX ORDER — BUPIVACAINE HYDROCHLORIDE 5 MG/ML
9 INJECTION, SOLUTION PERINEURAL ONCE
Status: COMPLETED | OUTPATIENT
Start: 2023-11-01 | End: 2023-11-01

## 2023-11-01 RX ADMIN — BUPIVACAINE HYDROCHLORIDE 45 MG: 5 INJECTION, SOLUTION PERINEURAL at 10:01

## 2023-11-01 RX ADMIN — TRIAMCINOLONE ACETONIDE 40 MG: 40 INJECTION, SUSPENSION INTRA-ARTICULAR; INTRAMUSCULAR at 10:02

## 2023-11-01 ASSESSMENT — PAIN SCALES - GENERAL: PAINLEVEL: MILD PAIN (3)

## 2023-11-01 NOTE — PATIENT INSTRUCTIONS
[facility]  Post Procedure Instructions    Today you had:  trigger point injections   Medications used: bupivacaine & kenalog     Go to the emergency room if you develop any shortness of breath  Monitor the injection sites for signs and symptoms of infection-fever, chills, redness, swelling, warmth, or drainage to areas.  You may have soreness at injection sites for up to 24 hours.  It may take up to 14 days for the steroid medication to start working although you may feel the effect as early as a few days after the procedure.     You may apply ice to the painful areas to help minimize the discomfort of the needle pokes.  Do not apply heat to sites for at least 12 hours.  You may use anti-inflammatory medications or Tylenol for pain control if necessary    Pain Clinic phone number during work hours (Monday through Friday 8 am-4:30 pm) at [phone] or the Provider Line after hours at [phone]  We can repeat trigger point injections in 3-4 weeks with just numbing medication if you are getting better but not back to baseline.

## 2023-11-01 NOTE — PROGRESS NOTES
"[facility] Pain Management     Date of Visit: Nov 1, 2023  Last visit: 7/11/2023  Original Consult: 8/6/2019    Tigre Gillette is an 83 year old male with PMH significant for COPD, and chronic lower back pain who is seen today for ongoing management of chronic pain.     History:  Low back pain- etiology likely mild degenerative changes/ bilateral L4-5 and L5-S1 facet arthropathy with overlying myofascial component. , Has had three RFA procedures;  first RFA was helpful 6-12 months, second RFA not as helpful as first time and no appreciated benefit from(third) RFA in April of 2021. Has had imporvement in symptoms with intermittent TPIs, gabapentin, robaxin and diclofenac.  Mental Health - the patient's mental health concerns affect his experience of pain and contribute to his clinically significant distress.    Recommendations from last visit:  Increase gabapentin to 600 mg three times a day. If symptoms do not improve in his hands, I have asked him to get a cervical MRI to further evaluate his symptoms. Unclear if hand symptoms are related to cervical DDD, MR Cervical Spine w/o Contrast ordered. Subsequent UE EMG ordered.   _______________________________________________________   Since last seen, Tigre reports:  - Had episode of severe pain that started 5 days ago. Sent [employer] message requesting appointment ASAP. Initially, when pain started he thought he would need an ambulance to go in as he did not have a . Ultimately was able to have improvement with rest, ice and sleeping; did not go in for evaluation. No bowel or bladder changes. No radicular symptoms. Worries about \"a new pinched nerve.\" Pain is in the same areas he has had pain historically, intensity was just much higher than he has recalled in the past.   - Has been sleeping somewhat more since COPD exacerbation in late September. Staying in bed for longer than 7-8 hours can make him feel more stiff, but he thinks that this has not been a " "factor in the past week. Lower back pain has been worse with standing and bending. Bending forward is \"murder.\"  The longer he stands, the more his back hurts; sitting and resting improves his pain, especially if he has lumbar support (often does this with his arm behind his back.)  Roll on lidocaine can help at times.  -Sleep is okay. Pain is okay in the morning, but builds as the day goes on. Has tried THC/CBD gummy before bed at night and this has helped him sleep and reduced the \"nerve jerks\" he was getting at night. Sleeps well with a sleep number bed.  - When he last saw me, he was reporting occasional cramping in his hand, where they \"turn into claws.\" and has pain in his forearms. This can last for a few minutes, but tends to get better when he \"gets his back in a good position.\" No longer having this much, last time was 1 week ago. Has EMG scheduled for 11/17/23  at [facility].     Pain description:  Location: lower back (constant), hands (intermittent)  Quality: dull aching to sharp  Duration: fluctuates, but only notices no pain on a very rare morning when he gets up.   Severity/Intensity: 3/10 today, but \"9-10\" in the past week  Aggravating factors include: standing, not having regular bowel movements, yard work/snow removal  Relieving factors include: pacing activities, using \"yak tracks\" for stability on his boots in winter, moving slowly, sleep number bed     Current pain medications:  Diclofenac 50mg BID  Gabapentin [phone]  Lidocaine cream/roll on prn  Robaxin 500-750 mg TID PRN   mg tablet on average 1 a week.  CBD/Delta 9 at bedtime helps with sleep and pain     Review of [place] Prescription Monitoring Program ():   No concern for abuse or misuse of controlled medications based on this report. Last viewed on 7/11/2023.    PAIN MANAGEMENT TREATMENT HISTORY  1. MEDICATIONS:  Opiates: Norco (given post-op, no chronic use)  NSAIDS: ibuprofen, naproxen, " Diclofenac, - all are somewhat helpful   Muscle Relaxants: Robaxin - somewhat helpful  Anti-depressants: -not tried  Neuropathics: Gabapentin -helpful for restless leg syndrome and pain   Topicals: lidocaine -helpful  Adjuvant pain medications: Tylenol - somewhat helpful, then -not helpful stopped taking  2. PHYSICAL THERAPY:   [facility] 2016 - somewhat helpful Continues to do HEP intermittently.   3. PAIN PSYCHOLOGY:   -not tried   4. SURGERY:   no pain related surgeries  5. INJECTIONS:   Trigger point injection with Olga Chowdhury 2023   caudal LUANNE on 6/15/2023 with Dr. Frias. Had some relief initially, then  less relief  trigger point injections with Olga Chowdhury 3/30/2023- helped some   trigger point injections with Olga Chowdhury 2023 -helpful  trigger point injections with Mesha Henry on 2022- very helpful  trigger point injections with Mesha Henry on 2022- 60-75% benefit for almost 3 months   repeat bilateral L3,4,5 RFA with Dr. Frias on 2022 -not helpful  trigger point injections with Mesha Henry on 2022 - somewhat helpful  repeat bilateral L3,4,5 RFA with Dr. Frias on 2021 - 60-70% benefit for 6 months  trigger point injections with Mesha Henry on 2021 -helpful, 3 months  bilateral L3,4,5 RFA with Dr. Frias on 10/23/2020- % benefit for 7 months  bilateral L4-5 facet joint injections with Dr. Moon on 2020 80-90% for almost 3 months, better than last  bilateral L4-5 facet joint injections with Dr. Moon on 19 -helpful 70% 3 months   6. COMPLEMENTARY THERAPY:  Chiropractic: intermittent for many years with benefit, stopped because insurance not longer covered.  Acupuncture: -not tried  TENS Unit: -not tried    Imagin2022 MRI of lumbar spine   FINDINGS:   Nomenclature is based on 5 lumbar type vertebral bodies. Anterolisthesis of L4 and L5 measuring 3 mm. The vertebral bodies of the lumbar spine otherwise have normal  "stature, alignment, and marrow signal intensity. Normal distal spinal cord and cauda equina with conus medullaris at the superior plate of L2. Thin fatty filum terminale. T2 hyperintense lesion within the right kidney which consistent most commonly represents a benign cyst. Unremarkable visualized bony pelvis.  T12-L1, L1-L2, L2-L3: Normal disc signal and disc height. No posterior disc bulge or spinal canal narrowing. No neural foraminal narrowing.   L3-L4: Normal disc signal and disc height. No posterior disc bulge or spinal canal narrowing. Mild bilateral facet arthropathy. No neural foraminal narrowing.  L4-L5: Mild loss of disc signal and disc height. Symmetric disc bulge and mild facet arthropathy with mild spinal canal narrowing. Mild bilateral neural foraminal narrowing.  L5-S1: Mild loss of disc signal and disc height. No posterior disc bulge or spinal canal narrowing. No neural foraminal narrowing.                                                                 IMPRESSION:  1.  Thin fatty filum terminale.  2.  Mild degenerative lumbar spondylosis with level by level analysis as described above without evidence of high-grade spinal canal or neural foraminal narrowing at any level.    5/11/2022 X-ray of lumbar spine  IMPRESSION: No fracture is identified. Multilevel mild degenerative disc disease. Moderate to severe lower lumbar spine facet arthropathy. Subtle grade 1 anterolisthesis of L4 on L5 worse in flexion compared to extension. Vascular calcifications. Presumed atelectasis at the lung bases    Social History:  Social History     Tobacco Use    Smoking status: Heavy Smoker     Packs/day: .5     Types: Cigarettes    Smokeless tobacco: Never   Substance Use Topics    Alcohol use: Yes     Comment: rare     Drug use: No      Social History     Social History Narrative    Lives with his wife ( '64), daughter and her boyfriend. [place]do [place] all\" kind of person, likes to fix and " maintain his home. 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    Enjoys fishing, fixing things and being outdoors. Pain can limit his ability to do things he likes, such as fishing, snowmobiling. Has started to read more since [employer], especially about [place]. Has a [place] who lives there.    Last updated 6/6/2023      Medications and Allergies reviewed.    OBJECTIVE  Vitals:    11/01/23 0905   BP: (!) 181/78   Pulse: 62   SpO2: 97%     Constitutional: Well developed, well nourished, appears stated age. No acute distress.  Gait is normal and steady  HEENT: Head atraumatic, normocephalic. Eyes without conjunctival injection or jaundice. Oropharynx clear.  Skin: No rash, lesions, or petechiae of exposed skin.   Extremities: Peripheral pulses intact. No clubbing, cyanosis, or edema.  Psychiatric/mental status: Alert, without lethargy or stupor. Speech fluent. Appropriate affect. Mood normal. Able to follow commands without difficulty.     Musculoskeletal exam:  Normal bulk and tone. Unremarkable spinal curvature.   Mildly restricted ROM in lumbar flexion, extension. No pain with rotation while in extension  Nontender to palpation of the midline lumbar spine, but significant spasms noted over lumbar paraspinals bilaterally  No LE clonus noted.  Cervical spine:  ROM:mildly restricted  Myofascial tenderness: none  Normal 5/5 UE strength bilaterally   Normal sensation to light touch in the C4-T1 distributions bilaterally   No allodynia, dysesthesia, or hyperalgesia in the upper extremities bilaterally   Lumbar/Thoracic spine:   ROM: mildly restricted  Rotation/ext to right and left: no change in pain. Forward flexion alleviates pain some  Myofascial tenderness:lumbar facet joints, left para lumbar muscles, right para lumbar  muscles  Focal tenderness: No SI joint, gluteal, piriformis, or GT tenderness  Normal 5/5 LE strength bilaterally   Normal sensation to light touch in the lower extremities bilaterally   Reflexes: Lower extremity reflexes within normal limits bilaterally  No ankle clonus bilaterally  Straight leg raise: negative bilaterally    ASSESSMENT AND PLAN:  Pain syndrome, chronic  Myofascial pain  Repeat TPI done today  - INJECTION SNGL/MULT TRIGGER POINT, >3 MUSCLES    DDD (degenerative disc disease), lumbar  Underlying DDD with myofascial pain secondary. Reassured Tigre that I do not think that there is a new cause, rather aggravation of underlying arthritis.   - INJECTION SNGL/MULT TRIGGER POINT, >3 MUSCLES    Chronic bilateral low back pain without sciatica  Continune gabapentin, robaxin and diclofenac as needed    Cervical radiculopathy  EMG pending.      Olga Chowdhury, CNP-BC, PMGT-BC, AP-PMN  [facility], [facility] - Procedure Note    Date of Visit: 11/1/2023    Pre procedure Diagnosis: myofascial pain/myositis  Post procedure Diagnosis: Same  Procedure performed: trigger point injections  Complications: none  Operators: Olga Chowdhury, JOYECLYN    BP (!) 181/78   Pulse 62   SpO2 97%     Indications:   Tigre Gillette is a 83 year old male with a history of lumbar DDD and muscle spasms.  Exam shows myofascial pain of the muscle groups listed below and they have tried conservative treatment including PT and medications.    Options/alternatives, benefits and risks were discussed with the patient including bleeding, infection, tissue trauma and pnuemothorax.  Questions were answered to his satisfaction and he agrees to proceed. Voluntary informed consent was obtained and signed.     Vitals allergies and medications were reviewed.    This is a repeat injection.   Previous TPI on 3/2023 was helpful for several months.     Procedure:  After  obtaining informed consent, a Pause for the Cause was performed.    Trigger points were identified by patient, and marked when appropriate.  The area was prepped with Chloroprep.    Using clean technique, injections were completed using a 25G, 1.5 inch needle.  After negative aspiration, injection was completed.  A total of 4 locations were injected with 2.5 ml in each injection.  When possible, tissue was retracted from the chest wall to avoid lung injury.    Muscle groups injected:  Bilateral erector spinae and serratus    Injection solution contained:  9 ml of 0.5% bupivacaine   40mg kenalog.    Hemostasis was achieved, the area was cleaned, and bandaids were placed when appropriate.  The patient tolerated the procedure well.  Breathing was non-labored and easy throughout the visit    Follow-up includes:   -f/u with me prn  -repeat as needed    Olga Chowdhury, CNP-BC, PMGT-BC, AP-PMN  [facility]

## 2023-11-17 ENCOUNTER — TRANSFERRED RECORDS (OUTPATIENT)
Dept: HEALTH INFORMATION MANAGEMENT | Facility: CLINIC | Age: 83
End: 2023-11-17
Payer: COMMERCIAL

## 2023-11-27 ENCOUNTER — OFFICE VISIT (OUTPATIENT)
Dept: PALLIATIVE MEDICINE | Facility: CLINIC | Age: 83
End: 2023-11-27
Payer: COMMERCIAL

## 2023-11-27 VITALS — HEART RATE: 70 BPM | DIASTOLIC BLOOD PRESSURE: 77 MMHG | SYSTOLIC BLOOD PRESSURE: 149 MMHG

## 2023-11-27 DIAGNOSIS — G56.03 BILATERAL CARPAL TUNNEL SYNDROME: ICD-10-CM

## 2023-11-27 DIAGNOSIS — M79.18 MYOFASCIAL PAIN: Primary | ICD-10-CM

## 2023-11-27 PROCEDURE — 20553 NJX 1/MLT TRIGGER POINTS 3/>: CPT | Performed by: NURSE PRACTITIONER

## 2023-11-27 RX ORDER — BUPIVACAINE HYDROCHLORIDE 5 MG/ML
10 INJECTION, SOLUTION PERINEURAL ONCE
Status: COMPLETED | OUTPATIENT
Start: 2023-11-27 | End: 2023-11-27

## 2023-11-27 RX ADMIN — BUPIVACAINE HYDROCHLORIDE 50 MG: 5 INJECTION, SOLUTION PERINEURAL at 12:15

## 2023-11-27 ASSESSMENT — PAIN SCALES - GENERAL: PAINLEVEL: MODERATE PAIN (4)

## 2023-11-27 NOTE — PROGRESS NOTES
[facility] - Procedure Note    Date of Visit: 11/27/2023    Pre procedure Diagnosis: myofascial pain/myositis  Post procedure Diagnosis: Same  Procedure performed: trigger point injections  Complications: none  Operators: Olga Chowdhury NP    BP (!) 149/77 (BP Location: Right arm, Cuff Size: Adult Regular)   Pulse 70     Indications:   Tigre Gillette is a 83 year old male with a history of chronic back pain.  Exam shows myofascial pain of the muscle groups listed below and they have tried conservative treatment including PT and medications.     Options/alternatives, benefits and risks were discussed with the patient including bleeding, infection, tissue trauma and pnuemothorax.  Questions were answered to his satisfaction and he agrees to proceed. Voluntary informed consent was obtained and signed.     Vitals allergies and medications were reviewed.    Previous TPI on 11/1/23 was helpful, thinks that these have been the most beneficial for him of the interventions he has done.    Procedure:  After obtaining informed consent, a Pause for the Cause was performed.    Trigger points were identified by patient, and marked when appropriate.  The area was prepped with Chloroprep.    Using clean technique, injections were completed using a 25G, 1.5 inch needle.  After negative aspiration, injection was completed.  A total of 4 locations were injected.  When possible, tissue was retracted from the chest wall to avoid lung injury.    Muscle groups injected:  Bilateral erector spinae and serratus     Injection solution contained:  9 ml of 0.5% bupivacaine     Hemostasis was achieved, the area was cleaned, and bandaids were placed when appropriate.  The patient tolerated the procedure well.  Breathing was non-labored and easy throughout the visit    Follow-up includes:   -f/u with the referring provider  -repeat as needed    Olga Chowdhury, CNP-BC, PMGT-BC, AP-PMN  [facility], [place]

## 2023-11-27 NOTE — PATIENT INSTRUCTIONS
[facility]  Post Procedure Instructions    Today you had:  trigger point injections       Monitor the injection sites for signs and symptoms of infection-fever, chills, redness, swelling, warmth, or drainage to areas.  You may have soreness at injection sites for up to 24 hours.  It may take up to 14 days for the steroid medication to start working although you may feel the effect as early as a few days after the procedure.     You may apply ice to the painful areas to help minimize the discomfort of the needle pokes.  Do not apply heat to sites for at least 12 hours.  You may use anti-inflammatory medications or Tylenol for pain control if necessary    Pain Clinic phone number during work hours (Monday through Friday 8 am-4:30 pm) at [phone] or the Provider Line after hours at [phone]    - Recommend orthopedic consult for bilateral carpal tunnel syndrome.

## 2024-02-05 ENCOUNTER — MYC MEDICAL ADVICE (OUTPATIENT)
Dept: PALLIATIVE MEDICINE | Facility: CLINIC | Age: 84
End: 2024-02-05
Payer: COMMERCIAL

## 2024-02-05 NOTE — TELEPHONE ENCOUNTER
Ok for TPI at 2/7/24 appt?    Rafaela DILL RN Care Coordinator  [facility] [place] [facility]        Tigre RAO Pain Nurse (supporting Olga Chowdhury NP)3 minutes ago (10:44 AM)     JR  I have an appointment for Wed. Feb. 7.    I believe it is time for trigger point injections.   Tigre Gillette

## 2024-02-05 NOTE — TELEPHONE ENCOUNTER
Yes, it is okay for TPI. I will also do a OV at the same time, just to discuss if he would like to continue as long term plan.    Olga Chowdhury, NP

## 2024-02-07 ENCOUNTER — OFFICE VISIT (OUTPATIENT)
Dept: PALLIATIVE MEDICINE | Facility: CLINIC | Age: 84
End: 2024-02-07
Payer: COMMERCIAL

## 2024-02-07 VITALS — DIASTOLIC BLOOD PRESSURE: 72 MMHG | SYSTOLIC BLOOD PRESSURE: 168 MMHG | HEART RATE: 68 BPM

## 2024-02-07 DIAGNOSIS — M79.18 MYOFASCIAL PAIN: Primary | ICD-10-CM

## 2024-02-07 PROCEDURE — 20552 NJX 1/MLT TRIGGER POINT 1/2: CPT | Performed by: NURSE PRACTITIONER

## 2024-02-07 RX ORDER — BUPIVACAINE HYDROCHLORIDE 5 MG/ML
9 INJECTION, SOLUTION PERINEURAL ONCE
Status: COMPLETED | OUTPATIENT
Start: 2024-02-07 | End: 2024-02-07

## 2024-02-07 RX ORDER — TRIAMCINOLONE ACETONIDE 40 MG/ML
40 INJECTION, SUSPENSION INTRA-ARTICULAR; INTRAMUSCULAR ONCE
Status: COMPLETED | OUTPATIENT
Start: 2024-02-07 | End: 2024-02-07

## 2024-02-07 RX ORDER — METHOCARBAMOL 500 MG/1
500 TABLET, FILM COATED ORAL 3 TIMES DAILY PRN
Qty: 90 TABLET | Refills: 3 | Status: SHIPPED | OUTPATIENT
Start: 2024-02-07 | End: 2024-04-29

## 2024-02-07 RX ADMIN — BUPIVACAINE HYDROCHLORIDE 45 MG: 5 INJECTION, SOLUTION PERINEURAL at 14:40

## 2024-02-07 RX ADMIN — TRIAMCINOLONE ACETONIDE 40 MG: 40 INJECTION, SUSPENSION INTRA-ARTICULAR; INTRAMUSCULAR at 14:40

## 2024-02-07 ASSESSMENT — PAIN SCALES - PAIN ENJOYMENT GENERAL ACTIVITY SCALE (PEG)
INTERFERED_GENERAL_ACTIVITY: 5
INTERFERED_GENERAL_ACTIVITY: 5
INTERFERED_ENJOYMENT_LIFE: 6
INTERFERED_ENJOYMENT_LIFE: 6
PEG_TOTALSCORE: 6
AVG_PAIN_PASTWEEK: 7
PEG_TOTALSCORE: 6
AVG_PAIN_PASTWEEK: 7

## 2024-02-07 ASSESSMENT — PAIN SCALES - GENERAL
PAINLEVEL: MODERATE PAIN (5)
PAINLEVEL: MODERATE PAIN (5)

## 2024-02-07 NOTE — PATIENT INSTRUCTIONS
[facility]  Post Procedure Instructions    Today you had:  trigger point injections     Medications used: bupivacaine &  kenalog       Go to the emergency room if you develop any shortness of breath  Monitor the injection sites for signs and symptoms of infection-fever, chills, redness, swelling, warmth, or drainage to areas.  You may have soreness at injection sites for up to 24 hours.  It may take up to 14 days for the steroid medication to start working although you may feel the effect as early as a few days after the procedure.     You may apply ice to the painful areas to help minimize the discomfort of the needle pokes.  Do not apply heat to sites for at least 12 hours.  You may use anti-inflammatory medications or Tylenol for pain control if necessary    Pain Clinic phone number during work hours (Monday through Friday 8 am-4:30 pm) at [phone] or the Provider Line after hours at [phone]:

## 2024-02-07 NOTE — PROGRESS NOTES
[facility] - Procedure Note    Date of Visit: 2/7/2024    Pre procedure Diagnosis: myofascial pain/myositis  Post procedure Diagnosis: Same  Procedure performed: trigger point injections  Complications: none  Operators: Olga Chowdhury NP    BP (!) 168/72 (BP Location: Right arm)   Pulse 68     Indications:   Tigre Gillette is a 83 year old male with a history of chronic back pain.  Exam shows myofascial pain of the muscle groups listed below and they have tried conservative treatment including PT and medications.     Options/alternatives, benefits and risks were discussed with the patient including bleeding, infection, tissue trauma and pnuemothorax.  Questions were answered to his satisfaction and he agrees to proceed. Voluntary informed consent was obtained and signed.     Vitals allergies and medications were reviewed.    Previous TPI on 11/27/2023 was helpful, thinks that these have been the most beneficial for him of the interventions he has done.    Procedure:  After obtaining informed consent, a Pause for the Cause was performed.    Trigger points were identified by patient, and marked when appropriate.  The area was prepped with Chloroprep.    Using clean technique, injections were completed using a 25G, 1.5 inch needle.  After negative aspiration, injection was completed.  A total of 4 locations were injected.  When possible, tissue was retracted from the chest wall to avoid lung injury.    Muscle groups injected:  Bilateral lumbar paraspinals, 2 on the left, 2 on the right     Injection solution contained:  9 ml of 0.5% bupivacaine   40 mg of kenalog    Hemostasis was achieved, the area was cleaned, and bandaids were placed when appropriate.  The patient tolerated the procedure well.  Breathing was non-labored and easy throughout the visit    Follow-up includes:   -f/u with me if no longer effective  -repeat as needed every 12 weeks if continued benefit and pain  reduction.    Olga Chowdhury, CNP-BC, PMGT-BC, AP-PMN  [facility]

## 2024-02-12 ENCOUNTER — TELEPHONE (OUTPATIENT)
Dept: PALLIATIVE MEDICINE | Facility: CLINIC | Age: 84
End: 2024-02-12
Payer: COMMERCIAL

## 2024-02-12 DIAGNOSIS — M79.18 MYOFASCIAL PAIN: ICD-10-CM

## 2024-02-12 NOTE — TELEPHONE ENCOUNTER
Spoke with pt, advised that he will need to schedule with PCP for BP check and labs prior to any further refills on Voltaren    Pt aware and will schedule annual with PCP    Rafaela DILL RN Care Coordinator  [facility]

## 2024-02-12 NOTE — PATIENT INSTRUCTIONS
Ok to do.     [facility] Procedure Discharge Instructions       Today you saw:   Dr. Patricia Frias       Your procedure:  Radiofrequency Nerve Ablation     Procedural Medications:  Lidocaine (anesthetic)        Sedation medications:  Fentanyl - pain.           You have received sedation during your procedure; for the next 24 hours you should not:   -Drive   -Operate machinery   -Drink alcohol   -Sign any legal documents     You may resume your normal diet and medications.   Avoid strenuous activity for the first 24 hours and resume regular activities after that.   Be cautious with walking as numbness and/or weakness in the lower extremities up to 6-8 hours may occur due to effect of local anesthetic   You may shower, however no swimming or tub baths or hot tubs for 24 hours following your procedure   Anticipate pain for up to 2 weeks after this procedure.  You may use ice packs 10-15 minutes three to four times a day at the injection site for comfort   You may use anti-inflammatory medications (such as Ibuprofen or Aleve or Advil) or Tylenol for pain control if necessary         It may take up to 8 weeks to receive relief from the RFA    If you experience any of the following, call the pain center line during work hours at [phone] or on call physician after hours at [phone]:  -Fever over 100 degree F   -Swelling, bleeding, redness, drainage, warmth at the injection site   -Progressive weakness or numbness in your legs or arms   -Loss of bowel or bladder function   -Unusual headache that is not relieved by Tylenol   -Unusual new onset of pain that is not improving

## 2024-02-12 NOTE — TELEPHONE ENCOUNTER
Please notify Tigre that he needs to be seen by his PCP for follow-up on his blood pressure and routine lab work before I can send in any more refills after this one month refill.    Thank you -   Olga Chowdhury NP

## 2024-02-12 NOTE — TELEPHONE ENCOUNTER
[facility] Call Center    Phone Message    May a detailed message be left on voicemail: yes     Reason for Call: Other: Patient called back to discuss. Please call back when available.      Action Taken: Message routed to:  Other: Pain     Travel Screening: Not Applicable

## 2024-02-12 NOTE — TELEPHONE ENCOUNTER
Received fax request from [facility] requesting refill(s) for diclofenac (VOLTAREN) 50 MG EC tablet     Last refilled on 111/09/23    Pt last seen on 02/27/24  Next appt scheduled for : none    Will facilitate refill.

## 2024-02-14 ENCOUNTER — MYC MEDICAL ADVICE (OUTPATIENT)
Dept: PALLIATIVE MEDICINE | Facility: CLINIC | Age: 84
End: 2024-02-14
Payer: COMMERCIAL

## 2024-02-14 DIAGNOSIS — M79.18 MYOFASCIAL PAIN: ICD-10-CM

## 2024-02-15 NOTE — TELEPHONE ENCOUNTER
"FYI to provider.     ----------------Mychart Below from pt----------------  I saw my primary today.  Lab results are on my [facility] \"My Chart\" .      --------------Mychart below response to pt----------------  Glenn Landeros,     Thanks for the update. Olga is out of the office today but we will make sure she is aware when she returns on Monday. Thanks!    Dagmar RODRIGUEZ, RN Care Coordinator  [facility]  Pain Management      "

## 2024-04-16 ENCOUNTER — TELEPHONE (OUTPATIENT)
Dept: PALLIATIVE MEDICINE | Facility: CLINIC | Age: 84
End: 2024-04-16

## 2024-04-16 ENCOUNTER — OFFICE VISIT (OUTPATIENT)
Dept: PALLIATIVE MEDICINE | Facility: CLINIC | Age: 84
End: 2024-04-16
Payer: COMMERCIAL

## 2024-04-16 VITALS — SYSTOLIC BLOOD PRESSURE: 174 MMHG | DIASTOLIC BLOOD PRESSURE: 87 MMHG | HEART RATE: 50 BPM | OXYGEN SATURATION: 100 %

## 2024-04-16 DIAGNOSIS — M79.18 MYOFASCIAL PAIN: Primary | ICD-10-CM

## 2024-04-16 PROCEDURE — 99207 PR NO CHARGE LOS: CPT | Performed by: NURSE PRACTITIONER

## 2024-04-16 ASSESSMENT — PAIN SCALES - PAIN ENJOYMENT GENERAL ACTIVITY SCALE (PEG)
PEG_TOTALSCORE: 8
INTERFERED_ENJOYMENT_LIFE: 9
INTERFERED_GENERAL_ACTIVITY: 9
INTERFERED_ENJOYMENT_LIFE: 9
PEG_TOTALSCORE: 8
INTERFERED_GENERAL_ACTIVITY: 9
AVG_PAIN_PASTWEEK: 6
AVG_PAIN_PASTWEEK: 6

## 2024-04-16 ASSESSMENT — PAIN SCALES - GENERAL: PAINLEVEL: SEVERE PAIN (6)

## 2024-04-16 NOTE — TELEPHONE ENCOUNTER
Informed by Lizzie that they will not have answers to this for a while. Could you please call Tigre and let him know that we will contact him as soon as we have better clarity? Thank you    Olga Chowdhury NP

## 2024-04-16 NOTE — PATIENT INSTRUCTIONS
----------------------------------------------------------------  [facility] Number:  [phone]   Call with any questions about your care and for scheduling assistance.   Calls are returned Monday through Friday between 8 AM and 4:30 PM. We usually get back to you within 2 business days depending on the issue/request.    If we are prescribing your medications:  For opioid medication refills, call the clinic or send a [facility]WD message 7 days in advance.  Please include:  Name of requested medication  Name of the pharmacy.  For non-opioid medications, call your pharmacy directly to request a refill. Please allow 3-4 days to be processed.   Per [place] State Law:  All controlled substance prescriptions must be filled within 30 days of being written.    For those controlled substances allowing refills, pickup must occur within 30 days of last fill.      We believe regular attendance is key to your success in our program!    Any time you are unable to keep your appointment we ask that you call us at least 24 hours in advance to cancel.This will allow us to offer the appointment time to another patient.   Multiple missed appointments may lead to dismissal from the clinic.

## 2024-04-16 NOTE — PROGRESS NOTES
"Presented today for trigger point injections with steroid. Discussed that we have just been informed that there have been insurance changes with Medicare and that they are no longer covering trigger point injections with steroids. Will have him reschedule for later in the week and ask our schedulers for answers to the following questions\"    When does his rolling 12 month period for trigger point injections start? Last had in 11/23, 2/24; would this be his last until November 2024.  Is he able to pay out of pocket for the steroid part of it if this is what he wants?  What is the out of pocket cost for TPI if insurance does not cover?   Or if he wants to repeat more often?    Will reschedule pending response.   "

## 2024-04-16 NOTE — TELEPHONE ENCOUNTER
Called pt and advised that at this time we just don't have all the answers due to this change.     Advised pt that we will contact back when we have more answers    Dagmar RODRIGUEZ, RN Care Coordinator  [facility]  Pain [facility]

## 2024-04-16 NOTE — TELEPHONE ENCOUNTER
Presented today for trigger point injections with steroid. Discussed that we have just been informed that there have been insurance changes with Medicare and that they are no longer covering trigger point injections with steroids. Will have him reschedule for later in the week and ask our schedulers for answers to the following questions. New order placed.     When does his rolling 12 month period for trigger point injections start? Last had in 11/23, 2/24; would this be his last until November 2024.  Is he able to pay out of pocket for the steroid part of it if this is what he wants?  What is the out of pocket cost for TPI if insurance does not cover?   Or if he wants to repeat more often?     Will reschedule pending response, currently holding tomorrow at 2 pm for new appointment if we can get answer.

## 2024-04-18 ENCOUNTER — TRANSFERRED RECORDS (OUTPATIENT)
Dept: HEALTH INFORMATION MANAGEMENT | Facility: CLINIC | Age: 84
End: 2024-04-18

## 2024-04-22 ENCOUNTER — OFFICE VISIT (OUTPATIENT)
Dept: PALLIATIVE MEDICINE | Facility: CLINIC | Age: 84
End: 2024-04-22
Payer: COMMERCIAL

## 2024-04-22 VITALS
SYSTOLIC BLOOD PRESSURE: 112 MMHG | OXYGEN SATURATION: 98 % | BODY MASS INDEX: 20.94 KG/M2 | WEIGHT: 163.1 LBS | DIASTOLIC BLOOD PRESSURE: 77 MMHG | HEART RATE: 69 BPM

## 2024-04-22 DIAGNOSIS — M79.18 MYOFASCIAL PAIN: Primary | ICD-10-CM

## 2024-04-22 PROCEDURE — 20552 NJX 1/MLT TRIGGER POINT 1/2: CPT | Performed by: NURSE PRACTITIONER

## 2024-04-22 RX ORDER — BUPIVACAINE HYDROCHLORIDE 5 MG/ML
10 INJECTION, SOLUTION EPIDURAL; INTRACAUDAL ONCE
Status: COMPLETED | OUTPATIENT
Start: 2024-04-22 | End: 2024-04-22

## 2024-04-22 RX ADMIN — BUPIVACAINE HYDROCHLORIDE 50 MG: 5 INJECTION, SOLUTION EPIDURAL; INTRACAUDAL at 11:23

## 2024-04-22 ASSESSMENT — PAIN SCALES - GENERAL: PAINLEVEL: SEVERE PAIN (7)

## 2024-04-22 NOTE — PATIENT INSTRUCTIONS
Last Trigger Point Injections:  2/7/2024 with steroid  11/27/23  without steroid  11/1/23  with steroid    Discussed changes to Medicare coverage of trigger point injections. They will only cover trigger point injections up to three times in a rolling 12 month period and only if they provide at least 50% pain relief for at least 6 weeks. Patient must report participation in a formal rehab program, home exercise program or functional restoration program in the past 6 months. Trigger point injections can no longer be performed on the same day as any other nerve blocks and may not contain steroid.     If you do not have significant pain relief, I would recommend scheduling lumbar facet steroid injections.      Post Procedure Instructions  Today you had:  trigger point injections  Medications used:  bupivacaine     Go to the emergency room if you develop any shortness of breath  Monitor the injection sites for signs and symptoms of infection-fever, chills, redness, swelling, warmth, or drainage to areas.  You may have soreness at injection sites for up to 24 hours.  It may take up to 14 days for the steroid medication to start working although you may feel the effect as early as a few days after the procedure.     You may apply ice to the painful areas to help minimize the discomfort of the needle pokes.  Do not apply heat to sites for at least 12 hours.  You may use anti-inflammatory medications or Tylenol for pain control if necessary    Pain Clinic phone number during work hours (Monday through Friday 8 am-4:30 pm) at [phone] or the Provider Line after hours at [phone]

## 2024-04-22 NOTE — PROGRESS NOTES
[facility] - Procedure Note  Date of Procedure: 4/22/2024    Pre procedure Diagnosis: myofascial pain/myositis  Post procedure Diagnosis: Same  Procedure performed: trigger point injections  Operators: Olga Chowdhury NP    Indications:   Tigre Gillette is a 84 year old male with a history of chronic lower back pain.  Exam shows presence of a hyperirritable area or taut band that is hyper sensitive or denser than normal causing referred myofascial pain of the muscle groups listed below.   He reports participation in a formal rehab program, home exercise program, or functional restoration program in the past 6 months? YES    Previous TPI on 2/7/2024 provided at least 50% pain relief for 10 weeks   Pain score was reduced from 5/10 prior to TPI to 2-3/10 after TPI with last injection     Options/alternatives, benefits and risks were discussed with the patient including bleeding, infection, tissue trauma, and pnuemothorax.  Questions were answered to his satisfaction and he agrees to proceed. Voluntary informed consent was obtained and signed.     /77   Pulse 69   Wt 74 kg (163 lb 1.6 oz)   SpO2 98%   BMI 20.94 kg/m    Medications and Allergies reviewed.    Procedure:  After obtaining informed consent, a Pause for the Cause was performed.    Trigger points were identified by patient, and marked when appropriate.The area was prepped with Chloroprep. Using clean technique, injections were completed using a 25G, 1.5 inch needle.  After negative aspiration, injection was completed.  A total of 6 locations were injected.  When possible, tissue was retracted from the chest wall to avoid lung injury.    Muscle groups evaluated and injected:chronic  bilateral lumbar paraspinals, 3 areas per side    Injection solution contained:  10 ml of 0.5% bupivacaine     Pre-injection pain score Severe Pain (7)  Immediate post injection pain score 4/10    Hemostasis was achieved, the area was  cleaned, and bandaids were placed when appropriate. The patient tolerated the procedure well. Breathing was non-labored and easy throughout the visit    Follow-up includes:   -f/u with the referring provider  -repeat as needed. Advised that criteria to repeat a TPI is at least 50% pain relief for at least 6 weeks measured on pain scale that can be compared to previous score used before the TPI was administered.   - Discussed that he could have lumbar facet joint injections again; he had previous relief with these, then several months of relief with RFA, then no relief with last RFA. He will call if needed or if TPI without steroid is not effective.      Olga Chowdhury, CNP-BC, PMGT-BC, AP-PMN  [facility]

## 2024-04-29 ENCOUNTER — HOSPITAL ENCOUNTER (OUTPATIENT)
Facility: CLINIC | Age: 84
Setting detail: OBSERVATION
Discharge: HOME | End: 2024-05-01
Attending: EMERGENCY MEDICINE | Admitting: INTERNAL MEDICINE
Payer: COMMERCIAL

## 2024-04-29 ENCOUNTER — TRANSFERRED RECORDS (OUTPATIENT)
Dept: HEALTH INFORMATION MANAGEMENT | Facility: CLINIC | Age: 84
End: 2024-04-29

## 2024-04-29 ENCOUNTER — APPOINTMENT (OUTPATIENT)
Dept: GENERAL RADIOLOGY | Facility: CLINIC | Age: 84
End: 2024-04-29
Attending: EMERGENCY MEDICINE
Payer: COMMERCIAL

## 2024-04-29 DIAGNOSIS — G89.29 CHRONIC BACK PAIN, UNSPECIFIED BACK LOCATION, UNSPECIFIED BACK PAIN LATERALITY: ICD-10-CM

## 2024-04-29 DIAGNOSIS — I48.91 ATRIAL FIBRILLATION, UNSPECIFIED TYPE (H): ICD-10-CM

## 2024-04-29 DIAGNOSIS — Z79.01 LONG TERM CURRENT USE OF ANTICOAGULANT THERAPY: ICD-10-CM

## 2024-04-29 DIAGNOSIS — R79.89 ELEVATED TROPONIN: ICD-10-CM

## 2024-04-29 DIAGNOSIS — J18.9 PNEUMONIA OF LEFT LUNG DUE TO INFECTIOUS ORGANISM, UNSPECIFIED PART OF LUNG: ICD-10-CM

## 2024-04-29 DIAGNOSIS — R79.89 ELEVATED BRAIN NATRIURETIC PEPTIDE (BNP) LEVEL: ICD-10-CM

## 2024-04-29 DIAGNOSIS — R52 PAIN: Primary | ICD-10-CM

## 2024-04-29 DIAGNOSIS — M54.9 CHRONIC BACK PAIN, UNSPECIFIED BACK LOCATION, UNSPECIFIED BACK PAIN LATERALITY: ICD-10-CM

## 2024-04-29 DIAGNOSIS — D72.829 LEUKOCYTOSIS, UNSPECIFIED TYPE: ICD-10-CM

## 2024-04-29 DIAGNOSIS — E86.0 DEHYDRATION: ICD-10-CM

## 2024-04-29 LAB
ALBUMIN UR-MCNC: 50 MG/DL
ANION GAP SERPL CALCULATED.3IONS-SCNC: 17 MMOL/L (ref 7–15)
APPEARANCE UR: CLEAR
ATRIAL RATE - MUSE: 77 BPM
ATRIAL RATE - MUSE: NORMAL BPM
BACTERIA #/AREA URNS HPF: ABNORMAL /HPF
BASOPHILS # BLD AUTO: 0.1 10E3/UL (ref 0–0.2)
BASOPHILS NFR BLD AUTO: 0 %
BILIRUB UR QL STRIP: NEGATIVE
BUN SERPL-MCNC: 21 MG/DL (ref 8–23)
CALCIUM SERPL-MCNC: 9.2 MG/DL (ref 8.8–10.2)
CHLORIDE SERPL-SCNC: 102 MMOL/L (ref 98–107)
COLOR UR AUTO: YELLOW
CREAT SERPL-MCNC: 1.06 MG/DL (ref 0.67–1.17)
CREAT SERPL-MCNC: 1.09 MG/DL (ref 0.67–1.17)
DEPRECATED HCO3 PLAS-SCNC: 18 MMOL/L (ref 22–29)
DIASTOLIC BLOOD PRESSURE - MUSE: NORMAL MMHG
DIASTOLIC BLOOD PRESSURE - MUSE: NORMAL MMHG
EGFRCR SERPLBLD CKD-EPI 2021: 68 ML/MIN/1.73M2
EGFRCR SERPLBLD CKD-EPI 2021: 70 ML/MIN/1.73M2
EOSINOPHIL # BLD AUTO: 0 10E3/UL (ref 0–0.7)
EOSINOPHIL NFR BLD AUTO: 0 %
ERYTHROCYTE [DISTWIDTH] IN BLOOD BY AUTOMATED COUNT: 14.1 % (ref 10–15)
FLUAV RNA SPEC QL NAA+PROBE: NEGATIVE
FLUBV RNA RESP QL NAA+PROBE: NEGATIVE
GLUCOSE SERPL-MCNC: 132 MG/DL (ref 70–99)
GLUCOSE UR STRIP-MCNC: NEGATIVE MG/DL
HCT VFR BLD AUTO: 41.8 % (ref 40–53)
HGB BLD-MCNC: 13.9 G/DL (ref 13.3–17.7)
HGB UR QL STRIP: NEGATIVE
HOLD SPECIMEN: NORMAL
IMM GRANULOCYTES # BLD: 0.1 10E3/UL
IMM GRANULOCYTES NFR BLD: 1 %
INTERPRETATION ECG - MUSE: NORMAL
INTERPRETATION ECG - MUSE: NORMAL
KETONES UR STRIP-MCNC: 10 MG/DL
LACTATE SERPL-SCNC: 1.9 MMOL/L (ref 0.7–2)
LACTATE SERPL-SCNC: 2.8 MMOL/L (ref 0.7–2)
LEUKOCYTE ESTERASE UR QL STRIP: NEGATIVE
LYMPHOCYTES # BLD AUTO: 0.7 10E3/UL (ref 0.8–5.3)
LYMPHOCYTES NFR BLD AUTO: 5 %
MCH RBC QN AUTO: 31.5 PG (ref 26.5–33)
MCHC RBC AUTO-ENTMCNC: 33.3 G/DL (ref 31.5–36.5)
MCV RBC AUTO: 95 FL (ref 78–100)
MONOCYTES # BLD AUTO: 1.9 10E3/UL (ref 0–1.3)
MONOCYTES NFR BLD AUTO: 13 %
MUCOUS THREADS #/AREA URNS LPF: PRESENT /LPF
NEUTROPHILS # BLD AUTO: 11.6 10E3/UL (ref 1.6–8.3)
NEUTROPHILS NFR BLD AUTO: 81 %
NITRATE UR QL: NEGATIVE
NRBC # BLD AUTO: 0 10E3/UL
NRBC BLD AUTO-RTO: 0 /100
NT-PROBNP SERPL-MCNC: 1116 PG/ML (ref 0–1800)
P AXIS - MUSE: 3 DEGREES
P AXIS - MUSE: NORMAL DEGREES
PH UR STRIP: 6 [PH] (ref 5–7)
PLATELET # BLD AUTO: 314 10E3/UL (ref 150–450)
POTASSIUM SERPL-SCNC: 3.6 MMOL/L (ref 3.4–5.3)
PR INTERVAL - MUSE: 160 MS
PR INTERVAL - MUSE: NORMAL MS
QRS DURATION - MUSE: 138 MS
QRS DURATION - MUSE: 144 MS
QT - MUSE: 354 MS
QT - MUSE: 428 MS
QTC - MUSE: 484 MS
QTC - MUSE: 514 MS
R AXIS - MUSE: 33 DEGREES
R AXIS - MUSE: 54 DEGREES
RBC # BLD AUTO: 4.41 10E6/UL (ref 4.4–5.9)
RBC URINE: 2 /HPF
RSV RNA SPEC NAA+PROBE: NEGATIVE
SARS-COV-2 RNA RESP QL NAA+PROBE: NEGATIVE
SODIUM SERPL-SCNC: 137 MMOL/L (ref 135–145)
SP GR UR STRIP: 1.03 (ref 1–1.03)
SQUAMOUS EPITHELIAL: <1 /HPF
SYSTOLIC BLOOD PRESSURE - MUSE: NORMAL MMHG
SYSTOLIC BLOOD PRESSURE - MUSE: NORMAL MMHG
T AXIS - MUSE: 53 DEGREES
T AXIS - MUSE: 57 DEGREES
TROPONIN T SERPL HS-MCNC: 36 NG/L
UROBILINOGEN UR STRIP-MCNC: 4 MG/DL
VENTRICULAR RATE- MUSE: 127 BPM
VENTRICULAR RATE- MUSE: 77 BPM
WBC # BLD AUTO: 14.3 10E3/UL (ref 4–11)
WBC URINE: 4 /HPF

## 2024-04-29 PROCEDURE — 96365 THER/PROPH/DIAG IV INF INIT: CPT

## 2024-04-29 PROCEDURE — 87040 BLOOD CULTURE FOR BACTERIA: CPT | Performed by: EMERGENCY MEDICINE

## 2024-04-29 PROCEDURE — 250N000011 HC RX IP 250 OP 636: Performed by: PHYSICIAN ASSISTANT

## 2024-04-29 PROCEDURE — 258N000003 HC RX IP 258 OP 636: Performed by: EMERGENCY MEDICINE

## 2024-04-29 PROCEDURE — 120N000001 HC R&B MED SURG/OB

## 2024-04-29 PROCEDURE — 250N000013 HC RX MED GY IP 250 OP 250 PS 637: Performed by: HOSPITALIST

## 2024-04-29 PROCEDURE — 258N000003 HC RX IP 258 OP 636: Performed by: PHYSICIAN ASSISTANT

## 2024-04-29 PROCEDURE — 82565 ASSAY OF CREATININE: CPT | Performed by: PHYSICIAN ASSISTANT

## 2024-04-29 PROCEDURE — 36415 COLL VENOUS BLD VENIPUNCTURE: CPT | Performed by: EMERGENCY MEDICINE

## 2024-04-29 PROCEDURE — 83880 ASSAY OF NATRIURETIC PEPTIDE: CPT | Performed by: EMERGENCY MEDICINE

## 2024-04-29 PROCEDURE — 99285 EMERGENCY DEPT VISIT HI MDM: CPT | Mod: 25

## 2024-04-29 PROCEDURE — 99223 1ST HOSP IP/OBS HIGH 75: CPT | Performed by: PHYSICIAN ASSISTANT

## 2024-04-29 PROCEDURE — 93005 ELECTROCARDIOGRAM TRACING: CPT | Mod: 76

## 2024-04-29 PROCEDURE — 84484 ASSAY OF TROPONIN QUANT: CPT | Performed by: EMERGENCY MEDICINE

## 2024-04-29 PROCEDURE — 250N000013 HC RX MED GY IP 250 OP 250 PS 637: Performed by: PHYSICIAN ASSISTANT

## 2024-04-29 PROCEDURE — 96367 TX/PROPH/DG ADDL SEQ IV INF: CPT

## 2024-04-29 PROCEDURE — 36415 COLL VENOUS BLD VENIPUNCTURE: CPT | Performed by: PHYSICIAN ASSISTANT

## 2024-04-29 PROCEDURE — 96361 HYDRATE IV INFUSION ADD-ON: CPT

## 2024-04-29 PROCEDURE — 71046 X-RAY EXAM CHEST 2 VIEWS: CPT

## 2024-04-29 PROCEDURE — 80048 BASIC METABOLIC PNL TOTAL CA: CPT | Performed by: EMERGENCY MEDICINE

## 2024-04-29 PROCEDURE — 250N000011 HC RX IP 250 OP 636: Performed by: EMERGENCY MEDICINE

## 2024-04-29 PROCEDURE — 93005 ELECTROCARDIOGRAM TRACING: CPT

## 2024-04-29 PROCEDURE — 83605 ASSAY OF LACTIC ACID: CPT | Mod: 91 | Performed by: EMERGENCY MEDICINE

## 2024-04-29 PROCEDURE — 85025 COMPLETE CBC W/AUTO DIFF WBC: CPT | Performed by: EMERGENCY MEDICINE

## 2024-04-29 PROCEDURE — 96372 THER/PROPH/DIAG INJ SC/IM: CPT | Mod: 59 | Performed by: PHYSICIAN ASSISTANT

## 2024-04-29 PROCEDURE — 87637 SARSCOV2&INF A&B&RSV AMP PRB: CPT | Performed by: EMERGENCY MEDICINE

## 2024-04-29 PROCEDURE — 81001 URINALYSIS AUTO W/SCOPE: CPT | Performed by: STUDENT IN AN ORGANIZED HEALTH CARE EDUCATION/TRAINING PROGRAM

## 2024-04-29 PROCEDURE — 87149 DNA/RNA DIRECT PROBE: CPT | Mod: XU | Performed by: EMERGENCY MEDICINE

## 2024-04-29 RX ORDER — AMOXICILLIN 250 MG
1 CAPSULE ORAL 2 TIMES DAILY PRN
Status: DISCONTINUED | OUTPATIENT
Start: 2024-04-29 | End: 2024-05-01 | Stop reason: HOSPADM

## 2024-04-29 RX ORDER — LIDOCAINE 40 MG/G
CREAM TOPICAL
Status: DISCONTINUED | OUTPATIENT
Start: 2024-04-29 | End: 2024-05-01 | Stop reason: HOSPADM

## 2024-04-29 RX ORDER — METOPROLOL TARTRATE 1 MG/ML
5 INJECTION, SOLUTION INTRAVENOUS EVERY 6 HOURS PRN
Status: DISCONTINUED | OUTPATIENT
Start: 2024-04-29 | End: 2024-05-01 | Stop reason: HOSPADM

## 2024-04-29 RX ORDER — CEFTRIAXONE 2 G/1
2 INJECTION, POWDER, FOR SOLUTION INTRAMUSCULAR; INTRAVENOUS EVERY 24 HOURS
Qty: 120 ML | Refills: 0 | Status: DISCONTINUED | OUTPATIENT
Start: 2024-04-30 | End: 2024-05-01 | Stop reason: HOSPADM

## 2024-04-29 RX ORDER — POLYETHYLENE GLYCOL 3350 17 G/17G
17 POWDER, FOR SOLUTION ORAL 2 TIMES DAILY PRN
Status: DISCONTINUED | OUTPATIENT
Start: 2024-04-29 | End: 2024-05-01 | Stop reason: HOSPADM

## 2024-04-29 RX ORDER — METHOCARBAMOL 500 MG/1
500 TABLET, FILM COATED ORAL 3 TIMES DAILY PRN
Status: DISCONTINUED | OUTPATIENT
Start: 2024-04-29 | End: 2024-05-01 | Stop reason: HOSPADM

## 2024-04-29 RX ORDER — CEFTRIAXONE 2 G/1
2 INJECTION, POWDER, FOR SOLUTION INTRAMUSCULAR; INTRAVENOUS ONCE
Status: COMPLETED | OUTPATIENT
Start: 2024-04-29 | End: 2024-04-29

## 2024-04-29 RX ORDER — GUAIFENESIN/DEXTROMETHORPHAN 100-10MG/5
10 SYRUP ORAL EVERY 4 HOURS PRN
Status: DISCONTINUED | OUTPATIENT
Start: 2024-04-29 | End: 2024-05-01 | Stop reason: HOSPADM

## 2024-04-29 RX ORDER — AZITHROMYCIN 500 MG/5ML
500 INJECTION, POWDER, LYOPHILIZED, FOR SOLUTION INTRAVENOUS EVERY 24 HOURS
Status: DISCONTINUED | OUTPATIENT
Start: 2024-04-29 | End: 2024-04-30

## 2024-04-29 RX ORDER — AMOXICILLIN 250 MG
2 CAPSULE ORAL 2 TIMES DAILY PRN
Status: DISCONTINUED | OUTPATIENT
Start: 2024-04-29 | End: 2024-05-01 | Stop reason: HOSPADM

## 2024-04-29 RX ORDER — ENOXAPARIN SODIUM 100 MG/ML
40 INJECTION SUBCUTANEOUS EVERY 24 HOURS
Status: DISCONTINUED | OUTPATIENT
Start: 2024-04-29 | End: 2024-04-30

## 2024-04-29 RX ORDER — GABAPENTIN 300 MG/1
600 CAPSULE ORAL 3 TIMES DAILY
Status: DISCONTINUED | OUTPATIENT
Start: 2024-04-29 | End: 2024-05-01 | Stop reason: HOSPADM

## 2024-04-29 RX ORDER — ACETAMINOPHEN 650 MG/1
650 SUPPOSITORY RECTAL EVERY 4 HOURS PRN
Status: DISCONTINUED | OUTPATIENT
Start: 2024-04-29 | End: 2024-05-01 | Stop reason: HOSPADM

## 2024-04-29 RX ORDER — CALCIUM CARBONATE 500 MG/1
1000 TABLET, CHEWABLE ORAL 4 TIMES DAILY PRN
Status: DISCONTINUED | OUTPATIENT
Start: 2024-04-29 | End: 2024-05-01 | Stop reason: HOSPADM

## 2024-04-29 RX ORDER — IPRATROPIUM BROMIDE AND ALBUTEROL SULFATE 2.5; .5 MG/3ML; MG/3ML
3 SOLUTION RESPIRATORY (INHALATION) EVERY 4 HOURS PRN
Status: DISCONTINUED | OUTPATIENT
Start: 2024-04-29 | End: 2024-05-01 | Stop reason: HOSPADM

## 2024-04-29 RX ORDER — FLUTICASONE FUROATE AND VILANTEROL 100; 25 UG/1; UG/1
1 POWDER RESPIRATORY (INHALATION) DAILY
Status: DISCONTINUED | OUTPATIENT
Start: 2024-04-29 | End: 2024-05-01 | Stop reason: HOSPADM

## 2024-04-29 RX ORDER — ONDANSETRON 2 MG/ML
4 INJECTION INTRAMUSCULAR; INTRAVENOUS EVERY 6 HOURS PRN
Status: DISCONTINUED | OUTPATIENT
Start: 2024-04-29 | End: 2024-05-01 | Stop reason: HOSPADM

## 2024-04-29 RX ORDER — ONDANSETRON 4 MG/1
4 TABLET, ORALLY DISINTEGRATING ORAL EVERY 6 HOURS PRN
Status: DISCONTINUED | OUTPATIENT
Start: 2024-04-29 | End: 2024-05-01 | Stop reason: HOSPADM

## 2024-04-29 RX ORDER — SODIUM CHLORIDE, SODIUM LACTATE, POTASSIUM CHLORIDE, CALCIUM CHLORIDE 600; 310; 30; 20 MG/100ML; MG/100ML; MG/100ML; MG/100ML
INJECTION, SOLUTION INTRAVENOUS CONTINUOUS
Status: ACTIVE | OUTPATIENT
Start: 2024-04-29 | End: 2024-04-30

## 2024-04-29 RX ORDER — ACETAMINOPHEN 325 MG/1
650 TABLET ORAL EVERY 4 HOURS PRN
Status: DISCONTINUED | OUTPATIENT
Start: 2024-04-29 | End: 2024-05-01 | Stop reason: HOSPADM

## 2024-04-29 RX ORDER — METHOCARBAMOL 500 MG/1
500 TABLET, FILM COATED ORAL 3 TIMES DAILY
COMMUNITY
End: 2024-06-06

## 2024-04-29 RX ADMIN — SODIUM CHLORIDE, POTASSIUM CHLORIDE, SODIUM LACTATE AND CALCIUM CHLORIDE: 600; 310; 30; 20 INJECTION, SOLUTION INTRAVENOUS at 17:10

## 2024-04-29 RX ADMIN — SODIUM CHLORIDE, POTASSIUM CHLORIDE, SODIUM LACTATE AND CALCIUM CHLORIDE: 600; 310; 30; 20 INJECTION, SOLUTION INTRAVENOUS at 23:07

## 2024-04-29 RX ADMIN — METHOCARBAMOL 500 MG: 500 TABLET ORAL at 22:24

## 2024-04-29 RX ADMIN — GABAPENTIN 600 MG: 300 CAPSULE ORAL at 21:39

## 2024-04-29 RX ADMIN — SODIUM CHLORIDE 1000 ML: 9 INJECTION, SOLUTION INTRAVENOUS at 10:20

## 2024-04-29 RX ADMIN — ENOXAPARIN SODIUM 40 MG: 40 INJECTION SUBCUTANEOUS at 17:12

## 2024-04-29 RX ADMIN — AZITHROMYCIN MONOHYDRATE 500 MG: 500 INJECTION, POWDER, LYOPHILIZED, FOR SOLUTION INTRAVENOUS at 12:25

## 2024-04-29 RX ADMIN — GABAPENTIN 600 MG: 300 CAPSULE ORAL at 17:10

## 2024-04-29 RX ADMIN — SODIUM CHLORIDE 1000 ML: 9 INJECTION, SOLUTION INTRAVENOUS at 12:25

## 2024-04-29 RX ADMIN — CEFTRIAXONE 2 G: 2 INJECTION, POWDER, FOR SOLUTION INTRAMUSCULAR; INTRAVENOUS at 11:17

## 2024-04-29 RX ADMIN — DICLOFENAC SODIUM 50 MG: 50 TABLET, DELAYED RELEASE ORAL at 22:54

## 2024-04-29 RX ADMIN — ACETAMINOPHEN 650 MG: 325 TABLET, FILM COATED ORAL at 20:22

## 2024-04-29 RX ADMIN — SODIUM CHLORIDE, POTASSIUM CHLORIDE, SODIUM LACTATE AND CALCIUM CHLORIDE: 600; 310; 30; 20 INJECTION, SOLUTION INTRAVENOUS at 16:59

## 2024-04-29 ASSESSMENT — ACTIVITIES OF DAILY LIVING (ADL)
ADLS_ACUITY_SCORE: 33
ADLS_ACUITY_SCORE: 37
ADLS_ACUITY_SCORE: 37
ADLS_ACUITY_SCORE: 40
ADLS_ACUITY_SCORE: 40
ADLS_ACUITY_SCORE: 37
ADLS_ACUITY_SCORE: 40
ADLS_ACUITY_SCORE: 40
ADLS_ACUITY_SCORE: 37
ADLS_ACUITY_SCORE: 37
ADLS_ACUITY_SCORE: 40
ADLS_ACUITY_SCORE: 40
ADLS_ACUITY_SCORE: 37
ADLS_ACUITY_SCORE: 40

## 2024-04-29 ASSESSMENT — COLUMBIA-SUICIDE SEVERITY RATING SCALE - C-SSRS
1. IN THE PAST MONTH, HAVE YOU WISHED YOU WERE DEAD OR WISHED YOU COULD GO TO SLEEP AND NOT WAKE UP?: NO
2. HAVE YOU ACTUALLY HAD ANY THOUGHTS OF KILLING YOURSELF IN THE PAST MONTH?: NO
6. HAVE YOU EVER DONE ANYTHING, STARTED TO DO ANYTHING, OR PREPARED TO DO ANYTHING TO END YOUR LIFE?: NO

## 2024-04-29 NOTE — ED TRIAGE NOTES
Had trigger point injections on Monday. States he has has had difficulty walking since a couple days after this and has had chills and sweats. C/O body aches. Doesn't normally use a cane, but states he's had to use this to help with his balance. Sent from  as he has an abnormal EKG.

## 2024-04-29 NOTE — ED PROVIDER NOTES
History     Chief Complaint:  Abnormal Ekg     HPI   Tigre Gillette is a 84 year old male with history of COPD and chronic post nasal drainage who presents to the ED with his daughter for evaluation of abnormal ECG. The patient reports he has had an increased productive cough and body aches for the last few days, noting concern for dehydration. He was evaluated at an urgent care and referred to the ED due to his abnormal ECG. He denies chest pain, shortness of breath, leg pain or swelling. No prior history of atrial fibrillation and he is not anticoagulated.     Independent Historian:   None - Patient Only    Review of External Notes:   I reviewed the patient's ECHO results from 2022.    Medications:    aspirin (ASA) 325 MG EC tablet  cetirizine (ZYRTEC) 10 MG tablet  diclofenac (VOLTAREN) 1 % topical gel  diclofenac (VOLTAREN) 50 MG EC tablet  gabapentin (NEURONTIN) 300 MG capsule  hydrocortisone 2.5 % cream  methocarbamol (ROBAXIN) 500 MG tablet  TRELEGY ELLIPTA 100-62.5-25 MCG/INH oral inhaler  TRIAMCINOLONE ACETONIDE EX      Past Medical History:    Past Medical History:   Diagnosis Date    Chronic lower back pain     COPD (chronic obstructive pulmonary disease) (H)     Degenerative arthritis of lumbar spine     Eczema      Past Surgical History:    Past Surgical History:   Procedure Laterality Date    HERNIORRHAPHY EPIGASTRIC N/A 1/30/2018    Procedure: HERNIORRHAPHY EPIGASTRIC;  Epigastric herniorrhaphy with Bard Ventralex ST Hernia Patch ;  Surgeon: Rossana Alas MD;  Location: [facility] OR    HERNIORRHAPHY INGUINAL  2010    open recurrent LIH rpr with mesh    HERNIORRHAPHY INGUINAL  1990    open LIH rpr with mesh    TONSILLECTOMY & ADENOIDECTOMY      t&a      Physical Exam   Patient Vitals for the past 24 hrs:   BP Temp Pulse Resp SpO2 Height Weight   04/29/24 1315 111/59 -- 66 -- 95 % -- --   04/29/24 1300 121/61 -- 73 -- 94 % -- --   04/29/24 1245 116/58 -- 73 -- 95 % -- --   04/29/24 1240 103/65 --  "73 -- 95 % -- --   04/29/24 1236 (!) 86/60 -- 75 -- 100 % -- --   04/29/24 1200 -- -- 98 29 93 % -- --   04/29/24 1145 118/73 -- 70 22 -- -- --   04/29/24 1141 -- -- 81 20 97 % -- --   04/29/24 1139 -- -- 66 -- 97 % -- --   04/29/24 1130 113/69 -- 75 -- 97 % -- --   04/29/24 1115 107/63 -- 73 -- 95 % -- --   04/29/24 1035 -- -- 77 20 -- -- --   04/29/24 1030 99/62 -- 89 25 94 % -- --   04/29/24 1020 95/58 -- 76 17 94 % -- --   04/29/24 1010 90/59 -- (!) 130 -- 96 % -- --   04/29/24 0956 -- -- -- -- -- 1.88 m (6' 2\") 73.9 kg (163 lb)   04/29/24 0952 (!) 134/93 97  F (36.1  C) -- 18 -- -- --      Physical Exam  General: Patient is well appearing. No distress.  Head: Atraumatic.  Eyes: Conjunctivae and EOM are normal. No scleral icterus.  Neck: Normal range of motion. Neck supple.   Cardiovascular: Normal rate, regular rhythm, normal heart sounds and intact distal pulses.   Pulmonary/Chest: Breath sounds normal. No respiratory distress.  Abdominal: Soft. Bowel sounds are normal. No distension. No tenderness. No rebound or guarding.   Musculoskeletal: Normal range of motion.  Skin: Warm and dry. No rash noted. Not diaphoretic.     Emergency Department Course   ECG  ECG taken at 0956, ECG read at 1008  Atrial fibrillation with rapid ventricular response with premature ventricular or aberrantly conducted complexes  Right bundle branch block  Inferior infarct  Abnormal ECG   Rate 127 bpm. VA interval * ms. QRS duration 138 ms. QT/QTc 354/514 ms. P-R-T axes */ 54/ 53.     ECG  ECG taken at 1105, ECG read at 1115  Normal sinus rhythm  Right bundle branch block  Inferior infarct  Abnormal ECG   Rate 77 bpm. VA interval 160 ms. QRS duration 144 ms. QT/QTc 428/484 ms. P-R-T axes 3/ 33/ 57.     Imaging:  XR Chest 2 Views   Preliminary Result   IMPRESSION: Interstitial and airspace opacities in the left mid and   lower lung may be related to infection and/or atelectasis. The right   lung is clear. No pleural effusions. Pulmonary " vascularity is within   normal limits.        Laboratory:  Labs Ordered and Resulted from Time of ED Arrival to Time of ED Departure   BASIC METABOLIC PANEL - Abnormal       Result Value    Sodium 137      Potassium 3.6      Chloride 102      Carbon Dioxide (CO2) 18 (*)     Anion Gap 17 (*)     Urea Nitrogen 21.0      Creatinine 1.09      GFR Estimate 68      Calcium 9.2      Glucose 132 (*)    TROPONIN T, HIGH SENSITIVITY - Abnormal    Troponin T, High Sensitivity 36 (*)    CBC WITH PLATELETS AND DIFFERENTIAL - Abnormal    WBC Count 14.3 (*)     RBC Count 4.41      Hemoglobin 13.9      Hematocrit 41.8      MCV 95      MCH 31.5      MCHC 33.3      RDW 14.1      Platelet Count 314      % Neutrophils 81      % Lymphocytes 5      % Monocytes 13      % Eosinophils 0      % Basophils 0      % Immature Granulocytes 1      NRBCs per 100 WBC 0      Absolute Neutrophils 11.6 (*)     Absolute Lymphocytes 0.7 (*)     Absolute Monocytes 1.9 (*)     Absolute Eosinophils 0.0      Absolute Basophils 0.1      Absolute Immature Granulocytes 0.1      Absolute NRBCs 0.0     LACTIC ACID WHOLE BLOOD - Abnormal    Lactic Acid 2.8 (*)    NT PROBNP INPATIENT - Normal    N terminal Pro BNP Inpatient 1,116     INFLUENZA A/B, RSV, & SARS-COV2 PCR - Normal    Influenza A PCR Negative      Influenza B PCR Negative      RSV PCR Negative      SARS CoV2 PCR Negative     LACTIC ACID WHOLE BLOOD - Normal    Lactic Acid 1.9     ROUTINE UA WITH MICROSCOPIC REFLEX TO CULTURE   BLOOD CULTURE   BLOOD CULTURE      Emergency Department Course & Assessments:    Interventions:  Medications   azithromycin 500 mg (ZITHROMAX) in 0.9% NaCl 250 mL intermittent infusion 500 mg (500 mg Intravenous $New Bag 4/29/24 1225)   sodium chloride 0.9% BOLUS 1,000 mL (0 mLs Intravenous Stopped 4/29/24 1219)   cefTRIAXone (ROCEPHIN) 2 g vial to attach to  ml bag for ADULTS or NS 50 ml bag for PEDS (0 g Intravenous Stopped 4/29/24 1219)   sodium chloride 0.9% BOLUS 1,000  mL (1,000 mLs Intravenous $New Bag 4/29/24 1225)      Assessments:  1101 Initial Examination    Independent Interpretation (X-rays, CTs, rhythm strip):  Left patchy infiltrate. No effusion    Consultations/Discussion of Management or Tests:  ED Course as of 04/29/24 1337   Mon Apr 29, 2024   1142 Discussed patient with [facility] Cadena for Dr. Munguia       Social Determinants of Health affecting care:   None    Disposition:  The patient was admitted to the hospital under the care of Dr. Munguia.     Impression & Plan           Medical Decision Making:  Tigre Gillette is a 84 year old male with chronic back pain with possibly increased resp sx over the weekend that then was found new afib this am.  This self resolved in the ER after IVF.  And was hypotensive also fluid responsive.  Had no hypoxia and rather normal lung exam CXR showed left sided infiltrates elev WBC and lactate.  This was infectious pneumonia not necessarily severe sepsis.   Immediate antibiotic IVF and very stable for admission.  Also had slight trop and BNP elevation that will be trended as inpatient and need IVF for infection over risk of BNP elevation at this time as patient described himself and appeared intravascularly dry.           Diagnosis:    ICD-10-CM    1. Chronic back pain, unspecified back location, unspecified back pain laterality  M54.9     G89.29       2. Atrial fibrillation, unspecified type (H)  I48.91       3. Elevated troponin  R79.89       4. Elevated brain natriuretic peptide (BNP) level  R79.89       5. Leukocytosis, unspecified type  D72.829       6. Pneumonia of left lung due to infectious organism, unspecified part of lung  J18.9       7. Dehydration  E86.0          Discharge Medications:  New Prescriptions    No medications on file      Scribe Disclosure:  Alexander DILL, am serving as a scribe at 10:26 AM on 4/29/2024 to document services personally performed by Donovan Johnson MD based on my observations and the  provider's statements to me.     4/29/2024   Donovan Johnson MD Stevens, Andrew C, MD  04/29/24 [id number]

## 2024-04-29 NOTE — PHARMACY-ADMISSION MEDICATION HISTORY
Pharmacist Admission Medication History    Admission medication history is complete. The information provided in this note is only as accurate as the sources available at the time of the update.    Information Source(s): Patient and CareEverywhere/SureScripts via in-person    Pertinent Information: None    Changes made to PTA medication list:  Added: triamcinolone  Deleted: None  Changed: aspirin to as needed,methocarbamol to scheduled     Allergies reviewed with patient and updates made in EHR: yes    Medication History Completed By: Nel Keyes [facility] 4/29/2024 12:36 PM    PTA Med List   Medication Sig Last Dose    aspirin (ASA) 325 MG EC tablet Take 325 mg by mouth 3 times daily as needed for pain 4/28/2024 at prn    cetirizine (ZYRTEC) 10 MG tablet Take 10 mg by mouth every evening 4/28/2024 at pm    diclofenac (VOLTAREN) 1 % topical gel Apply 4 g topically 4 times daily as needed for other (pain) prn at prn    diclofenac (VOLTAREN) 50 MG EC tablet Take 1 tablet (50 mg) by mouth 2 times daily 4/29/2024 at am    gabapentin (NEURONTIN) 300 MG capsule Take 2 capsules (600 mg) by mouth 3 times daily 4/29/2024 at am    hydrocortisone 2.5 % cream Apply topically as needed prn at prn    methocarbamol (ROBAXIN) 500 MG tablet Take 500 mg by mouth 3 times daily 4/29/2024 at am    TRELEGY ELLIPTA 100-62.5-25 MCG/INH oral inhaler Inhale 1 puff into the lungs daily 4/29/2024 at am    TRIAMCINOLONE ACETONIDE EX Apply topically daily as needed (eczema) prn at prn

## 2024-04-29 NOTE — ED NOTES
Bed: ED26  Expected date:   Expected time:   Means of arrival:   Comments:  Hold for ED 13 Kary will give report to Keyla

## 2024-04-29 NOTE — H&P
[facility]    Hospitalist History and Physical    Name: Tigre Gillette    MRN: [id number]  YOB: 1940    Age: 84 year old  Date of Admission:  4/29/2024  Date of Service (when I saw the patient): 04/29/24    Assessment & Plan   Tigre Gillette is a 84 year old male with PMH significant for chronic low back pain and COPD who presents from [facility] to the ED on 4/29/2024 for evaluation of abnormal EKG concerning for new A-fib.    ED workup reveals: initially tachycardic into the 130s with soft BP around /50s, leukocytosis of 14.3, CO2 of 18, anion gap of 17, glucose of 132, troponin of 36, proBNP of 1116, influenza/COVID/RSV negative, initial lactic acid of 2.8 with repeat of 1.9, blood cultures x 2 obtained and pending, initial EKG shows rate of 127 bpm in A-fib with RVR with premature ventricular or aberrantly conducted complexes, RBBB, inferior infarct, repeat EKG shows rate of 77 bpm in sinus rhythm with RBBB, and CXR shows interstitial and airspace opacities in the left mid and lower lung may be related to infection and/or atelectasis, right lung is clear, no pleural effusions, pulmonary vascularity is within normal limits.    #Sepsis due to left mid and lower lung CAP  Patient has felt unwell since 4/24 with subjective fevers, chills, myalgias, worsening productive cough with intermittent green/yellow sputum, chest congestion, and decreased appetite along with poor oral intake. No recent increased use of PRN albuterol inhaler. Initially tachycardic with soft BP but afebrile and not hypoxic. Leukocytosis of 14.3 and lactic acid of 2.8. CXR confirms interstitial and airspace opacities in the left mid and lower lung with crackles over these areas on exam.   -continue IV AZA and Rocephin  -monitor for fevers and hypoxia  -follow blood cultures from 4/29  -IVF hydration with LR at 100 ml/hour   -encourage oral intake  -PRN duo nebs and antitussives  -sputum  culture if able to obtain  -follow BMP and CBC    #New onset A-fib with RVR, converted to SR  #Elevated troponin level  Noted to be in new A-fib when initially evaluated by [facility]. Initial EKG in ED showed rate of 127 bpm in A-fib with RVR with premature ventricular or aberrantly conducted complexes, RBBB, inferior infarct, repeat EKG shows rate of 77 bpm in sinus rhythm with RBBB. Repeat EKG performed after receiving IVFs. Troponin slightly elevated at 36, likely mild demand ischemia. Suspect new A-fib due to acute illness along with dehydration in setting of underlying pulmonary disease. CHADsVASc score of 2 based on age, no current indication for anticoagulation.   -monitor on telemetry  -obtain one additional troponin level to monitor stability   -obtain echocardiogram  -PRN IV Metoprolol for recurrent A-fib and if HR >120 bpm   -consider cardiac event monitor at discharge if recurrent episodes here     #AGMA  Initial CO2 of 18 and anion gap of 17, due to dehydration due to lack of oral intake.  -IVF hydration with LR at 100 ml/hour  -repeat BMP in AM    #COPD  No current or recent exacerbation. Reports using his albuterol slightly more about 2 weeks ago but not over the past week.  -continue PTA Trelegy Ellipta inhaler     #Chronic low back pain   Follows with  pain management center, recently had trigger point injections performed on 4/22.   -continue PTA Voltaren gel, Gabapentin, and Robaxin     Clinically Significant Risk Factors Present on Admission                # Drug Induced Platelet Defect: home medication list includes an antiplatelet medication                 DVT Prophylaxis: Enoxaparin (Lovenox) SQ  Code Status: Full Code, discussed with patient  Disposition: Expected stay >2 midnights, will admit to inpatient    Primary Care Physician   Xavier Schumacher CNP at [facility]     Chief Complaint   Abnormal EKG    History obtained from discussion with ED provider, Dr. Johnson,  chart review, and interview with patient.     History of Present Illness   Tigre Gillette is a 84 year old male who presents from urgent care with concern for abnormal EKG.  Patient states he has felt unwell since 4/24.  He states that he has had issues with chronic osteoarthritis and had 6 trigger point injections performed last week on 4/22.  He was initially feeling fine the first couple of days after these were performed.  But since 4/24 he has been having myalgias, difficulty ambulating due to feeling too weak, subjective fevers with chills and diaphoresis, decreased appetite, and poor oral intake.  Denies associated nausea, vomiting, or diarrhea.  He notes an increased productive cough with intermittent yellow, green sputum and did have an episode of blood-tinged sputum while at urgent care earlier today.  He notes associated chest congestion. A few weeks ago the patient was using his albuterol inhaler more frequently for his known COPD.  He has not been using his albuterol inhaler much this past week.  He notes maybe a mild wheeze of recent but nothing significant.  Denies recent lightheadedness, dizziness, central or left chest pain, or palpitations.  He does follow with Dr. Culp of pulmonology for management of his COPD.  He notes compliance with his Trelegy inhaler.  He denies any prior history of coronary artery disease or atrial fibrillation.  He is a current smoker and last had a cigarette this morning.  He smokes approximately 6 cigarettes/day.    Past Medical History    Past Medical History:   Diagnosis Date    Chronic lower back pain     COPD (chronic obstructive pulmonary disease) (H)     Degenerative arthritis of lumbar spine     spine    Eczema     uses topical low dose steroid and cetaphil lotion     Past Surgical History   Past Surgical History:   Procedure Laterality Date    HERNIORRHAPHY EPIGASTRIC N/A 1/30/2018    Procedure: HERNIORRHAPHY EPIGASTRIC;  Epigastric herniorrhaphy with Bard  "Ventralex ST Hernia Patch ;  Surgeon: Rossana Alas MD;  Location: [facility] OR    HERNIORRHAPHY INGUINAL  2010    open recurrent LIH rpr with mesh    HERNIORRHAPHY INGUINAL  1990    open LIH rpr with mesh    TONSILLECTOMY & ADENOIDECTOMY      t&a     Prior to Admission Medications   Prior to Admission Medications   Prescriptions Last Dose Informant Patient Reported? Taking?   TRELEGY ELLIPTA 100-62.5-25 MCG/INH oral inhaler 4/29/2024 at am  Yes Yes   Sig: Inhale 1 puff into the lungs daily   TRIAMCINOLONE ACETONIDE EX prn at prn  Yes Yes   Sig: Apply topically daily as needed (eczema)   aspirin (ASA) 325 MG EC tablet 4/28/2024 at prn  Yes Yes   Sig: Take 325 mg by mouth 3 times daily as needed for pain   cetirizine (ZYRTEC) 10 MG tablet 4/28/2024 at pm  Yes Yes   Sig: Take 10 mg by mouth every evening   diclofenac (VOLTAREN) 1 % topical gel prn at prn  No Yes   Sig: Apply 4 g topically 4 times daily as needed for other (pain)   diclofenac (VOLTAREN) 50 MG EC tablet 4/29/2024 at am  No Yes   Sig: Take 1 tablet (50 mg) by mouth 2 times daily   gabapentin (NEURONTIN) 300 MG capsule 4/29/2024 at am  No Yes   Sig: Take 2 capsules (600 mg) by mouth 3 times daily   hydrocortisone 2.5 % cream prn at prn  Yes Yes   Sig: Apply topically as needed   methocarbamol (ROBAXIN) 500 MG tablet 4/29/2024 at am  Yes Yes   Sig: Take 500 mg by mouth 3 times daily      Facility-Administered Medications: None     Allergies   Allergies   Allergen Reactions    Amoxicillin      Social History   Social History     Tobacco Use    Smoking status: Heavy Smoker     Current packs/day: 0.50     Types: Cigarettes    Smokeless tobacco: Never   Substance Use Topics    Alcohol use: Yes     Comment: rare      Social History     Social History Narrative    Lives with his wife ( '64), daughter and her boyfriend. [place] kind of person, likes to fix and maintain his home. Grew up helping out to maintain his family's " [employer]. 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    Enjoys fishing, fixing things and being outdoors. Pain can limit his ability to do things he likes, such as fishing, snowmobiling. Has started to read more since jail, especially about [place]. Has a [place] who lives there.    Last updated 6/6/2023      Family History   Family history reviewed with patient and is noncontributory.    Review of Systems   A Comprehensive greater than 10 system review of systems was carried out.  Pertinent positives and negatives are noted above.  Otherwise negative for contributory information.    Physical Exam   Temp: 97  F (36.1  C)   BP: 118/73 Pulse: 98   Resp: 29 SpO2: 93 %      Vital Signs with Ranges  Temp:  [97  F (36.1  C)] 97  F (36.1  C)  Pulse:  [] 98  Resp:  [17-29] 29  BP: ()/(58-93) 118/73  SpO2:  [93 %-97 %] 93 %  163 lbs 0 oz    GEN:  Alert, oriented x 3, appears comfortable laying on gurney, no overt distress  HEENT:  Normocephalic/atraumatic, no scleral icterus, no nasal discharge, mouth moist.  CV:  Regular rate and rhythm, no murmur or JVD.  S1 + S2 noted, no S3 or S4.  LUNGS: decreased breath sounds throughout, crackles in left mid to lower lung fields, no wheezing appreciated.  Symmetric chest rise on inhalation noted.  ABD:  Active bowel sounds, soft, non-tender/non-distended.  No rebound/guarding/rigidity.  EXT:  No LE edema.  No cyanosis.  No acute joint synovitis noted.  SKIN:  Dry to touch, no exanthems noted in the visualized areas.  NEURO:  Symmetric slightly decreased muscle strength, sensation to touch grossly intact.  Coordination symmetric on general exam.  No new focal deficits appreciated.    Data   Data reviewed today:  I personally reviewed EKG showing rate of 127 bpm in A-fib with RVR with premature ventricular or aberrantly conducted  complexes, RBBB, inferior infarct, repeat EKG shows rate of 77 bpm in sinus rhythm with RBBB.    Results for orders placed or performed during the hospital encounter of 04/29/24   XR Chest 2 Views     Status: None (Preliminary result)    Narrative    CHEST TWO VIEWS April 29, 2024 10:49 AM     HISTORY: Atrial flutter.    COMPARISON: 5/25/2015.      Impression    IMPRESSION: Interstitial and airspace opacities in the left mid and  lower lung may be related to infection and/or atelectasis. The right  lung is clear. No pleural effusions. Pulmonary vascularity is within  normal limits.   Basic metabolic panel     Status: Abnormal   Result Value Ref Range    Sodium 137 135 - 145 mmol/L    Potassium 3.6 3.4 - 5.3 mmol/L    Chloride 102 98 - 107 mmol/L    Carbon Dioxide (CO2) 18 (L) 22 - 29 mmol/L    Anion Gap 17 (H) 7 - 15 mmol/L    Urea Nitrogen 21.0 8.0 - 23.0 mg/dL    Creatinine 1.09 0.67 - 1.17 mg/dL    GFR Estimate 68 >60 mL/min/1.73m2    Calcium 9.2 8.8 - 10.2 mg/dL    Glucose 132 (H) 70 - 99 mg/dL   Troponin T, High Sensitivity     Status: Abnormal   Result Value Ref Range    Troponin T, High Sensitivity 36 (H) <=22 ng/L   Nt probnp inpatient (BNP)     Status: Normal   Result Value Ref Range    N terminal Pro BNP Inpatient 1,116 0 - 1,800 pg/mL   CBC with platelets and differential     Status: Abnormal   Result Value Ref Range    WBC Count 14.3 (H) 4.0 - 11.0 10e3/uL    RBC Count 4.41 4.40 - 5.90 10e6/uL    Hemoglobin 13.9 13.3 - 17.7 g/dL    Hematocrit 41.8 40.0 - 53.0 %    MCV 95 78 - 100 fL    MCH 31.5 26.5 - 33.0 pg    MCHC 33.3 31.5 - 36.5 g/dL    RDW 14.1 10.0 - 15.0 %    Platelet Count 314 150 - 450 10e3/uL    % Neutrophils 81 %    % Lymphocytes 5 %    % Monocytes 13 %    % Eosinophils 0 %    % Basophils 0 %    % Immature Granulocytes 1 %    NRBCs per 100 WBC 0 <1 /100    Absolute Neutrophils 11.6 (H) 1.6 - 8.3 10e3/uL    Absolute Lymphocytes 0.7 (L) 0.8 - 5.3 10e3/uL    Absolute Monocytes 1.9 (H) 0.0 - 1.3  10e3/uL    Absolute Eosinophils 0.0 0.0 - 0.7 10e3/uL    Absolute Basophils 0.1 0.0 - 0.2 10e3/uL    Absolute Immature Granulocytes 0.1 <=0.4 10e3/uL    Absolute NRBCs 0.0 10e3/uL   Extra Tube ([place] Draw)     Status: None    Narrative    The following orders were created for panel order Extra Tube ([place] Draw).  Procedure                               Abnormality         Status                     ---------                               -----------         ------                     Extra Blue Top Tube[[id number]]                              Final result                 Please view results for these tests on the individual orders.   Extra Blue Top Tube     Status: None   Result Value Ref Range    Hold Specimen JIC    Lactic acid whole blood     Status: Abnormal   Result Value Ref Range    Lactic Acid 2.8 (H) 0.7 - 2.0 mmol/L   Symptomatic Influenza A/B, RSV, & SARS-CoV2 PCR (COVID-19) Nasopharyngeal     Status: Normal    Specimen: Nasopharyngeal; Swab   Result Value Ref Range    Influenza A PCR Negative Negative    Influenza B PCR Negative Negative    RSV PCR Negative Negative    SARS CoV2 PCR Negative Negative    Narrative    Testing was performed using the Xpert Xpress CoV2/Flu/RSV Assay on the Cepheid GeneXpert Instrument. This test should be ordered for the detection of SARS-CoV-2, influenza, and RSV viruses in individuals who meet clinical and/or epidemiological criteria. Test performance is unknown in asymptomatic patients. This test is for in vitro diagnostic use under the FDA EUA for laboratories certified under CLIA to perform high or moderate complexity testing. This test has not been FDA cleared or approved. A negative result does not rule out the presence of PCR inhibitors in the specimen or target RNA in concentration below the limit of detection for the assay. If only one viral target is positive but coinfection with multiple targets is suspected, the sample should be re-tested with another FDA  cleared, approved, or authorized test, if coinfection would change clinical management. This test was validated by the [facility]. These laboratories are certified under the Clinical Laboratory Improvement Amendments of 1988 (CLIA-88) as qualified to perform high complexity laboratory testing.   EKG 12-lead, tracing only     Status: None   Result Value Ref Range    Systolic Blood Pressure  mmHg    Diastolic Blood Pressure  mmHg    Ventricular Rate 77 BPM    Atrial Rate 77 BPM    TN Interval 160 ms    QRS Duration 144 ms     ms    QTc 484 ms    P Axis 3 degrees    R AXIS 33 degrees    T Axis 57 degrees    Interpretation ECG       Sinus rhythm  Right bundle branch block  Inferior infarct (cited on or before 25-MAY-2015)  Abnormal ECG  When compared with ECG of 29-APR-2024 09:56, (unconfirmed)  Sinus rhythm has replaced Atrial fibrillation  Vent. rate has decreased BY  50 BPM  Unconfirmed report - interpretation of this ECG is computer generated - see medical record for final interpretation  Confirmed by - EMERGENCY ROOM, PHYSICIAN ([id number]),  Adrien Diop ([id number]) on 4/29/2024 12:25:59 PM     EKG 12 lead     Status: None   Result Value Ref Range    Systolic Blood Pressure  mmHg    Diastolic Blood Pressure  mmHg    Ventricular Rate 127 BPM    Atrial Rate  BPM    TN Interval  ms    QRS Duration 138 ms     ms    QTc 514 ms    P Axis  degrees    R AXIS 54 degrees    T Axis 53 degrees    Interpretation ECG       Atrial fibrillation with rapid ventricular response with premature ventricular or aberrantly conducted complexes  Right bundle branch block  Inferior infarct (cited on or before 25-MAY-2015)  Abnormal ECG  When compared with ECG of 25-MAY-2015 10:41,  Atrial fibrillation has replaced Sinus rhythm  Vent. rate has increased BY  54 BPM  Right bundle branch block is now Present  Unconfirmed report - interpretation of this ECG is computer generated - see medical record for final  interpretation  Confirmed by - EMERGENCY ROOM, PHYSICIAN ([id number]),  Adrien Diop ([id number]) on 4/29/2024 12:25:30 PM     CBC with platelets differential     Status: Abnormal    Narrative    The following orders were created for panel order CBC with platelets differential.  Procedure                               Abnormality         Status                     ---------                               -----------         ------                     CBC with platelets and d...[[id number]]  Abnormal            Final result                 Please view results for these tests on the individual orders.     Soumya Rojas PA-C  [facility]  Securely message with the [email or web address] Web Console (learn more here)  Text page via [employer] Paging/Directory  I discussed the patient with Dr. Munguia and she agrees with the above plan.

## 2024-04-29 NOTE — ED NOTES
[facility]  ED Nurse Handoff Report    ED Chief complaint: Abnormal Ekg  . ED Diagnosis:   Final diagnoses:   Chronic back pain, unspecified back location, unspecified back pain laterality   Atrial fibrillation, unspecified type (H)   Elevated troponin   Elevated brain natriuretic peptide (BNP) level   Leukocytosis, unspecified type   Pneumonia of left lung due to infectious organism, unspecified part of lung       Allergies:   Allergies   Allergen Reactions    Amoxicillin        Code Status: Full Code    Activity level - Baseline/Home:  independent.  Activity Level - Current:   standby.   Lift room needed: No.   Bariatric: No   Needed: No   Isolation: No.   Infection: Not Applicable.     Respiratory status: Room air    Vital Signs (within 30 minutes):   Vitals:    04/29/24 1020 04/29/24 1030 04/29/24 1035 04/29/24 1115   BP: 95/58 99/62  107/63   Pulse: 76 89 77 73   Resp: 17 25 20    Temp:       SpO2: 94% 94%  95%   Weight:       Height:           Cardiac Rhythm:  ,   Cardiac  Cardiac Rhythm: Atrial flutter  Pain level:    Patient confused: No.   Patient Falls Risk: activity supervised, mobility aid in reach, and room door open.   Elimination Status: Has voided     Patient Report - Initial Complaint: abnormal EKG- body aches/chills .   Focused Assessment:  Tigre Gillette is a 84 year old male with history of COPD and chronic post nasal drainage who presents to the ED with his daughter for evaluation of abnormal ECG. The patient reports he has had an increased productive cough and body aches for the last few days, noting concern for dehydration. He was evaluated at an urgent care and referred to the ED due to his abnormal ECG. He denies chest pain, shortness of breath, leg pain or swelling. No prior history of atrial fibrillation and he is not anticoagulated.    Physical Exam  General: Patient is well appearing. No distress.  Head: Atraumatic.  Eyes: Conjunctivae and EOM are  normal. No scleral icterus.  Neck: Normal range of motion. Neck supple.   Cardiovascular: Normal rate, regular rhythm, normal heart sounds and intact distal pulses.   Pulmonary/Chest: Breath sounds normal. No respiratory distress.  Abdominal: Soft. Bowel sounds are normal. No distension. No tenderness. No rebound or guarding.   Musculoskeletal: Normal range of motion.  Skin: Warm and dry. No rash noted. Not diaphoretic.        Abnormal Results:   Labs Ordered and Resulted from Time of ED Arrival to Time of ED Departure   BASIC METABOLIC PANEL - Abnormal       Result Value    Sodium 137      Potassium 3.6      Chloride 102      Carbon Dioxide (CO2) 18 (*)     Anion Gap 17 (*)     Urea Nitrogen 21.0      Creatinine 1.09      GFR Estimate 68      Calcium 9.2      Glucose 132 (*)    TROPONIN T, HIGH SENSITIVITY - Abnormal    Troponin T, High Sensitivity 36 (*)    CBC WITH PLATELETS AND DIFFERENTIAL - Abnormal    WBC Count 14.3 (*)     RBC Count 4.41      Hemoglobin 13.9      Hematocrit 41.8      MCV 95      MCH 31.5      MCHC 33.3      RDW 14.1      Platelet Count 314      % Neutrophils 81      % Lymphocytes 5      % Monocytes 13      % Eosinophils 0      % Basophils 0      % Immature Granulocytes 1      NRBCs per 100 WBC 0      Absolute Neutrophils 11.6 (*)     Absolute Lymphocytes 0.7 (*)     Absolute Monocytes 1.9 (*)     Absolute Eosinophils 0.0      Absolute Basophils 0.1      Absolute Immature Granulocytes 0.1      Absolute NRBCs 0.0     NT PROBNP INPATIENT - Normal    N terminal Pro BNP Inpatient 1,116     INFLUENZA A/B, RSV, & SARS-COV2 PCR - Normal    Influenza A PCR Negative      Influenza B PCR Negative      RSV PCR Negative      SARS CoV2 PCR Negative     LACTIC ACID WHOLE BLOOD   ROUTINE UA WITH MICROSCOPIC REFLEX TO CULTURE   BLOOD CULTURE   BLOOD CULTURE        XR Chest 2 Views   Preliminary Result   IMPRESSION: Interstitial and airspace opacities in the left mid and   lower lung may be related to  infection and/or atelectasis. The right   lung is clear. No pleural effusions. Pulmonary vascularity is within   normal limits.          Treatments provided: see above see mar   Family Comments: none   OBS brochure/video discussed/provided to patient:  Yes  ED Medications:   Medications   cefTRIAXone (ROCEPHIN) 2 g vial to attach to  ml bag for ADULTS or NS 50 ml bag for PEDS (2 g Intravenous $New Bag 4/29/24 1117)   azithromycin 500 mg (ZITHROMAX) in 0.9% NaCl 250 mL intermittent infusion 500 mg (has no administration in time range)   sodium chloride 0.9% BOLUS 1,000 mL (has no administration in time range)   sodium chloride 0.9% BOLUS 1,000 mL (1,000 mLs Intravenous $New Bag 4/29/24 1020)       Drips infusing:  Yes  For the majority of the shift this patient was Green.   Interventions performed were none .    Sepsis treatment initiated: No    Cares/treatment/interventions/medications to be completed following ED care: see above     ED Nurse Name: Kary Arriaga RN  11:24 AM.  RECEIVING UNIT ED HANDOFF REVIEW    Above ED Nurse Handoff Report was reviewed: Yes  Reviewed by: Dagmar Painting RN on April 29, 2024 at 3:56 PM   I Bunny called the ED to inform them the note was read: No

## 2024-04-30 ENCOUNTER — APPOINTMENT (OUTPATIENT)
Dept: CARDIOLOGY | Facility: CLINIC | Age: 84
End: 2024-04-30
Attending: PHYSICIAN ASSISTANT
Payer: COMMERCIAL

## 2024-04-30 LAB
ANION GAP SERPL CALCULATED.3IONS-SCNC: 9 MMOL/L (ref 7–15)
BASOPHILS # BLD AUTO: 0.1 10E3/UL (ref 0–0.2)
BASOPHILS NFR BLD AUTO: 1 %
BUN SERPL-MCNC: 21.5 MG/DL (ref 8–23)
CALCIUM SERPL-MCNC: 8.6 MG/DL (ref 8.8–10.2)
CHLORIDE SERPL-SCNC: 108 MMOL/L (ref 98–107)
CREAT SERPL-MCNC: 0.98 MG/DL (ref 0.67–1.17)
DEPRECATED HCO3 PLAS-SCNC: 18 MMOL/L (ref 22–29)
EGFRCR SERPLBLD CKD-EPI 2021: 77 ML/MIN/1.73M2
ENTEROCOCCUS FAECALIS: NOT DETECTED
ENTEROCOCCUS FAECIUM: NOT DETECTED
EOSINOPHIL # BLD AUTO: 0.2 10E3/UL (ref 0–0.7)
EOSINOPHIL NFR BLD AUTO: 2 %
ERYTHROCYTE [DISTWIDTH] IN BLOOD BY AUTOMATED COUNT: 14.3 % (ref 10–15)
GLUCOSE SERPL-MCNC: 95 MG/DL (ref 70–99)
HCT VFR BLD AUTO: 35.6 % (ref 40–53)
HGB BLD-MCNC: 11.6 G/DL (ref 13.3–17.7)
IMM GRANULOCYTES # BLD: 0.1 10E3/UL
IMM GRANULOCYTES NFR BLD: 1 %
LISTERIA SPECIES (DETECTED/NOT DETECTED): NOT DETECTED
LVEF ECHO: NORMAL
LYMPHOCYTES # BLD AUTO: 1.1 10E3/UL (ref 0.8–5.3)
LYMPHOCYTES NFR BLD AUTO: 12 %
MCH RBC QN AUTO: 31.3 PG (ref 26.5–33)
MCHC RBC AUTO-ENTMCNC: 32.6 G/DL (ref 31.5–36.5)
MCV RBC AUTO: 96 FL (ref 78–100)
MONOCYTES # BLD AUTO: 1.4 10E3/UL (ref 0–1.3)
MONOCYTES NFR BLD AUTO: 15 %
NEUTROPHILS # BLD AUTO: 6.7 10E3/UL (ref 1.6–8.3)
NEUTROPHILS NFR BLD AUTO: 69 %
NRBC # BLD AUTO: 0 10E3/UL
NRBC BLD AUTO-RTO: 0 /100
PLATELET # BLD AUTO: 242 10E3/UL (ref 150–450)
POTASSIUM SERPL-SCNC: 3.4 MMOL/L (ref 3.4–5.3)
RBC # BLD AUTO: 3.71 10E6/UL (ref 4.4–5.9)
SODIUM SERPL-SCNC: 135 MMOL/L (ref 135–145)
STAPHYLOCOCCUS AUREUS: NOT DETECTED
STAPHYLOCOCCUS EPIDERMIDIS: NOT DETECTED
STAPHYLOCOCCUS LUGDUNENSIS: NOT DETECTED
STAPHYLOCOCCUS SPECIES: NOT DETECTED
STREPTOCOCCUS AGALACTIAE: NOT DETECTED
STREPTOCOCCUS ANGINOSUS GROUP: NOT DETECTED
STREPTOCOCCUS PNEUMONIAE: NOT DETECTED
STREPTOCOCCUS PYOGENES: NOT DETECTED
STREPTOCOCCUS SPECIES: NOT DETECTED
WBC # BLD AUTO: 9.4 10E3/UL (ref 4–11)

## 2024-04-30 PROCEDURE — 96361 HYDRATE IV INFUSION ADD-ON: CPT

## 2024-04-30 PROCEDURE — 99233 SBSQ HOSP IP/OBS HIGH 50: CPT | Performed by: INTERNAL MEDICINE

## 2024-04-30 PROCEDURE — G0378 HOSPITAL OBSERVATION PER HR: HCPCS

## 2024-04-30 PROCEDURE — 250N000011 HC RX IP 250 OP 636: Performed by: PHYSICIAN ASSISTANT

## 2024-04-30 PROCEDURE — 250N000013 HC RX MED GY IP 250 OP 250 PS 637: Performed by: HOSPITALIST

## 2024-04-30 PROCEDURE — 96376 TX/PRO/DX INJ SAME DRUG ADON: CPT | Mod: 59

## 2024-04-30 PROCEDURE — 85025 COMPLETE CBC W/AUTO DIFF WBC: CPT | Performed by: PHYSICIAN ASSISTANT

## 2024-04-30 PROCEDURE — 36415 COLL VENOUS BLD VENIPUNCTURE: CPT | Performed by: PHYSICIAN ASSISTANT

## 2024-04-30 PROCEDURE — 93306 TTE W/DOPPLER COMPLETE: CPT | Mod: 26 | Performed by: INTERNAL MEDICINE

## 2024-04-30 PROCEDURE — 258N000003 HC RX IP 258 OP 636: Performed by: PHYSICIAN ASSISTANT

## 2024-04-30 PROCEDURE — 250N000013 HC RX MED GY IP 250 OP 250 PS 637: Performed by: PHYSICIAN ASSISTANT

## 2024-04-30 PROCEDURE — 80048 BASIC METABOLIC PNL TOTAL CA: CPT | Performed by: PHYSICIAN ASSISTANT

## 2024-04-30 PROCEDURE — C8929 TTE W OR WO FOL WCON,DOPPLER: HCPCS

## 2024-04-30 PROCEDURE — 250N000013 HC RX MED GY IP 250 OP 250 PS 637: Performed by: INTERNAL MEDICINE

## 2024-04-30 PROCEDURE — 255N000002 HC RX 255 OP 636: Performed by: STUDENT IN AN ORGANIZED HEALTH CARE EDUCATION/TRAINING PROGRAM

## 2024-04-30 RX ORDER — CETIRIZINE HYDROCHLORIDE 10 MG/1
10 TABLET ORAL EVERY EVENING
Status: DISCONTINUED | OUTPATIENT
Start: 2024-04-30 | End: 2024-05-01 | Stop reason: HOSPADM

## 2024-04-30 RX ORDER — AZITHROMYCIN 250 MG/1
250 TABLET, FILM COATED ORAL DAILY
Status: DISCONTINUED | OUTPATIENT
Start: 2024-05-01 | End: 2024-05-01 | Stop reason: HOSPADM

## 2024-04-30 RX ADMIN — CEFTRIAXONE 2 G: 2 INJECTION, POWDER, FOR SOLUTION INTRAMUSCULAR; INTRAVENOUS at 10:07

## 2024-04-30 RX ADMIN — AZITHROMYCIN MONOHYDRATE 500 MG: 500 INJECTION, POWDER, LYOPHILIZED, FOR SOLUTION INTRAVENOUS at 10:56

## 2024-04-30 RX ADMIN — GABAPENTIN 600 MG: 300 CAPSULE ORAL at 17:14

## 2024-04-30 RX ADMIN — HUMAN ALBUMIN MICROSPHERES AND PERFLUTREN 3 ML: 10; .22 INJECTION, SOLUTION INTRAVENOUS at 11:25

## 2024-04-30 RX ADMIN — APIXABAN 5 MG: 5 TABLET, FILM COATED ORAL at 21:14

## 2024-04-30 RX ADMIN — METHOCARBAMOL 500 MG: 500 TABLET ORAL at 10:28

## 2024-04-30 RX ADMIN — DICLOFENAC SODIUM 50 MG: 50 TABLET, DELAYED RELEASE ORAL at 21:14

## 2024-04-30 RX ADMIN — UMECLIDINIUM 1 PUFF: 62.5 AEROSOL, POWDER ORAL at 10:22

## 2024-04-30 RX ADMIN — DICLOFENAC SODIUM 50 MG: 50 TABLET, DELAYED RELEASE ORAL at 12:07

## 2024-04-30 RX ADMIN — METHOCARBAMOL 500 MG: 500 TABLET ORAL at 17:17

## 2024-04-30 RX ADMIN — FLUTICASONE FUROATE AND VILANTEROL TRIFENATATE 1 PUFF: 100; 25 POWDER RESPIRATORY (INHALATION) at 10:23

## 2024-04-30 RX ADMIN — CETIRIZINE HYDROCHLORIDE 10 MG: 10 TABLET, FILM COATED ORAL at 21:14

## 2024-04-30 RX ADMIN — GABAPENTIN 600 MG: 300 CAPSULE ORAL at 10:07

## 2024-04-30 RX ADMIN — GABAPENTIN 600 MG: 300 CAPSULE ORAL at 21:15

## 2024-04-30 ASSESSMENT — ACTIVITIES OF DAILY LIVING (ADL)
ADLS_ACUITY_SCORE: 40
ADLS_ACUITY_SCORE: 42
ADLS_ACUITY_SCORE: 25
ADLS_ACUITY_SCORE: 25
ADLS_ACUITY_SCORE: 42
ADLS_ACUITY_SCORE: 25
ADLS_ACUITY_SCORE: 42
ADLS_ACUITY_SCORE: 40
ADLS_ACUITY_SCORE: 25
ADLS_ACUITY_SCORE: 40
ADLS_ACUITY_SCORE: 42
ADLS_ACUITY_SCORE: 25
ADLS_ACUITY_SCORE: 25
ADLS_ACUITY_SCORE: 42
ADLS_ACUITY_SCORE: 42
ADLS_ACUITY_SCORE: 25
ADLS_ACUITY_SCORE: 25
ADLS_ACUITY_SCORE: 42
ADLS_ACUITY_SCORE: 25
ADLS_ACUITY_SCORE: 25

## 2024-04-30 NOTE — PLAN OF CARE
Goal Outcome Evaluation:      Plan of Care Reviewed With: patient    Overall Patient Progress: improvingOverall Patient Progress: improving    Outcome Evaluation: Pt has been in SR, BP elevated , T of 100, tylenol given, recheck was 99.4. LS are dim, sob with act., 2L NC placed for 02 sats in low 90's on RA. Pt has chronic back pain, scheduled Neurontin, and call out to crosscover MD to order home pain meds. IV abx for probable PNA.

## 2024-04-30 NOTE — PROGRESS NOTES
[facility]    Medicine Progress Note - Hospitalist Service    Date of Admission:  4/29/2024    Assessment & Plan     Tigre Gillette is an 84 year old male with history of chronic low back pain and COPD who presented from [facility] to the ED on 4/29/2024 for evaluation of abnormal EKG concerning for new A-fib.  He had presented to urgent care for evaluation of 5 days of weakness, fevers, chills, and cough.  EKG there showed new atrial fibrillation.  He was referred to the ED for further cares.     ED workup revealed: Heart rate in the 130s with soft BP around /50s, white blood cells 14.3, CO2 of 18, anion gap 17, glucose 132, troponin 36, proBNP 1116, influenza/COVID/RSV negative, lactic acid 2.8 with repeat of 1.9, and ECG with A-fib and rate of 127 bpm with premature ventricular or aberrantly conducted complexes, RBBB, inferior infarct. Repeat EKG showed rate of 77 bpm in sinus rhythm with RBBB.  CXR showed interstitial and airspace opacities in the left mid and lower lung, maybe related to infection and/or atelectasis, Right lung was clear.  There were no pleural effusions. Pulmonary vascularity was within normal limits.  He was admitted for treatment of sepsis due to pneumonia and new onset atrial fibrillation with rapid ventricular response.  After admission, A-fib converted to sinus rhythm.    Problem list:     Sepsis due to left mid and lower lung CAP  -Improving  -Change Zithromax to oral.  Continue IV Rocephin.     New onset A-fib with RVR, converted to SR  Elevated troponin level, likely due to demand ischemia  -[id number] 2 score was 2.  I recommended anticoagulation.  Patient agreed.  Start Eliquis.  -Echo was unremarkable     COPD, without acute exacerbation  -Continue PTA Trelegy Ellipta inhaler   -Bronchodilators as needed     Chronic low back pain   -Follows with  pain management center, recently had trigger point injections performed on 4/22.    -Continue PTA Voltaren gel, Gabapentin, and Robaxin           Diet: Combination Diet Regular Diet Adult    DVT Prophylaxis: DOAC  Cornell Catheter: Not present  Lines: None     Cardiac Monitoring: ACTIVE order. Indication: Tachyarrhythmias, acute (48 hours)  Code Status: Full Code      Clinically Significant Risk Factors                                    Disposition Plan     Medically Ready for Discharge: Anticipated Tomorrow             Vincent Florentino MD  Hospitalist Service  [facility]  Securely message with [employer] (more info)  Text page via [employer] Paging/Directory   ______________________________________________________________________    Interval History   Feels a little bit better than yesterday but still weak and short of breath    Physical Exam   Vital Signs: Temp: 99  F (37.2  C) Temp src: Oral BP: 135/78 Pulse: 97   Resp: 20 SpO2: 95 % O2 Device: None (Room air) Oxygen Delivery: 2 LPM  Weight: 161 lbs 4.8 oz    GENERAL:  Comfortable. Cooperative.  PSYCH: pleasant, oriented, No acute distress.  EYES: PERRLA, Normal conjunctiva.  HEART:  Regular rate and rhythm. No JVD. Pulses normal. No edema.  LUNGS:  Clear to auscultation, normal Respiratory effort.  ABDOMEN:  Soft, no hepatosplenomegaly, normal bowel sounds.  EXTREMETIES: No clubbing, cyanosis or ischemia  SKIN:  Dry to touch, No rash.      Medical Decision Making       55 MINUTES SPENT BY ME on the date of service doing chart review, history, exam, documentation & further activities per the note.      Data     I have personally reviewed the following data over the past 24 hrs:    9.4  \   11.6 (L)   / 242     135 108 (H) 21.5 /  95   3.4 18 (L) 0.98 \       Imaging results reviewed over the past 24 hrs:   Recent Results (from the past 24 hour(s))   Echocardiogram Complete   Result Value    LVEF  55-60%    Narrative    [id number]  [id number]  [id number]  [id number]^HARMAN^FREEMAN^[facility]  Echocardiography  Laboratory  [address] [place] [address]     Name: RAFY JARRELL  MRN: [id number]  : 1940  Study Date: 2024 10:57 AM  Age: 84 yrs  Gender: Male  Patient Location: [facility]  Reason For Study: Atrial Fibrillation  Ordering Physician: FREEMAN HAMMER  Performed By: Ave [place]     BSA: 2.0 m2  Height: 74 in  Weight: 165 lb  HR: 67  BP: 118/58 mmHg  ______________________________________________________________________________  Procedure  Complete Portable Echo Adult. Optison (NDC #[id number]) given intravenously.  ______________________________________________________________________________  Interpretation Summary     The rhythm was atrial fibrillation.  Left ventricular systolic function is normal.  The visual ejection fraction is 55-60%.  No regional wall motion abnormalities.  Normal right ventricular size and systolic function.  Mild biatrial enlargement.  No significant valve disease.  Normal inferior vena cava.     There is no comparison study available.  ______________________________________________________________________________  Left Ventricle  The left ventricle is normal in size. There is normal left ventricular wall  thickness. Left ventricular systolic function is normal. The visual ejection  fraction is 55-60%. Diastolic function not assessed due to atrial  fibrillation. No regional wall motion abnormalities noted.     Right Ventricle  The right ventricle is normal in size and function.     Atria  There is mild biatrial enlargement. There is no atrial shunt seen.     Mitral Valve  The mitral valve leaflets are mildly thickened. There is mild mitral annular  calcification. There is trace mitral regurgitation. There is no mitral valve  stenosis.     Tricuspid Valve  Normal tricuspid valve. There is trace tricuspid regurgitation. The right  ventricular systolic pressure is approximated at 28.7 mmHg plus the right  atrial pressure.     Aortic Valve  The aortic valve is  trileaflet with aortic valve sclerosis. There is trace  aortic regurgitation. No aortic stenosis is present.     Pulmonic Valve  The pulmonic valve is not well visualized. There is trace pulmonic valvular  regurgitation. Normal pulmonic valve velocity.     Vessels  The aortic root is normal size. Normal size ascending aorta. The inferior vena  cava is normal.     Pericardium  There is no pericardial effusion.     Rhythm  The rhythm was atrial fibrillation.  ______________________________________________________________________________  MMode/2D Measurements & Calculations     IVSd: 1.3 cm  LVIDd: 4.1 cm  LVIDs: 2.2 cm  LVPWd: 1.1 cm  IVC diam: 2.5 cm  FS: 44.9 %  LV mass(C)d: 164.6 grams  LV mass(C)dI: 82.2 grams/m2  Ao root diam: 3.8 cm  asc Aorta Diam: 3.3 cm  LVOT diam: 2.4 cm  LVOT area: 4.4 cm2  Ao root diam index Ht(cm/m): 2.0  Ao root diam index BSA (cm/m2): 1.9  Asc Ao diam index BSA (cm/m2): 1.7  Asc Ao diam index Ht(cm/m): 1.8  LA Volume (BP): 64.4 ml     LA Volume Index (BP): 32.2 ml/m2  RV Base: 3.5 cm  RWT: 0.54     Doppler Measurements & Calculations  MV E max alda: 61.3 cm/sec  MV A max alda: 57.6 cm/sec  MV E/A: 1.1  MV dec time: 0.18 sec  TR max alda: 268.1 cm/sec  TR max P.7 mmHg     ______________________________________________________________________________  Report approved by: Dr Daylin Escobar 2024 12:04 PM           Recent Labs   Lab 24  0619 24  1701 24  1007   WBC 9.4  --  14.3*   HGB 11.6*  --  13.9   MCV 96  --  95     --  314     --  137   POTASSIUM 3.4  --  3.6   CHLORIDE 108*  --  102   CO2 18*  --  18*   BUN 21.5  --  21.0   CR 0.98 1.06 1.09   ANIONGAP 9  --  17*   KEVIN 8.6*  --  9.2   GLC 95  --  132*

## 2024-04-30 NOTE — CONSULTS
Patient has Medicare Advantage through [employer]    Xarelto/Eliquis  --$47/mo  --lf/when total drug costs exceed $5,030, price will increase to a 25% coinsurance, equivalent to $146/mo.    Sonam Flores  Pharmacy Technician/Liaison, Discharge Pharmacy   [phone] (voice or text)  [email or web address]  Available on [facility] and Teams

## 2024-04-30 NOTE — PROGRESS NOTES
Notified that patient's blood culture came back positive for gram-positive cocci in clusters on second day of incubation of 1 out of 2 bottles.  He is otherwise improving on current therapy, awaiting verigene panel.    Barbara Munguia, DO

## 2024-04-30 NOTE — UTILIZATION REVIEW
"[facility] Utilization Review  Admission Status; Secondary Review Determination     Admission Date: 4/29/2024  9:57 AM      Under the authority of the Utilization Management Committee, the utilization review process indicated a secondary review on the above patient.  The review outcome is based on review of the medical records, discussions with staff, and applying clinical experience noted on the date of the review.        (X) Observation Status Appropriate - This patient does not meet hospital inpatient criteria and is placed in observation status. If this patient's primary payer is Medicare and was admitted as an inpatient, Condition Code 44 should be used and patient status changed to \"observation\".   () Observation Status concurrent Review           RATIONALE FOR DETERMINATION   84-year-old male with history of chronic low back pain and COPD, admitted for evaluation of abnormal EKG concerning for new A-fib.  Patient had tachycardia, elevated BNP, borderline blood pressures, anion gap of 17, lactic acidosis, leukocytosis, blood cultures obtained, initial EKG showed heart rate of 127 with A-fib with RVR, repeat EKG showed rate of 77/min in sinus rhythm with right bundle branch block and chest x-ray shows interstitial airspace opacities in the left mid and lower lung related to infection/atelectasis.  Patient found to have sepsis secondary to pneumonia.  Started on IV Rocephin and Zithromax, IV fluid hydration.  CHADS2 score of 2 based on age, received IV metoprolol, spontaneously converted to normal sinus rhythm, echocardiogram shows EF of 55 to 60% otherwise unremarkable, electrolyte abnormalities resolved, patient does not meet criteria for inpatient stay, recommend change to observation status, communicated to Dr. Munguia      The severity of illness, intensity of service provided, expected LOS make the care appropriate for observation status at this time.        The information on this document is " developed by the utilization review team in order for the business office to ensure compliance.  This only denotes the appropriateness of proper admission status and does not reflect the quality of care rendered.              Sincerely,       Wale Gustafson MD  Physician Advisor  Utilization Review-[place]    Phone: [phone]

## 2024-04-30 NOTE — PLAN OF CARE
"  Problem: Adult Inpatient Plan of Care  Goal: Plan of Care Review  Description: The Plan of Care Review/Shift note should be completed every shift.  The Outcome Evaluation is a brief statement about your assessment that the patient is improving, declining, or no change.  This information will be displayed automatically on your shift  note.  Outcome: Progressing  Flowsheets (Taken 4/30/2024 1350)  Outcome Evaluation: Tele SR. Afebrile. Room air. Chronic back pain releived by methocarbamol. Up with cane. A&O x4. Personal belongings and call light in reach. Bed alarm on and falls bundle present. For VS and assessment, please see documentation under flowsheets.   Plan of Care Reviewed With: patient  Overall Patient Progress: improving  Goal: Patient-Specific Goal (Individualized)  Description: You can add care plan individualizations to a care plan. Examples of Individualization might be:  \"Parent requests to be called daily at 9am for status\", \"I have a hard time hearing out of my right ear\", or \"Do not touch me to wake me up as it startles  me\".  Outcome: Progressing  Goal: Absence of Hospital-Acquired Illness or Injury  Outcome: Progressing  Intervention: Identify and Manage Fall Risk  Recent Flowsheet Documentation  Taken 4/30/2024 1000 by Lauren Brady, RN  Safety Promotion/Fall Prevention:   activity supervised   clutter free environment maintained   nonskid shoes/slippers when out of bed   patient and family education   room organization consistent   safety round/check completed   supervised activity  Intervention: Prevent Skin Injury  Recent Flowsheet Documentation  Taken 4/30/2024 1000 by Lauren Brady, RN  Body Position: position changed independently  Skin Protection: adhesive use limited  Intervention: Prevent Infection  Recent Flowsheet Documentation  Taken 4/30/2024 1000 by Lauren Brady, RN  Infection Prevention: rest/sleep promoted  Goal: Optimal Comfort and Wellbeing  Outcome: " Progressing  Intervention: Monitor Pain and Promote Comfort  Recent Flowsheet Documentation  Taken 4/30/2024 1029 by Lauren Brady, RN  Pain Management Interventions:   medication (see MAR)   repositioned  Goal: Readiness for Transition of Care  Outcome: Progressing  Intervention: Mutually Develop Transition Plan  Recent Flowsheet Documentation  Taken 4/30/2024 1000 by Lauren Brady, RN  Equipment Currently Used at Home: cane, straight     Problem: Fall Injury Risk  Goal: Absence of Fall and Fall-Related Injury  Outcome: Progressing  Intervention: Identify and Manage Contributors  Recent Flowsheet Documentation  Taken 4/30/2024 1000 by Lauren Brady, RN  Medication Review/Management: medications reviewed  Intervention: Promote Injury-Free Environment  Recent Flowsheet Documentation  Taken 4/30/2024 1000 by Lauren Brady, RN  Safety Promotion/Fall Prevention:   activity supervised   clutter free environment maintained   nonskid shoes/slippers when out of bed   patient and family education   room organization consistent   safety round/check completed   supervised activity     Problem: Infection  Goal: Absence of Infection Signs and Symptoms  Outcome: Progressing   Goal Outcome Evaluation:      Plan of Care Reviewed With: patient    Overall Patient Progress: improvingOverall Patient Progress: improving    Outcome Evaluation: Tele SR. Afebrile. Room air. Chronic back pain releived by methocarbamol. Up with cane. A&O x4.

## 2024-05-01 ENCOUNTER — APPOINTMENT (OUTPATIENT)
Dept: CARDIOLOGY | Facility: CLINIC | Age: 84
End: 2024-05-01
Attending: INTERNAL MEDICINE
Payer: COMMERCIAL

## 2024-05-01 VITALS
SYSTOLIC BLOOD PRESSURE: 118 MMHG | BODY MASS INDEX: 20.8 KG/M2 | HEIGHT: 74 IN | WEIGHT: 162.04 LBS | RESPIRATION RATE: 20 BRPM | DIASTOLIC BLOOD PRESSURE: 65 MMHG | HEART RATE: 73 BPM | OXYGEN SATURATION: 95 % | TEMPERATURE: 98.3 F

## 2024-05-01 PROCEDURE — 96376 TX/PRO/DX INJ SAME DRUG ADON: CPT

## 2024-05-01 PROCEDURE — 250N000011 HC RX IP 250 OP 636: Performed by: PHYSICIAN ASSISTANT

## 2024-05-01 PROCEDURE — 250N000013 HC RX MED GY IP 250 OP 250 PS 637: Performed by: PHYSICIAN ASSISTANT

## 2024-05-01 PROCEDURE — 250N000013 HC RX MED GY IP 250 OP 250 PS 637: Performed by: HOSPITALIST

## 2024-05-01 PROCEDURE — G0378 HOSPITAL OBSERVATION PER HR: HCPCS

## 2024-05-01 PROCEDURE — 999N000157 HC STATISTIC RCP TIME EA 10 MIN

## 2024-05-01 PROCEDURE — 94640 AIRWAY INHALATION TREATMENT: CPT

## 2024-05-01 PROCEDURE — 93272 ECG/REVIEW INTERPRET ONLY: CPT | Performed by: INTERNAL MEDICINE

## 2024-05-01 PROCEDURE — 99239 HOSP IP/OBS DSCHRG MGMT >30: CPT | Performed by: INTERNAL MEDICINE

## 2024-05-01 PROCEDURE — 93270 REMOTE 30 DAY ECG REV/REPORT: CPT

## 2024-05-01 PROCEDURE — 250N000013 HC RX MED GY IP 250 OP 250 PS 637: Performed by: INTERNAL MEDICINE

## 2024-05-01 RX ORDER — CEFUROXIME AXETIL 500 MG/1
500 TABLET ORAL 2 TIMES DAILY
Qty: 14 TABLET | Refills: 0 | Status: SHIPPED | OUTPATIENT
Start: 2024-05-01 | End: 2024-05-08

## 2024-05-01 RX ORDER — ACETAMINOPHEN 500 MG
500-1000 TABLET ORAL EVERY 6 HOURS PRN
COMMUNITY
Start: 2024-05-01

## 2024-05-01 RX ORDER — AZITHROMYCIN 250 MG/1
250 TABLET, FILM COATED ORAL DAILY
Qty: 2 TABLET | Refills: 0 | Status: SHIPPED | OUTPATIENT
Start: 2024-05-02 | End: 2024-05-04

## 2024-05-01 RX ADMIN — FLUTICASONE FUROATE AND VILANTEROL TRIFENATATE 1 PUFF: 100; 25 POWDER RESPIRATORY (INHALATION) at 07:45

## 2024-05-01 RX ADMIN — DICLOFENAC SODIUM 50 MG: 50 TABLET, DELAYED RELEASE ORAL at 08:25

## 2024-05-01 RX ADMIN — APIXABAN 5 MG: 5 TABLET, FILM COATED ORAL at 08:25

## 2024-05-01 RX ADMIN — AZITHROMYCIN DIHYDRATE 250 MG: 250 TABLET ORAL at 08:25

## 2024-05-01 RX ADMIN — UMECLIDINIUM 1 PUFF: 62.5 AEROSOL, POWDER ORAL at 07:45

## 2024-05-01 RX ADMIN — GABAPENTIN 600 MG: 300 CAPSULE ORAL at 08:25

## 2024-05-01 RX ADMIN — CEFTRIAXONE 2 G: 2 INJECTION, POWDER, FOR SOLUTION INTRAMUSCULAR; INTRAVENOUS at 10:05

## 2024-05-01 RX ADMIN — METHOCARBAMOL 500 MG: 500 TABLET ORAL at 08:50

## 2024-05-01 RX ADMIN — DICLOFENAC SODIUM 4 G: 10 GEL TOPICAL at 10:08

## 2024-05-01 ASSESSMENT — ACTIVITIES OF DAILY LIVING (ADL)
ADLS_ACUITY_SCORE: 25

## 2024-05-01 NOTE — PLAN OF CARE
Goal Outcome Evaluation:      Plan of Care Reviewed With: patient    Overall Patient Progress: improving    Pt A/O x4. VSS. Cardiac event monitor placed on pt this PM. AVS and education given to pt. Questions answered. Pt verbalized understanding and had no other questions. All belongings returned to pt. PIV removed per support staff. Tele discontinued. Pt discharged @ 1700. Family provided transportation.

## 2024-05-01 NOTE — DISCHARGE SUMMARY
[facility]  Hospitalist Discharge Summary      Date of Admission:  4/29/2024  Date of Discharge:  5/1/2024  Discharging Provider: Vincent Florentino MD  Discharge Service: Hospitalist Service    Discharge Diagnoses     Sepsis due to left mid and lower lung community acquired pneumonia  Blood culture growing micrococcus (1 of 2, likely contaminant)  New atrial fibrillation with RVR, converted to SR  Elevated troponin level, likely due to demand ischemia  COPD, without acute exacerbation  Chronic low back pain     Clinically Significant Risk Factors          Follow-ups Needed After Discharge   Follow-up Appointments     Follow-up and recommended labs and tests       Follow up with primary care provider, Physician No Ref-Primary, within   7-14 days for hospital follow- up.  No follow up labs or test are needed.   Follow up with primary care in more than 30 days once event monitor has   been done to review event monitoring. Obtain CXR at that time to ensure   resolution of pneumonia.    Follow up with pain team regarding oral Voltaren with new anticoagulant.            Unresulted Labs Ordered in the Past 30 Days of this Admission       Date and Time Order Name Status Description    4/29/2024 11:00 AM Blood Culture Peripheral Blood Preliminary     4/29/2024 11:00 AM Blood Culture Peripheral Blood Preliminary         These results will be followed up by hospitalists    Discharge Disposition   Discharged to home  Condition at discharge: Stable    Hospital Course   Tigre Gillette is an 84 year old male with history of chronic low back pain and COPD who presented from [facility] to the ED on 4/29/2024 for evaluation of abnormal EKG concerning for new A-fib.  He had presented to urgent care for evaluation of 5 days of weakness, fevers, chills, and cough.  EKG there showed new atrial fibrillation.  He was referred to the ED for further cares.     ED workup revealed: Heart rate in the  130s with soft BP around /50s, white blood cells 14.3, CO2 of 18, anion gap 17, glucose 132, troponin 36, proBNP 1116, influenza/COVID/RSV negative, lactic acid 2.8 with repeat of 1.9, and ECG with A-fib and rate of 127 bpm with premature ventricular or aberrantly conducted complexes, RBBB, inferior infarct. Repeat EKG showed rate of 77 bpm in sinus rhythm with RBBB.  CXR showed interstitial and airspace opacities in the left mid and lower lung, maybe related to infection and/or atelectasis, Right lung was clear.  There were no pleural effusions. Pulmonary vascularity was within normal limits.  He was admitted for treatment of sepsis due to pneumonia and new onset atrial fibrillation with rapid ventricular response.  He was treated with Rocephin and Zithromax for pneumonia. After admission, A-fib converted to sinus rhythm. Eliquis was started for AFIB with CHADSVASC2 score of 2 in a male. 1 of 2 blood cultures grew micrococcus, likely contaminant.  Tigre is feeling better and will discharge home. I will discharge with 2 more days of Zithromax and 7 days of Ceftin (to provide 10 days of coverage in case the micrococcus is a pathogen. Tigre will discharge on Eliquis for AFIB stroke prevention. He takes Voltaren by mouth for his chronic back pain. I am recommending that he take Prilosec 20 mg daily for ulcer prevention until he can discuss his pain regimen with his pain clinic provider. I anticipate that the question of whether or not to continue Eliquis will come up- due to use of NSAID for chronic pain, easy bruising, and the possibility that this brief episode of AFIB may have been reactive AFIB related to pneumonia and sepsis. With this in mind I am requesting a 30 day event monitor to assess AFIB burden. If he has no AFIB over this time period it might be reasonable to consider stopping Eliquis due to: the possibility that this was reactive AFIB, relatively low CHADSVASC2 score, and increased risk of adverse  reaction (bruising, concurrent NSAID use).    Problem list:     Sepsis due to left mid and lower lung CAP  -Discharge on 2 days of Zithromax and 7 days of Ceftin    Blood culture growing micrococcus  -1 of 2 cultures are growing micrococcus; suspect contaminant but will cover for 10 days total in case pathogen     New onset A-fib with RVR versus reactive AFIB converted to SR  Elevated troponin level, likely due to demand ischemia  -Echo was unremarkable  -PBE1VS3-VFPn 2 score was 2.    -Eliquis on discharge  -30 day event monitor on discharge  -Prilosec for now until Tigre can discuss Voltaren use with pain clinic  -Primary care follow up     COPD, without acute exacerbation  -Continue PTA Trelegy Ellipta inhaler   -Bronchodilators as needed     Chronic low back pain   -Follows with  pain management center, recently had trigger point injections performed on 4/22.   -Continue PTA Voltaren gel, Gabapentin, and Robaxin     Consultations This Hospital Stay   PHARMACY LIAISON FOR MEDICATION COVERAGE CONSULT    Code Status   Full Code    Time Spent on this Encounter   I, Vincent Florentino MD, personally saw the patient today and spent greater than 30 minutes discharging this patient.       Vincent Florentino MD  [place] [address] [facility]  [address] [place] [address]  Phone: [phone]  Fax: [phone]  ______________________________________________________________________    Physical Exam   Vital Signs: Temp: 98.7  F (37.1  C) Temp src: Oral BP: (!) 172/73 Pulse: (!) 46   Resp: 20 SpO2: 95 % O2 Device: None (Room air)    Weight: 162 lbs .61 oz  GENERAL:  Comfortable. Cooperative.  PSYCH: pleasant, oriented, No acute distress.  EYES: PERRLA, Normal conjunctiva.  HEART:  Regular rate and rhythm. No JVD. Pulses normal. No edema.  LUNGS:  Clear to auscultation, normal Respiratory effort.  ABDOMEN:  Soft, no hepatosplenomegaly, normal bowel sounds.  EXTREMETIES: No clubbing, cyanosis  or ischemia  SKIN:  Dry to touch, No rash.         Primary Care Physician   Physician No Ref-Primary    Discharge Orders      Reason for your hospital stay    Left lung community acquired pneumonia and brief atrial fibrillation with rapid ventricular response.     Follow-up and recommended labs and tests     Follow up with primary care provider, Physician No Ref-Primary, within 7-14 days for hospital follow- up.  No follow up labs or test are needed. Follow up with primary care in more than 30 days once event monitor has been done to review event monitoring. Obtain CXR at that time to ensure resolution of pneumonia.    Follow up with pain team regarding oral Voltaren with new anticoagulant.     Activity    Your activity upon discharge: activity as tolerated     Diet    Follow this diet upon discharge: Regular       Significant Results and Procedures   Most Recent 3 CBC's:  Recent Labs   Lab Test 04/30/24  0619 04/29/24  1007 06/08/22  1443   WBC 9.4 14.3* 7.0   HGB 11.6* 13.9 14.7   MCV 96 95 93    314 253     Most Recent 3 BMP's:  Recent Labs   Lab Test 04/30/24  0619 04/29/24  1701 04/29/24  1007 06/08/22  1443     --  137 134   POTASSIUM 3.4  --  3.6 4.4   CHLORIDE 108*  --  102 103   CO2 18*  --  18* 27   BUN 21.5  --  21.0 10   CR 0.98 1.06 1.09 0.94   ANIONGAP 9  --  17* 4   KEVIN 8.6*  --  9.2 9.4   GLC 95  --  132* 96     Most Recent 2 LFT's:No lab results found.,   Results for orders placed or performed during the hospital encounter of 04/29/24   XR Chest 2 Views    Narrative    CHEST TWO VIEWS April 29, 2024 10:49 AM     HISTORY: Atrial flutter.    COMPARISON: 5/25/2015.      Impression    IMPRESSION: Interstitial and airspace opacities in the left mid and  lower lung may be related to infection and/or atelectasis. The right  lung is clear. No pleural effusions. Pulmonary vascularity is within  normal limits.    NEVILLE NEWSOME MD         SYSTEM ID:  [id number]   Echocardiogram Complete     Value     LVEF  55-60%    [facility]    [id number]  [id number]  [id number]  [id number]^HARMAN^FREEMAN^OLLIE     [facility]  Echocardiography Laboratory  [address] [place] [address]     Name: RAFY JARRELL  MRN: [id number]  : 1940  Study Date: 2024 10:57 AM  Age: 84 yrs  Gender: Male  Patient Location: [facility]  Reason For Study: Atrial Fibrillation  Ordering Physician: FREEMAN HAMMER  Performed By: vAe Israel     BSA: 2.0 m2  Height: 74 in  Weight: 165 lb  HR: 67  BP: 118/58 mmHg  ______________________________________________________________________________  Procedure  Complete Portable Echo Adult. Optison (NDC #[id number]) given intravenously.  ______________________________________________________________________________  Interpretation Summary     The rhythm was atrial fibrillation.  Left ventricular systolic function is normal.  The visual ejection fraction is 55-60%.  No regional wall motion abnormalities.  Normal right ventricular size and systolic function.  Mild biatrial enlargement.  No significant valve disease.  Normal inferior vena cava.     There is no comparison study available.  ______________________________________________________________________________  Left Ventricle  The left ventricle is normal in size. There is normal left ventricular wall  thickness. Left ventricular systolic function is normal. The visual ejection  fraction is 55-60%. Diastolic function not assessed due to atrial  fibrillation. No regional wall motion abnormalities noted.     Right Ventricle  The right ventricle is normal in size and function.     Atria  There is mild biatrial enlargement. There is no atrial shunt seen.     Mitral Valve  The mitral valve leaflets are mildly thickened. There is mild mitral annular  calcification. There is trace mitral regurgitation. There is no mitral valve  stenosis.     Tricuspid Valve  Normal tricuspid valve. There is trace tricuspid regurgitation. The  right  ventricular systolic pressure is approximated at 28.7 mmHg plus the right  atrial pressure.     Aortic Valve  The aortic valve is trileaflet with aortic valve sclerosis. There is trace  aortic regurgitation. No aortic stenosis is present.     Pulmonic Valve  The pulmonic valve is not well visualized. There is trace pulmonic valvular  regurgitation. Normal pulmonic valve velocity.     Vessels  The aortic root is normal size. Normal size ascending aorta. The inferior vena  cava is normal.     Pericardium  There is no pericardial effusion.     Rhythm  The rhythm was atrial fibrillation.  ______________________________________________________________________________  MMode/2D Measurements & Calculations     IVSd: 1.3 cm  LVIDd: 4.1 cm  LVIDs: 2.2 cm  LVPWd: 1.1 cm  IVC diam: 2.5 cm  FS: 44.9 %  LV mass(C)d: 164.6 grams  LV mass(C)dI: 82.2 grams/m2  Ao root diam: 3.8 cm  asc Aorta Diam: 3.3 cm  LVOT diam: 2.4 cm  LVOT area: 4.4 cm2  Ao root diam index Ht(cm/m): 2.0  Ao root diam index BSA (cm/m2): 1.9  Asc Ao diam index BSA (cm/m2): 1.7  Asc Ao diam index Ht(cm/m): 1.8  LA Volume (BP): 64.4 ml     LA Volume Index (BP): 32.2 ml/m2  RV Base: 3.5 cm  RWT: 0.54     Doppler Measurements & Calculations  MV E max alda: 61.3 cm/sec  MV A max alda: 57.6 cm/sec  MV E/A: 1.1  MV dec time: 0.18 sec  TR max alda: 268.1 cm/sec  TR max P.7 mmHg     ______________________________________________________________________________  Report approved by: Dr Daylin Escobar 2024 12:04 PM             Discharge Medications   Current Discharge Medication List        START taking these medications    Details   acetaminophen (TYLENOL) 500 MG tablet Take 1-2 tablets (500-1,000 mg) by mouth every 6 hours as needed for pain (and adjunct with moderate or severe pain or per patient request)    Associated Diagnoses: Pain      apixaban ANTICOAGULANT (ELIQUIS) 5 MG tablet Take 1 tablet (5 mg) by mouth 2 times daily  Qty: 60 tablet, Refills:  11    Associated Diagnoses: Atrial fibrillation, unspecified type (H)      azithromycin (ZITHROMAX) 250 MG tablet Take 1 tablet (250 mg) by mouth daily for 2 days  Qty: 2 tablet, Refills: 0    Associated Diagnoses: Pneumonia of left lung due to infectious organism, unspecified part of lung      cefuroxime (CEFTIN) 500 MG tablet Take 1 tablet (500 mg) by mouth 2 times daily for 7 days  Qty: 14 tablet, Refills: 0    Associated Diagnoses: Pneumonia of left lung due to infectious organism, unspecified part of lung      omeprazole (PRILOSEC) 20 MG DR capsule Take 1 capsule (20 mg) by mouth daily    Associated Diagnoses: Long term current use of anticoagulant therapy           CONTINUE these medications which have NOT CHANGED    Details   cetirizine (ZYRTEC) 10 MG tablet Take 10 mg by mouth every evening      diclofenac (VOLTAREN) 1 % topical gel Apply 4 g topically 4 times daily as needed for other (pain)  Qty: 350 g, Refills: 0    Comments: Patient will call when needed  Associated Diagnoses: Myofascial pain      diclofenac (VOLTAREN) 50 MG EC tablet Take 1 tablet (50 mg) by mouth 2 times daily  Qty: 180 tablet, Refills: 1    Associated Diagnoses: Myofascial pain      gabapentin (NEURONTIN) 300 MG capsule Take 2 capsules (600 mg) by mouth 3 times daily  Qty: 540 capsule, Refills: 3    Associated Diagnoses: Pain syndrome, chronic; Lumbar facet arthropathy      hydrocortisone 2.5 % cream Apply topically as needed      methocarbamol (ROBAXIN) 500 MG tablet Take 500 mg by mouth 3 times daily      TRELEGY ELLIPTA 100-62.5-25 MCG/INH oral inhaler Inhale 1 puff into the lungs daily      TRIAMCINOLONE ACETONIDE EX Apply topically daily as needed (eczema)           STOP taking these medications       aspirin (ASA) 325 MG EC tablet Comments:   Reason for Stopping:             Allergies   Allergies   Allergen Reactions    Amoxicillin

## 2024-05-01 NOTE — PLAN OF CARE
PRIMARY DIAGNOSIS: PNEUMONIA  OUTPATIENT/OBSERVATION GOALS TO BE MET BEFORE DISCHARGE:  Dyspnea improved and O2 sats >88% on RA or back to baseline O2 levels: Yes   SpO2: 93 %, O2 Device: None (Room air)    Tolerating oral abx or appropriate plans made outpatient infusion:  Not on PO abx, currently still on IV abx    Vitals signs normal or return to baseline: Yes    Short term supplemental O2 needed with activity at home: No    Tolerate oral intake to maintain hydration: Yes    Return to near baseline physical activity: Yes    Discharge Planner Nurse   Safe discharge environment identified: Yes  Barriers to discharge: No       Entered by: Cielo Peacock RN 04/30/2024 7:37 PM     Please review provider order for any additional goals.   Nurse to notify provider when observation goals have been met and patient is ready for discharge.

## 2024-05-01 NOTE — PLAN OF CARE
Pt is alert and oriented x4. Pt denies pain or discomfort. VSS. No acute distress noted. Pt is on tele monitoring, SR. Pt is on Rocephin and Azithromycin  for pneumonia. Pt SBA with a cane. Pt denies shortness of breath. Pt is on room air. Pt lungs diminished. Pt is sleeping in bed. Bed alarm in place and call light within reach. Will continue to monitor and assess pt.

## 2024-05-01 NOTE — PLAN OF CARE
"Alert, oriented. Up STA. PIV SL. Low back pain with vol gel and methocarbamol with relief. K/mag protocols. Tele SR. Personal belongings and call light in reach. Bed alarm on and falls bundle present. For VS and assessment, please see documentation under flowsheets.   Goal Outcome Evaluation:      Plan of Care Reviewed With: patient    Overall Patient Progress: improvingOverall Patient Progress: improving    Outcome Evaluation: PNA on abx.    Problem: Adult Inpatient Plan of Care  Goal: Plan of Care Review  Description: The Plan of Care Review/Shift note should be completed every shift.  The Outcome Evaluation is a brief statement about your assessment that the patient is improving, declining, or no change.  This information will be displayed automatically on your shift  note.  Outcome: Progressing  Flowsheets (Taken 5/1/2024 1356)  Outcome Evaluation: PNA on abx.  Plan of Care Reviewed With: patient  Overall Patient Progress: improving  Goal: Patient-Specific Goal (Individualized)  Description: You can add care plan individualizations to a care plan. Examples of Individualization might be:  \"Parent requests to be called daily at 9am for status\", \"I have a hard time hearing out of my right ear\", or \"Do not touch me to wake me up as it startles  me\".  Outcome: Progressing  Goal: Absence of Hospital-Acquired Illness or Injury  Outcome: Progressing  Intervention: Identify and Manage Fall Risk  Recent Flowsheet Documentation  Taken 5/1/2024 0836 by Lauren Brady, RN  Safety Promotion/Fall Prevention:   activity supervised   clutter free environment maintained   nonskid shoes/slippers when out of bed   room near nurse's station   room organization consistent   safety round/check completed   supervised activity  Intervention: Prevent Skin Injury  Recent Flowsheet Documentation  Taken 5/1/2024 0836 by Lauren Brady, RN  Body Position: position changed independently  Intervention: Prevent Infection  Recent Flowsheet " Documentation  Taken 5/1/2024 0836 by Lauren Brady RN  Infection Prevention: hand hygiene promoted  Goal: Optimal Comfort and Wellbeing  Outcome: Progressing  Intervention: Monitor Pain and Promote Comfort  Recent Flowsheet Documentation  Taken 5/1/2024 1100 by Lauren Brady RN  Pain Management Interventions:   emotional support   repositioned  Taken 5/1/2024 0945 by Lauren Brady RN  Pain Management Interventions:   care clustered   emotional support   pain management plan reviewed with patient/caregiver   pillow support provided   rest   repositioned   medication (see MAR)  Taken 5/1/2024 0850 by Lauren Brady RN  Pain Management Interventions: medication (see MAR)  Goal: Readiness for Transition of Care  Outcome: Progressing     Problem: Fall Injury Risk  Goal: Absence of Fall and Fall-Related Injury  Outcome: Progressing  Intervention: Identify and Manage Contributors  Recent Flowsheet Documentation  Taken 5/1/2024 0836 by Lauren Brady RN  Medication Review/Management: medications reviewed  Intervention: Promote Injury-Free Environment  Recent Flowsheet Documentation  Taken 5/1/2024 0836 by Lauren Brady, RN  Safety Promotion/Fall Prevention:   activity supervised   clutter free environment maintained   nonskid shoes/slippers when out of bed   room near nurse's station   room organization consistent   safety round/check completed   supervised activity     Problem: Infection  Goal: Absence of Infection Signs and Symptoms  Outcome: Progressing

## 2024-05-01 NOTE — PLAN OF CARE
Shift Summary (1500 - 2330):    Pt A/O x4. LS dim and clear, some KRISHNAN, infrequent coughs, O2 sats 93-96% on RA. PO Eliquis started this PM. On IV Rocephin and PO Zithromax for PNA. IS initiated, pt tolerated well. Up SBA w/ cane to bathroom.     Goal Outcome Evaluation:      Plan of Care Reviewed With: patient    Overall Patient Progress: improving    Outcome Evaluation: Tele SR/ST w/ BBB and occasional PVCs. VSS, afebrile. Denies c/p, dizziness. Chronic low back pain managed effectively w/ PRN Robaxin and scheduled Gabapentin and Voltaren PO.    PRIMARY DIAGNOSIS: PNEUMONIA  OUTPATIENT/OBSERVATION GOALS TO BE MET BEFORE DISCHARGE:  Dyspnea improved and O2 sats >88% on RA or back to baseline O2 levels: Yes   SpO2: 96 %, O2 Device: None (Room air)    Tolerating oral abx or appropriate plans made outpatient infusion: Yes    Vitals signs normal or return to baseline: Yes    Short term supplemental O2 needed with activity at home: No    Tolerate oral intake to maintain hydration: Yes    Return to near baseline physical activity: Yes    Discharge Planner Nurse   Safe discharge environment identified: Yes  Barriers to discharge: No       Entered by: Cielo Peacock RN 04/30/2024 10:45 PM     Please review provider order for any additional goals.   Nurse to notify provider when observation goals have been met and patient is ready for discharge.   Please review provider order for any additional goals.   Nurse to notify provider when observation goals have been met and patient is ready for discharge.    Problem: Adult Inpatient Plan of Care  Goal: Plan of Care Review  Description: The Plan of Care Review/Shift note should be completed every shift.  The Outcome Evaluation is a brief statement about your assessment that the patient is improving, declining, or no change.  This information will be displayed automatically on your shift  note.  Outcome: Progressing  Flowsheets (Taken 4/30/2024 2236)  Outcome Evaluation: Tele SR/ST w/  "BBB and occasional PVCs. VSS, afebrile. Denies c/p, dizziness. Chronic low back pain managed effectively w/ PRN Robaxin and scheduled Gabapentin and Voltaren PO.  Plan of Care Reviewed With: patient  Overall Patient Progress: improving  Goal: Patient-Specific Goal (Individualized)  Description: You can add care plan individualizations to a care plan. Examples of Individualization might be:  \"Parent requests to be called daily at 9am for status\", \"I have a hard time hearing out of my right ear\", or \"Do not touch me to wake me up as it startles  me\".  Outcome: Progressing  Goal: Absence of Hospital-Acquired Illness or Injury  Outcome: Progressing  Intervention: Identify and Manage Fall Risk  Recent Flowsheet Documentation  Taken 4/30/2024 1537 by Cielo Peacock RN  Safety Promotion/Fall Prevention:   assistive device/personal items within reach   activity supervised   clutter free environment maintained   nonskid shoes/slippers when out of bed   patient and family education   room door open   room near nurse's station   room organization consistent   safety round/check completed   supervised activity  Intervention: Prevent Skin Injury  Recent Flowsheet Documentation  Taken 4/30/2024 1537 by Cielo Peacock RN  Body Position: position changed independently  Intervention: Prevent and Manage VTE (Venous Thromboembolism) Risk  Recent Flowsheet Documentation  Taken 4/30/2024 1537 by Cielo Peaocck RN  VTE Prevention/Management: SCDs (sequential compression devices) off  Intervention: Prevent Infection  Recent Flowsheet Documentation  Taken 4/30/2024 1537 by Cielo Peacock RN  Infection Prevention:   single patient room provided   hand hygiene promoted  Goal: Optimal Comfort and Wellbeing  Outcome: Progressing  Intervention: Monitor Pain and Promote Comfort  Recent Flowsheet Documentation  Taken 4/30/2024 1537 by Cielo Peacock RN  Pain Management Interventions:   repositioned   rest  Goal: Readiness for Transition of Care  Outcome: " Progressing     Problem: Fall Injury Risk  Goal: Absence of Fall and Fall-Related Injury  Outcome: Progressing  Intervention: Identify and Manage Contributors  Recent Flowsheet Documentation  Taken 4/30/2024 1537 by Cielo Peacock RN  Medication Review/Management: medications reviewed  Intervention: Promote Injury-Free Environment  Recent Flowsheet Documentation  Taken 4/30/2024 1537 by Cielo Peacock, RN  Safety Promotion/Fall Prevention:   assistive device/personal items within reach   activity supervised   clutter free environment maintained   nonskid shoes/slippers when out of bed   patient and family education   room door open   room near nurse's station   room organization consistent   safety round/check completed   supervised activity     Problem: Infection  Goal: Absence of Infection Signs and Symptoms  Outcome: Progressing

## 2024-05-02 LAB
BACTERIA BLD CULT: ABNORMAL
BACTERIA BLD CULT: ABNORMAL

## 2024-05-04 ENCOUNTER — HOSPITAL ENCOUNTER (EMERGENCY)
Facility: CLINIC | Age: 84
Discharge: HOME | End: 2024-05-04
Attending: EMERGENCY MEDICINE | Admitting: EMERGENCY MEDICINE
Payer: COMMERCIAL

## 2024-05-04 ENCOUNTER — APPOINTMENT (OUTPATIENT)
Dept: GENERAL RADIOLOGY | Facility: CLINIC | Age: 84
End: 2024-05-04
Attending: EMERGENCY MEDICINE
Payer: COMMERCIAL

## 2024-05-04 VITALS
HEART RATE: 69 BPM | SYSTOLIC BLOOD PRESSURE: 123 MMHG | OXYGEN SATURATION: 97 % | TEMPERATURE: 98.1 F | DIASTOLIC BLOOD PRESSURE: 88 MMHG | RESPIRATION RATE: 12 BRPM

## 2024-05-04 DIAGNOSIS — J18.9 PNEUMONIA DUE TO INFECTIOUS ORGANISM, UNSPECIFIED LATERALITY, UNSPECIFIED PART OF LUNG: ICD-10-CM

## 2024-05-04 DIAGNOSIS — R00.2 PALPITATIONS: ICD-10-CM

## 2024-05-04 LAB
ANION GAP SERPL CALCULATED.3IONS-SCNC: 9 MMOL/L (ref 7–15)
BACTERIA BLD CULT: NO GROWTH
BASOPHILS # BLD AUTO: 0.1 10E3/UL (ref 0–0.2)
BASOPHILS NFR BLD AUTO: 1 %
BUN SERPL-MCNC: 7.4 MG/DL (ref 8–23)
CALCIUM SERPL-MCNC: 8.9 MG/DL (ref 8.8–10.2)
CHLORIDE SERPL-SCNC: 101 MMOL/L (ref 98–107)
CREAT SERPL-MCNC: 0.73 MG/DL (ref 0.67–1.17)
DEPRECATED HCO3 PLAS-SCNC: 25 MMOL/L (ref 22–29)
EGFRCR SERPLBLD CKD-EPI 2021: >90 ML/MIN/1.73M2
EOSINOPHIL # BLD AUTO: 0.2 10E3/UL (ref 0–0.7)
EOSINOPHIL NFR BLD AUTO: 3 %
ERYTHROCYTE [DISTWIDTH] IN BLOOD BY AUTOMATED COUNT: 14.1 % (ref 10–15)
GLUCOSE SERPL-MCNC: 93 MG/DL (ref 70–99)
HCT VFR BLD AUTO: 36.6 % (ref 40–53)
HGB BLD-MCNC: 12.1 G/DL (ref 13.3–17.7)
HOLD SPECIMEN: NORMAL
HOLD SPECIMEN: NORMAL
IMM GRANULOCYTES # BLD: 0.1 10E3/UL
IMM GRANULOCYTES NFR BLD: 1 %
LYMPHOCYTES # BLD AUTO: 1 10E3/UL (ref 0.8–5.3)
LYMPHOCYTES NFR BLD AUTO: 12 %
MCH RBC QN AUTO: 31.3 PG (ref 26.5–33)
MCHC RBC AUTO-ENTMCNC: 33.1 G/DL (ref 31.5–36.5)
MCV RBC AUTO: 95 FL (ref 78–100)
MONOCYTES # BLD AUTO: 1.1 10E3/UL (ref 0–1.3)
MONOCYTES NFR BLD AUTO: 13 %
NEUTROPHILS # BLD AUTO: 5.8 10E3/UL (ref 1.6–8.3)
NEUTROPHILS NFR BLD AUTO: 70 %
NRBC # BLD AUTO: 0 10E3/UL
NRBC BLD AUTO-RTO: 0 /100
PLATELET # BLD AUTO: 374 10E3/UL (ref 150–450)
POTASSIUM SERPL-SCNC: 3.4 MMOL/L (ref 3.4–5.3)
RBC # BLD AUTO: 3.87 10E6/UL (ref 4.4–5.9)
SODIUM SERPL-SCNC: 135 MMOL/L (ref 135–145)
TROPONIN T SERPL HS-MCNC: 29 NG/L
WBC # BLD AUTO: 8.3 10E3/UL (ref 4–11)

## 2024-05-04 PROCEDURE — 96360 HYDRATION IV INFUSION INIT: CPT

## 2024-05-04 PROCEDURE — 258N000003 HC RX IP 258 OP 636: Performed by: EMERGENCY MEDICINE

## 2024-05-04 PROCEDURE — 36415 COLL VENOUS BLD VENIPUNCTURE: CPT | Performed by: EMERGENCY MEDICINE

## 2024-05-04 PROCEDURE — 84484 ASSAY OF TROPONIN QUANT: CPT | Performed by: EMERGENCY MEDICINE

## 2024-05-04 PROCEDURE — 71046 X-RAY EXAM CHEST 2 VIEWS: CPT

## 2024-05-04 PROCEDURE — 80048 BASIC METABOLIC PNL TOTAL CA: CPT | Performed by: EMERGENCY MEDICINE

## 2024-05-04 PROCEDURE — 93005 ELECTROCARDIOGRAM TRACING: CPT

## 2024-05-04 PROCEDURE — 85025 COMPLETE CBC W/AUTO DIFF WBC: CPT | Performed by: EMERGENCY MEDICINE

## 2024-05-04 PROCEDURE — 99285 EMERGENCY DEPT VISIT HI MDM: CPT | Mod: 25

## 2024-05-04 RX ADMIN — SODIUM CHLORIDE 1000 ML: 9 INJECTION, SOLUTION INTRAVENOUS at 12:31

## 2024-05-04 ASSESSMENT — ACTIVITIES OF DAILY LIVING (ADL)
ADLS_ACUITY_SCORE: 38
ADLS_ACUITY_SCORE: 36
ADLS_ACUITY_SCORE: 38

## 2024-05-04 ASSESSMENT — COLUMBIA-SUICIDE SEVERITY RATING SCALE - C-SSRS
6. HAVE YOU EVER DONE ANYTHING, STARTED TO DO ANYTHING, OR PREPARED TO DO ANYTHING TO END YOUR LIFE?: NO
2. HAVE YOU ACTUALLY HAD ANY THOUGHTS OF KILLING YOURSELF IN THE PAST MONTH?: NO
1. IN THE PAST MONTH, HAVE YOU WISHED YOU WERE DEAD OR WISHED YOU COULD GO TO SLEEP AND NOT WAKE UP?: NO

## 2024-05-04 NOTE — ED TRIAGE NOTES
"Seen here on Monday. Diagnosed with pneumonia and a-fib. Developed a fever of \"100\" yesterday. Said he was having heart issues earlier today. Radial pulse feels slow and regular        "

## 2024-05-04 NOTE — ED PROVIDER NOTES
Emergency Department Note      History of Present Illness     Chief Complaint  Atrial Fib    HPI  Tigre Gillette is a 84 year old male who was discharged from the hospital on May 1 for pneumonia and A-fib.  He was discharged home on cefuroxime and azithromycin as well as Eliquis.  He has been somewhat tired since has been home.  His daughter checked his pulse today and felt it was irregular so they bring him here for evaluation.  He is not having chest pain or tightness.  He still has a mild cough but he feels like it is getting somewhat better.  He has been eating and drinking.  He has no other complaints at this time.    Independent Historian  His mother and daughter gives much of the information as detailed above.    Review of External Notes  I reviewed his discharge summary from May 1  Past Medical History   Medical History and Problem List  Past Medical History:   Diagnosis Date    Chronic lower back pain     COPD (chronic obstructive pulmonary disease) (H)     Degenerative arthritis of lumbar spine     Eczema        Medications  apixaban ANTICOAGULANT (ELIQUIS) 5 MG tablet  acetaminophen (TYLENOL) 500 MG tablet  azithromycin (ZITHROMAX) 250 MG tablet  cefuroxime (CEFTIN) 500 MG tablet  cetirizine (ZYRTEC) 10 MG tablet  diclofenac (VOLTAREN) 1 % topical gel  diclofenac (VOLTAREN) 50 MG EC tablet  gabapentin (NEURONTIN) 300 MG capsule  hydrocortisone 2.5 % cream  methocarbamol (ROBAXIN) 500 MG tablet  omeprazole (PRILOSEC) 20 MG DR capsule  TRELEGY ELLIPTA 100-62.5-25 MCG/INH oral inhaler  TRIAMCINOLONE ACETONIDE EX        Surgical History   Past Surgical History:   Procedure Laterality Date    HERNIORRHAPHY EPIGASTRIC N/A 1/30/2018    Procedure: HERNIORRHAPHY EPIGASTRIC;  Epigastric herniorrhaphy with Bard Ventralex ST Hernia Patch ;  Surgeon: Rossana Alas MD;  Location: [facility] OR    HERNIORRHAPHY INGUINAL  2010    open recurrent LIH rpr with mesh    HERNIORRHAPHY INGUINAL  1990    open LIH rpr with  mesh    TONSILLECTOMY & ADENOIDECTOMY      t&a     Physical Exam   Patient Vitals for the past 24 hrs:   BP Temp Temp src Pulse Resp SpO2   05/04/24 1300 132/84 -- -- 69 12 94 %   05/04/24 1259 132/84 -- -- 70 14 95 %   05/04/24 1257 -- -- -- 74 13 95 %   05/04/24 1256 -- -- -- 92 12 96 %   05/04/24 1255 -- -- -- 72 22 97 %   05/04/24 1254 -- -- -- 73 17 97 %   05/04/24 1253 -- -- -- 66 23 91 %   05/04/24 1252 -- -- -- 70 12 98 %   05/04/24 1159 (!) 160/89 98.5  F (36.9  C) Temporal 53 20 98 %     Physical Exam  Constitutional: Vital signs reviewed as above  General: Alert, pleasant, tired appearing  HEENT: Moist mucous membranes  Eyes: Pupils are equal, round, and reactive to light.   Neck: Normal range of motion  Cardiovascular: normal rate, Regular rhythm and normal heart sounds.  No MRG  Pulmonary/Chest: Effort normal and breath sounds normal. No respiratory distress. Patient has no wheezes. Patient has no rales.   Gastrointestinal: Soft. Positive bowel sounds. No MRG.  Musculoskeletal/Extremities: Full ROM.  Endo: No pitting edema  Neurological: Alert, no focal deficits.  Skin: Skin is warm and dry.   Psychiatric: Pleasant    Diagnostics   Lab Results   Labs Ordered and Resulted from Time of ED Arrival to Time of ED Departure   BASIC METABOLIC PANEL - Abnormal       Result Value    Sodium 135      Potassium 3.4      Chloride 101      Carbon Dioxide (CO2) 25      Anion Gap 9      Urea Nitrogen 7.4 (*)     Creatinine 0.73      GFR Estimate >90      Calcium 8.9      Glucose 93     TROPONIN T, HIGH SENSITIVITY - Abnormal    Troponin T, High Sensitivity 29 (*)    CBC WITH PLATELETS AND DIFFERENTIAL - Abnormal    WBC Count 8.3      RBC Count 3.87 (*)     Hemoglobin 12.1 (*)     Hematocrit 36.6 (*)     MCV 95      MCH 31.3      MCHC 33.1      RDW 14.1      Platelet Count 374      % Neutrophils 70      % Lymphocytes 12      % Monocytes 13      % Eosinophils 3      % Basophils 1      % Immature Granulocytes 1      NRBCs  per 100 WBC 0      Absolute Neutrophils 5.8      Absolute Lymphocytes 1.0      Absolute Monocytes 1.1      Absolute Eosinophils 0.2      Absolute Basophils 0.1      Absolute Immature Granulocytes 0.1      Absolute NRBCs 0.0         Imaging  Chest XR,  PA & LAT   Final Result   IMPRESSION: Pulmonary infiltrates in the left lower lobe are similar to previous. There is a small loculated left pleural effusion. Recommend follow-up to ensure clearing. The right lung remains clear.          EKG   ECG results from 05/04/24   EKG 12 lead     Value    Systolic Blood Pressure     Diastolic Blood Pressure     Ventricular Rate 104    Atrial Rate 104    MO Interval 160    QRS Duration 144        QTc 526    P Axis 57    R AXIS 69    T Axis 71    Interpretation ECG      Sinus tachycardia with frequent Premature ventricular complexes in a pattern of bigeminy  Right bundle branch block  Possible Inferior infarct (cited on or before 25-MAY-2015)  Abnormal ECG  When compared with ECG of 29-APR-2024 11:05,  Premature ventricular complexes are now Present  T wave inversion now evident in Anterior leads           Independent Interpretation  Chest x-ray bilateral infiltrates remain similar to previous.  Very small pleural effusion is now noted.  ED Course    Medications Administered  Medications   sodium chloride 0.9% BOLUS 1,000 mL (0 mLs Intravenous Stopped 5/4/24 1400)       Procedures  Procedures     Discussion of Management  None    Social Determinants of Health adding to complexity of care  None    ED Course     Medical Decision Making / Diagnosis   CMS Diagnoses: None    MIPS     None    [facility]  Tigre Gillette is a 84 year old male who presents to the emergency department with concerns over palpitations.  He was recently admitted to the hospital for A-fib and pneumonia.  He is currently on antibiotics and he is on Eliquis as well.  They were concerned that he may have been in A-fib today.  His EKG here shows bigeminy with a rate  of 104.  No ischemic changes.  He does not have any chest pain but a troponin was ordered prior to my seeing him.  It came back at 29 which is lower than when he was in the hospital.  White count has normalized and is now 8.3.  Hemoglobin is improved to 12.1.  Electrolytes are unremarkable.  Chest x-ray looks relatively unchanged.  Currently no worsening pneumonia.  He got some fluids here he is feeling better.  They will continue with the antibiotics and Eliquis at home.  Follow-up as needed.    Disposition  The patient was discharged.     ICD-10 Codes:    ICD-10-CM    1. Palpitations  R00.2       2. Pneumonia due to infectious organism, unspecified laterality, unspecified part of lung  J18.9            Discharge Medications  New Prescriptions    No medications on file         Luis Moscoso MD    [employer]       Luis Moscoso MD  05/04/24 0574

## 2024-05-05 ENCOUNTER — PATIENT OUTREACH (OUTPATIENT)
Dept: CARE COORDINATION | Facility: CLINIC | Age: 84
End: 2024-05-05
Payer: COMMERCIAL

## 2024-05-05 LAB
ATRIAL RATE - MUSE: 104 BPM
DIASTOLIC BLOOD PRESSURE - MUSE: NORMAL MMHG
INTERPRETATION ECG - MUSE: NORMAL
P AXIS - MUSE: 57 DEGREES
PR INTERVAL - MUSE: 160 MS
QRS DURATION - MUSE: 144 MS
QT - MUSE: 400 MS
QTC - MUSE: 526 MS
R AXIS - MUSE: 69 DEGREES
SYSTOLIC BLOOD PRESSURE - MUSE: NORMAL MMHG
T AXIS - MUSE: 71 DEGREES
VENTRICULAR RATE- MUSE: 104 BPM

## 2024-05-05 NOTE — PROGRESS NOTES
Connected Care Resource Center Contact  [facility]/Voicemail     Clinical Data: Post-Discharge Outreach     Outreach attempted x 2.  Left message on patient's voicemail, providing [facility]'s central phone number of [phone] ([phone]) for questions/concerns and/or to schedule an appt with an [facility] provider, if they do not have a PCP.      Plan:  [facility] will do no further outreaches at this time.       Valeria Amor  Community Health Worker  [facility] Care Resource [place], [facility]      *Connected Care Resource Team does NOT follow patient ongoing. Referrals are identified based on internal discharge reports and the outreach is to ensure patient has an understanding of their discharge instructions.

## 2024-05-10 ENCOUNTER — DOCUMENTATION ONLY (OUTPATIENT)
Dept: OTHER | Facility: CLINIC | Age: 84
End: 2024-05-10
Payer: COMMERCIAL

## 2024-06-06 DIAGNOSIS — M79.18 MYOFASCIAL PAIN: Primary | ICD-10-CM

## 2024-06-06 RX ORDER — METHOCARBAMOL 500 MG/1
500 TABLET, FILM COATED ORAL 3 TIMES DAILY
Qty: 60 TABLET | Refills: 1 | Status: SHIPPED | OUTPATIENT
Start: 2024-06-06 | End: 2024-07-11

## 2024-06-06 NOTE — TELEPHONE ENCOUNTER
Received fax request from pharmacy requesting refill(s) for methocarbamol (ROBAXIN) 500 MG tablet    Last refilled on 5/8/2024 per pharmacy.     Pt last seen on 4/22/2024.  Next appt scheduled for NONE.     Will facilitate refill.    Writer Note: You previously filled this medication then it was taken over by Soumya Rojas - pt states he does not have the slightest idea who that may be and would like you to fill it. Please advise.

## 2024-07-11 DIAGNOSIS — M79.18 MYOFASCIAL PAIN: ICD-10-CM

## 2024-07-11 RX ORDER — METHOCARBAMOL 500 MG/1
500 TABLET, FILM COATED ORAL 3 TIMES DAILY
Qty: 60 TABLET | Refills: 1 | Status: SHIPPED | OUTPATIENT
Start: 2024-07-11 | End: 2024-08-16

## 2024-07-11 NOTE — TELEPHONE ENCOUNTER
Received fax request from [facility] requesting refill(s) for methocarbamol (ROBAXIN) 500 MG tablet     Last refilled on 06/22/24    Pt last seen on 04/22/24  Next appt scheduled for: none    Will facilitate refill.

## 2024-07-30 ENCOUNTER — APPOINTMENT (OUTPATIENT)
Dept: CT IMAGING | Facility: CLINIC | Age: 84
End: 2024-07-30
Attending: EMERGENCY MEDICINE
Payer: COMMERCIAL

## 2024-07-30 ENCOUNTER — HOSPITAL ENCOUNTER (EMERGENCY)
Facility: CLINIC | Age: 84
Discharge: HOME | End: 2024-07-30
Attending: EMERGENCY MEDICINE | Admitting: EMERGENCY MEDICINE
Payer: COMMERCIAL

## 2024-07-30 VITALS
HEIGHT: 74 IN | WEIGHT: 162.26 LBS | RESPIRATION RATE: 18 BRPM | DIASTOLIC BLOOD PRESSURE: 118 MMHG | BODY MASS INDEX: 20.82 KG/M2 | OXYGEN SATURATION: 99 % | TEMPERATURE: 98.2 F | SYSTOLIC BLOOD PRESSURE: 134 MMHG | HEART RATE: 59 BPM

## 2024-07-30 DIAGNOSIS — K40.90 RIGHT INGUINAL HERNIA: ICD-10-CM

## 2024-07-30 DIAGNOSIS — R91.1 PULMONARY NODULE: ICD-10-CM

## 2024-07-30 DIAGNOSIS — I71.43 INFRARENAL ABDOMINAL AORTIC ANEURYSM (AAA) WITHOUT RUPTURE (H): ICD-10-CM

## 2024-07-30 LAB
ANION GAP SERPL CALCULATED.3IONS-SCNC: 12 MMOL/L (ref 7–15)
BASOPHILS # BLD AUTO: 0.1 10E3/UL (ref 0–0.2)
BASOPHILS NFR BLD AUTO: 1 %
BUN SERPL-MCNC: 11.1 MG/DL (ref 8–23)
CALCIUM SERPL-MCNC: 9.9 MG/DL (ref 8.8–10.4)
CHLORIDE SERPL-SCNC: 99 MMOL/L (ref 98–107)
CREAT SERPL-MCNC: 0.87 MG/DL (ref 0.67–1.17)
EGFRCR SERPLBLD CKD-EPI 2021: 86 ML/MIN/1.73M2
EOSINOPHIL # BLD AUTO: 0.3 10E3/UL (ref 0–0.7)
EOSINOPHIL NFR BLD AUTO: 4 %
ERYTHROCYTE [DISTWIDTH] IN BLOOD BY AUTOMATED COUNT: 14.4 % (ref 10–15)
GLUCOSE SERPL-MCNC: 92 MG/DL (ref 70–99)
HCO3 SERPL-SCNC: 22 MMOL/L (ref 22–29)
HCT VFR BLD AUTO: 44.2 % (ref 40–53)
HGB BLD-MCNC: 14.4 G/DL (ref 13.3–17.7)
IMM GRANULOCYTES # BLD: 0 10E3/UL
IMM GRANULOCYTES NFR BLD: 0 %
LYMPHOCYTES # BLD AUTO: 1.6 10E3/UL (ref 0.8–5.3)
LYMPHOCYTES NFR BLD AUTO: 19 %
MCH RBC QN AUTO: 31.4 PG (ref 26.5–33)
MCHC RBC AUTO-ENTMCNC: 32.6 G/DL (ref 31.5–36.5)
MCV RBC AUTO: 96 FL (ref 78–100)
MONOCYTES # BLD AUTO: 0.9 10E3/UL (ref 0–1.3)
MONOCYTES NFR BLD AUTO: 11 %
NEUTROPHILS # BLD AUTO: 5.5 10E3/UL (ref 1.6–8.3)
NEUTROPHILS NFR BLD AUTO: 65 %
NRBC # BLD AUTO: 0 10E3/UL
NRBC BLD AUTO-RTO: 0 /100
PLATELET # BLD AUTO: 285 10E3/UL (ref 150–450)
POTASSIUM SERPL-SCNC: 4.4 MMOL/L (ref 3.4–5.3)
RBC # BLD AUTO: 4.59 10E6/UL (ref 4.4–5.9)
SODIUM SERPL-SCNC: 133 MMOL/L (ref 135–145)
WBC # BLD AUTO: 8.4 10E3/UL (ref 4–11)

## 2024-07-30 PROCEDURE — 99285 EMERGENCY DEPT VISIT HI MDM: CPT | Mod: 25

## 2024-07-30 PROCEDURE — 85025 COMPLETE CBC W/AUTO DIFF WBC: CPT | Performed by: EMERGENCY MEDICINE

## 2024-07-30 PROCEDURE — 250N000011 HC RX IP 250 OP 636: Performed by: EMERGENCY MEDICINE

## 2024-07-30 PROCEDURE — 36415 COLL VENOUS BLD VENIPUNCTURE: CPT | Performed by: EMERGENCY MEDICINE

## 2024-07-30 PROCEDURE — 250N000009 HC RX 250: Performed by: EMERGENCY MEDICINE

## 2024-07-30 PROCEDURE — 74177 CT ABD & PELVIS W/CONTRAST: CPT

## 2024-07-30 PROCEDURE — 80048 BASIC METABOLIC PNL TOTAL CA: CPT | Performed by: EMERGENCY MEDICINE

## 2024-07-30 RX ORDER — IOPAMIDOL 755 MG/ML
500 INJECTION, SOLUTION INTRAVASCULAR ONCE
Status: COMPLETED | OUTPATIENT
Start: 2024-07-30 | End: 2024-07-30

## 2024-07-30 RX ADMIN — IOPAMIDOL 81 ML: 755 INJECTION, SOLUTION INTRAVENOUS at 17:11

## 2024-07-30 RX ADMIN — SODIUM CHLORIDE 60 ML: 9 INJECTION, SOLUTION INTRAVENOUS at 17:11

## 2024-07-30 ASSESSMENT — ACTIVITIES OF DAILY LIVING (ADL)
ADLS_ACUITY_SCORE: 38

## 2024-07-30 ASSESSMENT — COLUMBIA-SUICIDE SEVERITY RATING SCALE - C-SSRS
2. HAVE YOU ACTUALLY HAD ANY THOUGHTS OF KILLING YOURSELF IN THE PAST MONTH?: NO
6. HAVE YOU EVER DONE ANYTHING, STARTED TO DO ANYTHING, OR PREPARED TO DO ANYTHING TO END YOUR LIFE?: NO
1. IN THE PAST MONTH, HAVE YOU WISHED YOU WERE DEAD OR WISHED YOU COULD GO TO SLEEP AND NOT WAKE UP?: NO

## 2024-07-30 NOTE — ED TRIAGE NOTES
Pt. Presents to ED with complaints of an inguinal hernia bulge and increased pain in his groin that started within the last few days. Pt. Reports he had it surgically repaired with mesh over 10 years ago and is concerned its failing. Reports he also had an umbilical hernia repaired with mesh as well. AVSS on RA. A & O x 4, independent. Denies N/V/D. Reports his urination is normal other than his baseline difficulty. Of note, patient is on Eliquis for Afib and has some pulmonary nodules concerning for cancer.

## 2024-07-30 NOTE — DISCHARGE INSTRUCTIONS
Return to the ER for worsening abdominal pain, vomiting or inability take oral fluids, inability to move your bowels, or for any new concerns.

## 2024-07-30 NOTE — ED PROVIDER NOTES
Emergency Department Note      History of Present Illness     Chief Complaint   Inguinal Hernia      HPI   Tigre Gillette is a 84 year old male on Eliquis with a history of inguinal hernia repair who presents with suspected recurrent inguinal hernia. For the past 2 days he noticed pain and bulging in his groin region, similar to his past inguinal hernia which was repaired with mesh years ago.  He is able to reduce this on his own. He denies any nausea, vomiting, diarrhea, testicular pain or swelling.  He is moving his bowels normally.    Past Medical History     Medical History and Problem List   Chronic lower back pain   COPD  Degenerative arthritis lumbar spine   Pneumonia   Atrial fibrillation   Leukocytosis     Medications   Eliquis   Robaxin   Prilosec     Surgical History   Herniorrhaphy epigastric   Herniorrhaphy inguinal   Tonsillectomy and adenoidectomy     Physical Exam     No data found.    Physical Exam  Constitutional:       General: He is not in acute distress.     Appearance: Normal appearance. He is not toxic-appearing.   HENT:      Head: Atraumatic.   Eyes:      General: No scleral icterus.     Conjunctiva/sclera: Conjunctivae normal.   Cardiovascular:      Rate and Rhythm: Normal rate and regular rhythm.      Heart sounds: Normal heart sounds.   Pulmonary:      Effort: Pulmonary effort is normal. No respiratory distress.      Breath sounds: Normal breath sounds.   Abdominal:      Palpations: Abdomen is soft.      Tenderness: There is no abdominal tenderness.      Comments: Reducible right inguinal hernia.   Genitourinary:     Penis: Normal.       Testes: Normal.   Musculoskeletal:         General: No deformity.      Cervical back: Neck supple.   Skin:     General: Skin is warm.      Capillary Refill: Capillary refill takes less than 2 seconds.   Neurological:      General: No focal deficit present.      Mental Status: He is alert and oriented to person, place, and time.   Psychiatric:          Mood and Affect: Mood normal.         Behavior: Behavior normal.           Diagnostics     Lab Results   Labs Ordered and Resulted from Time of ED Arrival to Time of ED Departure   BASIC METABOLIC PANEL - Abnormal       Result Value    Sodium 133 (*)     Potassium 4.4      Chloride 99      Carbon Dioxide (CO2) 22      Anion Gap 12      Urea Nitrogen 11.1      Creatinine 0.87      GFR Estimate 86      Calcium 9.9      Glucose 92     CBC WITH PLATELETS AND DIFFERENTIAL    WBC Count 8.4      RBC Count 4.59      Hemoglobin 14.4      Hematocrit 44.2      MCV 96      MCH 31.4      MCHC 32.6      RDW 14.4      Platelet Count 285      % Neutrophils 65      % Lymphocytes 19      % Monocytes 11      % Eosinophils 4      % Basophils 1      % Immature Granulocytes 0      NRBCs per 100 WBC 0      Absolute Neutrophils 5.5      Absolute Lymphocytes 1.6      Absolute Monocytes 0.9      Absolute Eosinophils 0.3      Absolute Basophils 0.1      Absolute Immature Granulocytes 0.0      Absolute NRBCs 0.0         Imaging   CT Abdomen Pelvis w Contrast   Final Result   IMPRESSION:    1.  Small bowel loop in proximity of the right inguinal canal, currently without evidence for micheal herniation. Due to it's proximity to the canal, transient herniation possible.   2.  Colonic diverticulosis, without evidence for diverticulitis.   3.  Advanced atherosclerotic disease with 3.0 x 2.8 cm abdominal aortic aneurysm.   4.  Prostatic hypertrophy.   5.  Emphysema, with 1.8 x 1.1 cm spiculated nodule left lower lobe which could represent scarring or neoplasm. A complete CT chest exam recommended for further evaluation.   6.  Bronchial wall thickening right lower lobes suggesting bronchitis.        ED Course      Medications Administered   Medications   iopamidol (ISOVUE-370) solution 500 mL (81 mLs Intravenous $Given 7/30/24 1711)   CT scan flush (60 mLs Intravenous $Given 7/30/24 1711)       ED Course   ED Course as of 08/01/24 0201 Tue Jul 30,  2024 1531 I initially assessed the patient and obtained the above history and physical exam.   1822 I rechecked the patient        Medical Decision Making / Diagnosis     MDM   Tigre Gillette is a 84 year old male who has a reducible right inguinal hernia.  This is not incarcerated or obstructed.  He will be referred to surgery for consideration of repeat care.    We discussed the incidental finding of the abdominal aortic aneurysm.  He will follow this through his primary care clinic.    The patient is also noted to have a spiculated pulmonary nodule.  The patient says this is already known to him.  His primary care doctor is following.  He has been referred to pulmonology.  He says his pulmonology appointment was not as soon as he would have liked and he requested referral information for another clinic which was provided as well.    Disposition   The patient was discharged.     Diagnosis     ICD-10-CM    1. Right inguinal hernia  K40.90       2. Infrarenal abdominal aortic aneurysm (AAA) without rupture (H24)  I71.43       3. Pulmonary nodule  R91.1              Scribe Disclosure:  I, Kendall Christensen, am serving as a scribe at 3:29 PM on 7/30/2024 to document services personally performed by Andres Laurent MD based on my observations and the provider's statements to me.        Andres Laurent MD  08/01/24 2091

## 2024-07-31 ENCOUNTER — TRANSCRIBE ORDERS (OUTPATIENT)
Dept: ONCOLOGY | Facility: CLINIC | Age: 84
End: 2024-07-31
Payer: COMMERCIAL

## 2024-07-31 ENCOUNTER — PRE VISIT (OUTPATIENT)
Dept: ONCOLOGY | Facility: CLINIC | Age: 84
End: 2024-07-31
Payer: COMMERCIAL

## 2024-07-31 DIAGNOSIS — R91.1 LUNG NODULE: Primary | ICD-10-CM

## 2024-07-31 NOTE — TELEPHONE ENCOUNTER
Action    Action Taken 7/31/24  WT from NPS, pt advised recent concern, became aware of nodules due to recent imaging @ [facility] in 6/2024 & recent ED/imaging @ [facility].    Pt advised no Oncologist, Hematologist  Pt advised no surgical procedures, biopsies conducted  Pt advised all imaging should be at , [facility]  Pt advised being seen @ [facility] for COPD/Dr. Tigre Reynolds  PFT possibly conducted 4/2024 @ [facility]  Pt advised they have an order placed @  for further imaging, but nothing scheduled as of yet.  Pt advised they are supposed to have follow up appt @ [facility], but would rather be seen @ [facility]    Call to [facility] - pre recorded message advises medical records is now under the custody of [facility]. Faxed request to [facility] for Dr. Reynolds Notes, PFTs, any imaging not captured @  or [facility].  12:32 PM    August 1, 2024 1:51 PM:  -Received  records from [facility]. Faxed to [facility] and emailed to [facility].      RECORDS STATUS - ALL OTHER DIAGNOSIS      Imaging Received  July 31, 2024 1:31 PM    Action: Images from [facility] received and resolved to PACS.     RECORDS RECEIVED FROM: [facility], [employer], [employer]   DATE RECEIVED:    NOTES STATUS DETAILS   OFFICE NOTE from Pulmonologist [facility] ([facility]) - Requested 7/31 4/18/24: Dr. Tigre Reynolds   DISCHARGE REPORT from the ER [facility] 7/30/24: [facility] ED   MEDICATION LIST [facility]    LABS     ANYTHING RELATED TO DIAGNOSIS Epic Most recent 7/30/24   IMAGING (NEED IMAGES & REPORT)     CT SCANS Requested 7/31 6/27/24, 6/7/17:    XRAYS Requested 7/31 6/12/24, 9/26/23, 12/2/22, 6/7/17:

## 2024-08-01 ENCOUNTER — PATIENT OUTREACH (OUTPATIENT)
Dept: ONCOLOGY | Facility: CLINIC | Age: 84
End: 2024-08-01
Payer: COMMERCIAL

## 2024-08-01 DIAGNOSIS — R91.8 PULMONARY NODULES: Primary | ICD-10-CM

## 2024-08-01 NOTE — PROGRESS NOTES
New Patient Oncology Nurse Navigator Note     Referring provider: Self    Referring Clinic/Organization: Self Referred  Referred to: Interventional Pulmonology  Requested provider (if applicable): First available - did not specify   Referral Received: 07/31/24       Evaluation for :   Diagnosis   R91.1 (ICD-10-CM) - Lung nodule      Note:  PT CALLED. ED Follow-up  DX: Nodule on lungs  [facility] CT [facility] June 27 [facility]. Pt already has an appt scheduled with [facility] on October 15. Pt was recommended to follow-up with us after Hospital visit with [facility] yesterday.     PT SENT TO \[facility]".     Clinical History (per Nurse review of records provided):      06/27/2024 CT Chest w/o IV contrast (bookmarked) showed:   IMPRESSION:   1. The opacity of concern on x-ray from 06/12/2024 corresponds to an irregular density in the superior segment of the left upper lobe with pleural spiculation/fibrosis and central groundglass opacity. Differential consideration includes resolving pneumonia and organizing pneumonia, though a neoplastic process cannot be excluded. Recommend short-term (1-2 months) follow-up. If not improved, pulmonary consult would be recommended.   2. Bronchial wall thickening elsewhere in both lungs, possibly chronic bronchitis. This is superimposed upon moderate to severe apical predominant emphysematous change.   3. Small pulmonary nodules and foci of mucus plugging elsewhere. Recommend attention on follow-up imaging.     Abnormal findings requiring follow-up.  Recommend: Follow-up CT the chest without contrast in 1 to 2 months.     Clinical Assessment / Barriers to Care (Per Nurse):  Tobacco History    Smoking Status  Heavy Smoker Current Packs/Day  0.5 packs/day Smoking Tobacco Type  Cigarettes     Records Location: [facility] Everywhere   Records Needed: None  Additional testing needed prior to consult: PFT    SHERRI CheryN, RN, OCN  [facility] Oncology Nurse Navigator  [phone]  [phone] / [phone]

## 2024-08-04 ENCOUNTER — HEALTH MAINTENANCE LETTER (OUTPATIENT)
Age: 84
End: 2024-08-04

## 2024-08-13 ENCOUNTER — OFFICE VISIT (OUTPATIENT)
Dept: SURGERY | Facility: CLINIC | Age: 84
End: 2024-08-13
Payer: COMMERCIAL

## 2024-08-13 ENCOUNTER — MYC MEDICAL ADVICE (OUTPATIENT)
Dept: PALLIATIVE MEDICINE | Facility: CLINIC | Age: 84
End: 2024-08-13

## 2024-08-13 ENCOUNTER — TELEPHONE (OUTPATIENT)
Dept: SURGERY | Facility: CLINIC | Age: 84
End: 2024-08-13

## 2024-08-13 VITALS
DIASTOLIC BLOOD PRESSURE: 81 MMHG | BODY MASS INDEX: 20.79 KG/M2 | HEART RATE: 63 BPM | SYSTOLIC BLOOD PRESSURE: 127 MMHG | HEIGHT: 74 IN | WEIGHT: 162 LBS

## 2024-08-13 DIAGNOSIS — M47.816 LUMBAR FACET ARTHROPATHY: ICD-10-CM

## 2024-08-13 DIAGNOSIS — G89.4 PAIN SYNDROME, CHRONIC: ICD-10-CM

## 2024-08-13 DIAGNOSIS — K40.90 RIGHT INGUINAL HERNIA: Primary | ICD-10-CM

## 2024-08-13 PROCEDURE — 99204 OFFICE O/P NEW MOD 45 MIN: CPT | Performed by: SURGERY

## 2024-08-13 RX ORDER — TAMSULOSIN HYDROCHLORIDE 0.4 MG/1
0.4 CAPSULE ORAL DAILY
Qty: 7 CAPSULE | Refills: 0 | Status: SHIPPED | OUTPATIENT
Start: 2024-08-13 | End: 2024-09-20

## 2024-08-13 NOTE — LETTER
2024       Tigre Gillette  [address]  [place] [address]     RE: [id number]  : 1940    Tigre Gillette has been scheduled for surgery on 24 at 12:30 pm at [facility] with Dr Scar Martin.  The hospital is located at [address] in [place].    Please check in at the Surgery reception desk at 10:30 am. This is located in the back of the hospital on the East side, just past the Emergency Room entrance.     DO NOT EAT OR DRINK ANYTHING 8 HOURS BEFORE YOUR ARRIVAL TIME.   You may have sips of clear liquids up until 2 hours before your arrival time. If you have been advised to take your medication, please do this early in the morning with just sips of clear liquid.        If you are on a blood thinner such as ELIQUIS, please stop taking this 72 hours before your surgery.         Hospital regulations require an updated pre-operative examination to be completed within 30 days of the procedure. This can be done by your primary care provider. Please ask them to fax documentation to [phone]. We also recommend you bring a copy with you.     You should shower before your surgery with Hibiclens or Exidine soap.  This can be found at your local pharmacy or you can pick it up from our office for free.  Please call our office if you have any questions.     You will be required to have an Adult (friend or family member) drive you home after your surgery and arrange for an adult to stay with you until the next morning.     You will receive several calls from our staff 3-7 days prior to your scheduled procedure with further details and to answer any questions you may have.    It is sometimes necessary to adjust the surgery schedule due to emergencies and additions to the schedule.  If your surgery is affected by this, we greatly appreciate your flexibility and understanding in this matter    It is best if you call regarding post-operative questions between the hours  of 8:00 am & 3:00 pm Monday-Friday, so you have access to the daytime care team that know you best.  Prescription refills are accepted during regular office hours only.    Please do not bring any Disability or FMLA papers to the hospital.  They need to be either faxed ([phone]), mailed or hand delivered to our office by you or a family member for completion.  Please allow 14 business days to complete paperwork.        If you have questions or concerns, please contact our office at [phone].

## 2024-08-13 NOTE — LETTER
Showering Before Surgery      Your surgeon has asked you to take 2 showers before surgery.    Why is this important?  It is normal for bacteria (germs) to be on your skin. The skin protects us from these germs. When you have surgery, we cut the skin. Sometimes germs get into the cuts and cause infection (illness caused by germs). By following the instructions below and using special soap, you will lower the number of germs on your skin. This decreases your chance of infection.  Special soap  Buy or get 8 ounces of antiseptic surgical soap called 4% CHG. Common name brands of this soap are Hibiclens and Exidine.  You can find it at your local pharmacy, clinic or retail store. If you have trouble, ask your pharmacist to help you find the right substitute.  A note about shaving:  Do not shave within 12 inches of your incision (surgical cut) area for at least 3 days before surgery. Shaving can make small cuts in the skin. This puts you at a higher risk of infection.  Items you will need for each shower:  1 newly washed towel  4 ounces of one of the above soaps  Clean pajamas or clothes to change into    Follow these instructions:  Follow these steps the evening before surgery and the morning of surgery.  Wash your hair and body with your regular shampoo and soap. Make sure you rinse the shampoo and soap from your hair and body.  Using clean hands, apply about 2 ounces of soap gently on your skin from your ear lobes to your toes. Use on your groin area last. Do not use this soap on your face or head. If you get any soap in your eyes, ears or mouth, rinse right away.  Repeat step 2. It is very important to let the soap stay on your skin for at least 1 minute.    Rinse well and dry off using a clean towel.    If you feel any tingling, itching or other irritation, rinse right away. It is normal to feel some coolness on the skin after using the antiseptic soap. Your skin may feel a bit dry after the shower, but do not use  any lotions, creams or moisturizers. Do not use hair spray or other products in your hair.  5.  Dress in freshly washed clothes or pajamas. Use fresh pillowcases and sheets on your bed.    Repeat these steps the morning of surgery.  If you have any questions about showering or an allergy to CHG soap, please call your surgery center.    For informational purposes only. Not to replace the advice of your health care provider. Copyright   2012 [facility]. All rights reserved. Clinically reviewed by [employer]. [employer] [id number] - REV 12

## 2024-08-13 NOTE — TELEPHONE ENCOUNTER
Type of surgery: ROBOTIC ASSISTED RIGHT INGUINAL HERNIA REPAIR WITH MESH   Location of surgery: [facility]  Date and time of surgery: 9-13-24, 12:30 PM  Surgeon: DR OCONNOR  Pre-Op Appt Date: PATIENT TO SCHEDULE   Post-Op Appt Date: NA   Packet sent out:  GIVEN TO PATIENT   Pre-cert/Authorization completed:  Not Applicable  Date: 8-13-24        ROBOTIC ASSISTED RIGHT INGUINAL HERNIA REPAIR WITH MESH GENERAL PT INST TO HAVE H&P 90 MINS REQ PA ASSIST [facility] ALW

## 2024-08-13 NOTE — LETTER
"August 13, 2024          RE:   Tigre Gillette 1940      Dear Colleague,    Thank you for referring your patient, Tigre Gillette, to [facility]. Please see a copy of my visit note below.    HPI:  Tigre is a 84 year old male who presents for evaluation of right groin bulging.  The patient has had a previous left inguinal hernia repair with mesh.  He may have had the right side fixed as well.  He had an epigastric hernia repaired with an 8 cm preperitoneal patch in 2018.  The patient has generally been able to push the bulge back in, but it did cause him some significant pain a couple of times.    Past Medical History:  Has a past medical history of Chronic lower back pain, COPD (chronic obstructive pulmonary disease) (H), Degenerative arthritis of lumbar spine, and Eczema.      Other Topics Concern    Parent/sibling w/ CABG, MI or angioplasty before 65F 55M? Not Asked   Social History Narrative    Lives with his wife ( '64), daughter and her boyfriend. [place] all\" kind of person, likes to fix and maintain his home. 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    Enjoys fishing, fixing things and being outdoors. Pain can limit his ability to do things he likes, such as fishing, snowmobiling. Has started to read more since alf, especially about [place]. Has a ELEAZAR who lives there.    Last updated 6/6/2023      ROS:  The 10 point review of systems is negative other than noted in the HPI and above.    PE:    General- Well-developed, well-nourished, patient able to get up on table without difficulty.  HEENT- Normocephalic and atraumatic. Pupils equal and round.  Mucous membranes moist.  Sclera are nonicteric.  Neck- No lymphadenopathy or masses "   Respirations- are regular and non labored  Abdomen is abdomen is soft without significant tenderness, masses, organomegaly or guarding  Hernia- Left inguinal hernia is not present with valsalva              Right inguinal hernia is present with valsalva              The hernia is reducible   Umbilical hernia is not present.              External genitalia are normal               Assessment: right inguinal hernia    Plan: I have recommended robotic assisted repair of the patient's right inguinal hernia with mesh.   We have discussed observation, reduction techniques and importance, incarceration and strangulation signs, symptoms and importance as well as need to seek emergency treatment.    We have discussed surgery in detail, including risk, benefits, complications, mesh, infection, nerve and cord damage and their sequelae including chronic pain and testicular loss, lifting and activity limits after surgery. He has been given literature to review. We will schedule surgery at patient's convenience.      Again, thank you for allowing me to participate in the care of your patient.      Sincerely,      Scar aMrtin MD

## 2024-08-13 NOTE — PROGRESS NOTES
"HPI:  Tigre is a 84 year old male who presents for evaluation of right groin bulging.  The patient has had a previous left inguinal hernia repair with mesh.  He may have had the right side fixed as well.  He had an epigastric hernia repaired with an 8 cm preperitoneal patch in 2018.  The patient has generally been able to push the bulge back in, but it did cause him some significant pain a couple of times.    Past Medical History:   has a past medical history of Chronic lower back pain, COPD (chronic obstructive pulmonary disease) (H), Degenerative arthritis of lumbar spine, and Eczema.    Past Surgical History:  Past Surgical History:   Procedure Laterality Date    HERNIORRHAPHY EPIGASTRIC N/A 1/30/2018    Procedure: HERNIORRHAPHY EPIGASTRIC;  Epigastric herniorrhaphy with Bard Ventralex ST Hernia Patch ;  Surgeon: Rossana Alas MD;  Location: [facility] OR    HERNIORRHAPHY INGUINAL  2010    open recurrent LIH rpr with mesh    HERNIORRHAPHY INGUINAL  1990    open LIH rpr with mesh    TONSILLECTOMY & ADENOIDECTOMY      t&a        Social History:  Social History     Socioeconomic History    Marital status:      Spouse name: Not on file    Number of children: Not on file    Years of education: Not on file    Highest education level: Not on file   Occupational History    Not on file   Tobacco Use    Smoking status: Heavy Smoker     Current packs/day: 0.50     Types: Cigarettes    Smokeless tobacco: Never   Substance and Sexual Activity    Alcohol use: Yes     Comment: rare     Drug use: No    Sexual activity: Yes     Partners: Female   Other Topics Concern    Parent/sibling w/ CABG, MI or angioplasty before 65F 55M? Not Asked   Social History Narrative    Lives with his wife ( '64), daughter and her boyfriend. [place] all\" kind of person, likes to fix and maintain his home. 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 4 adult children (B-G-G-B).    Enjoys fishing, fixing things and being outdoors. Pain can limit his ability to do things he likes, such as fishing, snowmobiling. 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 Has a [place] who lives there.    Last updated 6/6/2023      Social Determinants of Health     Financial Resource Strain: Not on file   Food Insecurity: Not on file   Transportation Needs: Not on file   Physical Activity: Not on file   Stress: Not on file   Social Connections: Not on file   Interpersonal Safety: Not on file   Housing Stability: Not on file        Family History:  Family History   Family history unknown: Yes         ROS:  The 10 point review of systems is negative other than noted in the HPI and above.    PE:    General- Well-developed, well-nourished, patient able to get up on table without difficulty.  HEENT- Normocephalic and atraumatic. Pupils equal and round.  Mucous membranes moist.  Sclera are nonicteric.  Neck- No lymphadenopathy or masses   Respirations- are regular and non labored  Abdomen is abdomen is soft without significant tenderness, masses, organomegaly or guarding  Hernia- Left inguinal hernia is not present with valsalva              Right inguinal hernia is present with valsalva              The hernia is reducible   Umbilical hernia is not present.              External genitalia are normal               Assessment: right inguinal hernia    Plan: I have recommended robotic assisted repair of the patient's right inguinal hernia with mesh.   We have discussed observation, reduction techniques and importance, incarceration and strangulation signs, symptoms and importance as well as need to seek emergency treatment.    We have discussed surgery in detail, including risk, benefits, complications, mesh, infection, nerve and cord damage and their  sequelae including chronic pain and testicular loss, lifting and activity limits after surgery. He has been given literature to review. We will schedule surgery at patient's convenience.        Scar Martin MD    Please route or send letter to:  Primary Care Provider (PCP)

## 2024-08-14 RX ORDER — GABAPENTIN 300 MG/1
600 CAPSULE ORAL 3 TIMES DAILY
Qty: 540 CAPSULE | Refills: 0 | Status: SHIPPED | OUTPATIENT
Start: 2024-08-14

## 2024-08-14 NOTE — TELEPHONE ENCOUNTER
Will route to MA pool for assistance with gathering/prep of refill information.    Please prep:  gabapentin (NEURONTIN) 300 MG capsule     Rafaela DILL RN Care Coordinator  [facility]        Tigre RAO Pain Nurse (supporting Olga Chowdhury NP)12 hours ago (7:48 PM)     JR  I am on my last refill.  Please renew my prescription.   Tigre Gillette

## 2024-08-14 NOTE — TELEPHONE ENCOUNTER
Received fax request from pharmacy requesting refill(s) for gabapentin (NEURONTIN) 300 MG capsule    Last refilled on 5/23/2024 (90 day supply)    Pt last seen on 4/22/2024.  Next appt scheduled for NONE.     Will facilitate refill.

## 2024-08-16 DIAGNOSIS — M79.18 MYOFASCIAL PAIN: ICD-10-CM

## 2024-08-16 NOTE — TELEPHONE ENCOUNTER
Received fax request from [facility] requesting refill(s) for methocarbamol (ROBAXIN) 500 MG tablet     Last refilled on 07/29/24    Pt last seen on 04/22/24  Next appt scheduled for: none    Will facilitate refill.

## 2024-08-19 RX ORDER — METHOCARBAMOL 500 MG/1
500 TABLET, FILM COATED ORAL 3 TIMES DAILY
Qty: 60 TABLET | Refills: 3 | Status: SHIPPED | OUTPATIENT
Start: 2024-08-19

## 2024-08-26 ENCOUNTER — HOSPITAL ENCOUNTER (OUTPATIENT)
Dept: RESPIRATORY THERAPY | Facility: CLINIC | Age: 84
Discharge: HOME | End: 2024-08-26
Attending: INTERNAL MEDICINE | Admitting: INTERNAL MEDICINE
Payer: COMMERCIAL

## 2024-08-26 DIAGNOSIS — R91.8 PULMONARY NODULES: ICD-10-CM

## 2024-08-26 PROCEDURE — 94729 DIFFUSING CAPACITY: CPT

## 2024-08-26 PROCEDURE — 94726 PLETHYSMOGRAPHY LUNG VOLUMES: CPT

## 2024-08-26 PROCEDURE — 999N000157 HC STATISTIC RCP TIME EA 10 MIN

## 2024-08-26 PROCEDURE — 94010 BREATHING CAPACITY TEST: CPT

## 2024-08-26 NOTE — PROGRESS NOTES
PFT Note:        Pt completed pulmonary function testing with DLCO.  Good Pt effort and cooperation.  All testing meets ATS recommendations.  DLCO is an average of 2 maneuvers.  Predicted DLCO is corrected for a Hgb of 14.4, drawn on 7/30/2024.    August 26, 2024.2:47 PM  Kristopher Bowers, RT

## 2024-08-27 ENCOUNTER — VIRTUAL VISIT (OUTPATIENT)
Dept: ONCOLOGY | Facility: CLINIC | Age: 84
End: 2024-08-27
Attending: INTERNAL MEDICINE
Payer: COMMERCIAL

## 2024-08-27 VITALS — HEIGHT: 74 IN | BODY MASS INDEX: 20.8 KG/M2

## 2024-08-27 DIAGNOSIS — R91.1 LUNG NODULE: ICD-10-CM

## 2024-08-27 DIAGNOSIS — J44.9 CHRONIC OBSTRUCTIVE PULMONARY DISEASE, UNSPECIFIED COPD TYPE (H): Primary | ICD-10-CM

## 2024-08-27 LAB
DLCOCOR-%PRED-PRE: 51 %
DLCOCOR-PRE: 13.76 ML/MIN/MMHG
DLCOUNC-%PRED-PRE: 50 %
DLCOUNC-PRE: 13.68 ML/MIN/MMHG
DLCOUNC-PRED: 26.9 ML/MIN/MMHG
ERV-%PRED-PRE: 118 %
ERV-PRE: 1.81 L
ERV-PRED: 1.52 L
EXPTIME-PRE: 12.74 SEC
FEF2575-%PRED-PRE: 28 %
FEF2575-PRE: 0.58 L/SEC
FEF2575-PRED: 2.03 L/SEC
FEFMAX-%PRED-PRE: 61 %
FEFMAX-PRE: 4.86 L/SEC
FEFMAX-PRED: 7.86 L/SEC
FEV1-%PRED-PRE: 65 %
FEV1-PRE: 1.94 L
FEV1FEV6-PRE: 47 %
FEV1FEV6-PRED: 76 %
FEV1FVC-PRE: 39 %
FEV1FVC-PRED: 75 %
FEV1SVC-PRE: 41 %
FEV1SVC-PRED: 69 %
FIFMAX-PRE: 4.42 L/SEC
FRCPLETH-%PRED-PRE: 159 %
FRCPLETH-PRE: 6.45 L
FRCPLETH-PRED: 4.05 L
FVC-%PRED-PRE: 124 %
FVC-PRE: 4.99 L
FVC-PRED: 4.02 L
IC-%PRED-PRE: 96 %
IC-PRE: 2.95 L
IC-PRED: 3.05 L
RVPLETH-PRE: 4.64 L
TLCPLETH-%PRED-PRE: 118 %
TLCPLETH-PRE: 9.4 L
TLCPLETH-PRED: 7.94 L
VA-%PRED-PRE: 89 %
VA-PRE: 6.45 L
VC-%PRED-PRE: 111 %
VC-PRE: 4.76 L
VC-PRED: 4.28 L

## 2024-08-27 PROCEDURE — 99204 OFFICE O/P NEW MOD 45 MIN: CPT | Mod: 95 | Performed by: INTERNAL MEDICINE

## 2024-08-27 RX ORDER — ALBUTEROL SULFATE 90 UG/1
1-2 AEROSOL, METERED RESPIRATORY (INHALATION) EVERY 6 HOURS PRN
Qty: 17 G | Refills: 11 | Status: SHIPPED | OUTPATIENT
Start: 2024-08-27

## 2024-08-27 ASSESSMENT — PAIN SCALES - GENERAL: PAINLEVEL: MODERATE PAIN (4)

## 2024-08-27 NOTE — PATIENT INSTRUCTIONS
Please call to schedule CT at your next convenience [phone]    Depending on the CT, we might need to consider a lung biopsy.

## 2024-08-27 NOTE — PROGRESS NOTES
Virtual Visit Details    Type of service:  Video Visit   Video Start Time: 9:41 AM  Video End Time:10:03 AM    Originating Location (pt. Location): Home    Distant Location (provider location):  On-site  Platform used for Video Visit: [facility]  [address]  [address]  [facility] [address]  Phone: [phone]  Fax: [phone]    [facility] Initial Visit    Patient:  Tigre Gillette, Date of birth 1940  Date of Visit:  08/27/2024  Referring Provider Referred Self      Assessment & Plan      Tigre is an 83 yo male with COPD and abnormal chest imaging    COPD - moderate obstruction grossly similar FEV1 to spirometry last year   Continue Trelegy   Albuterol prn   Pulmonary rehab - to start after hernia repair scheduled mid September    Lung nodule - LLL    Mass-like opacity LLL hopefully might be resolving pneumonia, based on timing and appearance    Medical Decision Making             Katia Erazo MD           Reason for Visit  Tigre Gillette is a 84 year old male who is referred by himself for abnormal chest CT  Pulmonary HPI    - hospitalized in April 2024 with pneumonia and atrial fib .    - he has COPD, he had been seeing [facility] but preferred to stay in [facility].   - he is on Trelegy  - his wife is joining him on the call today, he has adult offspring who can [place] [employer] if [place].       Other active medical problems include:   - no prior radiation or chest trauma  - he is undergoing inguinal hernia repair and preop evaluation at [facility]  Dyspnea: Yes, Cough: No, Chest pain: No, Wheezing: No, Sputum Production: No, Hemoptysis: No  A complete ROS was otherwise negative except as noted in the HPI.  The patient was seen and examined by Katia Erazo MD   Current Outpatient Medications   Medication Sig Dispense Refill     acetaminophen (TYLENOL) 500 MG tablet Take 1-2 tablets (500-1,000 mg) by mouth every 6 hours as needed for pain (and adjunct with moderate or severe pain or per patient request)      apixaban ANTICOAGULANT (ELIQUIS) 5 MG tablet Take 1 tablet (5 mg) by mouth 2 times daily 60 tablet 11    cetirizine (ZYRTEC) 10 MG tablet Take 10 mg by mouth every evening      diclofenac (VOLTAREN) 1 % topical gel Apply 4 g topically 4 times daily as needed for other (pain) (Patient not taking: Reported on 8/13/2024) 350 g 0    diclofenac (VOLTAREN) 50 MG EC tablet Take 1 tablet (50 mg) by mouth 2 times daily (Patient not taking: Reported on 8/13/2024) 180 tablet 1    gabapentin (NEURONTIN) 300 MG capsule Take 2 capsules (600 mg) by mouth 3 times daily 540 capsule 0    hydrocortisone 2.5 % cream Apply topically as needed      methocarbamol (ROBAXIN) 500 MG tablet Take 1 tablet (500 mg) by mouth 3 times daily 60 tablet 3    omeprazole (PRILOSEC) 20 MG DR capsule Take 1 capsule (20 mg) by mouth daily      tamsulosin (FLOMAX) 0.4 MG capsule Take 1 capsule (0.4 mg) by mouth daily Begin 4 days before surgery, including morning of surgery 7 capsule 0    TRELEGY ELLIPTA 100-62.5-25 MCG/INH oral inhaler Inhale 1 puff into the lungs daily      TRIAMCINOLONE ACETONIDE EX Apply topically daily as needed (eczema)       No current facility-administered medications for this visit.     Allergies   Allergen Reactions    Amoxicillin-Pot Clavulanate GI Disturbance and Rash    Amoxicillin      Social History     Socioeconomic History    Marital status:      Spouse name: Not on file    Number of children: Not on file    Years of education: Not on file    Highest education level: Not on file   Occupational History    Not on file   Tobacco Use    Smoking status: Heavy Smoker     Current packs/day: 0.50     Types: Cigarettes    Smokeless tobacco: Never    Tobacco comments:     8/27/24 6-8 cigarettes daily.    Substance and Sexual Activity    Alcohol  "use: Yes     Comment: rare     Drug use: No    Sexual activity: Yes     Partners: Female   Other Topics Concern    Parent/sibling w/ CABG, MI or angioplasty before 65F 55M? Not Asked   Social History Narrative    Lives with his wife ( '64), daughter and her boyfriend. [place] all\" kind of person, likes to fix and maintain his home. 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    Enjoys fishing, fixing things and being outdoors. Pain can limit his ability to do things he likes, such as fishing, snowmobiling. Has started to read more since care home, especially about [place]. Has a [place] who lives there.    Last updated 6/6/2023      Social Determinants of Health     Financial Resource Strain: Not on file   Food Insecurity: Not on file   Transportation Needs: Not on file   Physical Activity: Not on file   Stress: Not on file   Social Connections: Not on file   Interpersonal Safety: Not on file   Housing Stability: Not on file     Past Medical History:   Diagnosis Date    Chronic lower back pain     COPD (chronic obstructive pulmonary disease) (H)     Degenerative arthritis of lumbar spine     spine    Eczema     uses topical low dose steroid and cetaphil lotion     Past Surgical History:   Procedure Laterality Date    HERNIORRHAPHY EPIGASTRIC N/A 1/30/2018    Procedure: HERNIORRHAPHY EPIGASTRIC;  Epigastric herniorrhaphy with Bard Ventralex ST Hernia Patch ;  Surgeon: Rossana Alas MD;  Location: [facility] OR    HERNIORRHAPHY INGUINAL  2010    open recurrent LIH rpr with mesh    HERNIORRHAPHY INGUINAL  1990    open LIH rpr with mesh    TONSILLECTOMY & ADENOIDECTOMY      t&a     Family History   Problem Relation Age of Onset    LUNG DISEASE No family hx of        Exam:   Ht 1.88 m (6' 2\")  "  BMI 20.80 kg/m    GENERAL APPEARANCE: Well developed, well nourished, alert, and in no apparent distress.  PSYCH: mentation appears normal. and affect normal/bright  Results:  - My interpretation of the images relevant for this visit includes: CT chest images reviewed, there are 2 abnormalities. Lobulated nodular opacity peripheral in the LLL, that might be slightly growing over the last few months. central LLL opacities with some traction bronchiectasis, has more of a healing injury appearance.  Neither was seen on CT several years ago    - My interpretation of the PFT's relevant for this visit includes: Obstructive pattern moderate, no significant change in FEV1 compared to Feb 2023 spirometry

## 2024-08-27 NOTE — LETTER
8/27/2024      Tigre Gillette  [address]  [place] [address]      Dear Colleague,    Thank you for referring your patient, Tigre Gillette, to the [facility]. Please see a copy of my visit note below.    Virtual Visit Details    Type of service:  Video Visit   Video Start Time: 9:41 AM  Video End Time:10:03 AM    Originating Location (pt. Location): Home    Distant Location (provider location):  On-site  Platform used for Video Visit: Well      [facility]  [address]  [address]  [facility] [address]  Phone: [phone]  Fax: [phone]    [facility] Initial Visit    Patient:  Tigre Gillette, Date of birth 1940  Date of Visit:  08/27/2024  Referring Provider Referred Self      Assessment & Plan     Tigre is an 83 yo male with COPD and abnormal chest imaging    COPD - moderate obstruction grossly similar FEV1 to spirometry last year   Continue Trelegy   Albuterol prn   Pulmonary rehab - to start after hernia repair scheduled mid September    Lung nodule - LLL    Mass-like opacity LLL hopefully might be resolving pneumonia, based on timing and appearance    Medical Decision Making            Katia Erzao MD           Reason for Visit  Tigre Gillette is a 84 year old male who is referred by himself for abnormal chest CT  Pulmonary HPI    - hospitalized in April 2024 with pneumonia and atrial fib .    - he has COPD, he had been seeing [facility] but preferred to stay in [facility].   - he is on Trelegy  - his wife is joining him on the call today, he has adult offspring who can help with [place] to [place] [employer] if needed.       Other active medical problems include:   - no prior radiation or chest trauma  - he is undergoing inguinal hernia repair and preop evaluation at [facility]  Dyspnea: Yes, Cough: No, Chest pain: No, Wheezing: No,  Sputum Production: No, Hemoptysis: No  A complete ROS was otherwise negative except as noted in the HPI.  The patient was seen and examined by Katia Erazo MD   Current Outpatient Medications   Medication Sig Dispense Refill     acetaminophen (TYLENOL) 500 MG tablet Take 1-2 tablets (500-1,000 mg) by mouth every 6 hours as needed for pain (and adjunct with moderate or severe pain or per patient request)       apixaban ANTICOAGULANT (ELIQUIS) 5 MG tablet Take 1 tablet (5 mg) by mouth 2 times daily 60 tablet 11     cetirizine (ZYRTEC) 10 MG tablet Take 10 mg by mouth every evening       diclofenac (VOLTAREN) 1 % topical gel Apply 4 g topically 4 times daily as needed for other (pain) (Patient not taking: Reported on 8/13/2024) 350 g 0     diclofenac (VOLTAREN) 50 MG EC tablet Take 1 tablet (50 mg) by mouth 2 times daily (Patient not taking: Reported on 8/13/2024) 180 tablet 1     gabapentin (NEURONTIN) 300 MG capsule Take 2 capsules (600 mg) by mouth 3 times daily 540 capsule 0     hydrocortisone 2.5 % cream Apply topically as needed       methocarbamol (ROBAXIN) 500 MG tablet Take 1 tablet (500 mg) by mouth 3 times daily 60 tablet 3     omeprazole (PRILOSEC) 20 MG DR capsule Take 1 capsule (20 mg) by mouth daily       tamsulosin (FLOMAX) 0.4 MG capsule Take 1 capsule (0.4 mg) by mouth daily Begin 4 days before surgery, including morning of surgery 7 capsule 0     TRELEGY ELLIPTA 100-62.5-25 MCG/INH oral inhaler Inhale 1 puff into the lungs daily       TRIAMCINOLONE ACETONIDE EX Apply topically daily as needed (eczema)       No current facility-administered medications for this visit.     Allergies   Allergen Reactions     Amoxicillin-Pot Clavulanate GI Disturbance and Rash     Amoxicillin      Social History     Socioeconomic History     Marital status:      Spouse name: Not on file     Number of children: Not on file     Years of education: Not on file     Highest education level: Not on file  "  Occupational History     Not on file   Tobacco Use     Smoking status: Heavy Smoker     Current packs/day: 0.50     Types: Cigarettes     Smokeless tobacco: Never     Tobacco comments:     8/27/24 6-8 cigarettes daily.    Substance and Sexual Activity     Alcohol use: Yes     Comment: rare      Drug use: No     Sexual activity: Yes     Partners: Female   Other Topics Concern     Parent/sibling w/ CABG, MI or angioplasty before 65F 55M? Not Asked   Social History Narrative    Lives with his wife ( '64), daughter and her boyfriend. [place] all\" kind of person, likes to fix and maintain his home. 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    Enjoys fishing, fixing things and being outdoors. Pain can limit his ability to do things he likes, such as fishing, snowmobiling. Has started to read more since [facility], especially about [place]. Has a ELEAZAR who lives there.    Last updated 6/6/2023      Social Determinants of Health     Financial Resource Strain: Not on file   Food Insecurity: Not on file   Transportation Needs: Not on file   Physical Activity: Not on file   Stress: Not on file   Social Connections: Not on file   Interpersonal Safety: Not on file   Housing Stability: Not on file     Past Medical History:   Diagnosis Date     Chronic lower back pain      COPD (chronic obstructive pulmonary disease) (H)      Degenerative arthritis of lumbar spine     spine     Eczema     uses topical low dose steroid and cetaphil lotion     Past Surgical History:   Procedure Laterality Date     HERNIORRHAPHY EPIGASTRIC N/A 1/30/2018    Procedure: HERNIORRHAPHY EPIGASTRIC;  Epigastric herniorrhaphy with Bard Ventralex ST Hernia Patch ;  Surgeon: Rossana Alas MD;  Location:  OR     " "HERNIORRHAPHY INGUINAL  2010    open recurrent LIH rpr with mesh     HERNIORRHAPHY INGUINAL  1990    open LIH rpr with mesh     TONSILLECTOMY & ADENOIDECTOMY      t&a     Family History   Problem Relation Age of Onset     LUNG DISEASE No family hx of        Exam:   Ht 1.88 m (6' 2\")   BMI 20.80 kg/m    GENERAL APPEARANCE: Well developed, well nourished, alert, and in no apparent distress.  PSYCH: mentation appears normal. and affect normal/bright  Results:  - My interpretation of the images relevant for this visit includes: CT chest images reviewed, there are 2 abnormalities. Lobulated nodular opacity peripheral in the LLL, that might be slightly growing over the last few months. central LLL opacities with some traction bronchiectasis, has more of a healing injury appearance.  Neither was seen on CT several years ago    - My interpretation of the PFT's relevant for this visit includes: Obstructive pattern moderate, no significant change in FEV1 compared to Feb 2023 spirometry      Again, thank you for allowing me to participate in the care of your patient.        Sincerely,        Katia Erazo MD  "

## 2024-08-27 NOTE — LETTER
8/27/2024      Tigre Gillette  [address]  [place] [address]      Dear Colleague,    Thank you for referring your patient, Tigre Gillette, to the [facility]. Please see a copy of my visit note below.    Virtual Visit Details    Type of service:  Video Visit   Video Start Time: 9:41 AM  Video End Time:10:03 AM    Originating Location (pt. Location): Home    Distant Location (provider location):  On-site  Platform used for Video Visit: Well      [facility]  [address]  [address]  [facility] [address]  Phone: [phone]  Fax: [phone]    [facility] Initial Visit    Patient:  Tigre Gillette, Date of birth 1940  Date of Visit:  08/27/2024  Referring Provider Referred Self      Assessment & Plan     Tigre is an 85 yo male with COPD and abnormal chest imaging    COPD - moderate obstruction grossly similar FEV1 to spirometry last year   Continue Trelegy   Albuterol prn   Pulmonary rehab - to start after hernia repair scheduled mid September    Lung nodule - LLL    Mass-like opacity LLL hopefully might be resolving pneumonia, based on timing and appearance    Medical Decision Making            Katia Erazo MD           Reason for Visit  Tigre Gillette is a 84 year old male who is referred by himself for abnormal chest CT  Pulmonary HPI    - hospitalized in April 2024 with pneumonia and atrial fib .    - he has COPD, he had been seeing [facility] but preferred to stay in [facility].   - he is on Trelegy  - his wife is joining him on the call today, he has adult offspring who can help with driving to the [place] if needed.       Other active medical problems include:   - no prior radiation or chest trauma  - he is undergoing inguinal hernia repair and preop evaluation at [facility]  Dyspnea: Yes, Cough: No, Chest pain: No, Wheezing: No,  Sputum Production: No, Hemoptysis: No  A complete ROS was otherwise negative except as noted in the HPI.  The patient was seen and examined by Katia Erazo MD   Current Outpatient Medications   Medication Sig Dispense Refill     acetaminophen (TYLENOL) 500 MG tablet Take 1-2 tablets (500-1,000 mg) by mouth every 6 hours as needed for pain (and adjunct with moderate or severe pain or per patient request)       apixaban ANTICOAGULANT (ELIQUIS) 5 MG tablet Take 1 tablet (5 mg) by mouth 2 times daily 60 tablet 11     cetirizine (ZYRTEC) 10 MG tablet Take 10 mg by mouth every evening       diclofenac (VOLTAREN) 1 % topical gel Apply 4 g topically 4 times daily as needed for other (pain) (Patient not taking: Reported on 8/13/2024) 350 g 0     diclofenac (VOLTAREN) 50 MG EC tablet Take 1 tablet (50 mg) by mouth 2 times daily (Patient not taking: Reported on 8/13/2024) 180 tablet 1     gabapentin (NEURONTIN) 300 MG capsule Take 2 capsules (600 mg) by mouth 3 times daily 540 capsule 0     hydrocortisone 2.5 % cream Apply topically as needed       methocarbamol (ROBAXIN) 500 MG tablet Take 1 tablet (500 mg) by mouth 3 times daily 60 tablet 3     omeprazole (PRILOSEC) 20 MG DR capsule Take 1 capsule (20 mg) by mouth daily       tamsulosin (FLOMAX) 0.4 MG capsule Take 1 capsule (0.4 mg) by mouth daily Begin 4 days before surgery, including morning of surgery 7 capsule 0     TRELEGY ELLIPTA 100-62.5-25 MCG/INH oral inhaler Inhale 1 puff into the lungs daily       TRIAMCINOLONE ACETONIDE EX Apply topically daily as needed (eczema)       No current facility-administered medications for this visit.     Allergies   Allergen Reactions     Amoxicillin-Pot Clavulanate GI Disturbance and Rash     Amoxicillin      Social History     Socioeconomic History     Marital status:      Spouse name: Not on file     Number of children: Not on file     Years of education: Not on file     Highest education level: Not on file  "  Occupational History     Not on file   Tobacco Use     Smoking status: Heavy Smoker     Current packs/day: 0.50     Types: Cigarettes     Smokeless tobacco: Never     Tobacco comments:     8/27/24 6-8 cigarettes daily.    Substance and Sexual Activity     Alcohol use: Yes     Comment: rare      Drug use: No     Sexual activity: Yes     Partners: Female   Other Topics Concern     Parent/sibling w/ CABG, MI or angioplasty before 65F 55M? Not Asked   Social History Narrative    Lives with his wife ( '64), daughter and her boyfriend. [place] all\" kind of person, likes to fix and maintain his home. 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    Enjoys fishing, fixing things and being outdoors. Pain can limit his ability to do things he likes, such as fishing, snowmobiling. Has started to read more since [facility], especially about [place]. Has a [place] who lives there.    Last updated 6/6/2023      Social Determinants of Health     Financial Resource Strain: Not on file   Food Insecurity: Not on file   Transportation Needs: Not on file   Physical Activity: Not on file   Stress: Not on file   Social Connections: Not on file   Interpersonal Safety: Not on file   Housing Stability: Not on file     Past Medical History:   Diagnosis Date     Chronic lower back pain      COPD (chronic obstructive pulmonary disease) (H)      Degenerative arthritis of lumbar spine     spine     Eczema     uses topical low dose steroid and cetaphil lotion     Past Surgical History:   Procedure Laterality Date     HERNIORRHAPHY EPIGASTRIC N/A 1/30/2018    Procedure: HERNIORRHAPHY EPIGASTRIC;  Epigastric herniorrhaphy with Bard Ventralex ST Hernia Patch ;  Surgeon: Rossana Alas MD;  Location:  OR     " "HERNIORRHAPHY INGUINAL  2010    open recurrent LIH rpr with mesh     HERNIORRHAPHY INGUINAL  1990    open LIH rpr with mesh     TONSILLECTOMY & ADENOIDECTOMY      t&a     Family History   Problem Relation Age of Onset     LUNG DISEASE No family hx of        Exam:   Ht 1.88 m (6' 2\")   BMI 20.80 kg/m    GENERAL APPEARANCE: Well developed, well nourished, alert, and in no apparent distress.  PSYCH: mentation appears normal. and affect normal/bright  Results:  - My interpretation of the images relevant for this visit includes: CT chest images reviewed, there are 2 abnormalities. Lobulated nodular opacity peripheral in the LLL, that might be slightly growing over the last few months. central LLL opacities with some traction bronchiectasis, has more of a healing injury appearance.  Neither was seen on CT several years ago    - My interpretation of the PFT's relevant for this visit includes: Obstructive pattern moderate, no significant change in FEV1 compared to Feb 2023 spirometry      Again, thank you for allowing me to participate in the care of your patient.        Sincerely,        Katia Erazo MD  "

## 2024-08-27 NOTE — NURSING NOTE
Current patient location: [address] [place] [id number]    Is the patient currently in the state of [place]? YES    Visit mode:VIDEO    If the visit is dropped, the patient can be reconnected by: VIDEO VISIT: Text to cell phone:   Telephone Information:   Mobile [phone]       Will anyone else be joining the visit? NO  (If patient encounters technical issues they should call [phone]    How would you like to obtain your AVS? MyChart    Are changes needed to the allergy or medication list? No, Pt stated no changes to allergies, and Pt stated no med changes    Are refills needed on medications prescribed by this physician? NO    Rooming Documentation:  Questionnaire(s) not done per department protocol    Pt states he had pulmonary tests done on 8/26/24.    Reason for visit: Consult (New Oncology)    Allyn CASTILLO

## 2024-09-04 ENCOUNTER — HOSPITAL ENCOUNTER (OUTPATIENT)
Dept: CT IMAGING | Facility: CLINIC | Age: 84
Discharge: HOME | End: 2024-09-04
Attending: INTERNAL MEDICINE | Admitting: INTERNAL MEDICINE
Payer: COMMERCIAL

## 2024-09-04 DIAGNOSIS — R91.1 LUNG NODULE: ICD-10-CM

## 2024-09-04 PROCEDURE — 71250 CT THORAX DX C-: CPT

## 2024-09-06 DIAGNOSIS — R91.1 LUNG NODULE: Primary | ICD-10-CM

## 2024-09-09 RX ORDER — SILDENAFIL CITRATE 20 MG/1
20-60 TABLET ORAL DAILY PRN
COMMUNITY
Start: 2023-08-01

## 2024-09-09 RX ORDER — METOPROLOL SUCCINATE 25 MG/1
1 TABLET, EXTENDED RELEASE ORAL DAILY
COMMUNITY
Start: 2024-06-18 | End: 2025-06-18

## 2024-09-13 ENCOUNTER — APPOINTMENT (OUTPATIENT)
Dept: SURGERY | Facility: PHYSICIAN GROUP | Age: 84
End: 2024-09-13
Payer: COMMERCIAL

## 2024-09-13 ENCOUNTER — ANESTHESIA EVENT (OUTPATIENT)
Dept: SURGERY | Facility: CLINIC | Age: 84
End: 2024-09-13
Payer: COMMERCIAL

## 2024-09-13 ENCOUNTER — ANESTHESIA (OUTPATIENT)
Dept: SURGERY | Facility: CLINIC | Age: 84
End: 2024-09-13
Payer: COMMERCIAL

## 2024-09-13 ENCOUNTER — HOSPITAL ENCOUNTER (OUTPATIENT)
Facility: CLINIC | Age: 84
Discharge: HOME | End: 2024-09-13
Attending: SURGERY | Admitting: SURGERY
Payer: COMMERCIAL

## 2024-09-13 VITALS
WEIGHT: 161 LBS | OXYGEN SATURATION: 95 % | RESPIRATION RATE: 21 BRPM | TEMPERATURE: 97 F | BODY MASS INDEX: 20.67 KG/M2 | SYSTOLIC BLOOD PRESSURE: 159 MMHG | HEART RATE: 67 BPM | DIASTOLIC BLOOD PRESSURE: 83 MMHG

## 2024-09-13 DIAGNOSIS — K40.90 RIGHT INGUINAL HERNIA: ICD-10-CM

## 2024-09-13 PROCEDURE — 49659 UNLSTD LAPS PX HRNAP HRNRPHY: CPT | Mod: 51 | Performed by: SURGERY

## 2024-09-13 PROCEDURE — 49650 LAP ING HERNIA REPAIR INIT: CPT | Mod: RT | Performed by: SURGERY

## 2024-09-13 PROCEDURE — 258N000003 HC RX IP 258 OP 636: Performed by: NURSE ANESTHETIST, CERTIFIED REGISTERED

## 2024-09-13 PROCEDURE — S2900 ROBOTIC SURGICAL SYSTEM: HCPCS | Performed by: SURGERY

## 2024-09-13 PROCEDURE — 250N000009 HC RX 250: Performed by: SURGERY

## 2024-09-13 PROCEDURE — C1781 MESH (IMPLANTABLE): HCPCS | Performed by: SURGERY

## 2024-09-13 PROCEDURE — 49659 UNLSTD LAPS PX HRNAP HRNRPHY: CPT | Mod: AS | Performed by: PHYSICIAN ASSISTANT

## 2024-09-13 PROCEDURE — 250N000013 HC RX MED GY IP 250 OP 250 PS 637: Performed by: SURGERY

## 2024-09-13 PROCEDURE — 258N000003 HC RX IP 258 OP 636: Performed by: ANESTHESIOLOGY

## 2024-09-13 PROCEDURE — 250N000011 HC RX IP 250 OP 636: Performed by: ANESTHESIOLOGY

## 2024-09-13 PROCEDURE — 710N000009 HC RECOVERY PHASE 1, LEVEL 1, PER MIN: Performed by: SURGERY

## 2024-09-13 PROCEDURE — 250N000011 HC RX IP 250 OP 636: Performed by: SURGERY

## 2024-09-13 PROCEDURE — 360N000080 HC SURGERY LEVEL 7, PER MIN: Performed by: SURGERY

## 2024-09-13 PROCEDURE — 250N000009 HC RX 250: Performed by: NURSE ANESTHETIST, CERTIFIED REGISTERED

## 2024-09-13 PROCEDURE — 49650 LAP ING HERNIA REPAIR INIT: CPT | Mod: AS | Performed by: PHYSICIAN ASSISTANT

## 2024-09-13 PROCEDURE — 250N000025 HC SEVOFLURANE, PER MIN: Performed by: SURGERY

## 2024-09-13 PROCEDURE — 250N000011 HC RX IP 250 OP 636: Performed by: NURSE ANESTHETIST, CERTIFIED REGISTERED

## 2024-09-13 PROCEDURE — 370N000017 HC ANESTHESIA TECHNICAL FEE, PER MIN: Performed by: SURGERY

## 2024-09-13 PROCEDURE — 999N000141 HC STATISTIC PRE-PROCEDURE NURSING ASSESSMENT: Performed by: SURGERY

## 2024-09-13 PROCEDURE — 250N000013 HC RX MED GY IP 250 OP 250 PS 637: Performed by: ANESTHESIOLOGY

## 2024-09-13 PROCEDURE — 710N000012 HC RECOVERY PHASE 2, PER MINUTE: Performed by: SURGERY

## 2024-09-13 PROCEDURE — 272N000001 HC OR GENERAL SUPPLY STERILE: Performed by: SURGERY

## 2024-09-13 DEVICE — LAPAROSCOPIC SELF-FIXATING MESH POLYESTER WITH POLYLACTIC ACID GRIPS AND COLLAGEN FILM
Type: IMPLANTABLE DEVICE | Site: INGUINAL | Status: FUNCTIONAL
Brand: PROGRIP

## 2024-09-13 RX ORDER — NALOXONE HYDROCHLORIDE 0.4 MG/ML
0.1 INJECTION, SOLUTION INTRAMUSCULAR; INTRAVENOUS; SUBCUTANEOUS
Status: CANCELLED | OUTPATIENT
Start: 2024-09-13

## 2024-09-13 RX ORDER — ONDANSETRON 2 MG/ML
INJECTION INTRAMUSCULAR; INTRAVENOUS PRN
Status: DISCONTINUED | OUTPATIENT
Start: 2024-09-13 | End: 2024-09-13

## 2024-09-13 RX ORDER — FENTANYL CITRATE 50 UG/ML
50 INJECTION, SOLUTION INTRAMUSCULAR; INTRAVENOUS EVERY 5 MIN PRN
Status: DISCONTINUED | OUTPATIENT
Start: 2024-09-13 | End: 2024-09-13 | Stop reason: HOSPADM

## 2024-09-13 RX ORDER — HYDROMORPHONE HCL IN WATER/PF 6 MG/30 ML
0.2 PATIENT CONTROLLED ANALGESIA SYRINGE INTRAVENOUS EVERY 5 MIN PRN
Status: DISCONTINUED | OUTPATIENT
Start: 2024-09-13 | End: 2024-09-13 | Stop reason: HOSPADM

## 2024-09-13 RX ORDER — KETOROLAC TROMETHAMINE 30 MG/ML
INJECTION, SOLUTION INTRAMUSCULAR; INTRAVENOUS PRN
Status: DISCONTINUED | OUTPATIENT
Start: 2024-09-13 | End: 2024-09-13

## 2024-09-13 RX ORDER — LIDOCAINE HYDROCHLORIDE 20 MG/ML
INJECTION, SOLUTION INFILTRATION; PERINEURAL PRN
Status: DISCONTINUED | OUTPATIENT
Start: 2024-09-13 | End: 2024-09-13

## 2024-09-13 RX ORDER — DEXAMETHASONE SODIUM PHOSPHATE 4 MG/ML
INJECTION, SOLUTION INTRA-ARTICULAR; INTRALESIONAL; INTRAMUSCULAR; INTRAVENOUS; SOFT TISSUE PRN
Status: DISCONTINUED | OUTPATIENT
Start: 2024-09-13 | End: 2024-09-13

## 2024-09-13 RX ORDER — ONDANSETRON 4 MG/1
4 TABLET, ORALLY DISINTEGRATING ORAL EVERY 30 MIN PRN
Status: CANCELLED | OUTPATIENT
Start: 2024-09-13

## 2024-09-13 RX ORDER — CEFAZOLIN SODIUM/WATER 2 G/20 ML
2 SYRINGE (ML) INTRAVENOUS SEE ADMIN INSTRUCTIONS
Status: DISCONTINUED | OUTPATIENT
Start: 2024-09-13 | End: 2024-09-13 | Stop reason: HOSPADM

## 2024-09-13 RX ORDER — PROPOFOL 10 MG/ML
INJECTION, EMULSION INTRAVENOUS PRN
Status: DISCONTINUED | OUTPATIENT
Start: 2024-09-13 | End: 2024-09-13

## 2024-09-13 RX ORDER — OXYCODONE HYDROCHLORIDE 5 MG/1
5 TABLET ORAL
Status: COMPLETED | OUTPATIENT
Start: 2024-09-13 | End: 2024-09-13

## 2024-09-13 RX ORDER — BUPIVACAINE HYDROCHLORIDE AND EPINEPHRINE 5; 5 MG/ML; UG/ML
INJECTION, SOLUTION EPIDURAL; INTRACAUDAL; PERINEURAL PRN
Status: DISCONTINUED | OUTPATIENT
Start: 2024-09-13 | End: 2024-09-13 | Stop reason: HOSPADM

## 2024-09-13 RX ORDER — LIDOCAINE 40 MG/G
CREAM TOPICAL
Status: DISCONTINUED | OUTPATIENT
Start: 2024-09-13 | End: 2024-09-13 | Stop reason: HOSPADM

## 2024-09-13 RX ORDER — SODIUM CHLORIDE, SODIUM LACTATE, POTASSIUM CHLORIDE, CALCIUM CHLORIDE 600; 310; 30; 20 MG/100ML; MG/100ML; MG/100ML; MG/100ML
INJECTION, SOLUTION INTRAVENOUS CONTINUOUS
Status: DISCONTINUED | OUTPATIENT
Start: 2024-09-13 | End: 2024-09-13 | Stop reason: HOSPADM

## 2024-09-13 RX ORDER — GLYCOPYRROLATE 0.2 MG/ML
INJECTION, SOLUTION INTRAMUSCULAR; INTRAVENOUS PRN
Status: DISCONTINUED | OUTPATIENT
Start: 2024-09-13 | End: 2024-09-13

## 2024-09-13 RX ORDER — ONDANSETRON 4 MG/1
4 TABLET, ORALLY DISINTEGRATING ORAL EVERY 30 MIN PRN
Status: DISCONTINUED | OUTPATIENT
Start: 2024-09-13 | End: 2024-09-13 | Stop reason: HOSPADM

## 2024-09-13 RX ORDER — NALOXONE HYDROCHLORIDE 0.4 MG/ML
0.1 INJECTION, SOLUTION INTRAMUSCULAR; INTRAVENOUS; SUBCUTANEOUS
Status: DISCONTINUED | OUTPATIENT
Start: 2024-09-13 | End: 2024-09-13 | Stop reason: HOSPADM

## 2024-09-13 RX ORDER — OXYCODONE HYDROCHLORIDE 5 MG/1
5-10 TABLET ORAL EVERY 4 HOURS PRN
Qty: 12 TABLET | Refills: 0 | Status: SHIPPED | OUTPATIENT
Start: 2024-09-13 | End: 2024-09-25

## 2024-09-13 RX ORDER — DEXAMETHASONE SODIUM PHOSPHATE 4 MG/ML
4 INJECTION, SOLUTION INTRA-ARTICULAR; INTRALESIONAL; INTRAMUSCULAR; INTRAVENOUS; SOFT TISSUE
Status: DISCONTINUED | OUTPATIENT
Start: 2024-09-13 | End: 2024-09-13 | Stop reason: HOSPADM

## 2024-09-13 RX ORDER — ONDANSETRON 4 MG/1
4 TABLET, ORALLY DISINTEGRATING ORAL EVERY 8 HOURS PRN
Qty: 10 TABLET | Refills: 0 | Status: SHIPPED | OUTPATIENT
Start: 2024-09-13

## 2024-09-13 RX ORDER — HYDROMORPHONE HCL IN WATER/PF 6 MG/30 ML
0.4 PATIENT CONTROLLED ANALGESIA SYRINGE INTRAVENOUS EVERY 5 MIN PRN
Status: DISCONTINUED | OUTPATIENT
Start: 2024-09-13 | End: 2024-09-13 | Stop reason: HOSPADM

## 2024-09-13 RX ORDER — ONDANSETRON 2 MG/ML
4 INJECTION INTRAMUSCULAR; INTRAVENOUS EVERY 30 MIN PRN
Status: CANCELLED | OUTPATIENT
Start: 2024-09-13

## 2024-09-13 RX ORDER — ONDANSETRON 2 MG/ML
4 INJECTION INTRAMUSCULAR; INTRAVENOUS EVERY 30 MIN PRN
Status: DISCONTINUED | OUTPATIENT
Start: 2024-09-13 | End: 2024-09-13 | Stop reason: HOSPADM

## 2024-09-13 RX ORDER — EPHEDRINE SULFATE 50 MG/ML
INJECTION, SOLUTION INTRAMUSCULAR; INTRAVENOUS; SUBCUTANEOUS PRN
Status: DISCONTINUED | OUTPATIENT
Start: 2024-09-13 | End: 2024-09-13

## 2024-09-13 RX ORDER — FENTANYL CITRATE 50 UG/ML
25 INJECTION, SOLUTION INTRAMUSCULAR; INTRAVENOUS EVERY 5 MIN PRN
Status: DISCONTINUED | OUTPATIENT
Start: 2024-09-13 | End: 2024-09-13 | Stop reason: HOSPADM

## 2024-09-13 RX ORDER — OXYCODONE HYDROCHLORIDE 5 MG/1
10 TABLET ORAL
Status: CANCELLED | OUTPATIENT
Start: 2024-09-13

## 2024-09-13 RX ORDER — OXYCODONE HYDROCHLORIDE 5 MG/1
5 TABLET ORAL
Status: CANCELLED | OUTPATIENT
Start: 2024-09-13

## 2024-09-13 RX ORDER — DEXAMETHASONE SODIUM PHOSPHATE 4 MG/ML
4 INJECTION, SOLUTION INTRA-ARTICULAR; INTRALESIONAL; INTRAMUSCULAR; INTRAVENOUS; SOFT TISSUE
Status: CANCELLED | OUTPATIENT
Start: 2024-09-13

## 2024-09-13 RX ORDER — FENTANYL CITRATE 50 UG/ML
INJECTION, SOLUTION INTRAMUSCULAR; INTRAVENOUS PRN
Status: DISCONTINUED | OUTPATIENT
Start: 2024-09-13 | End: 2024-09-13

## 2024-09-13 RX ORDER — CEFAZOLIN SODIUM/WATER 2 G/20 ML
2 SYRINGE (ML) INTRAVENOUS
Status: COMPLETED | OUTPATIENT
Start: 2024-09-13 | End: 2024-09-13

## 2024-09-13 RX ORDER — ACETAMINOPHEN 325 MG/1
650 TABLET ORAL ONCE
Status: COMPLETED | OUTPATIENT
Start: 2024-09-13 | End: 2024-09-13

## 2024-09-13 RX ADMIN — Medication 10 MG: at 12:06

## 2024-09-13 RX ADMIN — FENTANYL CITRATE 50 MCG: 50 INJECTION, SOLUTION INTRAMUSCULAR; INTRAVENOUS at 14:11

## 2024-09-13 RX ADMIN — GLYCOPYRROLATE 0.2 MG: 0.2 INJECTION, SOLUTION INTRAMUSCULAR; INTRAVENOUS at 11:50

## 2024-09-13 RX ADMIN — Medication 5 MG: at 12:42

## 2024-09-13 RX ADMIN — PROPOFOL 100 MG: 10 INJECTION, EMULSION INTRAVENOUS at 11:50

## 2024-09-13 RX ADMIN — PROPOFOL 50 MG: 10 INJECTION, EMULSION INTRAVENOUS at 11:54

## 2024-09-13 RX ADMIN — ONDANSETRON 4 MG: 2 INJECTION INTRAMUSCULAR; INTRAVENOUS at 13:09

## 2024-09-13 RX ADMIN — Medication 2 G: at 11:45

## 2024-09-13 RX ADMIN — ACETAMINOPHEN 650 MG: 325 TABLET, FILM COATED ORAL at 14:46

## 2024-09-13 RX ADMIN — SUGAMMADEX 200 MG: 100 INJECTION, SOLUTION INTRAVENOUS at 13:12

## 2024-09-13 RX ADMIN — FENTANYL CITRATE 100 MCG: 50 INJECTION INTRAMUSCULAR; INTRAVENOUS at 11:50

## 2024-09-13 RX ADMIN — SODIUM CHLORIDE, POTASSIUM CHLORIDE, SODIUM LACTATE AND CALCIUM CHLORIDE: 600; 310; 30; 20 INJECTION, SOLUTION INTRAVENOUS at 13:16

## 2024-09-13 RX ADMIN — PHENYLEPHRINE HYDROCHLORIDE 100 MCG: 10 INJECTION INTRAVENOUS at 11:54

## 2024-09-13 RX ADMIN — SODIUM CHLORIDE, POTASSIUM CHLORIDE, SODIUM LACTATE AND CALCIUM CHLORIDE: 600; 310; 30; 20 INJECTION, SOLUTION INTRAVENOUS at 11:45

## 2024-09-13 RX ADMIN — PHENYLEPHRINE HYDROCHLORIDE 200 MCG: 10 INJECTION INTRAVENOUS at 12:03

## 2024-09-13 RX ADMIN — ROCURONIUM BROMIDE 30 MG: 50 INJECTION, SOLUTION INTRAVENOUS at 11:50

## 2024-09-13 RX ADMIN — OXYCODONE HYDROCHLORIDE 5 MG: 5 TABLET ORAL at 14:47

## 2024-09-13 RX ADMIN — LIDOCAINE HYDROCHLORIDE 30 MG: 20 INJECTION, SOLUTION INFILTRATION; PERINEURAL at 11:50

## 2024-09-13 RX ADMIN — KETOROLAC TROMETHAMINE 15 MG: 30 INJECTION, SOLUTION INTRAMUSCULAR at 13:09

## 2024-09-13 RX ADMIN — DEXAMETHASONE SODIUM PHOSPHATE 8 MG: 4 INJECTION, SOLUTION INTRA-ARTICULAR; INTRALESIONAL; INTRAMUSCULAR; INTRAVENOUS; SOFT TISSUE at 11:50

## 2024-09-13 ASSESSMENT — ACTIVITIES OF DAILY LIVING (ADL)
ADLS_ACUITY_SCORE: 36

## 2024-09-13 ASSESSMENT — ENCOUNTER SYMPTOMS: DYSRHYTHMIAS: 1

## 2024-09-13 ASSESSMENT — LIFESTYLE VARIABLES: TOBACCO_USE: 1

## 2024-09-13 ASSESSMENT — COPD QUESTIONNAIRES: COPD: 1

## 2024-09-13 NOTE — ANESTHESIA POSTPROCEDURE EVALUATION
Patient: Tigre Gillette    Procedure: Procedure(s):  Robotic assisted right inguinal hernia repair with mesh       Anesthesia Type:  General    Note:     Postop Pain Control: Uneventful            Sign Out: Well controlled pain   PONV: No   Neuro/Psych: Uneventful            Sign Out: Acceptable/Baseline neuro status   Airway/Respiratory:             Sign Out: Acceptable/Baseline resp. status   CV/Hemodynamics:             Sign Out: Acceptable CV status   Other NRE:    DID A NON-ROUTINE EVENT OCCUR?            Last vitals:  Vitals Value Taken Time   /54 09/13/24 1355   Temp 98.6  F (37  C) 09/13/24 1327   Pulse 71 09/13/24 1358   Resp 33 09/13/24 1358   SpO2 93 % 09/13/24 1358   Vitals shown include unfiled device data.    Electronically Signed By: Florentin Keyes DO  September 13, 2024  2:00 PM

## 2024-09-13 NOTE — OR NURSING
Pt with history of urinary retention with previous surgeries.  Unable to void, but has bladder scan of 210.  Dr. Martin notified and urinary catheter ordered.  Pt tried to void a couple more times but unable to and agreed to have catheter placed.  Pt to call Dr. Martin's office on Monday for catheter removal.

## 2024-09-13 NOTE — OP NOTE
General Surgery Operative Note    Pre-operative diagnosis:  Right inguinal hernia [K40.90]   Post-operative diagnosis: Right direct and indirect inguinal hernias, right femoral hernia, right obturator hernia   Procedure: Robotic assisted repair of the right inguinal hernia, right femoral hernia, and right obturator hernia   Surgeon: Scar Martin MD   Assistant(s): Diane Saenz PA-C   Anesthesia: General    Estimated blood loss: 5 cc's   Drains placed: None   Complications:  None   Findings:  Complex right inguinal region with a large direct hernia defect, 2 indirect hernia defects, a large femoral defect and a small obturator defect.  There was achieved using preperitoneal ProGrip mesh.  This required using 2 pieces of mesh to cover the broad area.     Indications for operation: This is an 84-year-old gentleman who presented with a right inguinal hernia.  Robotic assisted repair was recommended and the procedure, along with its risks and complications, was discussed with the patient.  He agreed to proceed.    Details of the operation: After informed consent, the patient was taken to the operating room, where he underwent satisfactory induction of general anesthesia.  Patient was sterilely prepped and draped and an epigastric incision was made using a skin knife.  Dissection was carried bluntly down to the fascia, which was opened very slightly using electrocautery.  The robotic camera port was then placed, taking care to be above the patient's previously placed epigastric mesh.  Pneumoperitoneum was achieved using CO2 insufflation and an 8 mm robotic port was now placed on each side of the abdomen under direct visualization.  The robot was brought in and docked without difficulty.  The patient was placed in slight Trendelenburg position and the right inguinal region was inspected.  This revealed 4 immediately obvious bulges.  The peritoneum was scored above the level of the ASIS and the preperitoneal space  was then developed, reducing the hernias as we went.  There was a prominent direct hernia, a large mouth femoral hernia and an anterior and posterior indirect hernia.  As we completed the dissection, an obturator hernia came into view below the pubis.  This was also reduced.  A 12 x 16 cm piece of ProGrip mesh was now brought onto the field.  This was deployed in the preperitoneal space and nicely covered all of the defects except the obturator defect.  A second piece of ProGrip was then brought onto the field and cut in half.  This was then placed down over the obturator hernia and trimmed appropriately to fit the space.  The 2 pieces of mesh overlapped by about 3 cm.  The 2 pieces of ProGrip mesh was sutured together using 2-0 Vicryl suture.  The peritoneum was now closed using a running 3-0 V lock suture.  The robot was undocked and pneumoperitoneum was released.  The trocars were removed and the epigastric fascia was closed using interrupted 0 Vicryl sutures.  Skin incisions were closed using 4-0 subcuticular Vicryl, followed by Steri-Strips.    The patient tolerated the procedure well and was transferred to the recovery room in satisfactory condition.  Sponge and needle counts were correct at the close of the case.    Specimens: * No specimens in log *        Scar Martin MD

## 2024-09-13 NOTE — DISCHARGE INSTRUCTIONS
HOME CARE FOLLOWING INGUINAL/FEMORAL HERNIA REPAIR  CHASIDY Wang, GUILLERMINA Magallon, SCOTT Lange, REJI Dewitt  **RESTART YOUR ELIQUIS ON THE EVENING OF 9/14/2024**    DIET:  Start with liquids and gradually resume your regular diet as tolerated.  Drink plenty of fluids.  While taking pain medications, consider use of a stool softener, increase your fiber in your diet, or add a fiber supplement (like Metamucil, Citrucel) to help prevent constipation - a possible side effect of pain medications.    NAUSEA:  If nauseated from the anesthetic/pain meds; rest in bed, get up cautiously with assistance, and drink clear liquids (juice, tea, broth).    ACTIVITY:  Light Activity -- you may immediately be up and about as tolerated.  Walking is encouraged, increase as tolerated.  Driving/Light Work-- when comfortable and off narcotic pain medications.  Strenuous Work/Activity -- limit lifting to 20 pounds for 3 weeks.  Active Sports (running, biking, etc.) -- cautiously resume after 2 weeks.    INCISIONAL CARE:  If you have a dressing in place, keep clean and dry for 48 hours; you may replace the gauze if it becomes soiled.  After 48 hours you may remove the dressing and shower.  Do not submerse incision in water for 1 week.  If you have a Dermabond dressing (a type of skin glue), you may shower immediately.  Sutures will absorb and need not be removed.  If present, leave the steri-strips (white paper tapes) in place for 14 days after surgery.  If present, leave Dermabond glue in place until it wears/flakes off.  Do not apply lotions, creams, or ointments to incisions.  Expect a variable amount of swelling/black and blue discoloration that may involve the penis/scrotum or labia.  Some numbness around the incision is common.  A lump/ridge under the incision is normal and will gradually resolve.    DISCOMFORT:  Local anesthetic placed at surgery should provide relief for 4-8 hours.  Begin taking pain pills before  discomfort is severe.  Take the pain medication with some food, when possible, to minimize side effects.  Intermittent use of ice packs to the hernia repair site may help during the first 1-3 weeks after surgery.  Expect gradual improvement.    Over-the-counter anti-inflammatory medications (i.e. Ibuprofen/Advil/Motrin or Naprosyn/Aleve) may be used per package instructions in addition to or while tapering off the narcotic pain medications to decrease swelling and sensitivity at the repair site.  DO NOT TAKE these Anti-inflammatory medications if your primary physician has advised against doing so, or if you have acid reflux, ulcer, or bleeding disorder, or take blood-thinner medications.  Call your primary physician or the surgery office if you have medication questions.    FOLLOW-UP AFTER SURGERY:  -Our office will contact you approximately 2-3 weeks after surgery to check on your progress and answer any questions you may have.  If you are doing well, you will not need to return for an office appointment.  If any concerns are identified over the phone, we will help you make an appointment to see a provider.    -If you have not received a phone call, have any questions or concerns, or would like to be seen, please call us at [phone].  We are located at: [address], [address]; [place] [address]    -CONTACT US IF THE FOLLOWING DEVELOPS:   1. A fever that is above 101     2. Increased redness, warmth, drainage, bleeding, or swelling.   3. Pain that is not relieved by rest/ice and your prescription.   4.  Increasing pain after 48 hours.   5. Drainage that is thick, cloudy, yellow, green or white.   6. Any other questions or concerns.      FREQUENTLY ASKED QUESTIONS:    Q:  How should my incision look?    A:  Normally your incision will appear slightly swollen with light redness directly along the incision itself as it heals.  It may feel like a bump or ridge as the healing/scarring happens, and over  time (3-4 months) this bump or ridge feeling should slowly go away.  In general, clear or pink watery drainage can be normal at first as your incision heals, but should decrease over time.    Q:  How do I know if my incision is infected?  A:  Look at your incision for signs of infection, like redness around the incision spreading to surrounding skin, or drainage of cloudy or foul-smelling drainage.  If you feel warm, check your temperature to see if you are running a fever.    **If any of these things occur, please notify the nurse at our office.  We may need you to come into the office for an incision check.      Q:  How do I take care of my incision?  A:  If you have a dressing in place - Starting the day after surgery, replace the dressing 1-2 times a day until there is no further drainage from the incision.  At that time, a dressing is no longer needed.  Try to minimize tape on the skin if irritation is occurring at the tape sites.  If you have significant irritation from tape on the skin, please call the office to discuss other method of dressing your incision.    Small pieces of tape called  steri-strips  may be present directly overlying your incision; these may be removed 10 days after surgery unless otherwise specified by your surgeon.  If these tapes start to loosen at the ends, you may trim them back until they fall off or are removed.    A:  If you had  Dermabond  tissue glue used as a dressing (this causes your incision to look shiny with a clear covering over it) - This type of dressing wears off with time and does not require more dressings over the top unless it is draining around the glue as it wears off.  Do not apply ointments or lotions over the incisions until the glue has completely worn off.    Q:  There is a piece of tape or a sticky  lead  still on my skin.  Can I remove this?  A:  Sometimes the sticky  leads  used for monitoring during surgery or for evaluation in the emergency department  are not all removed while you are in the hospital.  These sometimes have a tab or metal dot on them.  You can easily remove these on your own, like taking off a band-aid.  If there is a gel substance under the  lead , simply wipe/clean it off with a washcloth or paper towel.      Q:  What can I do to minimize constipation (very hard stools, or lack of stools)?  A:  Stay well hydrated.  Increase your dietary fiber intake or take a fiber supplement -with plenty of water.  Walk around frequently.  You may consider an over-the-counter stool-softener.  Your Pharmacist can assist you with choosing one that is stocked at your pharmacy.  Constipation is also one of the most common side effects of pain medication.  If you are using pain medication, be pro-active and try to PREVENT problems with constipation by taking the steps above BEFORE constipation becomes a problem.    Q:  What do I do if I need more pain medications?  A:  Call the office to receive refills.  Be aware that certain pain meds cannot be called into a pharmacy and actually require a paper prescription.  A change may be made in your pain med as you progress thru your recovery period or if you have side effects to certain meds.    --Pain meds are NOT refilled after 5pm on weekdays, and NOT AT ALL on the weekends, so please look ahead to prevent problems.    Q:  Why am I having a hard time sleeping now that I am at home?  A:  Many medications you receive while you are in the hospital can impact your sleep for a number of days after your surgery/hospitalization.  Decreased level of activity and naps during the day may also make sleeping at night difficult.  Try to minimize day-time naps, and get up frequently during the day to walk around your home during your recovery time.  Sleep aides may be of some help, but are not recommended for long-term use.      Q:  I am having some back discomfort.  What should I do?  A:  This may be related to certain positioning  that was required for your surgery, extended periods of time in bed, or other changes in your overall activity level.  You may try ice, heat, acetaminophen, or ibuprofen to treat this temporarily.  Note that many pain medications have acetaminophen in them and would state this on the prescription bottle.  Be sure not to exceed the maximum of 4000mg per day of acetaminophen.     **If the pain you are having does not resolve, is severe, or is a flare of back pain you have had on other occasions prior to surgery, please contact your primary physician for further recommendations or for an appointment to be examined at their office.    Q:  Why am I having headaches?  A:  Headaches can be caused by many things:  caffeine withdrawal, use of pain meds, dehydration, high blood pressure, lack of sleep, over-activity/exhaustion, flare-up of usual migraine headaches.  If you feel this is related to muscle tension (a band-like feeling around the head, or a pressure at the low-back of the head) you may try ice or heat to this area.  You may need to drink more fluids (try electrolyte drink like Gatorade), rest, or take your usual migraine medications.   **If your headaches do not resolve, worsen, are accompanied by other symptoms, or if your blood pressure is high, please call your primary physician for recommendation and/or examination.    Q:  I am unable to urinate.  What do I do?  A:  A small percentage of people can have difficulty urinating initially after surgery.  This includes being able to urinate only a very small amount at a time and feeling discomfort or pressure in the very low abdomen.  This is called  urinary retention , and is actually an urgent situation.  Proceed to your nearest Emergency department for evaluation (not an Urgent Care Center).  Sometimes the bladder does not work correctly after certain medications you receive during surgery, or related to certain procedures.  You may need to have a catheter  placed until your bladder recovers.  When planning to go to an Emergency department, it may help to call the ER to let them know you are coming in for this problem after a surgery.  This may help you get in quicker to be evaluated.  **If you have symptoms of a urinary tract infection, please contact your primary physician for the proper evaluation and treatment.        If you have other questions, please call the office Monday thru Friday between 8am and 4:30pm to discuss with the nurse or physician assistant.  #[phone]    There is a surgeon ON CALL on weekday evenings and over the weekend in case of urgent need only, and may be contacted at the same number.    If you are having an emergency, call 911 or proceed to your nearest emergency department.    You received Toradol, an IV form of Ibuprofen (Motrin) at 1:00pm.  Do not take any Ibuprofen products until 7:00pm.     Maximum acetaminophen (Tylenol) dose from all sources should not exceed 4 grams (4000 mg) per day. You received 650mg at 3:00pm today.    Call Dr. Martin's office on Monday to arrange for catheter removal

## 2024-09-13 NOTE — ANESTHESIA PROCEDURE NOTES
Airway       Patient location during procedure: OR       Procedure Start/Stop Times: 9/13/2024 11:54 AM  Staff -        CRNA: Carolynn Jacobo APRN CRNA       Performed By: CRNA  Consent for Airway        Urgency: elective  Indications and Patient Condition       Indications for airway management: nannette-procedural       Induction type:intravenous       Mask difficulty assessment: 1 - vent by mask    Final Airway Details       Final airway type: endotracheal airway       Successful airway: ETT - single and Oral  Endotracheal Airway Details        ETT size (mm): 8.0       Cuffed: yes       Successful intubation technique: direct laryngoscopy       DL Blade Type: Desai 2       Grade View of Cords: 1       Adjucts: stylet       Position: Right       Measured from: lips       Secured at (cm): 24       Bite block used: None    Post intubation assessment        Placement verified by: capnometry, equal breath sounds and chest rise        Number of attempts at approach: 1       Number of other approaches attempted: 0       Secured with: tape       Ease of procedure: easy       Dentition: Unchanged    Medication(s) Administered   Medication Administration Time: 9/13/2024 11:54 AM

## 2024-09-13 NOTE — ANESTHESIA PREPROCEDURE EVALUATION
Anesthesia Pre-Procedure Evaluation    Patient: Tigre Gillette   MRN: [id number] : 1940        Procedure : Procedure(s):  Robotic assisted right inguinal hernia repair with mesh          Past Medical History:   Diagnosis Date    Arrhythmia     afib    Chronic lower back pain     COPD (chronic obstructive pulmonary disease) (H)     Degenerative arthritis of lumbar spine     spine    Eczema     uses topical low dose steroid and cetaphil lotion      Past Surgical History:   Procedure Laterality Date    COLONOSCOPY      HERNIORRHAPHY EPIGASTRIC N/A 2018    Procedure: HERNIORRHAPHY EPIGASTRIC;  Epigastric herniorrhaphy with Bard Ventralex ST Hernia Patch ;  Surgeon: Rossana Alas MD;  Location: [facility] OR    HERNIORRHAPHY INGUINAL      open recurrent LIH rpr with mesh    HERNIORRHAPHY INGUINAL      open LIH rpr with mesh    TONSILLECTOMY & ADENOIDECTOMY      tonsillectomy as a child      Allergies   Allergen Reactions    Amoxicillin-Pot Clavulanate GI Disturbance and Rash    Amoxicillin       Social History     Tobacco Use    Smoking status: Heavy Smoker     Current packs/day: 0.25     Average packs/day: 0.9 packs/day for 64.7 years (56.7 ttl pk-yrs)     Types: Cigarettes     Start date:     Smokeless tobacco: Never    Tobacco comments:     24 6-8 cigarettes daily.    Substance Use Topics    Alcohol use: Yes     Comment: rare       Wt Readings from Last 1 Encounters:   24 73.5 kg (162 lb)        Anesthesia Evaluation   Pt has had prior anesthetic. Type: General.    No history of anesthetic complications       ROS/MED HX  ENT/Pulmonary:     (+)                tobacco use, Current use,         COPD,    recent URI, resolved, hospitalized for pna 2024:        Neurologic:       Cardiovascular:     (+)  - -   -  - -   Taking blood thinners                     dysrhythmias,         Previous cardiac testing   Echo: Date: Results:    Stress Test:  Date: Results:    ECG Reviewed:  Date:  "Results:  RBBB  Cath:  Date: Results:      METS/Exercise Tolerance:     Hematologic:       Musculoskeletal:       GI/Hepatic:     (+) GERD,                   Renal/Genitourinary:       Endo:       Psychiatric/Substance Use:     (+)     Recreational drug usage: Cannabis.    Infectious Disease:       Malignancy:       Other:      (+)  , H/O Chronic Pain,         Physical Exam    Airway        Mallampati: II    Neck ROM: full     Respiratory Devices and Support         Dental           Cardiovascular   cardiovascular exam normal       Rhythm and rate: regular     Pulmonary   pulmonary exam normal                OUTSIDE LABS:  CBC:   Lab Results   Component Value Date    WBC 8.4 07/30/2024    WBC 8.3 05/04/2024    HGB 14.4 07/30/2024    HGB 12.1 (L) 05/04/2024    HCT 44.2 07/30/2024    HCT 36.6 (L) 05/04/2024     07/30/2024     05/04/2024     BMP:   Lab Results   Component Value Date     (L) 07/30/2024     05/04/2024    POTASSIUM 4.4 07/30/2024    POTASSIUM 3.4 05/04/2024    CHLORIDE 99 07/30/2024    CHLORIDE 101 05/04/2024    CO2 22 07/30/2024    CO2 25 05/04/2024    BUN 11.1 07/30/2024    BUN 7.4 (L) 05/04/2024    CR 0.87 07/30/2024    CR 0.73 05/04/2024    GLC 92 07/30/2024    GLC 93 05/04/2024     COAGS: No results found for: \"PTT\", \"INR\", \"FIBR\"  POC: No results found for: \"BGM\", \"HCG\", \"HCGS\"  HEPATIC: No results found for: \"ALBUMIN\", \"PROTTOTAL\", \"ALT\", \"AST\", \"GGT\", \"ALKPHOS\", \"BILITOTAL\", \"BILIDIRECT\", \"SARINA\"  OTHER:   Lab Results   Component Value Date    LACT 1.9 04/29/2024    KEVIN 9.9 07/30/2024       Anesthesia Plan    ASA Status:  3    NPO Status:  NPO Appropriate    Anesthesia Type: General.     - Airway: ETT   Induction: Intravenous.   Maintenance: Balanced.        Consents            Postoperative Care    Pain management: IV analgesics, Oral pain medications, Multi-modal analgesia.   PONV prophylaxis: Ondansetron (or other 5HT-3), Dexamethasone or Solumedrol     Comments:     "           Kary Willingham MD    I have reviewed the pertinent notes and labs in the chart from the past 30 days and (re)examined the patient.  Any updates or changes from those notes are reflected in this note.

## 2024-09-13 NOTE — ANESTHESIA CARE TRANSFER NOTE
Patient: Tigre Gillette    Procedure: Procedure(s):  Robotic assisted right inguinal hernia repair with mesh       Diagnosis: Right inguinal hernia [K40.90]  Diagnosis Additional Information: No value filed.    Anesthesia Type:   General     Note:    Oropharynx: oropharynx clear of all foreign objects and spontaneously breathing  Level of Consciousness: awake  Oxygen Supplementation: face mask  Level of Supplemental Oxygen (L/min / FiO2): 6  Independent Airway: airway patency satisfactory and stable  Dentition: dentition unchanged  Vital Signs Stable: post-procedure vital signs reviewed and stable  Report to RN Given: handoff report given  Patient transferred to: PACU    Handoff Report: Identifed the Patient, Identified the Reponsible Provider, Reviewed the pertinent medical history, Discussed the surgical course, Reviewed Intra-OP anesthesia mangement and issues during anesthesia, Set expectations for post-procedure period and Allowed opportunity for questions and acknowledgement of understanding      Vitals:  Vitals Value Taken Time   BP     Temp     Pulse 78 09/13/24 1330   Resp 25 09/13/24 1330   SpO2 99 % 09/13/24 1330   Vitals shown include unfiled device data.    Electronically Signed By: JERALD Brooks CRNA  September 13, 2024  1:31 PM

## 2024-09-14 ENCOUNTER — HOSPITAL ENCOUNTER (EMERGENCY)
Facility: CLINIC | Age: 84
Discharge: HOME | End: 2024-09-14
Attending: EMERGENCY MEDICINE | Admitting: EMERGENCY MEDICINE
Payer: COMMERCIAL

## 2024-09-14 VITALS
TEMPERATURE: 98.1 F | BODY MASS INDEX: 22.44 KG/M2 | HEIGHT: 74 IN | RESPIRATION RATE: 18 BRPM | DIASTOLIC BLOOD PRESSURE: 79 MMHG | WEIGHT: 174.82 LBS | HEART RATE: 74 BPM | SYSTOLIC BLOOD PRESSURE: 119 MMHG | OXYGEN SATURATION: 94 %

## 2024-09-14 DIAGNOSIS — T83.9XXA PROBLEM WITH FOLEY CATHETER, INITIAL ENCOUNTER (H): ICD-10-CM

## 2024-09-14 DIAGNOSIS — E87.1 HYPONATREMIA: ICD-10-CM

## 2024-09-14 LAB
ANION GAP SERPL CALCULATED.3IONS-SCNC: 12 MMOL/L (ref 7–15)
BASOPHILS # BLD AUTO: 0 10E3/UL (ref 0–0.2)
BASOPHILS NFR BLD AUTO: 0 %
BUN SERPL-MCNC: 13.7 MG/DL (ref 8–23)
CALCIUM SERPL-MCNC: 9.2 MG/DL (ref 8.8–10.4)
CHLORIDE SERPL-SCNC: 91 MMOL/L (ref 98–107)
CREAT SERPL-MCNC: 0.78 MG/DL (ref 0.67–1.17)
EGFRCR SERPLBLD CKD-EPI 2021: 89 ML/MIN/1.73M2
EOSINOPHIL # BLD AUTO: 0 10E3/UL (ref 0–0.7)
EOSINOPHIL NFR BLD AUTO: 0 %
ERYTHROCYTE [DISTWIDTH] IN BLOOD BY AUTOMATED COUNT: 13.2 % (ref 10–15)
GLUCOSE SERPL-MCNC: 107 MG/DL (ref 70–99)
HCO3 SERPL-SCNC: 19 MMOL/L (ref 22–29)
HCT VFR BLD AUTO: 36.6 % (ref 40–53)
HGB BLD-MCNC: 12.2 G/DL (ref 13.3–17.7)
IMM GRANULOCYTES # BLD: 0.1 10E3/UL
IMM GRANULOCYTES NFR BLD: 1 %
LYMPHOCYTES # BLD AUTO: 0.8 10E3/UL (ref 0.8–5.3)
LYMPHOCYTES NFR BLD AUTO: 5 %
MCH RBC QN AUTO: 31.8 PG (ref 26.5–33)
MCHC RBC AUTO-ENTMCNC: 33.3 G/DL (ref 31.5–36.5)
MCV RBC AUTO: 95 FL (ref 78–100)
MONOCYTES # BLD AUTO: 1.3 10E3/UL (ref 0–1.3)
MONOCYTES NFR BLD AUTO: 9 %
NEUTROPHILS # BLD AUTO: 12.7 10E3/UL (ref 1.6–8.3)
NEUTROPHILS NFR BLD AUTO: 85 %
NRBC # BLD AUTO: 0 10E3/UL
NRBC BLD AUTO-RTO: 0 /100
PLATELET # BLD AUTO: 245 10E3/UL (ref 150–450)
POTASSIUM SERPL-SCNC: 5.3 MMOL/L (ref 3.4–5.3)
RBC # BLD AUTO: 3.84 10E6/UL (ref 4.4–5.9)
SODIUM SERPL-SCNC: 122 MMOL/L (ref 135–145)
WBC # BLD AUTO: 14.9 10E3/UL (ref 4–11)

## 2024-09-14 PROCEDURE — 258N000003 HC RX IP 258 OP 636: Performed by: EMERGENCY MEDICINE

## 2024-09-14 PROCEDURE — 96360 HYDRATION IV INFUSION INIT: CPT

## 2024-09-14 PROCEDURE — 36415 COLL VENOUS BLD VENIPUNCTURE: CPT | Performed by: EMERGENCY MEDICINE

## 2024-09-14 PROCEDURE — 85048 AUTOMATED LEUKOCYTE COUNT: CPT | Performed by: EMERGENCY MEDICINE

## 2024-09-14 PROCEDURE — 80048 BASIC METABOLIC PNL TOTAL CA: CPT | Performed by: EMERGENCY MEDICINE

## 2024-09-14 PROCEDURE — 99283 EMERGENCY DEPT VISIT LOW MDM: CPT | Mod: 25

## 2024-09-14 RX ORDER — SODIUM CHLORIDE 1 G/1
1 TABLET ORAL 3 TIMES DAILY
Qty: 6 TABLET | Refills: 0 | Status: SHIPPED | OUTPATIENT
Start: 2024-09-14 | End: 2024-09-16

## 2024-09-14 RX ADMIN — SODIUM CHLORIDE 1000 ML: 9 INJECTION, SOLUTION INTRAVENOUS at 19:13

## 2024-09-14 ASSESSMENT — ACTIVITIES OF DAILY LIVING (ADL)
ADLS_ACUITY_SCORE: 38

## 2024-09-14 ASSESSMENT — COLUMBIA-SUICIDE SEVERITY RATING SCALE - C-SSRS
6. HAVE YOU EVER DONE ANYTHING, STARTED TO DO ANYTHING, OR PREPARED TO DO ANYTHING TO END YOUR LIFE?: NO
1. IN THE PAST MONTH, HAVE YOU WISHED YOU WERE DEAD OR WISHED YOU COULD GO TO SLEEP AND NOT WAKE UP?: NO
2. HAVE YOU ACTUALLY HAD ANY THOUGHTS OF KILLING YOURSELF IN THE PAST MONTH?: NO

## 2024-09-14 NOTE — ED TRIAGE NOTES
Patient had a hernia repair done yesterday as an outpatient. Patient had a calderon placed after the procedure yesterday as he was unable to void.  Patient states noted about 2 hours ago he began having blood in his calderon bag, patient has not noted any clots thus far. Patient is suppose to be on a blood thinner but it is unclear if he restarted it today after his procedure.      Triage Assessment (Adult)       Row Name 09/14/24 5342          Triage Assessment    Airway WDL WDL        Respiratory WDL    Respiratory WDL WDL        Skin Circulation/Temperature WDL    Skin Circulation/Temperature WDL WDL        Cardiac WDL    Cardiac WDL WDL        Peripheral/Neurovascular WDL    Peripheral Neurovascular WDL WDL        Cognitive/Neuro/Behavioral WDL    Cognitive/Neuro/Behavioral WDL WDL

## 2024-09-14 NOTE — ED NOTES
Writer and GREG Preston irrigated calderon. No resistance or obstruction noted during irrigation. Provider at bedside.

## 2024-09-14 NOTE — DISCHARGE INSTRUCTIONS
Restart your Eliquis on Monday    Return to ER immediately if you develop: New severe pain, Fever > 101, persistent nausea or vomiting OR you have any other concerns about your health.    2 L fluid restriction per 24-hour.  Instead of plain water drink an electrolyte containing solution such as Gatorade or Powerade or liquid IV or similar.

## 2024-09-14 NOTE — ED PROVIDER NOTES
Emergency Department Note      History of Present Illness   Chief Complaint   Post-op Problem    HPI   Tigre Gillette is a 84 year old male with a history of an arrhythmia, COPD and atrial fibrillation who presents with a family member for an evaluation of a post-op problem. The patient stated the hernia repair surgery was more complicated than expected but successful. He stated he went home yesterday with a calderon catheter in place. He stated when he got up from his chair today he felt something pull and his wife noticed blood in his catheter bag. He added some clotting early this morning. He stated he has been fatigued all day. He stated he is taking oxycodone every four hours for pain. He stated he was told to stop taking his blood thinning medication six days ago and is unsure when he is supposed to start up again. He stated he is planning to talk with his doctor in two days to discuss when his catheter can be removed. He denies pain with urination, leaking from the catheter, vomiting, nausea, and bloating or distention in the abdomen. He noted he has had a catheter before in 2018 for three days following a surgery.     Independent Historian   None    Review of External Notes   I reviewed the surgery note from 09/13/2024.   Past Medical History   Medical History and Problem List   Arrhythmia   COPD  Arthritis  Eczema   Carpal tunnel syndrome  Elevated troponin  Elevated BNP  Pneumonia  Atrial fibrillation  Leukocytosis   Inguinal hernia     Medications   Albuterol  Eliquis  Gabapentin  Methocarbamol  Omeprazole  Oxycodone  Metoprolol succinate  Sildenafil   Trelegy ellipta    Surgical History   Colonoscopy  Herniorrhaphy epigastric  Tonsillectomy and adenoidectomy   Cataract   Physical Exam   Patient Vitals for the past 24 hrs:   BP Temp Temp src Pulse Resp SpO2 Height Weight   09/14/24 2034 -- -- -- -- -- 94 % -- --   09/14/24 2033 119/79 -- -- 74 -- -- -- --   09/14/24 1946 -- -- -- -- -- 90 % -- --  "  09/14/24 1931 -- -- -- -- -- 91 % -- --   09/14/24 1912 107/58 -- -- 72 -- 91 % -- --   09/14/24 1708 (!) 192/84 98.1  F (36.7  C) Oral 60 18 93 % 1.88 m (6' 2\") 79.3 kg (174 lb 13.2 oz)     Physical Exam      CV: ppi, regular   Resp: speaking in full sentences without any resp distress  Skin: warm dry well perfused  Neuro: Alert, no gross motor or sensory deficits,  gait stable      Abdomen: Soft nontender nondistended, surgical port sites without surrounding erythema purulent drainage.  Small amount of dried blood on the central port site.  Right groin without significant tenderness ecchymoses or erythema.    Diagnostics   Lab Results   Labs Ordered and Resulted from Time of ED Arrival to Time of ED Departure   BASIC METABOLIC PANEL - Abnormal       Result Value    Sodium 122 (*)     Potassium 5.3      Chloride 91 (*)     Carbon Dioxide (CO2) 19 (*)     Anion Gap 12      Urea Nitrogen 13.7      Creatinine 0.78      GFR Estimate 89      Calcium 9.2      Glucose 107 (*)    CBC WITH PLATELETS AND DIFFERENTIAL - Abnormal    WBC Count 14.9 (*)     RBC Count 3.84 (*)     Hemoglobin 12.2 (*)     Hematocrit 36.6 (*)     MCV 95      MCH 31.8      MCHC 33.3      RDW 13.2      Platelet Count 245      % Neutrophils 85      % Lymphocytes 5      % Monocytes 9      % Eosinophils 0      % Basophils 0      % Immature Granulocytes 1      NRBCs per 100 WBC 0      Absolute Neutrophils 12.7 (*)     Absolute Lymphocytes 0.8      Absolute Monocytes 1.3      Absolute Eosinophils 0.0      Absolute Basophils 0.0      Absolute Immature Granulocytes 0.1      Absolute NRBCs 0.0         Imaging   No orders to display         Independent Interpretation     ED Course    Medications Administered   Medications   sodium chloride 0.9% BOLUS 1,000 mL (0 mLs Intravenous Stopped 9/14/24 2030)       Procedures   Procedures     Discussion of Management   Discussed with general surgery time and will be okay to restart Eliquis and this was 2 to 3-day " after surgery and so I directed him to started on Monday    ED Course   ED Course as of 09/14/24 2236   Sat Sep 14, 2024   1727 I obtained history and examined the patient as noted above.    1854 I rechecked and updated the patient.      Additional Documentation  None  Medical Decision Making / Diagnosis   CMS Diagnoses: None    MIPS       None    MDM   Tigre Gillette is a 84 year old male here for evaluation of his Cornell catheter and discolored urine.  Recently had robot-assisted hernia surgery of the right groin.  Sent home with a Cornell for urinary retention.  They have noted some discolored urine that is darkish yellow/orange/red.  Examination here his abdomen is benign, no complications with the Cornell itself when flushed by nursing staff.  Low suspicion he would have developed a urinary tract infection.  BMP shows hyponatremia last I can see was in the low 130s today is 122.  Discussed this with him and his daughter.  He is not on diuretics he has some degree of very mild hyponatremia and not into the 120s.  Daughter states his diet is mostly coffee.  Likely there is a component of hypovolemia here and that he was given a liter of normal saline.  We discussed observation admission versus discharge home.  He would strongly like to go home and has the capacity to make that decision.  Daughter is comfortable with that as well.  I ordered an outpatient BMP which can be followed by his primary care provider we will do salt tabs for a couple days and encouraged him to drink 2 L at most per day and rather than free water do electrolyte containing solutions.        Disposition   The patient was discharged.     Diagnosis     ICD-10-CM    1. Problem with Cornell catheter, initial encounter (H24)  T83.9XXA       2. Hyponatremia  E87.1 Basic metabolic panel           Discharge Medications   Discharge Medication List as of 9/14/2024  8:30 PM        START taking these medications    Details   sodium chloride 1 GM tablet Take 1  tablet (1 g) by mouth 3 times daily for 2 days., Disp-6 tablet, R-0, E-Prescribe           Scribe Disclosure:  I, Jenna Luis, am serving as a scribe at 5:49 PM on 9/14/2024 to document services personally performed by Adonis Harper MD, based on my observations and the provider's statements to me.      Adonis Harper MD  09/14/24 6329

## 2024-09-16 ENCOUNTER — TELEPHONE (OUTPATIENT)
Dept: SURGERY | Facility: CLINIC | Age: 84
End: 2024-09-16
Payer: COMMERCIAL

## 2024-09-16 DIAGNOSIS — N99.89 POSTOPERATIVE RETENTION OF URINE: Primary | ICD-10-CM

## 2024-09-16 DIAGNOSIS — K40.90 RIGHT INGUINAL HERNIA: ICD-10-CM

## 2024-09-16 DIAGNOSIS — R33.8 POSTOPERATIVE RETENTION OF URINE: Primary | ICD-10-CM

## 2024-09-16 RX ORDER — TAMSULOSIN HYDROCHLORIDE 0.4 MG/1
0.4 CAPSULE ORAL DAILY
Qty: 7 CAPSULE | Refills: 0 | Status: SHIPPED | OUTPATIENT
Start: 2024-09-16 | End: 2024-09-25

## 2024-09-16 NOTE — TELEPHONE ENCOUNTER
S/p Robotic assisted repair of the right inguinal hernia, right femoral hernia, and right obturator hernia   Procedure date: 9/13/24  Surgeon: Dr. Martin    Patient went home with calderon catheter after surgery because he was unable to void.      He was seen in ED on 9/14 due to some blood in his calderon bag.  This has since cleared.  He is calling today to schedule an appointment to remove calderon catheter.      He is not under the care of a urologist.  Calderon, with clear yellow urine in bag.      Scheduled patient for a nurse visit on Friday 9/20 at 11AM to remove calderon catheter. Our office will prescribe an additional 7 days of Flomax - Pt requests this to be sent to [facility] in [place].  He understands that he should continue to take Flomax Q day until all tablets are gone.      He is wondering about follow up labs for BMP related to hyponatremia noted during 9/14/24 ED visit.  He has not yet started on salt tablets as prescribed by ED physician.   Explained to patient that he should contact PCP to inform of labs and follow up labs and discuss results / timing.      Pt verbalizes understanding of our conversation and is in agreement with this plan.

## 2024-09-16 NOTE — TELEPHONE ENCOUNTER
Name of caller: Patient    Reason for Call:Pt needs Cath removed and talk about labs etc    Surgeon:  Dr. Martin    Recent Surgery:  Yes.    If yes, when & what type:  9/13, Robotic assisted repair of the right inguinal hernia, right femoral hernia, and right obturator hernia       Best phone number to reach pt at is: [phone]  Ok to leave a message with medical info? Yes.    Pharmacy preferred (if calling for a refill): NA

## 2024-09-20 ENCOUNTER — OFFICE VISIT (OUTPATIENT)
Dept: SURGERY | Facility: CLINIC | Age: 84
End: 2024-09-20
Payer: COMMERCIAL

## 2024-09-20 ENCOUNTER — LAB (OUTPATIENT)
Dept: LAB | Facility: CLINIC | Age: 84
End: 2024-09-20
Payer: COMMERCIAL

## 2024-09-20 DIAGNOSIS — E87.1 HYPONATREMIA: ICD-10-CM

## 2024-09-20 DIAGNOSIS — Z09 SURGICAL FOLLOWUP VISIT: Primary | ICD-10-CM

## 2024-09-20 LAB
ANION GAP SERPL CALCULATED.3IONS-SCNC: 11 MMOL/L (ref 7–15)
BUN SERPL-MCNC: 9.9 MG/DL (ref 8–23)
CALCIUM SERPL-MCNC: 9.4 MG/DL (ref 8.8–10.4)
CHLORIDE SERPL-SCNC: 100 MMOL/L (ref 98–107)
CREAT SERPL-MCNC: 0.82 MG/DL (ref 0.67–1.17)
EGFRCR SERPLBLD CKD-EPI 2021: 88 ML/MIN/1.73M2
GLUCOSE SERPL-MCNC: 88 MG/DL (ref 70–99)
HCO3 SERPL-SCNC: 23 MMOL/L (ref 22–29)
POTASSIUM SERPL-SCNC: 4.6 MMOL/L (ref 3.4–5.3)
SODIUM SERPL-SCNC: 134 MMOL/L (ref 135–145)

## 2024-09-20 PROCEDURE — 99207 PR NO CHARGE NURSE ONLY: CPT

## 2024-09-20 PROCEDURE — 36415 COLL VENOUS BLD VENIPUNCTURE: CPT

## 2024-09-20 PROCEDURE — 80048 BASIC METABOLIC PNL TOTAL CA: CPT

## 2024-09-20 NOTE — PROGRESS NOTES
[facility] Note:     Subjective: Patient underwent Robotic assisted repair of the right inguinal hernia, right femoral hernia, and right obturator hernia with Dr. Martin on 9/13/24.  He was sent home with a calderon catheter due to inability to urinate after surgery.   He is here today to have the catheter removed.  Patient tells me he is doing well after hernia surgery.  Minimal pain at surgery / incision sites.  He is no longer taking any of the oxycodone.  He does take gabapentin for back pain.  He is taking the flomax as prescribed.      Objective: Abdominal incision sites CDI with steri strips in place.  Some mild resolving ecchymosis at incisions.  Resolving ecchymosis in scrotum as well.  Minimal swelling in scrotum.    Leg bag with clear dark yellow urine.  Calderon catheter was removed without difficulty.      Assessment: s/p Robotic assisted repair of the right inguinal hernia, right femoral hernia, and right obturator hernia.     Plan:   Patient will drink 6-8 8oz glasses of water over the next few hours.  If he is unable to urinate within 8 hours, including dribbling very small amounts,  he is to go to ED.  He will continue Flomax until is is gone.    Discussed restrictions of no lifting greater than 20lbs for 3 weeks.  He understands that a PA from our office will call him 2-3 weeks after surgery to follow up on his recovery.    We also discussed follow up labs of BMP as ordered by ED physician due to low sodium.  Order for BMP is in Epic, patient will stop at the hospital lab on his way out for lab draw and follow up with [facility] PCP re: lab results.

## 2024-09-25 ENCOUNTER — OFFICE VISIT (OUTPATIENT)
Dept: PALLIATIVE MEDICINE | Facility: CLINIC | Age: 84
End: 2024-09-25
Attending: NURSE PRACTITIONER
Payer: COMMERCIAL

## 2024-09-25 VITALS — DIASTOLIC BLOOD PRESSURE: 80 MMHG | OXYGEN SATURATION: 98 % | HEART RATE: 60 BPM | SYSTOLIC BLOOD PRESSURE: 146 MMHG

## 2024-09-25 DIAGNOSIS — M47.816 LUMBAR FACET ARTHROPATHY: ICD-10-CM

## 2024-09-25 DIAGNOSIS — M79.18 MYOFASCIAL PAIN: Primary | ICD-10-CM

## 2024-09-25 DIAGNOSIS — G89.4 PAIN SYNDROME, CHRONIC: ICD-10-CM

## 2024-09-25 PROCEDURE — 99214 OFFICE O/P EST MOD 30 MIN: CPT | Performed by: NURSE PRACTITIONER

## 2024-09-25 RX ORDER — BUPIVACAINE HYDROCHLORIDE 5 MG/ML
9 INJECTION, SOLUTION EPIDURAL; INTRACAUDAL ONCE
Status: COMPLETED | OUTPATIENT
Start: 2024-09-25 | End: 2024-09-25

## 2024-09-25 RX ADMIN — BUPIVACAINE HYDROCHLORIDE 45 MG: 5 INJECTION, SOLUTION EPIDURAL; INTRACAUDAL at 14:44

## 2024-09-25 ASSESSMENT — PAIN SCALES - GENERAL: PAINLEVEL: SEVERE PAIN (6)

## 2024-09-25 ASSESSMENT — PAIN SCALES - PAIN ENJOYMENT GENERAL ACTIVITY SCALE (PEG)
INTERFERED_GENERAL_ACTIVITY: 10
AVG_PAIN_PASTWEEK: 8
PEG_TOTALSCORE: 9.33
INTERFERED_ENJOYMENT_LIFE: 10
PEG_TOTALSCORE: 9.33

## 2024-09-25 NOTE — PROGRESS NOTES
[facility] Pain Management   Date of Visit: 9/25/2024  Last visit: 11/1/2023  Original Consult: 8/6/2019    Tigre Gillette is an 84 year old male with PMH significant for COPD, CAD, HTN and chronic lower back pain who is seen today for ongoing management of chronic pain.     History of Present Illness    Low back pain- etiology likely mild degenerative changes/ bilateral L4-5 and L5-S1 facet arthropathy with overlying myofascial component. , Has had three RFA procedures;  first RFA was helpful 6-12 months, second RFA not as helpful as first time and no appreciated benefit from(third) RFA in April of 2021. Has had imporvement in symptoms with intermittent TPIs, gabapentin, robaxin and diclofenac.  Mental Health - the patient's mental health concerns affect his experience of pain and contribute to his clinically significant distress.    Recommendations from last visit:  Increase gabapentin to 600 mg three times a day. If symptoms do not improve in his hands, I have asked him to get a cervical MRI to further evaluate his symptoms. Unclear if hand symptoms are related to cervical DDD, MR Cervical Spine w/o Contrast ordered. Subsequent UE EMG ordered. Continue TPI PRN.  _______________________________________________________   Interval History   Tigre reports:  - TPIs without steroids in April 2024 provided less significant relief compared to when we used steroids. We had discussed facet joint injections, however he then had several significant health issues and has not been seen since that time.    - He was diagnosed pneumonia in late April of 2024 and while hospitalized he was diagnosed with artrial fibrillation and was started on Eliquis.   - He also recently underwent inguinal hernia repair.   - He can no longer use oral diclofenac for pain management. He is here today to discuss alternative solutions for managing severe pain flares  - He is having right lower back pain that has been increased in the past few  "days.   -Still has intermittent pain in his hands and this causes him to feel like his hands are claws. This is very intermittent and mostly when he has been sitting in his chair with a support.     Pain description:  Location: lower back (constant), hands (intermittent)  Quality: dull aching to sharp  Duration: fluctuates, currently continuous in his right lower back  Severity/Intensity: Severe Pain (6)  Aggravating factors include: standing, not having regular bowel movements, yard work/snow removal  Relieving factors include: pacing activities, using \"yak tracks\" for stability on his boots in winter, moving slowly, sleep number bed     Current pain medications:  Gabapentin [phone]  Lidocaine cream/roll on prn  Robaxin 500-750 mg TID PRN  Acetaminophen 1000 mg TID  CBD/Delta 9 at bedtime helps with sleep and pain- uses rarely     Review of [place] Prescription Monitoring Program ():       PAIN MANAGEMENT TREATMENT HISTORY  1. MEDICATIONS:  Opiates: Norco, oxycodone (given post-op, no chronic use)  NSAIDS: ibuprofen, naproxen, Diclofenac, - all are somewhat helpful- can no longer use as he is on chronic anticoagulation   Muscle Relaxants: Robaxin - somewhat helpful  Anti-depressants: -not tried  Neuropathics: Gabapentin -helpful for restless leg syndrome and pain   Topicals: lidocaine -helpful  Adjuvant pain medications: Tylenol - somewhat helpful, then -not helpful stopped taking  2. PHYSICAL THERAPY:   [facility] 2016 - somewhat helpful Continues to do HEP intermittently.   3. PAIN PSYCHOLOGY:   -not tried   4. SURGERY:   no pain related surgeries  5. INJECTIONS:   - caudal LUANNE on 6/15/2023 with Dr. Frias. Had some relief initially, then  less relief  - trigger point injections (with steroid) with me 1/11/2023 -helpful; 3/30/2023- helped some; 11/1/2023; 2/7/2024. TPI with no steroids 4/22/24 -not as helpful  - trigger point injections with Mesha Henry on 9/22/2022- very helpful, 6/30/2022- " 60-75% benefit for almost 3 months, 2022 - somewhat helpful, 2021-helpful, 3 months  - Bilateral L3,4,5 RFA with Dr. Frias on 10/23/2020- % benefit for 7 months; on 2021 - 60-70% benefit for 6 months; 2022 -not helpful  - Bilateral L4-5 facet joint injections with Dr. Moon on 19 -helpful 70% 3 months; 2020 80-90% for almost 3 months  6. COMPLEMENTARY THERAPY:  Chiropractic: intermittent for many years with benefit, stopped because insurance not longer covered.  Acupuncture: -not tried  TENS Unit: -not tried    Imagin2022 MRI of lumbar spine   FINDINGS:   Nomenclature is based on 5 lumbar type vertebral bodies. Anterolisthesis of L4 and L5 measuring 3 mm. The vertebral bodies of the lumbar spine otherwise have normal stature, alignment, and marrow signal intensity. Normal distal spinal cord and cauda equina with conus medullaris at the superior plate of L2. Thin fatty filum terminale. T2 hyperintense lesion within the right kidney which consistent most commonly represents a benign cyst. Unremarkable visualized bony pelvis.  T12-L1, L1-L2, L2-L3: Normal disc signal and disc height. No posterior disc bulge or spinal canal narrowing. No neural foraminal narrowing.   L3-L4: Normal disc signal and disc height. No posterior disc bulge or spinal canal narrowing. Mild bilateral facet arthropathy. No neural foraminal narrowing.  L4-L5: Mild loss of disc signal and disc height. Symmetric disc bulge and mild facet arthropathy with mild spinal canal narrowing. Mild bilateral neural foraminal narrowing.  L5-S1: Mild loss of disc signal and disc height. No posterior disc bulge or spinal canal narrowing. No neural foraminal narrowing.                                                                 IMPRESSION:  1.  Thin fatty filum terminale.  2.  Mild degenerative lumbar spondylosis with level by level analysis as described above without evidence of high-grade spinal canal or neural  "foraminal narrowing at any level.    5/11/2022 X-ray of lumbar spine  IMPRESSION: No fracture is identified. Multilevel mild degenerative disc disease. Moderate to severe lower lumbar spine facet arthropathy. Subtle grade 1 anterolisthesis of L4 on L5 worse in flexion compared to extension. Vascular calcifications. Presumed atelectasis at the lung bases    Social History    Social History     Tobacco Use    Smoking status: Heavy Smoker     Current packs/day: 0.25     Average packs/day: 0.9 packs/day for 64.7 years (56.7 ttl pk-yrs)     Types: Cigarettes     Start date: 1960    Smokeless tobacco: Never    Tobacco comments:     8/27/24 6-8 cigarettes daily.    Substance Use Topics    Alcohol use: Yes     Comment: rare     Drug use: Yes     Comment: THC/CBD gummies occas      Social History     Social History Narrative    Lives with his wife ( '64), daughter and her boyfriend. [place] all\" kind of person, likes to fix and maintain his home. 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    Enjoys fishing, fixing things and being outdoors. Pain can limit his ability to do things he likes, such as fishing, snowmobiling. 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 Has a [place] who lives there.    Last updated 6/6/2023      Medications and Allergies reviewed.  Allergies   Amoxicillin-pot clavulanate and Amoxicillin  Medications   has a current medication list which includes the following prescription(s): acetaminophen, albuterol, apixaban anticoagulant, cetirizine, gabapentin, hydrocortisone, methocarbamol, metoprolol succinate er, omeprazole, ondansetron, sildenafil, trelegy ellipta, and triamcinolone acetonide.  Objective   Vitals:    09/25/24 1256   BP: (!) 146/80   Pulse: " 60   SpO2: 98%   Constitutional: Well developed, well nourished, appears stated age. No acute distress.  Gait is normal and steady  HEENT: Head atraumatic, normocephalic. Eyes without conjunctival injection or jaundice. Oropharynx clear.  Skin: No rash, lesions, or petechiae of exposed skin.   Extremities: Peripheral pulses intact. No clubbing, cyanosis, or edema.  Psychiatric/mental status: Alert, without lethargy or stupor. Speech fluent. Appropriate affect. Mood normal. Able to follow commands without difficulty.   Significant spasm noted along the upper lumbar paraspinals on the right. Lumbar ROM is mildly restricted. Strength and sensation in lower extremities  are intact bilaterally     Assessment & Plan   Pain syndrome, chronic  Lumbar facet arthropathy  Myofascial pain    Unfortunately, he has limited options. Will administer trigger point injection (TPI) today as these have historically been the intervention that provided the past relief. Discussed medicare limitations on steroids in TPI and that he would next be able to access this intervention in February 2025 if they continue to provide benefit.        Olga Chowdhury, CNP-BC, PMGT-BC, AP-PMN  [facility], [facility] - Procedure Note  Date of Procedure: 9/25/2024    Pre procedure Diagnosis: myofascial pain/myositis  Post procedure Diagnosis: Same  Procedure performed: trigger point injections  Operators: Olga Chowdhury NP  Referred by Olga Chowdhury    Indications:   Tigre Gillette is a 84 year old male with a history of lumbar facet arthropathy with secondary myalgia.  Exam shows presence of a hyperirritable area or taut band that is hyper sensitive or denser than normal causing referred myofascial pain of the muscle groups listed below.   He reports participation in a formal rehab program, home exercise program, or functional restoration program in the past 6  months? YES    Previous TPI on 4/22/24  provided 55 % pain relief for 8-12 weeks   Pain score was reduced from 7/10 prior to TPI to 3/10 after TPI with last injection     Options/alternatives, benefits and risks were discussed with the patient including bleeding, infection, tissue trauma, and pnuemothorax.  Questions were answered to his satisfaction and he agrees to proceed. Voluntary informed consent was obtained and signed.     BP (!) 146/80   Pulse 60   SpO2 98%   Medications and Allergies reviewed.    Procedure:  After obtaining informed consent, a Pause for the Cause was performed.    Trigger points were identified by patient, and marked when appropriate.The area was prepped with Chloroprep. Using clean technique, injections were completed using a 25G, 1.5 inch needle.  After negative aspiration, injection was completed.  A total of 4 locations were injected.  When possible, tissue was retracted from the chest wall to avoid lung injury.    Muscle groups evaluated and injected:  bilateral lumbar paraspinals (3 right, 1 left)    Injection solution contained:  10 ml of 0.5% bupivacaine       Pre-injection pain score Severe Pain (6)  Immediate post injection pain score 6    Hemostasis was achieved, the area was cleaned, and bandaids were placed when appropriate. The patient tolerated the procedure well. Breathing was non-labored and easy throughout the visit    Follow-up includes:   -can next repeat in February of 2025      Olga Chowdhury, OTIS-BC, PMGT-BC, AP-PMN  [facility]

## 2024-09-25 NOTE — PATIENT INSTRUCTIONS
[facility]  Post Procedure Instructions    Today you had:  trigger point injections   Medications used: bupivacaine    Monitor the injection sites for signs and symptoms of infection-fever, chills, redness, swelling, warmth, or drainage to areas.  You may have soreness at injection sites for up to 24 hours.  You may apply ice to the painful areas to help minimize the discomfort of the needle pokes.  Do not apply heat to sites for at least 12 hours.  Continue to use Tylenol for pain control.    Pain Clinic phone number during work hours (Monday through Friday 8 am-4:30 pm) at [phone] or the Provider Line after hours at [phone]     Medicare coverage for trigger point injections is limited to up to three times in a rolling 12 month period and only if they provide at least 50% pain relief for at least 6 weeks. Patient must report participation in a formal rehab program, home exercise program or functional restoration program in the past 6 months. Trigger point injections can no longer be performed with steroid.     You can next repeat TPIs  in February of 2025

## 2024-10-01 ENCOUNTER — TELEPHONE (OUTPATIENT)
Dept: SURGERY | Facility: CLINIC | Age: 84
End: 2024-10-01
Payer: COMMERCIAL

## 2024-10-01 NOTE — CONFIDENTIAL NOTE
[facility]  Post op call note     Tigre Gillette was called for an update regarding his recovery.  He underwent robotic assisted repair of the right inguinal hernia, right femoral hernia, and right obturator hernia by Dr. Martin on 9/13/2024. Today he tells me he is doing well and denies any complaints. He is eating a normal diet and his bowels are regular. He states his wounds are healing well.    He was instructed to slowly and gradually resume all normal activities. He states all of his questions were answered.  He understands our discussion.  He agrees to follow up as needed or to call our office with any concerns.    Diane Saenz PA-C

## 2024-10-03 ENCOUNTER — HOSPITAL ENCOUNTER (OUTPATIENT)
Dept: CARDIAC REHAB | Facility: CLINIC | Age: 84
Discharge: HOME | End: 2024-10-03
Attending: INTERNAL MEDICINE
Payer: COMMERCIAL

## 2024-10-03 DIAGNOSIS — J44.9 CHRONIC OBSTRUCTIVE PULMONARY DISEASE, UNSPECIFIED COPD TYPE (H): ICD-10-CM

## 2024-10-03 PROCEDURE — 94626 PHY/QHP OP PULM RHB W/MNTR: CPT

## 2024-10-15 ENCOUNTER — HOSPITAL ENCOUNTER (OUTPATIENT)
Dept: CARDIAC REHAB | Facility: CLINIC | Age: 84
Discharge: HOME | End: 2024-10-15
Attending: INTERNAL MEDICINE
Payer: COMMERCIAL

## 2024-10-15 PROCEDURE — 94625 PHY/QHP OP PULM RHB W/O MNTR: CPT

## 2024-10-30 ENCOUNTER — HOSPITAL ENCOUNTER (OUTPATIENT)
Dept: CARDIAC REHAB | Facility: CLINIC | Age: 84
Discharge: HOME | End: 2024-10-30
Attending: INTERNAL MEDICINE
Payer: COMMERCIAL

## 2024-10-30 PROCEDURE — 94625 PHY/QHP OP PULM RHB W/O MNTR: CPT

## 2024-11-01 DIAGNOSIS — M79.18 MYOFASCIAL PAIN: ICD-10-CM

## 2024-11-01 RX ORDER — METHOCARBAMOL 500 MG/1
500 TABLET, FILM COATED ORAL 3 TIMES DAILY
Qty: 60 TABLET | Refills: 3 | Status: SHIPPED | OUTPATIENT
Start: 2024-11-01

## 2024-11-01 NOTE — TELEPHONE ENCOUNTER
Received fax from pharmacy requesting refill(s) for     methocarbamol (ROBAXIN) 500 MG tablet    Date last filled 10/15/2024    Last Appt Date:09/25/2024    Next Appt scheduled: None    Pharmacy:   [facility] # [id number] - [place], [place] - [address],    Will route for processing    Teresa Hammond MA  [facility] [place]

## 2024-11-14 DIAGNOSIS — M47.816 LUMBAR FACET ARTHROPATHY: ICD-10-CM

## 2024-11-14 DIAGNOSIS — G89.4 PAIN SYNDROME, CHRONIC: ICD-10-CM

## 2024-11-14 RX ORDER — GABAPENTIN 300 MG/1
600 CAPSULE ORAL 3 TIMES DAILY
Qty: 540 CAPSULE | Refills: 0 | Status: SHIPPED | OUTPATIENT
Start: 2024-11-14

## 2024-11-14 NOTE — TELEPHONE ENCOUNTER
Received fax request from [facility]  pharmacy for gabapentin (NEURONTIN) 300 MG capsule     Last refilled on 08/20/24 per pharmacy     Pt last seen on 9/25/24  Next appt scheduled for none     Will facilitate refill.

## 2024-11-19 ENCOUNTER — HOSPITAL ENCOUNTER (OUTPATIENT)
Dept: CARDIAC REHAB | Facility: CLINIC | Age: 84
Discharge: HOME | End: 2024-11-19
Attending: INTERNAL MEDICINE
Payer: COMMERCIAL

## 2024-11-19 PROCEDURE — 94625 PHY/QHP OP PULM RHB W/O MNTR: CPT

## 2024-11-21 ENCOUNTER — HOSPITAL ENCOUNTER (OUTPATIENT)
Dept: CARDIAC REHAB | Facility: CLINIC | Age: 84
Discharge: HOME | End: 2024-11-21
Attending: INTERNAL MEDICINE
Payer: COMMERCIAL

## 2024-11-21 PROCEDURE — 94625 PHY/QHP OP PULM RHB W/O MNTR: CPT

## 2024-11-25 ENCOUNTER — MYC MEDICAL ADVICE (OUTPATIENT)
Dept: PALLIATIVE MEDICINE | Facility: CLINIC | Age: 84
End: 2024-11-25
Payer: COMMERCIAL

## 2024-11-26 ENCOUNTER — HOSPITAL ENCOUNTER (OUTPATIENT)
Dept: CARDIAC REHAB | Facility: CLINIC | Age: 84
Discharge: HOME | End: 2024-11-26
Attending: INTERNAL MEDICINE
Payer: COMMERCIAL

## 2024-11-26 PROCEDURE — 94625 PHY/QHP OP PULM RHB W/O MNTR: CPT

## 2024-12-05 ENCOUNTER — HOSPITAL ENCOUNTER (OUTPATIENT)
Dept: CARDIAC REHAB | Facility: CLINIC | Age: 84
Discharge: HOME | End: 2024-12-05
Attending: INTERNAL MEDICINE
Payer: COMMERCIAL

## 2024-12-05 PROCEDURE — 94625 PHY/QHP OP PULM RHB W/O MNTR: CPT

## 2024-12-10 ENCOUNTER — HOSPITAL ENCOUNTER (OUTPATIENT)
Dept: CARDIAC REHAB | Facility: CLINIC | Age: 84
Discharge: HOME | End: 2024-12-10
Attending: INTERNAL MEDICINE
Payer: COMMERCIAL

## 2024-12-10 PROCEDURE — 94625 PHY/QHP OP PULM RHB W/O MNTR: CPT | Performed by: REHABILITATION PRACTITIONER

## 2024-12-12 ENCOUNTER — HOSPITAL ENCOUNTER (OUTPATIENT)
Dept: CARDIAC REHAB | Facility: CLINIC | Age: 84
Discharge: HOME | End: 2024-12-12
Attending: INTERNAL MEDICINE
Payer: COMMERCIAL

## 2024-12-12 PROCEDURE — 94625 PHY/QHP OP PULM RHB W/O MNTR: CPT

## 2024-12-19 ENCOUNTER — HOSPITAL ENCOUNTER (OUTPATIENT)
Dept: CARDIAC REHAB | Facility: CLINIC | Age: 84
Discharge: HOME | End: 2024-12-19
Attending: INTERNAL MEDICINE
Payer: COMMERCIAL

## 2024-12-19 PROCEDURE — 94625 PHY/QHP OP PULM RHB W/O MNTR: CPT

## 2024-12-24 ENCOUNTER — HOSPITAL ENCOUNTER (OUTPATIENT)
Dept: CARDIAC REHAB | Facility: CLINIC | Age: 84
Discharge: HOME | End: 2024-12-24
Attending: INTERNAL MEDICINE
Payer: COMMERCIAL

## 2024-12-24 PROCEDURE — 94625 PHY/QHP OP PULM RHB W/O MNTR: CPT

## 2024-12-27 ENCOUNTER — HOSPITAL ENCOUNTER (OUTPATIENT)
Dept: CARDIAC REHAB | Facility: CLINIC | Age: 84
Discharge: HOME | End: 2024-12-27
Attending: INTERNAL MEDICINE
Payer: COMMERCIAL

## 2024-12-27 PROCEDURE — 94626 PHY/QHP OP PULM RHB W/MNTR: CPT

## 2025-01-06 ENCOUNTER — PATIENT OUTREACH (OUTPATIENT)
Dept: ONCOLOGY | Facility: CLINIC | Age: 85
End: 2025-01-06
Payer: COMMERCIAL

## 2025-01-06 ENCOUNTER — HOSPITAL ENCOUNTER (OUTPATIENT)
Dept: CT IMAGING | Facility: CLINIC | Age: 85
Discharge: HOME | End: 2025-01-06
Attending: INTERNAL MEDICINE | Admitting: INTERNAL MEDICINE
Payer: COMMERCIAL

## 2025-01-06 ENCOUNTER — ONCOLOGY VISIT (OUTPATIENT)
Dept: ONCOLOGY | Facility: CLINIC | Age: 85
End: 2025-01-06
Attending: INTERNAL MEDICINE
Payer: COMMERCIAL

## 2025-01-06 VITALS
HEART RATE: 58 BPM | OXYGEN SATURATION: 97 % | RESPIRATION RATE: 20 BRPM | SYSTOLIC BLOOD PRESSURE: 86 MMHG | DIASTOLIC BLOOD PRESSURE: 56 MMHG | TEMPERATURE: 98.3 F | WEIGHT: 163.3 LBS | BODY MASS INDEX: 20.97 KG/M2

## 2025-01-06 DIAGNOSIS — R91.1 LUNG NODULE: ICD-10-CM

## 2025-01-06 DIAGNOSIS — R91.1 LUNG NODULE: Primary | ICD-10-CM

## 2025-01-06 PROCEDURE — G0463 HOSPITAL OUTPT CLINIC VISIT: HCPCS | Performed by: INTERNAL MEDICINE

## 2025-01-06 PROCEDURE — 71250 CT THORAX DX C-: CPT

## 2025-01-06 PROCEDURE — 99214 OFFICE O/P EST MOD 30 MIN: CPT | Performed by: INTERNAL MEDICINE

## 2025-01-06 RX ORDER — TAMSULOSIN HYDROCHLORIDE 0.4 MG/1
0.4 CAPSULE ORAL DAILY
COMMUNITY
Start: 2024-11-20 | End: 2025-11-20

## 2025-01-06 ASSESSMENT — PAIN SCALES - GENERAL: PAINLEVEL_OUTOF10: MILD PAIN (2)

## 2025-01-06 NOTE — NURSING NOTE
"Oncology Rooming Note    January 6, 2025 11:18 AM   Tigre Gillette is a 84 year old male who presents for:    Chief Complaint   Patient presents with    Oncology Clinic Visit     Initial Vitals: BP (!) 86/56   Pulse 58   Temp 98.3  F (36.8  C) (Oral)   Resp 20   Wt 74.1 kg (163 lb 4.8 oz)   SpO2 97%   BMI 20.97 kg/m   Estimated body mass index is 20.97 kg/m  as calculated from the following:    Height as of 9/14/24: 1.88 m (6' 2\").    Weight as of this encounter: 74.1 kg (163 lb 4.8 oz). Body surface area is 1.97 meters squared.  Mild Pain (2) Comment: Data Unavailable   No LMP for male patient.  Allergies reviewed: Yes  Medications reviewed: Yes    Medications: Medication refills not needed today.  Pharmacy name entered into [facility]: [facility] # [id number] - [place], [place] - [address] [address]    Frailty Screening:   Is the patient here for a new oncology consult visit in cancer care? 2. No      Clinical concerns: f/u       Kathryn Wong CMA              "

## 2025-01-06 NOTE — PATIENT INSTRUCTIONS
There is still a residual lung nodule at the very bottom of the left lung. This area is also visible on an abdominal scan if you get more of those.     I recommend a CT of the chest in June 2025, if you haven't had any other scans by then.     We will notify your PCP about the blood pressure.

## 2025-01-06 NOTE — PROGRESS NOTES
[facility] [address]  [facility] [address]  Phone: [phone]  Fax: [phone]    [facility] Nodule [facility] Initial Visit    Patient:  Tigre Gillette, Date of birth 1940  Date of Visit:  01/06/2025  Referring Provider Referred Self      Assessment & Plan      Tigre is an 85 yo male with COPD and abnormal chest imaging    COPD - moderate obstruction grossly similar FEV1 to spirometry [id number]   Continue Trelegy   Albuterol prn   Pulmonary rehab - to start after hernia repair scheduled mid September    Lung nodule - LLL deep basilar. Spiculated, stable about 6 months but not previously seen.    I recommend continued imaging surveillance with a CT in about 6 months (Summer 2025)   I anticipate he might be getting other chest or abdominal scans in light of new prostate cancer diagnosis so we will check in prior to his follow up appointment with me if he has had any imaging at [facility] to avoid redundant imaging    Low BP - asymptomatic hypotension today. He has recently had addition of and then doubling of Flomax by urology, along with metoprolol for Atrial Fib   I didn't make any changes today but we reached out to PCP     Mass-like opacity LLL resolving pneumonia, based on timing and appearance   No specific follow up needed    Medical Decision Making             Katia Erazo MD           Reason for Visit  Tigre Gillette is a 84 year old male who is referred by himself for abnormal chest CT  Pulmonary HPI    - he has morning productive cough, recently completed pulmonary rehab, concluded early due to the prostate biopsy  - he is on Trelegy; he has low BP today      Other active medical problems include:   - no prior radiation or chest trauma  - he was just diagnosed with prostate cancer    ROS Pulmonary  Dyspnea: Yes, Cough: No, Chest pain: No, Wheezing: No, Sputum Production: No,  Hemoptysis: No  A complete ROS was otherwise negative except as noted in the HPI.  The patient was seen and examined by Katia Erazo MD   Current Outpatient Medications   Medication Sig Dispense Refill    acetaminophen (TYLENOL) 500 MG tablet Take 1-2 tablets (500-1,000 mg) by mouth every 6 hours as needed for pain (and adjunct with moderate or severe pain or per patient request)      albuterol (PROAIR HFA/PROVENTIL HFA/VENTOLIN HFA) 108 (90 Base) MCG/ACT inhaler Inhale 1-2 puffs into the lungs every 6 hours as needed for shortness of breath, wheezing or cough. 17 g 11    apixaban ANTICOAGULANT (ELIQUIS) 5 MG tablet Take 1 tablet (5 mg) by mouth 2 times daily 60 tablet 11    cetirizine (ZYRTEC) 10 MG tablet Take 10 mg by mouth every evening      gabapentin (NEURONTIN) 300 MG capsule Take 2 capsules (600 mg) by mouth 3 times daily. 540 capsule 0    hydrocortisone 2.5 % cream Apply topically as needed      methocarbamol (ROBAXIN) 500 MG tablet Take 1 tablet (500 mg) by mouth 3 times daily. 60 tablet 3    metoprolol succinate ER (TOPROL XL) 25 MG 24 hr tablet Take 1 tablet by mouth daily.      omeprazole (PRILOSEC) 20 MG DR capsule Take 1 capsule (20 mg) by mouth daily      sildenafil (REVATIO) 20 MG tablet Take 20-60 mg by mouth daily as needed.      tamsulosin (FLOMAX) 0.4 MG capsule Take 0.4 mg by mouth daily.      TRELEGY ELLIPTA 100-62.5-25 MCG/INH oral inhaler Inhale 1 puff into the lungs daily      TRIAMCINOLONE ACETONIDE EX Apply topically daily as needed (eczema)       No current facility-administered medications for this visit.     Allergies   Allergen Reactions    Amoxicillin-Pot Clavulanate GI Disturbance and Rash    Amoxicillin      Social History     Socioeconomic History    Marital status:      Spouse name: Not on file    Number of children: Not on file    Years of education: Not on file    Highest education level: Not on file   Occupational History    Not on file   Tobacco Use    Smoking  "status: Heavy Smoker     Current packs/day: 0.25     Average packs/day: 0.9 packs/day for 65.0 years (56.8 ttl pk-yrs)     Types: Cigarettes     Start date: 1960    Smokeless tobacco: Never    Tobacco comments:     8/27/24 6-8 cigarettes daily.    Substance and Sexual Activity    Alcohol use: Yes     Comment: rare     Drug use: Yes     Comment: THC/CBD gummies occas    Sexual activity: Yes     Partners: Female   Other Topics Concern    Parent/sibling w/ CABG, MI or angioplasty before 65F 55M? Not Asked   Social History Narrative    Lives with his wife ( '64), daughter and her boyfriend. [place] all\" kind of person, likes to fix and maintain his home. 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    Enjoys fishing, fixing things and being outdoors. Pain can limit his ability to do things he likes, such as fishing, snowmobiling. Has started to read more since snf, especially about [place]. Has a [place] who lives there.    Last updated 6/6/2023      Social Drivers of Health     Financial Resource Strain: Not on file   Food Insecurity: Not on file   Transportation Needs: Not on file   Physical Activity: Not on file   Stress: Not on file   Social Connections: Not on file   Interpersonal Safety: Low Risk  (9/13/2024)    Interpersonal Safety     Do you feel physically and emotionally safe where you currently live?: Yes     Within the past 12 months, have you been hit, slapped, kicked or otherwise physically hurt by someone?: No     Within the past 12 months, have you been humiliated or emotionally abused in other ways by your partner or ex-partner?: No   Housing Stability: Not on file     Past Medical History:   Diagnosis Date    Arrhythmia     afib    Chronic lower back pain     COPD " (chronic obstructive pulmonary disease) (H)     Degenerative arthritis of lumbar spine     spine    Eczema     uses topical low dose steroid and cetaphil lotion     Past Surgical History:   Procedure Laterality Date    COLONOSCOPY      DAVINCI XI HERNIORRHAPHY INGUINAL Right 9/13/2024    Procedure: Robotic assisted right inguinal hernia repair with mesh,  Robotic assisted repair of the right inguinal hernia,  and right obturator hernia;  Surgeon: Scar Martin MD;  Location: [facility] OR    HERNIORRHAPHY EPIGASTRIC N/A 01/30/2018    Procedure: HERNIORRHAPHY EPIGASTRIC;  Epigastric herniorrhaphy with Bard Ventralex ST Hernia Patch ;  Surgeon: Rossana Alas MD;  Location: [facility] OR    HERNIORRHAPHY INGUINAL  2010    open recurrent LIH rpr with mesh    HERNIORRHAPHY INGUINAL  1990    open LIH rpr with mesh    LAPAROSCOPIC HERNIORRHAPHY FEMORAL Right 9/13/2024    Procedure: right femoral hernia hepair;  Surgeon: Scar Martin MD;  Location: [facility] OR    TONSILLECTOMY & ADENOIDECTOMY      tonsillectomy as a child     Family History   Problem Relation Age of Onset    LUNG DISEASE No family hx of        Exam:   BP (!) 86/56 (BP Location: Left arm, Patient Position: Sitting, Cuff Size: Adult Regular)   Pulse 58   Temp 98.3  F (36.8  C) (Oral)   Resp 20   Wt 74.1 kg (163 lb 4.8 oz)   SpO2 97%   BMI 20.97 kg/m    GENERAL APPEARANCE: Well developed, well nourished, alert, and in no apparent distress.  PSYCH: mentation appears normal. and affect normal/bright  Results:  - My interpretation of the images relevant for this visit includes: CT chest images reviewed, there are 2 abnormalities. Lobulated nodular opacity peripheral in the LLL, that might be slightly growing over the last few months. central LLL opacities with some traction bronchiectasis, has more of a healing injury appearance.  Neither was seen on CT several years ago    - My interpretation of the PFT's relevant for this visit includes: Obstructive  pattern moderate, no significant change in FEV1 compared to Feb 2023 spirometry

## 2025-01-06 NOTE — LETTER
1/6/2025      Tigre Gillette  [address]  [place] [address]      Dear Colleague,    Thank you for referring your patient, Tigre Gillette, to the [facility]. Please see a copy of my visit note below.      [facility]  [address]  [facility] [address]  Phone: [phone]  Fax: [phone]    [facility] Initial Visit    Patient:  Tigre Gillette, Date of birth 1940  Date of Visit:  01/06/2025  Referring Provider Referred Self      Assessment & Plan     Tigre is an 85 yo male with COPD and abnormal chest imaging    COPD - moderate obstruction grossly similar FEV1 to spirometry [id number]   Continue Trelegy   Albuterol prn   Pulmonary rehab - to start after hernia repair scheduled mid September    Lung nodule - LLL deep basilar. Spiculated, stable about 6 months but not previously seen.    I recommend continued imaging surveillance with a CT in about 6 months (Summer 2025)   I anticipate he might be getting other chest or abdominal scans in light of new prostate cancer diagnosis so we will check in prior to his follow up appointment with me if he has had any imaging at [facility] to avoid redundant imaging    Low BP - asymptomatic hypotension today. He has recently had addition of and then doubling of Flomax by urology, along with metoprolol for Atrial Fib   I didn't make any changes today but we reached out to PCP     Mass-like opacity LLL resolving pneumonia, based on timing and appearance   No specific follow up needed    Medical Decision Making            Katia Erazo MD           Reason for Visit  Tigre Gillette is a 84 year old male who is referred by himself for abnormal chest CT  Pulmonary HPI    - he has morning productive cough, recently completed pulmonary rehab, concluded early due to the prostate biopsy  - he is on Trelegy; he has  low BP today      Other active medical problems include:   - no prior radiation or chest trauma  - he was just diagnosed with prostate cancer    ROS Pulmonary  Dyspnea: Yes, Cough: No, Chest pain: No, Wheezing: No, Sputum Production: No, Hemoptysis: No  A complete ROS was otherwise negative except as noted in the HPI.  The patient was seen and examined by Katia Erazo MD   Current Outpatient Medications   Medication Sig Dispense Refill     acetaminophen (TYLENOL) 500 MG tablet Take 1-2 tablets (500-1,000 mg) by mouth every 6 hours as needed for pain (and adjunct with moderate or severe pain or per patient request)       albuterol (PROAIR HFA/PROVENTIL HFA/VENTOLIN HFA) 108 (90 Base) MCG/ACT inhaler Inhale 1-2 puffs into the lungs every 6 hours as needed for shortness of breath, wheezing or cough. 17 g 11     apixaban ANTICOAGULANT (ELIQUIS) 5 MG tablet Take 1 tablet (5 mg) by mouth 2 times daily 60 tablet 11     cetirizine (ZYRTEC) 10 MG tablet Take 10 mg by mouth every evening       gabapentin (NEURONTIN) 300 MG capsule Take 2 capsules (600 mg) by mouth 3 times daily. 540 capsule 0     hydrocortisone 2.5 % cream Apply topically as needed       methocarbamol (ROBAXIN) 500 MG tablet Take 1 tablet (500 mg) by mouth 3 times daily. 60 tablet 3     metoprolol succinate ER (TOPROL XL) 25 MG 24 hr tablet Take 1 tablet by mouth daily.       omeprazole (PRILOSEC) 20 MG DR capsule Take 1 capsule (20 mg) by mouth daily       sildenafil (REVATIO) 20 MG tablet Take 20-60 mg by mouth daily as needed.       tamsulosin (FLOMAX) 0.4 MG capsule Take 0.4 mg by mouth daily.       TRELEGY ELLIPTA 100-62.5-25 MCG/INH oral inhaler Inhale 1 puff into the lungs daily       TRIAMCINOLONE ACETONIDE EX Apply topically daily as needed (eczema)       No current facility-administered medications for this visit.     Allergies   Allergen Reactions     Amoxicillin-Pot Clavulanate GI Disturbance and Rash     Amoxicillin      Social History  "    Socioeconomic History     Marital status:      Spouse name: Not on file     Number of children: Not on file     Years of education: Not on file     Highest education level: Not on file   Occupational History     Not on file   Tobacco Use     Smoking status: Heavy Smoker     Current packs/day: 0.25     Average packs/day: 0.9 packs/day for 65.0 years (56.8 ttl pk-yrs)     Types: Cigarettes     Start date: 1960     Smokeless tobacco: Never     Tobacco comments:     8/27/24 6-8 cigarettes daily.    Substance and Sexual Activity     Alcohol use: Yes     Comment: rare      Drug use: Yes     Comment: THC/CBD gummies occas     Sexual activity: Yes     Partners: Female   Other Topics Concern     Parent/sibling w/ CABG, MI or angioplasty before 65F 55M? Not Asked   Social History Narrative    Lives with his wife ( '64), daughter and her boyfriend. [place] all\" kind of person, likes to fix and maintain his home. 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    Enjoys fishing, fixing things and being outdoors. Pain can limit his ability to do things he likes, such as fishing, snowmobiling. Has started to read more since [employer], especially about [place]. Has a [place] who lives there.    Last updated 6/6/2023      Social Drivers of Health     Financial Resource Strain: Not on file   Food Insecurity: Not on file   Transportation Needs: Not on file   Physical Activity: Not on file   Stress: Not on file   Social Connections: Not on file   Interpersonal Safety: Low Risk  (9/13/2024)    Interpersonal Safety      Do you feel physically and emotionally safe where you currently live?: Yes      Within the past 12 months, have you been hit, slapped, kicked or otherwise physically hurt by someone?: No "      Within the past 12 months, have you been humiliated or emotionally abused in other ways by your partner or ex-partner?: No   Housing Stability: Not on file     Past Medical History:   Diagnosis Date     Arrhythmia     afib     Chronic lower back pain      COPD (chronic obstructive pulmonary disease) (H)      Degenerative arthritis of lumbar spine     spine     Eczema     uses topical low dose steroid and cetaphil lotion     Past Surgical History:   Procedure Laterality Date     COLONOSCOPY       DAVINCI XI HERNIORRHAPHY INGUINAL Right 9/13/2024    Procedure: Robotic assisted right inguinal hernia repair with mesh,  Robotic assisted repair of the right inguinal hernia,  and right obturator hernia;  Surgeon: Scar Martin MD;  Location: [facility] OR     HERNIORRHAPHY EPIGASTRIC N/A 01/30/2018    Procedure: HERNIORRHAPHY EPIGASTRIC;  Epigastric herniorrhaphy with Bard Ventralex ST Hernia Patch ;  Surgeon: Rossana Alas MD;  Location: [facility] OR     HERNIORRHAPHY INGUINAL  2010    open recurrent LIH rpr with mesh     HERNIORRHAPHY INGUINAL  1990    open LIH rpr with mesh     LAPAROSCOPIC HERNIORRHAPHY FEMORAL Right 9/13/2024    Procedure: right femoral hernia hepair;  Surgeon: Scar Martin MD;  Location: [facility] OR     TONSILLECTOMY & ADENOIDECTOMY      tonsillectomy as a child     Family History   Problem Relation Age of Onset     LUNG DISEASE No family hx of        Exam:   BP (!) 86/56 (BP Location: Left arm, Patient Position: Sitting, Cuff Size: Adult Regular)   Pulse 58   Temp 98.3  F (36.8  C) (Oral)   Resp 20   Wt 74.1 kg (163 lb 4.8 oz)   SpO2 97%   BMI 20.97 kg/m    GENERAL APPEARANCE: Well developed, well nourished, alert, and in no apparent distress.  PSYCH: mentation appears normal. and affect normal/bright  Results:  - My interpretation of the images relevant for this visit includes: CT chest images reviewed, there are 2 abnormalities. Lobulated nodular opacity peripheral in the LLL, that  might be slightly growing over the last few months. central LLL opacities with some traction bronchiectasis, has more of a healing injury appearance.  Neither was seen on CT several years ago    - My interpretation of the PFT's relevant for this visit includes: Obstructive pattern moderate, no significant change in FEV1 compared to Feb 2023 spirometry      Again, thank you for allowing me to participate in the care of your patient.        Sincerely,        Katia Erazo MD    Electronically signed

## 2025-01-06 NOTE — TELEPHONE ENCOUNTER
Per Dr. Erazo's request, office visit note from 1/6/25 sent to patient's PCP, Macario Iyer (F: [phone]). Writer called his team and confirmed the fax number above. Confirmation of receipt received via RightFax.     Carey Steinberg RN on 1/6/2025 at 12:13 PM

## 2025-01-25 ENCOUNTER — HEALTH MAINTENANCE LETTER (OUTPATIENT)
Age: 85
End: 2025-01-25

## 2025-02-03 NOTE — PLAN OF CARE
Pt is alert and oriented x4. Pt denies pain or discomfort. VSS. No acute distress noted. Pt is on tele monitoring, SR. Pt is on Rocephin and Azithromycin  for pneumonia. Plan for Echocardiogram in the morning. Pt SBA with a cane. Pt denies shortness of breath. Pt is on room air. Pt lungs diminished. Pt is sleeping in bed. Bed alarm in place and call light within reach. Will continue to monitor and assess pt.    Physical Examination:  GENERAL:                 Alert, confused  HEENT:                     No JVD, Moist MM  PULM:                      Bilateral air entry, Clear to auscultation bilaterally, no significant sputum production, No Rales, No Rhonchi, No Wheezing  CVS:                         S1, S2,  No Murmur  ABD:                         Soft, protuberant, nontender, normoactive bowel sounds,    NEURO:                    alert, confused, does not follows commands  PSYC:                        Calm             73 year old female with a pmh of HTN, HLD, Bipolar illness MDD,, Fall, History of TIAs, and Cerebral infarction is admitted for     Problem List  Septic Shock suspect due to UTI, enterocolitis, or possible pneumonia based on CT  GURDEEP with normal baseline resolved  Lithium toxicity possible  improving  metaobolic encephelopathy suspected  Bipolar disorder, MDD     Underlying HTN, High chol,     Plan  BP much improved  on n/c o2, taper to off  noted normalized lithium level,     psyc to f/u to when to restart  finsih course of abx Zosyn for E. Coli in ua    GI/DVT ppx   can transition care from ICU to medical team.

## 2025-03-17 ENCOUNTER — HOSPITAL ENCOUNTER (OUTPATIENT)
Facility: CLINIC | Age: 85
Setting detail: OBSERVATION
Discharge: HOME | End: 2025-03-18
Attending: EMERGENCY MEDICINE | Admitting: HOSPITALIST
Payer: COMMERCIAL

## 2025-03-17 ENCOUNTER — APPOINTMENT (OUTPATIENT)
Dept: GENERAL RADIOLOGY | Facility: CLINIC | Age: 85
End: 2025-03-17
Attending: EMERGENCY MEDICINE
Payer: COMMERCIAL

## 2025-03-17 DIAGNOSIS — J18.9 COMMUNITY ACQUIRED PNEUMONIA OF LEFT LOWER LOBE OF LUNG: ICD-10-CM

## 2025-03-17 LAB
ALBUMIN SERPL BCG-MCNC: 4.2 G/DL (ref 3.5–5.2)
ALP SERPL-CCNC: 93 U/L (ref 40–150)
ALT SERPL W P-5'-P-CCNC: 12 U/L (ref 0–70)
ANION GAP SERPL CALCULATED.3IONS-SCNC: 12 MMOL/L (ref 7–15)
AST SERPL W P-5'-P-CCNC: 18 U/L (ref 0–45)
BASOPHILS # BLD AUTO: 0.1 10E3/UL (ref 0–0.2)
BASOPHILS NFR BLD AUTO: 0 %
BILIRUB SERPL-MCNC: 0.3 MG/DL
BUN SERPL-MCNC: 12.5 MG/DL (ref 8–23)
CALCIUM SERPL-MCNC: 9.4 MG/DL (ref 8.8–10.4)
CHLORIDE SERPL-SCNC: 98 MMOL/L (ref 98–107)
CREAT SERPL-MCNC: 0.83 MG/DL (ref 0.67–1.17)
EGFRCR SERPLBLD CKD-EPI 2021: 87 ML/MIN/1.73M2
EOSINOPHIL # BLD AUTO: 0.6 10E3/UL (ref 0–0.7)
EOSINOPHIL NFR BLD AUTO: 4 %
ERYTHROCYTE [DISTWIDTH] IN BLOOD BY AUTOMATED COUNT: 13.2 % (ref 10–15)
FLUAV RNA SPEC QL NAA+PROBE: NEGATIVE
FLUBV RNA RESP QL NAA+PROBE: NEGATIVE
GLUCOSE SERPL-MCNC: 98 MG/DL (ref 70–99)
HCO3 SERPL-SCNC: 20 MMOL/L (ref 22–29)
HCT VFR BLD AUTO: 37.5 % (ref 40–53)
HGB BLD-MCNC: 12.5 G/DL (ref 13.3–17.7)
HOLD SPECIMEN: NORMAL
HOLD SPECIMEN: NORMAL
IMM GRANULOCYTES # BLD: 0 10E3/UL
IMM GRANULOCYTES NFR BLD: 0 %
LYMPHOCYTES # BLD AUTO: 1 10E3/UL (ref 0.8–5.3)
LYMPHOCYTES NFR BLD AUTO: 8 %
MCH RBC QN AUTO: 30.8 PG (ref 26.5–33)
MCHC RBC AUTO-ENTMCNC: 33.3 G/DL (ref 31.5–36.5)
MCV RBC AUTO: 92 FL (ref 78–100)
MONOCYTES # BLD AUTO: 1.2 10E3/UL (ref 0–1.3)
MONOCYTES NFR BLD AUTO: 9 %
NEUTROPHILS # BLD AUTO: 10.2 10E3/UL (ref 1.6–8.3)
NEUTROPHILS NFR BLD AUTO: 78 %
NRBC # BLD AUTO: 0 10E3/UL
NRBC BLD AUTO-RTO: 0 /100
PLATELET # BLD AUTO: 289 10E3/UL (ref 150–450)
POTASSIUM SERPL-SCNC: 3.8 MMOL/L (ref 3.4–5.3)
PROT SERPL-MCNC: 7 G/DL (ref 6.4–8.3)
RBC # BLD AUTO: 4.06 10E6/UL (ref 4.4–5.9)
RSV RNA SPEC NAA+PROBE: NEGATIVE
SARS-COV-2 RNA RESP QL NAA+PROBE: NEGATIVE
SODIUM SERPL-SCNC: 130 MMOL/L (ref 135–145)
WBC # BLD AUTO: 13 10E3/UL (ref 4–11)

## 2025-03-17 PROCEDURE — 87637 SARSCOV2&INF A&B&RSV AMP PRB: CPT | Performed by: EMERGENCY MEDICINE

## 2025-03-17 PROCEDURE — 85025 COMPLETE CBC W/AUTO DIFF WBC: CPT | Performed by: EMERGENCY MEDICINE

## 2025-03-17 PROCEDURE — 258N000003 HC RX IP 258 OP 636: Performed by: INTERNAL MEDICINE

## 2025-03-17 PROCEDURE — 82040 ASSAY OF SERUM ALBUMIN: CPT | Performed by: EMERGENCY MEDICINE

## 2025-03-17 PROCEDURE — 96374 THER/PROPH/DIAG INJ IV PUSH: CPT

## 2025-03-17 PROCEDURE — 250N000013 HC RX MED GY IP 250 OP 250 PS 637: Performed by: INTERNAL MEDICINE

## 2025-03-17 PROCEDURE — 96375 TX/PRO/DX INJ NEW DRUG ADDON: CPT

## 2025-03-17 PROCEDURE — 36415 COLL VENOUS BLD VENIPUNCTURE: CPT | Performed by: EMERGENCY MEDICINE

## 2025-03-17 PROCEDURE — 250N000009 HC RX 250: Performed by: EMERGENCY MEDICINE

## 2025-03-17 PROCEDURE — 82310 ASSAY OF CALCIUM: CPT | Performed by: EMERGENCY MEDICINE

## 2025-03-17 PROCEDURE — 99222 1ST HOSP IP/OBS MODERATE 55: CPT | Performed by: INTERNAL MEDICINE

## 2025-03-17 PROCEDURE — 80053 COMPREHEN METABOLIC PANEL: CPT | Performed by: EMERGENCY MEDICINE

## 2025-03-17 PROCEDURE — 94640 AIRWAY INHALATION TREATMENT: CPT

## 2025-03-17 PROCEDURE — 71046 X-RAY EXAM CHEST 2 VIEWS: CPT

## 2025-03-17 PROCEDURE — 120N000001 HC R&B MED SURG/OB

## 2025-03-17 PROCEDURE — 93005 ELECTROCARDIOGRAM TRACING: CPT

## 2025-03-17 PROCEDURE — 82565 ASSAY OF CREATININE: CPT | Performed by: EMERGENCY MEDICINE

## 2025-03-17 PROCEDURE — 250N000011 HC RX IP 250 OP 636: Performed by: INTERNAL MEDICINE

## 2025-03-17 PROCEDURE — 87040 BLOOD CULTURE FOR BACTERIA: CPT | Performed by: EMERGENCY MEDICINE

## 2025-03-17 PROCEDURE — 250N000011 HC RX IP 250 OP 636: Performed by: EMERGENCY MEDICINE

## 2025-03-17 PROCEDURE — 250N000013 HC RX MED GY IP 250 OP 250 PS 637: Performed by: EMERGENCY MEDICINE

## 2025-03-17 PROCEDURE — 99285 EMERGENCY DEPT VISIT HI MDM: CPT | Mod: 25

## 2025-03-17 RX ORDER — ONDANSETRON 2 MG/ML
4 INJECTION INTRAMUSCULAR; INTRAVENOUS EVERY 6 HOURS PRN
Status: DISCONTINUED | OUTPATIENT
Start: 2025-03-17 | End: 2025-03-18 | Stop reason: HOSPADM

## 2025-03-17 RX ORDER — AMOXICILLIN 250 MG
2 CAPSULE ORAL 2 TIMES DAILY PRN
Status: DISCONTINUED | OUTPATIENT
Start: 2025-03-17 | End: 2025-03-18 | Stop reason: HOSPADM

## 2025-03-17 RX ORDER — CEFTRIAXONE 1 G/1
1 INJECTION, POWDER, FOR SOLUTION INTRAMUSCULAR; INTRAVENOUS EVERY 24 HOURS
Status: DISCONTINUED | OUTPATIENT
Start: 2025-03-18 | End: 2025-03-18 | Stop reason: HOSPADM

## 2025-03-17 RX ORDER — CETIRIZINE HYDROCHLORIDE 10 MG/1
10 TABLET ORAL EVERY EVENING
Status: DISCONTINUED | OUTPATIENT
Start: 2025-03-17 | End: 2025-03-18 | Stop reason: HOSPADM

## 2025-03-17 RX ORDER — ACETAMINOPHEN 650 MG/1
650 SUPPOSITORY RECTAL EVERY 4 HOURS PRN
Status: DISCONTINUED | OUTPATIENT
Start: 2025-03-17 | End: 2025-03-18 | Stop reason: HOSPADM

## 2025-03-17 RX ORDER — LIDOCAINE 4 G/G
1 PATCH TOPICAL ONCE
Status: COMPLETED | OUTPATIENT
Start: 2025-03-17 | End: 2025-03-18

## 2025-03-17 RX ORDER — ONDANSETRON 4 MG/1
4 TABLET, ORALLY DISINTEGRATING ORAL EVERY 6 HOURS PRN
Status: DISCONTINUED | OUTPATIENT
Start: 2025-03-17 | End: 2025-03-18 | Stop reason: HOSPADM

## 2025-03-17 RX ORDER — AZITHROMYCIN 500 MG/5ML
500 INJECTION, POWDER, LYOPHILIZED, FOR SOLUTION INTRAVENOUS ONCE
Status: DISCONTINUED | OUTPATIENT
Start: 2025-03-17 | End: 2025-03-17

## 2025-03-17 RX ORDER — IPRATROPIUM BROMIDE AND ALBUTEROL SULFATE 2.5; .5 MG/3ML; MG/3ML
3 SOLUTION RESPIRATORY (INHALATION) ONCE
Status: COMPLETED | OUTPATIENT
Start: 2025-03-17 | End: 2025-03-17

## 2025-03-17 RX ORDER — FLUTICASONE FUROATE AND VILANTEROL 100; 25 UG/1; UG/1
1 POWDER RESPIRATORY (INHALATION) DAILY
Status: DISCONTINUED | OUTPATIENT
Start: 2025-03-18 | End: 2025-03-18 | Stop reason: HOSPADM

## 2025-03-17 RX ORDER — GABAPENTIN 300 MG/1
300 CAPSULE ORAL ONCE
Status: COMPLETED | OUTPATIENT
Start: 2025-03-17 | End: 2025-03-17

## 2025-03-17 RX ORDER — AZITHROMYCIN 500 MG/5ML
500 INJECTION, POWDER, LYOPHILIZED, FOR SOLUTION INTRAVENOUS EVERY 24 HOURS
Status: DISCONTINUED | OUTPATIENT
Start: 2025-03-17 | End: 2025-03-18 | Stop reason: HOSPADM

## 2025-03-17 RX ORDER — METHOCARBAMOL 500 MG/1
500 TABLET, FILM COATED ORAL 3 TIMES DAILY
Status: DISCONTINUED | OUTPATIENT
Start: 2025-03-18 | End: 2025-03-18 | Stop reason: HOSPADM

## 2025-03-17 RX ORDER — GABAPENTIN 300 MG/1
600 CAPSULE ORAL 3 TIMES DAILY
Status: DISCONTINUED | OUTPATIENT
Start: 2025-03-18 | End: 2025-03-18 | Stop reason: HOSPADM

## 2025-03-17 RX ORDER — CALCIUM CARBONATE 500 MG/1
1000 TABLET, CHEWABLE ORAL 4 TIMES DAILY PRN
Status: DISCONTINUED | OUTPATIENT
Start: 2025-03-17 | End: 2025-03-18 | Stop reason: HOSPADM

## 2025-03-17 RX ORDER — LIDOCAINE 40 MG/G
CREAM TOPICAL
Status: DISCONTINUED | OUTPATIENT
Start: 2025-03-17 | End: 2025-03-18 | Stop reason: HOSPADM

## 2025-03-17 RX ORDER — METHOCARBAMOL 500 MG/1
500 TABLET, FILM COATED ORAL ONCE
Status: COMPLETED | OUTPATIENT
Start: 2025-03-17 | End: 2025-03-17

## 2025-03-17 RX ORDER — AMOXICILLIN 250 MG
1 CAPSULE ORAL 2 TIMES DAILY PRN
Status: DISCONTINUED | OUTPATIENT
Start: 2025-03-17 | End: 2025-03-18 | Stop reason: HOSPADM

## 2025-03-17 RX ORDER — METOPROLOL SUCCINATE 25 MG/1
25 TABLET, EXTENDED RELEASE ORAL DAILY
Status: DISCONTINUED | OUTPATIENT
Start: 2025-03-18 | End: 2025-03-18 | Stop reason: HOSPADM

## 2025-03-17 RX ORDER — PANTOPRAZOLE SODIUM 40 MG/1
40 TABLET, DELAYED RELEASE ORAL DAILY
Status: DISCONTINUED | OUTPATIENT
Start: 2025-03-18 | End: 2025-03-18 | Stop reason: HOSPADM

## 2025-03-17 RX ORDER — IPRATROPIUM BROMIDE AND ALBUTEROL SULFATE 2.5; .5 MG/3ML; MG/3ML
3 SOLUTION RESPIRATORY (INHALATION) EVERY 4 HOURS PRN
Status: DISCONTINUED | OUTPATIENT
Start: 2025-03-17 | End: 2025-03-18 | Stop reason: HOSPADM

## 2025-03-17 RX ORDER — TAMSULOSIN HYDROCHLORIDE 0.4 MG/1
0.4 CAPSULE ORAL DAILY
Status: DISCONTINUED | OUTPATIENT
Start: 2025-03-18 | End: 2025-03-18 | Stop reason: HOSPADM

## 2025-03-17 RX ORDER — ACETAMINOPHEN 325 MG/1
650 TABLET ORAL EVERY 4 HOURS PRN
Status: DISCONTINUED | OUTPATIENT
Start: 2025-03-17 | End: 2025-03-18 | Stop reason: HOSPADM

## 2025-03-17 RX ORDER — CEFTRIAXONE 1 G/1
1 INJECTION, POWDER, FOR SOLUTION INTRAMUSCULAR; INTRAVENOUS ONCE
Status: COMPLETED | OUTPATIENT
Start: 2025-03-17 | End: 2025-03-17

## 2025-03-17 RX ADMIN — LIDOCAINE 1 PATCH: 4 PATCH TOPICAL at 23:03

## 2025-03-17 RX ADMIN — GABAPENTIN 300 MG: 300 CAPSULE ORAL at 23:04

## 2025-03-17 RX ADMIN — CEFTRIAXONE 1 G: 1 INJECTION, POWDER, FOR SOLUTION INTRAMUSCULAR; INTRAVENOUS at 22:04

## 2025-03-17 RX ADMIN — METHOCARBAMOL 500 MG: 500 TABLET ORAL at 23:04

## 2025-03-17 RX ADMIN — APIXABAN 5 MG: 5 TABLET, FILM COATED ORAL at 23:28

## 2025-03-17 RX ADMIN — AZITHROMYCIN MONOHYDRATE 500 MG: 500 INJECTION, POWDER, LYOPHILIZED, FOR SOLUTION INTRAVENOUS at 22:42

## 2025-03-17 RX ADMIN — IPRATROPIUM BROMIDE AND ALBUTEROL SULFATE 3 ML: .5; 3 SOLUTION RESPIRATORY (INHALATION) at 21:12

## 2025-03-17 ASSESSMENT — ACTIVITIES OF DAILY LIVING (ADL)
ADLS_ACUITY_SCORE: 55

## 2025-03-17 ASSESSMENT — COLUMBIA-SUICIDE SEVERITY RATING SCALE - C-SSRS
2. HAVE YOU ACTUALLY HAD ANY THOUGHTS OF KILLING YOURSELF IN THE PAST MONTH?: NO
1. IN THE PAST MONTH, HAVE YOU WISHED YOU WERE DEAD OR WISHED YOU COULD GO TO SLEEP AND NOT WAKE UP?: NO
6. HAVE YOU EVER DONE ANYTHING, STARTED TO DO ANYTHING, OR PREPARED TO DO ANYTHING TO END YOUR LIFE?: NO

## 2025-03-17 NOTE — ED TRIAGE NOTES
"Arrives ambulatory from home. States SOB which started yesterday but has gotten worse today.     States worse with movement and \"wear this mask makes it worse.\"     Denies fevers at home. + cough. States utilized inhaler at home 2 X with improvement.     Currently on hormone therapy for CA.       "

## 2025-03-18 VITALS
DIASTOLIC BLOOD PRESSURE: 67 MMHG | SYSTOLIC BLOOD PRESSURE: 97 MMHG | OXYGEN SATURATION: 95 % | WEIGHT: 170 LBS | HEART RATE: 60 BPM | HEIGHT: 74 IN | RESPIRATION RATE: 20 BRPM | BODY MASS INDEX: 21.82 KG/M2 | TEMPERATURE: 98 F

## 2025-03-18 LAB
ANION GAP SERPL CALCULATED.3IONS-SCNC: 10 MMOL/L (ref 7–15)
ATRIAL RATE - MUSE: 76 BPM
BASOPHILS # BLD AUTO: 0 10E3/UL (ref 0–0.2)
BASOPHILS NFR BLD AUTO: 0 %
BUN SERPL-MCNC: 9 MG/DL (ref 8–23)
CALCIUM SERPL-MCNC: 9.3 MG/DL (ref 8.8–10.4)
CHLORIDE SERPL-SCNC: 102 MMOL/L (ref 98–107)
CREAT SERPL-MCNC: 0.77 MG/DL (ref 0.67–1.17)
DIASTOLIC BLOOD PRESSURE - MUSE: NORMAL MMHG
EGFRCR SERPLBLD CKD-EPI 2021: 89 ML/MIN/1.73M2
EOSINOPHIL # BLD AUTO: 0.2 10E3/UL (ref 0–0.7)
EOSINOPHIL NFR BLD AUTO: 1 %
ERYTHROCYTE [DISTWIDTH] IN BLOOD BY AUTOMATED COUNT: 13.2 % (ref 10–15)
GLUCOSE SERPL-MCNC: 109 MG/DL (ref 70–99)
HCO3 SERPL-SCNC: 20 MMOL/L (ref 22–29)
HCT VFR BLD AUTO: 35.6 % (ref 40–53)
HGB BLD-MCNC: 11.9 G/DL (ref 13.3–17.7)
IMM GRANULOCYTES # BLD: 0.1 10E3/UL
IMM GRANULOCYTES NFR BLD: 0 %
INTERPRETATION ECG - MUSE: NORMAL
LYMPHOCYTES # BLD AUTO: 1.3 10E3/UL (ref 0.8–5.3)
LYMPHOCYTES NFR BLD AUTO: 10 %
MCH RBC QN AUTO: 30.6 PG (ref 26.5–33)
MCHC RBC AUTO-ENTMCNC: 33.4 G/DL (ref 31.5–36.5)
MCV RBC AUTO: 92 FL (ref 78–100)
MONOCYTES # BLD AUTO: 1.2 10E3/UL (ref 0–1.3)
MONOCYTES NFR BLD AUTO: 10 %
NEUTROPHILS # BLD AUTO: 10.1 10E3/UL (ref 1.6–8.3)
NEUTROPHILS NFR BLD AUTO: 78 %
NRBC # BLD AUTO: 0 10E3/UL
NRBC BLD AUTO-RTO: 0 /100
P AXIS - MUSE: 65 DEGREES
PLATELET # BLD AUTO: 272 10E3/UL (ref 150–450)
POTASSIUM SERPL-SCNC: 3.7 MMOL/L (ref 3.4–5.3)
PR INTERVAL - MUSE: 166 MS
QRS DURATION - MUSE: 140 MS
QT - MUSE: 420 MS
QTC - MUSE: 472 MS
R AXIS - MUSE: 71 DEGREES
RBC # BLD AUTO: 3.89 10E6/UL (ref 4.4–5.9)
SODIUM SERPL-SCNC: 132 MMOL/L (ref 135–145)
SYSTOLIC BLOOD PRESSURE - MUSE: NORMAL MMHG
T AXIS - MUSE: 82 DEGREES
VENTRICULAR RATE- MUSE: 76 BPM
WBC # BLD AUTO: 12.8 10E3/UL (ref 4–11)

## 2025-03-18 PROCEDURE — 250N000013 HC RX MED GY IP 250 OP 250 PS 637: Performed by: INTERNAL MEDICINE

## 2025-03-18 PROCEDURE — G0378 HOSPITAL OBSERVATION PER HR: HCPCS

## 2025-03-18 PROCEDURE — 99239 HOSP IP/OBS DSCHRG MGMT >30: CPT | Performed by: HOSPITALIST

## 2025-03-18 PROCEDURE — 82565 ASSAY OF CREATININE: CPT | Performed by: INTERNAL MEDICINE

## 2025-03-18 PROCEDURE — 250N000013 HC RX MED GY IP 250 OP 250 PS 637: Performed by: STUDENT IN AN ORGANIZED HEALTH CARE EDUCATION/TRAINING PROGRAM

## 2025-03-18 PROCEDURE — 36415 COLL VENOUS BLD VENIPUNCTURE: CPT | Performed by: INTERNAL MEDICINE

## 2025-03-18 PROCEDURE — 80048 BASIC METABOLIC PNL TOTAL CA: CPT | Performed by: INTERNAL MEDICINE

## 2025-03-18 PROCEDURE — 85025 COMPLETE CBC W/AUTO DIFF WBC: CPT | Performed by: INTERNAL MEDICINE

## 2025-03-18 PROCEDURE — 84132 ASSAY OF SERUM POTASSIUM: CPT | Performed by: INTERNAL MEDICINE

## 2025-03-18 PROCEDURE — 85014 HEMATOCRIT: CPT | Performed by: INTERNAL MEDICINE

## 2025-03-18 RX ORDER — GUAIFENESIN/DEXTROMETHORPHAN 100-10MG/5
10 SYRUP ORAL EVERY 4 HOURS PRN
Status: DISCONTINUED | OUTPATIENT
Start: 2025-03-18 | End: 2025-03-18 | Stop reason: HOSPADM

## 2025-03-18 RX ORDER — CEFDINIR 300 MG/1
300 CAPSULE ORAL 2 TIMES DAILY
Qty: 12 CAPSULE | Refills: 0 | Status: SHIPPED | OUTPATIENT
Start: 2025-03-18 | End: 2025-03-24

## 2025-03-18 RX ORDER — ACETAMINOPHEN 500 MG
1000 TABLET ORAL 3 TIMES DAILY
COMMUNITY

## 2025-03-18 RX ORDER — GABAPENTIN 300 MG/1
600 CAPSULE ORAL 2 TIMES DAILY
COMMUNITY

## 2025-03-18 RX ORDER — AZITHROMYCIN 250 MG/1
250 TABLET, FILM COATED ORAL DAILY
Qty: 4 TABLET | Refills: 0 | Status: SHIPPED | OUTPATIENT
Start: 2025-03-18 | End: 2025-03-22

## 2025-03-18 RX ORDER — GABAPENTIN 300 MG/1
900 CAPSULE ORAL AT BEDTIME
COMMUNITY

## 2025-03-18 RX ADMIN — METHOCARBAMOL 500 MG: 500 TABLET ORAL at 14:09

## 2025-03-18 RX ADMIN — UMECLIDINIUM 1 PUFF: 62.5 AEROSOL, POWDER ORAL at 09:54

## 2025-03-18 RX ADMIN — FLUTICASONE FUROATE AND VILANTEROL TRIFENATATE 1 PUFF: 100; 25 POWDER RESPIRATORY (INHALATION) at 09:54

## 2025-03-18 RX ADMIN — GABAPENTIN 600 MG: 300 CAPSULE ORAL at 09:54

## 2025-03-18 RX ADMIN — ACETAMINOPHEN 650 MG: 325 TABLET ORAL at 09:23

## 2025-03-18 RX ADMIN — PANTOPRAZOLE SODIUM 40 MG: 40 TABLET, DELAYED RELEASE ORAL at 09:54

## 2025-03-18 RX ADMIN — ACETAMINOPHEN 650 MG: 325 TABLET ORAL at 14:09

## 2025-03-18 RX ADMIN — APIXABAN 5 MG: 5 TABLET, FILM COATED ORAL at 09:24

## 2025-03-18 RX ADMIN — GUAIFENESIN AND DEXTROMETHORPHAN 10 ML: 100; 10 SYRUP ORAL at 06:41

## 2025-03-18 RX ADMIN — METHOCARBAMOL 500 MG: 500 TABLET ORAL at 09:24

## 2025-03-18 RX ADMIN — METOPROLOL SUCCINATE 25 MG: 25 TABLET, EXTENDED RELEASE ORAL at 09:23

## 2025-03-18 RX ADMIN — GABAPENTIN 600 MG: 300 CAPSULE ORAL at 14:11

## 2025-03-18 ASSESSMENT — ACTIVITIES OF DAILY LIVING (ADL)
ADLS_ACUITY_SCORE: 60
ADLS_ACUITY_SCORE: 44
ADLS_ACUITY_SCORE: 48
ADLS_ACUITY_SCORE: 40
ADLS_ACUITY_SCORE: 60
ADLS_ACUITY_SCORE: 60
ADLS_ACUITY_SCORE: 40
ADLS_ACUITY_SCORE: 60
ADLS_ACUITY_SCORE: 40
ADLS_ACUITY_SCORE: 60
ADLS_ACUITY_SCORE: 44
ADLS_ACUITY_SCORE: 48
ADLS_ACUITY_SCORE: 48

## 2025-03-18 NOTE — PROGRESS NOTES
"[facility] ADMISSION NOTE    Patient admitted to room 449 at approximately 0000 via cart from emergency room. Patient was accompanied by transport tech.     Verbal SBAR report received from ED Handoff report prior to patient arrival.     Patient ambulated to bed with one assist. Patient alert and oriented X 3. Pain is controlled with current analgesics.  Medication(s) being used: acetaminophen and lidocaine patch.  . Admission vital signs: Blood pressure 122/87, pulse 96, temperature 98.4  F (36.9  C), temperature source Oral, resp. rate 20, height 1.88 m (6' 2\"), weight 77.1 kg (170 lb), SpO2 92%. Patient was oriented to plan of care, call light, bed controls, tv, telephone, bathroom, and visiting hours.     Risk Assessment    The following safety risks were identified during admission: fall and skin. Yellow risk band applied: YES.     Skin Initial Assessment    This writer admitted this patient and completed a full skin assessment and Pavel score in the Adult PCS flowsheet.   Photo documentation of skin problem and/or wound competed via [employer] application (located under Media):  N/A    Appropriate interventions initiated as needed.     Secondary skin check completed by RN.    Pavel Risk Assessment  Sensory Perception: 3-->slightly limited  Moisture: 3-->occasionally moist  Activity: 3-->walks occasionally  Mobility: 3-->slightly limited  Nutrition: 2-->probably inadequate  Friction and Shear: 3-->no apparent problem  Pavel Score: 17  Moisture Interventions: Encourage regular toileting  Nutrition Interventions: Maintain adequate hydration  Mattress: Standard gel/foam mattress (IsoFlex, Atmos Air, etc.)  Bed Frame: Standard width and length    Education    Patient education completed and documented: Yes      Holly Milner RN      "

## 2025-03-18 NOTE — ED NOTES
"[facility]  ED Nurse Handoff Report    ED Chief complaint: Shortness of Breath and Cough  . ED Diagnosis:   Final diagnoses:   Community acquired pneumonia of left lower lobe of lung       Allergies:   Allergies   Allergen Reactions    Amoxicillin-Pot Clavulanate GI Disturbance and Rash    Amoxicillin     Finasteride Other (See Comments)     Lightheadedness       Code Status: Full Code    Activity level - Baseline/Home:  independent.  Activity Level - Current:   standby.   Lift room needed: No.   Bariatric: No   Needed: No   Isolation: Yes  Infection: Community acquired pneumonia    Respiratory status: Room air    Vital Signs (within 30 minutes):   Vitals:    03/17/25 1828 03/17/25 2205   BP: (!) 180/87 139/87   Pulse: 76 85   Resp: 24 20   Temp: 99.5  F (37.5  C)    TempSrc: Temporal    SpO2: 97% (!) 91%       Cardiac Rhythm:  ,      Pain level:    Patient confused: No.   Patient Falls Risk: bed/chair alarm on, nonskid shoes/slippers when out of bed, arm band in place, patient and family education, and assistive device/personal items within reach.   Elimination Status: Has voided     Patient Report - Initial Complaint: Arrives ambulatory from home. States SOB which started yesterday but has gotten worse today.      States worse with movement and \"wear this mask makes it worse.\"      Denies fevers at home. + cough. States utilized inhaler at home 2 X with improvement.      Currently on hormone therapy for CA.    Focused Assessment:   84 year old male with a history as noted below who presents to the emergency department for shortness of breath and cough. The patient states that for 1-2 days, he has been experiencing worsening shortness of breath, a productive cough, fatigue, and postnasal drainage. He notes that today, he administered his Albuterol inhaler twice without symptom relief. He adds that he is currently undergoing hormone therapy for prostate cancer. No fever or chills.   "   Independent Historian   None     Review of External Notes      Past Medical History      Medical History and Problem List   AFIB  COPD  Chronic back pain  Eczema  Leukocytosis  Prostate cancer  Tobacco abuse     Medications   albuterol (PROAIR HFA/PROVENTIL HFA/VENTOLIN HFA) 108 (90 Base) MCG/ACT inhaler  apixaban ANTICOAGULANT (ELIQUIS) 5 MG tablet  cetirizine (ZYRTEC) 10 MG tablet  gabapentin (NEURONTIN) 300 MG capsule  methocarbamol (ROBAXIN) 500 MG tablet  metoprolol succinate ER (TOPROL XL) 25 MG 24 hr tablet  omeprazole (PRILOSEC) 20 MG DR capsule  sildenafil (REVATIO) 20 MG tablet  tamsulosin (FLOMAX) 0.4 MG capsule  TRELEGY ELLIPTA 100-62.5-25 MCG/INH oral inhaler  TRIAMCINOLONE ACETONIDE EX     Surgical History   DaVinci herniorrhaphy  IR LP  T&A     Abnormal Results:   Labs Ordered and Resulted from Time of ED Arrival to Time of ED Departure   COMPREHENSIVE METABOLIC PANEL - Abnormal       Result Value    Sodium 130 (*)     Potassium 3.8      Carbon Dioxide (CO2) 20 (*)     Anion Gap 12      Urea Nitrogen 12.5      Creatinine 0.83      GFR Estimate 87      Calcium 9.4      Chloride 98      Glucose 98      Alkaline Phosphatase 93      AST 18      ALT 12      Protein Total 7.0      Albumin 4.2      Bilirubin Total 0.3     CBC WITH PLATELETS AND DIFFERENTIAL - Abnormal    WBC Count 13.0 (*)     RBC Count 4.06 (*)     Hemoglobin 12.5 (*)     Hematocrit 37.5 (*)     MCV 92      MCH 30.8      MCHC 33.3      RDW 13.2      Platelet Count 289      % Neutrophils 78      % Lymphocytes 8      % Monocytes 9      % Eosinophils 4      % Basophils 0      % Immature Granulocytes 0      NRBCs per 100 WBC 0      Absolute Neutrophils 10.2 (*)     Absolute Lymphocytes 1.0      Absolute Monocytes 1.2      Absolute Eosinophils 0.6      Absolute Basophils 0.1      Absolute Immature Granulocytes 0.0      Absolute NRBCs 0.0     INFLUENZA A/B, RSV AND SARS-COV2 PCR - Normal    Influenza A PCR Negative      Influenza B PCR Negative       RSV PCR Negative      SARS CoV2 PCR Negative     BLOOD CULTURE   BLOOD CULTURE        Chest XR,  PA & LAT   Final Result   IMPRESSION: Normal size of cardiomediastinal silhouette. Upper lobe predominant oligemia, compatible with known emphysema. There is a new focal opacity in the lateral/posterior aspect of the left lower lobe, developing infectious/inflammatory process is    possible, recommend radiographic follow-up to resolution. Right lung is clear. No pleural effusion or pneumothorax. Bones are unchanged.          Treatments provided: See MAR  Family Comments: alone  OBS brochure/video discussed/provided to patient:  N/A  ED Medications:   Medications   cefTRIAXone (ROCEPHIN) 1 g vial to attach to  mL bag for ADULTS or NS 50 mL bag for PEDS (1 g Intravenous $New Bag 3/17/25 2204)   azithromycin (ZITHROMAX) 500 mg in  mL intermittent infusion (has no administration in time range)   ipratropium - albuterol 0.5 mg/2.5 mg/3 mL (DUONEB) neb solution 3 mL (3 mLs Nebulization $Given 3/17/25 2112)       Drips infusing:  No  For the majority of the shift this patient was Green.   Interventions performed were     Sepsis treatment initiated: No    Cares/treatment/interventions/medications to be completed following ED care:     ED Nurse Name: Rhonda Coleman RN  10:09 PM     RECEIVING UNIT ED HANDOFF REVIEW    Above ED Nurse Handoff Report was reviewed: Yes  Reviewed by: Holly Milner RN on March 17, 2025 at 11:38 PM   ANIYAH Hollis called the ED to inform them the note was read: Yes

## 2025-03-18 NOTE — PHARMACY-ADMISSION MEDICATION HISTORY
Pharmacist Admission Medication History    Admission medication history is complete. The information provided in this note is only as accurate as the sources available at the time of the update.    Information Source(s): Patient via in-person, [employer]    Pertinent Information: -    Changes made to PTA medication list:  Added: None  Deleted: None  Changed:     - acetaminophen 500-1000 mg q6h prn to 1000 mg TID;  - gabapentin 600 mg TID to 600 mg BID (am and afternoon) and 900 mg at bedtime  - tamsulosin daily to qPM    Paged provider to review changes to medications.     Medication History Completed By: Tracy Ruiz RPH 3/18/2025 10:03 AM    PTA Med List   Medication Sig Last Dose/Taking    acetaminophen (TYLENOL) 500 MG tablet Take 1,000 mg by mouth 3 times daily. 3/17/2025    albuterol (PROAIR HFA/PROVENTIL HFA/VENTOLIN HFA) 108 (90 Base) MCG/ACT inhaler Inhale 1-2 puffs into the lungs every 6 hours as needed for shortness of breath, wheezing or cough. Taking As Needed    apixaban ANTICOAGULANT (ELIQUIS) 5 MG tablet Take 1 tablet (5 mg) by mouth 2 times daily 3/17/2025    cetirizine (ZYRTEC) 10 MG tablet Take 10 mg by mouth every evening 3/17/2025    gabapentin (NEURONTIN) 300 MG capsule Take 600 mg by mouth 2 times daily. In the morning and afternoon. 3/17/2025    gabapentin (NEURONTIN) 300 MG capsule Take 900 mg by mouth at bedtime. 3/16/2025    hydrocortisone 2.5 % cream Apply topically as needed Taking As Needed    methocarbamol (ROBAXIN) 500 MG tablet Take 1 tablet (500 mg) by mouth 3 times daily. 3/17/2025    metoprolol succinate ER (TOPROL XL) 25 MG 24 hr tablet Take 1 tablet by mouth daily. 3/17/2025    omeprazole (PRILOSEC) 20 MG DR capsule Take 1 capsule (20 mg) by mouth daily 3/17/2025    tamsulosin (FLOMAX) 0.4 MG capsule Take 0.4 mg by mouth every evening. 3/16/2025    TRELEGY ELLIPTA 100-62.5-25 MCG/INH oral inhaler Inhale 1 puff into the lungs daily 3/17/2025    TRIAMCINOLONE ACETONIDE EX  Apply topically daily as needed (eczema) Taking As Needed

## 2025-03-18 NOTE — DISCHARGE SUMMARY
[facility]  Hospitalist Discharge Summary      Date of Admission:  3/17/2025  Date of Discharge:  3/18/2025  Discharging Provider: Luis Mai DO  Discharge Service: Hospitalist Service    Discharge Diagnoses   Community-acquired pneumonia  COPD  Smoking history  Atrial fibrillation on DOAC  Hx prostate cancer    Follow-ups Needed After Discharge   Follow-up Appointments       Follow-up and recommended labs and tests       Follow up with primary care provider, [facility], within 7 days for hospital follow- up.  No follow up labs or test are needed.                Unresulted Labs Ordered in the Past 30 Days of this Admission       Date and Time Order Name Status Description    3/17/2025  8:13 PM Blood Culture Wrist, Right Preliminary     3/17/2025  8:13 PM Blood Culture Line, venous Preliminary         These results will be followed up by PCP    Discharge Disposition   Discharged to home  Condition at discharge: Stable    Hospital Course    Tigre Gillette is a 84 year old male patient with past medical history of COPD, A-fib, history of tobacco use, eczema, chronic back pain, prostate cancer on hormone therapy, came to emergency room with a complaint of cough, shortness of breath, wheezing.  On arrival to emergency room, his initial vital signs showed temperature 99.5, pulse 76, blood pressure 108/87, oxygen saturation 97% on room air.  Laboratory workup showed sodium 130, potassium 3.8, ALT 12, AST 18.  WBC 13.0, hemoglobin 12.5.  Chest x-ray showed evidence of emphysema.  He is in new focal opacity in the lateral/posterior aspect of the left lower lobe concerning for developing infectious process.  In the emergency room, he was given IV ceftriaxone and azithromycin for community-acquired pneumonia. He was admitted to the hospital for further management.     Community-acquired pneumonia  COPD  Smoking history  Patient presented with productive cough, shortness of  breath and generalized fatigue.  He states that his symptoms started 2 days ago and gradually worsening.  In the ER, he was noted to have temp of 99.5.  Oxygen saturation stable.  Lab work showed elevated WBC at 13.0.  Chest x-ray concerning for pneumonia.  Received ceftriaxone and azithromycin in the ER.  -off of oxygen day of discharge  -initially on Rocephin and azithro, will be on cefdinir and azithro on discharge for 1 week     Atrial fibrillation  Heart rate controlled.  On anticoagulation with apixaban  -Will resume apixaban, Toprol-XL     History of prostate cancer  States that he was diagnosed end of last year.  He states that he is on hormonal therapy.  Will resume his medication once reviewed by pharmacy    Consultations This Hospital Stay   None    Code Status   No CPR- Do NOT Intubate    Time Spent on this Encounter   I, Luis Mai DO, personally saw the patient today and spent greater than 30 minutes discharging this patient.       Luis Mai DO  [facility]  [address] [place] [address]  Phone: [phone]  Fax: [phone]  ______________________________________________________________________    Physical Exam   Vital Signs: Temp: 98.2  F (36.8  C) Temp src: Oral BP: 112/75 Pulse: 88   Resp: 20 SpO2: 93 % O2 Device: None (Room air)    Weight: 170 lbs 0 oz  Face to face completed day of discharge       [facility]    Discharge Orders      Reason for your hospital stay    Admitted for CAP, complete entire course of antibiotics on discharge     Follow-up and recommended labs and tests     Follow up with primary care provider, [facility], within 7 days for hospital follow- up.  No follow up labs or test are needed.     Activity    Your activity upon discharge: activity as tolerated     Diet    Follow this diet upon discharge: Current Diet:Orders Placed This Encounter      Combination  Diet Regular Diet Adult       Significant Results and Procedures   Most Recent 3 CBC's:  Recent Labs   Lab Test 03/18/25  0737 03/17/25  1850 09/14/24  1750   WBC 12.8* 13.0* 14.9*   HGB 11.9* 12.5* 12.2*   MCV 92 92 95    289 245     Most Recent 3 BMP's:  Recent Labs   Lab Test 03/18/25  0737 03/17/25  1850 09/20/24  1126   * 130* 134*   POTASSIUM 3.7 3.8 4.6   CHLORIDE 102 98 100   CO2 20* 20* 23   BUN 9.0 12.5 9.9   CR 0.77 0.83 0.82   ANIONGAP 10 12 11   KEVIN 9.3 9.4 9.4   * 98 88     Most Recent 2 LFT's:  Recent Labs   Lab Test 03/17/25  1850   AST 18   ALT 12   ALKPHOS 93   BILITOTAL 0.3     Most Recent 3 INR's:No lab results found.  Most Recent 3 Hemoglobins:  Recent Labs   Lab Test 03/18/25  0737 03/17/25  1850 09/14/24  1750   HGB 11.9* 12.5* 12.2*     Most Recent 3 Troponin's:No lab results found.  Most Recent 3 BNP's:  Recent Labs   Lab Test 04/29/24  1007   NTBNPI 1,116     Most Recent D-dimer:No lab results found.,   Results for orders placed or performed during the hospital encounter of 03/17/25   Chest XR,  PA & LAT    Narrative    EXAM: XR CHEST 2 VIEWS  LOCATION: [facility]  DATE: 3/17/2025    INDICATION: Cough, shortness of breath  COMPARISON: Chest CT 1/6/2025      Impression    IMPRESSION: Normal size of cardiomediastinal silhouette. Upper lobe predominant oligemia, compatible with known emphysema. There is a new focal opacity in the lateral/posterior aspect of the left lower lobe, developing infectious/inflammatory process is   possible, recommend radiographic follow-up to resolution. Right lung is clear. No pleural effusion or pneumothorax. Bones are unchanged.       Discharge Medications   Current Discharge Medication List        START taking these medications    Details   azithromycin (ZITHROMAX) 250 MG tablet Take 1 tablet (250 mg) by mouth daily for 4 days.  Qty: 4 tablet, Refills: 0    Associated Diagnoses: Community acquired pneumonia of left  lower lobe of lung      cefdinir (OMNICEF) 300 MG capsule Take 1 capsule (300 mg) by mouth 2 times daily for 6 days.  Qty: 12 capsule, Refills: 0    Associated Diagnoses: Community acquired pneumonia of left lower lobe of lung           CONTINUE these medications which have NOT CHANGED    Details   acetaminophen (TYLENOL) 500 MG tablet Take 1,000 mg by mouth 3 times daily.      albuterol (PROAIR HFA/PROVENTIL HFA/VENTOLIN HFA) 108 (90 Base) MCG/ACT inhaler Inhale 1-2 puffs into the lungs every 6 hours as needed for shortness of breath, wheezing or cough.  Qty: 17 g, Refills: 11    Comments: Pharmacy may dispense brand covered by insurance (Proair, or proventil or ventolin or generic albuterol inhaler)  Associated Diagnoses: Chronic obstructive pulmonary disease, unspecified COPD type (H)      apixaban ANTICOAGULANT (ELIQUIS) 5 MG tablet Take 1 tablet (5 mg) by mouth 2 times daily  Qty: 60 tablet, Refills: 11    Associated Diagnoses: Atrial fibrillation, unspecified type (H)      cetirizine (ZYRTEC) 10 MG tablet Take 10 mg by mouth every evening      !! gabapentin (NEURONTIN) 300 MG capsule Take 600 mg by mouth 2 times daily. In the morning and afternoon.      !! gabapentin (NEURONTIN) 300 MG capsule Take 900 mg by mouth at bedtime.      hydrocortisone 2.5 % cream Apply topically as needed      methocarbamol (ROBAXIN) 500 MG tablet Take 1 tablet (500 mg) by mouth 3 times daily.  Qty: 60 tablet, Refills: 3    Associated Diagnoses: Myofascial pain      metoprolol succinate ER (TOPROL XL) 25 MG 24 hr tablet Take 1 tablet by mouth daily.      omeprazole (PRILOSEC) 20 MG DR capsule Take 1 capsule (20 mg) by mouth daily    Associated Diagnoses: Long term current use of anticoagulant therapy      tamsulosin (FLOMAX) 0.4 MG capsule Take 0.4 mg by mouth every evening.      TRELEGY ELLIPTA 100-62.5-25 MCG/INH oral inhaler Inhale 1 puff into the lungs daily      TRIAMCINOLONE ACETONIDE EX Apply topically daily as needed  (eczema)      sildenafil (REVATIO) 20 MG tablet Take 20-60 mg by mouth daily as needed.       !! - Potential duplicate medications found. Please discuss with provider.        Allergies   Allergies   Allergen Reactions    Amoxicillin-Pot Clavulanate GI Disturbance and Rash    Amoxicillin     Finasteride Other (See Comments)     Lightheadedness

## 2025-03-18 NOTE — ED PROVIDER NOTES
Emergency Department Note      History of Present Illness     Chief Complaint   Shortness of Breath and Cough    HPI   Tigre Gillette is a 84 year old male with a history of COPD, paroxysmal A-fib on Eliquis, and continued tobacco use as noted below who presents to the emergency department for shortness of breath and cough. The patient states that for 1-2 days, he has been experiencing worsening shortness of breath, a productive cough, fatigue, and postnasal drainage. He notes that today, he administered his Albuterol inhaler twice without symptom relief. He adds that he is currently undergoing hormone therapy for prostate cancer. No fever or chills.    Independent Historian   Patient's daughter at the bedside.    Review of External Notes   I reviewed office visit notes from 3/10/2025.  Patient continues to smoke.    Past Medical History     Medical History and Problem List   AFIB  COPD  Chronic back pain  Eczema  Leukocytosis  Prostate cancer  Tobacco abuse    Medications   albuterol (PROAIR HFA/PROVENTIL HFA/VENTOLIN HFA) 108 (90 Base) MCG/ACT inhaler  apixaban ANTICOAGULANT (ELIQUIS) 5 MG tablet  cetirizine (ZYRTEC) 10 MG tablet  gabapentin (NEURONTIN) 300 MG capsule  methocarbamol (ROBAXIN) 500 MG tablet  metoprolol succinate ER (TOPROL XL) 25 MG 24 hr tablet  omeprazole (PRILOSEC) 20 MG DR capsule  sildenafil (REVATIO) 20 MG tablet  tamsulosin (FLOMAX) 0.4 MG capsule  TRELEGY ELLIPTA 100-62.5-25 MCG/INH oral inhaler  TRIAMCINOLONE ACETONIDE EX    Surgical History   DaVinci herniorrhaphy  IR LP  T&A    Physical Exam     Patient Vitals for the past 24 hrs:   BP Temp Temp src Pulse Resp SpO2   03/17/25 2205 139/87 -- -- 85 20 (!) 91 %   03/17/25 1828 (!) 180/87 99.5  F (37.5  C) Temporal 76 24 97 %     Physical Exam  General: alert, seated on a chair in fast track  HENT: mucous membranes moist  CV: Normal rate, regular rhythm.  Resp: Tachypnea with increased work of breathing at rest.  Diffuse wheezing.   Symmetric wheezing on exam, no focal rales.  GI: abdomen soft and nontender, no guarding  MSK: no bony tenderness  Skin: appropriately warm and dry  Extremities: no edema, calves non-tender  Neuro: alert, clear speech, oriented  Psych: normal mood and affect      Diagnostics     Lab Results   Labs Ordered and Resulted from Time of ED Arrival to Time of ED Departure   COMPREHENSIVE METABOLIC PANEL - Abnormal       Result Value    Sodium 130 (*)     Potassium 3.8      Carbon Dioxide (CO2) 20 (*)     Anion Gap 12      Urea Nitrogen 12.5      Creatinine 0.83      GFR Estimate 87      Calcium 9.4      Chloride 98      Glucose 98      Alkaline Phosphatase 93      AST 18      ALT 12      Protein Total 7.0      Albumin 4.2      Bilirubin Total 0.3     CBC WITH PLATELETS AND DIFFERENTIAL - Abnormal    WBC Count 13.0 (*)     RBC Count 4.06 (*)     Hemoglobin 12.5 (*)     Hematocrit 37.5 (*)     MCV 92      MCH 30.8      MCHC 33.3      RDW 13.2      Platelet Count 289      % Neutrophils 78      % Lymphocytes 8      % Monocytes 9      % Eosinophils 4      % Basophils 0      % Immature Granulocytes 0      NRBCs per 100 WBC 0      Absolute Neutrophils 10.2 (*)     Absolute Lymphocytes 1.0      Absolute Monocytes 1.2      Absolute Eosinophils 0.6      Absolute Basophils 0.1      Absolute Immature Granulocytes 0.0      Absolute NRBCs 0.0     INFLUENZA A/B, RSV AND SARS-COV2 PCR - Normal    Influenza A PCR Negative      Influenza B PCR Negative      RSV PCR Negative      SARS CoV2 PCR Negative     BLOOD CULTURE   BLOOD CULTURE     Imaging   Chest XR,  PA & LAT   Final Result   IMPRESSION: Normal size of cardiomediastinal silhouette. Upper lobe predominant oligemia, compatible with known emphysema. There is a new focal opacity in the lateral/posterior aspect of the left lower lobe, developing infectious/inflammatory process is    possible, recommend radiographic follow-up to resolution. Right lung is clear. No pleural effusion or  pneumothorax. Bones are unchanged.        EKG   ECG results from 03/17/25   EKG 12 lead     Value    Systolic Blood Pressure     Diastolic Blood Pressure     Ventricular Rate 76    Atrial Rate 76    WY Interval 166    QRS Duration 140        QTc 472    P Axis 65    R AXIS 71    T Axis 82    Interpretation ECG      Sinus rhythm with Premature atrial complexes with Aberrant conduction  Right bundle branch block  Possible Inferior infarct (cited on or before 25-May-2015)  Abnormal ECG  When compared with ECG of 04-May-2024 12:07,  Premature ventricular complexes are no longer Present  Aberrant conduction is now Present  QT has shortened       Independent Interpretation   CXR: Evidence of left lower lobe infiltrate.    ED Course      Medications Administered   Medications   cefTRIAXone (ROCEPHIN) 1 g vial to attach to  mL bag for ADULTS or NS 50 mL bag for PEDS (has no administration in time range)   azithromycin (ZITHROMAX) 500 mg in  mL intermittent infusion (has no administration in time range)   ipratropium - albuterol 0.5 mg/2.5 mg/3 mL (DUONEB) neb solution 3 mL (3 mLs Nebulization $Given 3/17/25 2112)     Discussion of Management   Admitting Hospitalist, Dr. Pantoja    ED Course   ED Course as of 03/17/25 2307   Mon Mar 17, 2025   2010 I obtained history and examined the patient as noted above.      2155 Spoke with admitting hospitalist, Dr. Pantoja, regarding the patient. He accepts patient for admission.     2159 I rechecked the patient and explained findings.      2245 I rechecked the patient.     Additional Documentation  None    Medical Decision Making / Diagnosis     CMS Diagnoses: None    MIPS   None    [facility]   Tigre Gillette is a 84 year old male with history of COPD, continued tobacco use, atrial fibrillation, presenting today with active cough and increased work of breathing.  On exam, the patient is not hypoxic, but does have increased work of breathing even at rest.  Chest x-ray  demonstrates new left lower lobe infiltrate, concerning for pneumonia in the setting of his symptoms.  Labs demonstrate a slightly elevated white blood cell count, and stable anemia.  Patient is also slightly hyponatremic, though this appears to be chronic.  Viral testing is negative.  EKG does not show any acute ischemic findings.  The patient was started on Rocephin and azithromycin.  He received a DuoNeb in the ER which helped his symptoms slightly.  Fortunately, he does not need supplemental oxygen at this time.  He will be admitted for continued IV antibiotics, respiratory support with nebs and supplemental oxygen as needed.    Disposition   The patient was admitted to the hospital.     Diagnosis     ICD-10-CM    1. Community acquired pneumonia of left lower lobe of lung  J18.9            Scribe Disclosure:  Luís DILL, am serving as a scribe at 8:00 PM on 3/17/2025 to document services personally performed by Yin Cotter MD based on my observations and the provider's statements to me.        Yin Cotter MD  03/17/25 2347

## 2025-03-18 NOTE — PLAN OF CARE
"Goal Outcome Evaluation:      Plan of Care Reviewed With: patient    Overall Patient Progress: no changeOverall Patient Progress: no change    Outcome Evaluation: Patient on RA , sats at 90-94 %. Continous pulse oximeter. Multiple trips to the bathroom overnight. SBA. Denies SOB. Endorses productive cough. Paged for PRN robitussin and was given to patient. Incont of urine .LS  clear bilaterally, no wheezing.    /87 (BP Location: Left arm)   Pulse 96   Temp 98.4  F (36.9  C) (Oral)   Resp 20   Ht 1.88 m (6' 2\")   Wt 77.1 kg (170 lb)   SpO2 92%   BMI 21.83 kg/m      Problem: Adult Inpatient Plan of Care  Goal: Plan of Care Review  Description: The Plan of Care Review/Shift note should be completed every shift.  The Outcome Evaluation is a brief statement about your assessment that the patient is improving, declining, or no change.  This information will be displayed automatically on your shift  note.  Outcome: Not Progressing  Flowsheets (Taken 3/18/2025 0519)  Outcome Evaluation: Patient on RA , sats at 90-94 %. Continous pulse oximeter. Multiple trips to the bathroom overnight. SBA.  Plan of Care Reviewed With: patient  Overall Patient Progress: no change  Goal: Patient-Specific Goal (Individualized)  Description: You can add care plan individualizations to a care plan. Examples of Individualization might be:  \"Parent requests to be called daily at 9am for status\", \"I have a hard time hearing out of my right ear\", or \"Do not touch me to wake me up as it startles  me\".  Outcome: Not Progressing  Goal: Absence of Hospital-Acquired Illness or Injury  Outcome: Not Progressing  Intervention: Identify and Manage Fall Risk  Recent Flowsheet Documentation  Taken 3/18/2025 0008 by Holly Milner, RN  Safety Promotion/Fall Prevention: safety round/check completed  Intervention: Prevent Skin Injury  Recent Flowsheet Documentation  Taken 3/18/2025 0008 by Holly Milner, RN  Body Position: position changed " independently  Skin Protection: adhesive use limited  Intervention: Prevent Infection  Recent Flowsheet Documentation  Taken 3/18/2025 0008 by Holly Milner RN  Infection Prevention: rest/sleep promoted  Goal: Optimal Comfort and Wellbeing  Outcome: Not Progressing  Intervention: Monitor Pain and Promote Comfort  Recent Flowsheet Documentation  Taken 3/18/2025 0008 by Holly Milner RN  Pain Management Interventions: medication (see MAR)  Goal: Readiness for Transition of Care  Outcome: Not Progressing     Problem: Delirium  Goal: Optimal Coping  Outcome: Not Progressing  Goal: Improved Behavioral Control  Outcome: Not Progressing  Intervention: Minimize Safety Risk  Recent Flowsheet Documentation  Taken 3/18/2025 0008 by Holly Milner RN  Enhanced Safety Measures: room near unit station  Goal: Improved Attention and Thought Clarity  Outcome: Not Progressing  Goal: Improved Sleep  Outcome: Not Progressing  Intervention: Promote Sleep  Recent Flowsheet Documentation  Taken 3/18/2025 0008 by Holly Milner RN  Sleep/Rest Enhancement:   awakenings minimized   comfort measures     Problem: Pain Acute  Goal: Optimal Pain Control and Function  Outcome: Not Progressing  Intervention: Develop Pain Management Plan  Recent Flowsheet Documentation  Taken 3/18/2025 0008 by Holly Milner RN  Pain Management Interventions: medication (see MAR)  Intervention: Prevent or Manage Pain  Recent Flowsheet Documentation  Taken 3/18/2025 0008 by Holly Milner RN  Sleep/Rest Enhancement:   awakenings minimized   comfort measures  Medication Review/Management: medications reviewed     Problem: Comorbidity Management  Goal: Maintenance of Asthma Control  Outcome: Not Progressing  Intervention: Maintain Asthma Symptom Control  Recent Flowsheet Documentation  Taken 3/18/2025 0008 by Holly Milner RN  Medication Review/Management: medications reviewed  Goal: Maintenance of Behavioral Health Symptom Control  Outcome: Not  Progressing  Intervention: Maintain Behavioral Health Symptom Control  Recent Flowsheet Documentation  Taken 3/18/2025 0008 by Holly Milner RN  Medication Review/Management: medications reviewed  Goal: Maintenance of COPD Symptom Control  Outcome: Not Progressing  Intervention: Maintain COPD Symptom Control  Recent Flowsheet Documentation  Taken 3/18/2025 0008 by Holly Milner RN  Medication Review/Management: medications reviewed  Goal: Blood Glucose Levels Within Targeted Range  Outcome: Not Progressing  Intervention: Monitor and Manage Glycemia  Recent Flowsheet Documentation  Taken 3/18/2025 0008 by Holly Milner RN  Medication Review/Management: medications reviewed  Goal: Maintenance of Heart Failure Symptom Control  Outcome: Not Progressing  Intervention: Maintain Heart Failure Management  Recent Flowsheet Documentation  Taken 3/18/2025 0008 by Holly Milner RN  Medication Review/Management: medications reviewed  Goal: Blood Pressure in Desired Range  Outcome: Not Progressing  Intervention: Maintain Blood Pressure Management  Recent Flowsheet Documentation  Taken 3/18/2025 0008 by Holly Milner RN  Medication Review/Management: medications reviewed  Goal: Maintenance of Osteoarthritis Symptom Control  Outcome: Not Progressing  Intervention: Maintain Osteoarthritis Symptom Control  Recent Flowsheet Documentation  Taken 3/18/2025 0008 by Holly Milner RN  Medication Review/Management: medications reviewed  Goal: Bariatric Home Regimen Maintained  Outcome: Not Progressing  Intervention: Maintain and Manage Postbariatric Surgery Care  Recent Flowsheet Documentation  Taken 3/18/2025 0008 by Holly Milner RN  Medication Review/Management: medications reviewed  Goal: Maintenance of Seizure Control  Outcome: Not Progressing  Intervention: Maintain Seizure Symptom Control  Recent Flowsheet Documentation  Taken 3/18/2025 0008 by Holly Milner RN  Medication Review/Management: medications reviewed

## 2025-03-18 NOTE — H&P
[facility]    History and Physical  Hospitalist       Date of Admission:  3/17/2025  Date of Service (when I saw the patient): 03/17/25    Assessment & Plan   Tigre Gillette is a 84 year old male patient with past medical history of COPD, A-fib, history of tobacco use, eczema, chronic back pain, prostate cancer on hormone therapy, came to emergency room with a complaint of cough, shortness of breath, wheezing.  On arrival to emergency room, his initial vital signs showed temperature 99.5, pulse 76, blood pressure 108/87, oxygen saturation 97% on room air.  Laboratory workup showed sodium 130, potassium 3.8, ALT 12, AST 18.  WBC 13.0, hemoglobin 12.5.  Chest x-ray showed evidence of emphysema.  He is in new focal opacity in the lateral/posterior aspect of the left lower lobe concerning for developing infectious process.  In the emergency room, he was given IV ceftriaxone and azithromycin for community-acquired pneumonia. He was admitted to the hospital for further management.    Community-acquired pneumonia  COPD  Smoking history  Patient presented with productive cough, shortness of breath and generalized fatigue.  He states that his symptoms started 2 days ago and gradually worsening.  In the ER, he was noted to have temp of 99.5.  Oxygen saturation stable.  Lab work showed elevated WBC at 13.0.  Chest x-ray concerning for pneumonia.  Received ceftriaxone and azithromycin in the ER.  -Will continue IV ceftriaxone and azithromycin  -Will order nebs as needed for shortness of breath/wheezing  -Currently has no wheezes on exam.  Will hold off steroids for now  -Oxygen supplement as needed    Atrial fibrillation  Heart rate controlled.  On anticoagulation with apixaban  -Will resume apixaban, Toprol-XL    History of prostate cancer  States that he was diagnosed end of last year.  He states that he is on hormonal therapy.  Will resume his medication once reviewed by pharmacy    DVT Prophylaxis:  Pneumatic Compression Devices  Code Status: DNR / DNI.  CODE STATUS confirmed with the patient at bedside    Medically Ready for Discharge: Anticipated in 2-4 Days    Catie Pantoja MD    Primary Care Physician   [facility]    Chief Complaint   Shortness of breath, cough    History is obtained from the patient    History of Present Illness   Tigre Gillette is a 84 year old male patient with past medical history of COPD, A-fib, history of tobacco use, eczema, chronic back pain, prostate cancer on hormone therapy, came to emergency room with a complaint of cough, shortness of breath, wheezing. Patient states that he started having shortness of breath associated with productive cough and wheezing 2 days ago. He denies associated fever or chills. He complains of generalized weakness. He states that he is currently undergoing hormone therapy for his prostate cancer.  He states that he has been assessed for radiation treatment.  He has history of COPD for which he uses inhalers.  He states that he does not use oxygen or steroids at home.  On arrival to emergency room, his initial vital signs showed temperature 99.5, pulse 76, blood pressure 108/87, oxygen saturation 97% on room air.  Laboratory workup showed sodium 130, potassium 3.8, ALT 12, AST 18.  WBC 13.0, hemoglobin 12.5.  Chest x-ray showed evidence of emphysema.  He is in new focal opacity in the lateral/posterior aspect of the left lower lobe concerning for developing infectious process.  In the emergency room, he was given IV ceftriaxone and azithromycin for community-acquired pneumonia. He was admitted to the hospital for further management.    Past Medical History    I have reviewed this patient's medical history and updated it with pertinent information if needed.   Past Medical History:   Diagnosis Date    Arrhythmia     afib    Chronic lower back pain     COPD (chronic obstructive pulmonary disease) (H)     Degenerative arthritis of  lumbar spine     spine    Eczema     uses topical low dose steroid and cetaphil lotion       Past Surgical History   I have reviewed this patient's surgical history and updated it with pertinent information if needed.  Past Surgical History:   Procedure Laterality Date    COLONOSCOPY      DAVINCI XI HERNIORRHAPHY INGUINAL Right 9/13/2024    Procedure: Robotic assisted right inguinal hernia repair with mesh,  Robotic assisted repair of the right inguinal hernia,  and right obturator hernia;  Surgeon: Scar Martin MD;  Location: [facility] OR    HERNIORRHAPHY EPIGASTRIC N/A 01/30/2018    Procedure: HERNIORRHAPHY EPIGASTRIC;  Epigastric herniorrhaphy with Bard Ventralex ST Hernia Patch ;  Surgeon: Rossana Alas MD;  Location: [facility] OR    HERNIORRHAPHY INGUINAL  2010    open recurrent LIH rpr with mesh    HERNIORRHAPHY INGUINAL  1990    open LIH rpr with mesh    IR LUMBAR PUNCTURE  2/21/2025    LAPAROSCOPIC HERNIORRHAPHY FEMORAL Right 9/13/2024    Procedure: right femoral hernia hepair;  Surgeon: Scar Martin MD;  Location: [facility] OR    TONSILLECTOMY & ADENOIDECTOMY      tonsillectomy as a child       Prior to Admission Medications   Prior to Admission Medications   Prescriptions Last Dose Informant Patient Reported? Taking?   TRELEGY ELLIPTA 100-62.5-25 MCG/INH oral inhaler   Yes No   Sig: Inhale 1 puff into the lungs daily   TRIAMCINOLONE ACETONIDE EX   Yes No   Sig: Apply topically daily as needed (eczema)   acetaminophen (TYLENOL) 500 MG tablet   No No   Sig: Take 1-2 tablets (500-1,000 mg) by mouth every 6 hours as needed for pain (and adjunct with moderate or severe pain or per patient request)   albuterol (PROAIR HFA/PROVENTIL HFA/VENTOLIN HFA) 108 (90 Base) MCG/ACT inhaler   No No   Sig: Inhale 1-2 puffs into the lungs every 6 hours as needed for shortness of breath, wheezing or cough.   apixaban ANTICOAGULANT (ELIQUIS) 5 MG tablet   No No   Sig: Take 1 tablet (5 mg) by mouth 2 times daily   cetirizine  (ZYRTEC) 10 MG tablet   Yes No   Sig: Take 10 mg by mouth every evening   gabapentin (NEURONTIN) 300 MG capsule   No No   Sig: Take 2 capsules (600 mg) by mouth 3 times daily.   hydrocortisone 2.5 % cream   Yes No   Sig: Apply topically as needed   methocarbamol (ROBAXIN) 500 MG tablet   No No   Sig: Take 1 tablet (500 mg) by mouth 3 times daily.   metoprolol succinate ER (TOPROL XL) 25 MG 24 hr tablet   Yes No   Sig: Take 1 tablet by mouth daily.   omeprazole (PRILOSEC) 20 MG DR capsule   No No   Sig: Take 1 capsule (20 mg) by mouth daily   sildenafil (REVATIO) 20 MG tablet   Yes No   Sig: Take 20-60 mg by mouth daily as needed.   tamsulosin (FLOMAX) 0.4 MG capsule   Yes No   Sig: Take 0.4 mg by mouth daily.      Facility-Administered Medications: None     Allergies   Allergies   Allergen Reactions    Amoxicillin-Pot Clavulanate GI Disturbance and Rash    Amoxicillin     Finasteride Other (See Comments)     Lightheadedness       Social History   I have reviewed this patient's social history and updated it with pertinent information if needed. Tigre Gillette  reports that he has been smoking cigarettes. He started smoking about 65 years ago. He has a 56.8 pack-year smoking history. He has never used smokeless tobacco. He reports current alcohol use. He reports current drug use.    Family History   I have reviewed this patient's family history and updated it with pertinent information if needed.   Family History   Problem Relation Age of Onset    LUNG DISEASE No family hx of        Review of Systems   The 10 point Review of Systems is negative other than noted in the HPI or here.     Physical Exam   Temp: 99.5  F (37.5  C) Temp src: Temporal BP: 139/87 Pulse: 85   Resp: 20 SpO2: (!) 91 % O2 Device: None (Room air)    Vital Signs with Ranges  Temp:  [99.5  F (37.5  C)] 99.5  F (37.5  C)  Pulse:  [76-85] 85  Resp:  [20-24] 20  BP: (139-180)/(87) 139/87  SpO2:  [91 %-97 %] 91 %  0 lbs 0 oz    GEN:  Alert, oriented x 3,  appears comfortable, NAD.  HEENT:  Normocephalic/atraumatic, no scleral icterus, no nasal discharge, mouth moist.  CV:  Regular rate and rhythm, no murmur or JVD.  S1 + S2 noted, no S3 or S4.  LUNGS:  Clear to auscultation bilaterally without rales/rhonchi/wheezing/retractions.  Symmetric chest rise on inhalation noted.  ABD:  Active bowel sounds, soft, non-tender/non-distended.  No rebound/guarding/rigidity.  EXT:  trace lower extremity edema   SKIN:  Dry to touch, no exanthems noted in the visualized areas.  NEURO:  Symmetric muscle strength, sensation to touch grossly intact.  No new focal deficits appreciated.    Data   Data reviewed today:  I personally reviewed     Recent Labs   Lab 03/17/25  1850   WBC 13.0*   HGB 12.5*   MCV 92      *   POTASSIUM 3.8   CHLORIDE 98   CO2 20*   BUN 12.5   CR 0.83   ANIONGAP 12   KEVIN 9.4   GLC 98   ALBUMIN 4.2   PROTTOTAL 7.0   BILITOTAL 0.3   ALKPHOS 93   ALT 12   AST 18       Recent Results (from the past 24 hours)   Chest XR,  PA & LAT    Narrative    EXAM: XR CHEST 2 VIEWS  LOCATION: [facility]  DATE: 3/17/2025    INDICATION: Cough, shortness of breath  COMPARISON: Chest CT 1/6/2025      Impression    IMPRESSION: Normal size of cardiomediastinal silhouette. Upper lobe predominant oligemia, compatible with known emphysema. There is a new focal opacity in the lateral/posterior aspect of the left lower lobe, developing infectious/inflammatory process is   possible, recommend radiographic follow-up to resolution. Right lung is clear. No pleural effusion or pneumothorax. Bones are unchanged.

## 2025-03-18 NOTE — PLAN OF CARE
"Goal Outcome Evaluation:     A&Ox4, VSS on RA sating in the mid 90s. Coarse cough, non productive. Up ad evelia in room, tolerating regular diet. Voiding. PIV removed. Discharging home to self care with oral antibiotics. Patient feels much better than when he came in yesterday. Discharge questions completed, patient discharged 1730     Plan of Care Reviewed With: patient    Overall Patient Progress: improvingOverall Patient Progress: improving    Outcome Evaluation: VSS, on RA. Pain controlled. Discharging home to self care      Problem: Adult Inpatient Plan of Care  Goal: Plan of Care Review  Description: The Plan of Care Review/Shift note should be completed every shift.  The Outcome Evaluation is a brief statement about your assessment that the patient is improving, declining, or no change.  This information will be displayed automatically on your shift  note.  Outcome: Progressing  Flowsheets (Taken 3/18/2025 1744)  Outcome Evaluation: VSS, on RA. Pain controlled. Discharging home to self care  Plan of Care Reviewed With: patient  Overall Patient Progress: improving  Goal: Patient-Specific Goal (Individualized)  Description: You can add care plan individualizations to a care plan. Examples of Individualization might be:  \"Parent requests to be called daily at 9am for status\", \"I have a hard time hearing out of my right ear\", or \"Do not touch me to wake me up as it startles  me\".  Outcome: Progressing  Goal: Absence of Hospital-Acquired Illness or Injury  Outcome: Progressing  Intervention: Identify and Manage Fall Risk  Recent Flowsheet Documentation  Taken 3/18/2025 1000 by Irene Vega, RN  Safety Promotion/Fall Prevention: safety round/check completed  Intervention: Prevent Skin Injury  Recent Flowsheet Documentation  Taken 3/18/2025 1000 by Irene Vega, RN  Body Position: position changed independently  Skin Protection: adhesive use limited  Intervention: Prevent Infection  Recent Flowsheet " Documentation  Taken 3/18/2025 1000 by Irene Vega RN  Infection Prevention: rest/sleep promoted  Goal: Optimal Comfort and Wellbeing  Outcome: Progressing  Intervention: Monitor Pain and Promote Comfort  Recent Flowsheet Documentation  Taken 3/18/2025 0954 by Irene Vega RN  Pain Management Interventions: medication (see MAR)  Goal: Readiness for Transition of Care  Outcome: Progressing  Intervention: Mutually Develop Transition Plan  Recent Flowsheet Documentation  Taken 3/18/2025 0700 by Irene Vega RN  Equipment Currently Used at Home: cane, straight     Problem: Delirium  Goal: Optimal Coping  Outcome: Progressing  Goal: Improved Behavioral Control  Outcome: Progressing  Intervention: Minimize Safety Risk  Recent Flowsheet Documentation  Taken 3/18/2025 1000 by Irene Vega RN  Enhanced Safety Measures: room near unit station  Goal: Improved Attention and Thought Clarity  Outcome: Progressing  Goal: Improved Sleep  Outcome: Progressing  Intervention: Promote Sleep  Recent Flowsheet Documentation  Taken 3/18/2025 1000 by Irene Vega RN  Sleep/Rest Enhancement:   awakenings minimized   comfort measures     Problem: Pain Acute  Goal: Optimal Pain Control and Function  Outcome: Progressing  Intervention: Develop Pain Management Plan  Recent Flowsheet Documentation  Taken 3/18/2025 0954 by Ierne Vega RN  Pain Management Interventions: medication (see MAR)  Intervention: Prevent or Manage Pain  Recent Flowsheet Documentation  Taken 3/18/2025 1000 by Irene Vega RN  Sleep/Rest Enhancement:   awakenings minimized   comfort measures  Medication Review/Management: medications reviewed     Problem: Comorbidity Management  Goal: Maintenance of Asthma Control  Outcome: Progressing  Intervention: Maintain Asthma Symptom Control  Recent Flowsheet Documentation  Taken 3/18/2025 1000 by Irene Vega RN  Medication Review/Management: medications reviewed  Goal: Maintenance of Behavioral Health  Symptom Control  Outcome: Progressing  Intervention: Maintain Behavioral Health Symptom Control  Recent Flowsheet Documentation  Taken 3/18/2025 1000 by Irene Vega RN  Medication Review/Management: medications reviewed  Goal: Maintenance of COPD Symptom Control  Outcome: Progressing  Intervention: Maintain COPD Symptom Control  Recent Flowsheet Documentation  Taken 3/18/2025 1000 by Irene Vega RN  Medication Review/Management: medications reviewed  Goal: Blood Glucose Levels Within Targeted Range  Outcome: Progressing  Intervention: Monitor and Manage Glycemia  Recent Flowsheet Documentation  Taken 3/18/2025 1000 by Irene Vega RN  Medication Review/Management: medications reviewed  Goal: Maintenance of Heart Failure Symptom Control  Outcome: Progressing  Intervention: Maintain Heart Failure Management  Recent Flowsheet Documentation  Taken 3/18/2025 1000 by Irene Vega RN  Medication Review/Management: medications reviewed  Goal: Blood Pressure in Desired Range  Outcome: Progressing  Intervention: Maintain Blood Pressure Management  Recent Flowsheet Documentation  Taken 3/18/2025 1000 by Irene Vega RN  Medication Review/Management: medications reviewed  Goal: Maintenance of Osteoarthritis Symptom Control  Outcome: Progressing  Intervention: Maintain Osteoarthritis Symptom Control  Recent Flowsheet Documentation  Taken 3/18/2025 1203 by Irene Vega RN  Activity Management:   ambulated to bathroom   back to bed  Taken 3/18/2025 1000 by Irene Vega RN  Activity Management:   ambulated to bathroom   back to bed  Medication Review/Management: medications reviewed  Goal: Bariatric Home Regimen Maintained  Outcome: Progressing  Intervention: Maintain and Manage Postbariatric Surgery Care  Recent Flowsheet Documentation  Taken 3/18/2025 1000 by Irene Vega RN  Medication Review/Management: medications reviewed  Goal: Maintenance of Seizure Control  Outcome: Progressing  Intervention:  Maintain Seizure Symptom Control  Recent Flowsheet Documentation  Taken 3/18/2025 1000 by Irene Vega, RN  Medication Review/Management: medications reviewed

## 2025-03-18 NOTE — PLAN OF CARE
"[facility]    ED Boarding Nurse Handoff Addendum Report:    Date/time: 3/17/2025, 11:38 PM    Neuro: Alert and Oriented x4  Activity: are SBA with Gait Belt and Cane  Telemetry Monitoring: No  Pain: complaining of 5/10 pain in their back.  Gabapentin, Lidocaine patch given for pain.  LDA's: Peripheral  Fluids: is Saline locked.  Diet: Regular    Plan of Care:    IV abx. Nebs scheduled and PRN. Oxygen not needed but does desat with movement.     Vital signs (within last 30 minutes):    Vitals:    03/17/25 1828 03/17/25 2205 03/17/25 2304   BP: (!) 180/87 139/87    Pulse: 76 85    Resp: 24 20    Temp: 99.5  F (37.5  C)     TempSrc: Temporal     SpO2: 97% (!) 91%    Weight:   72.6 kg (160 lb)   Height:   1.88 m (6' 2\")       ED Boarding Nurse name: Tonya Rodgers RN   "

## 2025-03-19 ENCOUNTER — PATIENT OUTREACH (OUTPATIENT)
Dept: CARE COORDINATION | Facility: CLINIC | Age: 85
End: 2025-03-19
Payer: COMMERCIAL

## 2025-03-19 NOTE — PROGRESS NOTES
"  [facility]: Transitions of Care Outreach  Chief Complaint   Patient presents with    Clinic Care Coordination - Post Hospital       Most Recent Admission Date: 3/17/2025   Most Recent Admission Diagnosis: Community acquired pneumonia of left lower lobe of lung - J18.9     Most Recent Discharge Date: 3/18/2025   Most Recent Discharge Diagnosis: Community acquired pneumonia of left lower lobe of lung - J18.9     Transitions of Care Assessment    Discharge Assessment  How are you doing now that you are home?: \" I am doing well \"  How are your symptoms? (Red Flag symptoms escalate to triage hotline per guidelines): Improved  Do you know how to contact your clinic care team if you have future questions or changes to your health status? : Yes  Does the patient have their discharge instructions? : Yes  Does the patient have questions regarding their discharge instructions? : No  Were you started on any new medications or were there changes to any of your previous medications? : Yes  Does the patient have all of their medications?: Yes  Do you have questions regarding any of your medications? : No  Do you have all of your needed medical supplies or equipment (DME)?  (i.e. oxygen tank, CPAP, cane, etc.): Yes    Post-op ([facility] CTA Only)  If the patient had a surgery or procedure, do they have any questions for a nurse?: No        Follow up Plan     Discharge Follow-Up  Discharge follow up appointment scheduled in alignment with recommended follow up timeframe or Transitions of Risk Category? (Low = within 30 days; Moderate= within 14 days; High= within 7 days): No  Patient's follow up appointment not scheduled: Patient declined scheduling support. Education on the importance of transitions of care follow up. Provided scheduling phone number.    Future Appointments   Date Time Provider Department Center   [facility]20[facility]   7/7/2025 11:30 [facility]       Outpatient Plan as outlined on AVS reviewed with patient.    For any urgent concerns, please contact our 24 hour nurse triage line: [phone] ([phone])       TRISH Thomas

## 2025-03-23 LAB
BACTERIA BLD CULT: NO GROWTH
BACTERIA BLD CULT: NO GROWTH

## 2025-05-21 ENCOUNTER — APPOINTMENT (OUTPATIENT)
Dept: CT IMAGING | Facility: CLINIC | Age: 85
DRG: 871 | End: 2025-05-21
Attending: STUDENT IN AN ORGANIZED HEALTH CARE EDUCATION/TRAINING PROGRAM
Payer: COMMERCIAL

## 2025-05-21 ENCOUNTER — APPOINTMENT (OUTPATIENT)
Dept: GENERAL RADIOLOGY | Facility: CLINIC | Age: 85
DRG: 871 | End: 2025-05-21
Payer: COMMERCIAL

## 2025-05-21 ENCOUNTER — HOSPITAL ENCOUNTER (INPATIENT)
Facility: CLINIC | Age: 85
DRG: 871 | End: 2025-05-21
Attending: STUDENT IN AN ORGANIZED HEALTH CARE EDUCATION/TRAINING PROGRAM | Admitting: INTERNAL MEDICINE
Payer: COMMERCIAL

## 2025-05-21 ENCOUNTER — APPOINTMENT (OUTPATIENT)
Dept: GENERAL RADIOLOGY | Facility: CLINIC | Age: 85
DRG: 871 | End: 2025-05-21
Attending: STUDENT IN AN ORGANIZED HEALTH CARE EDUCATION/TRAINING PROGRAM
Payer: COMMERCIAL

## 2025-05-21 DIAGNOSIS — A41.9 SEPTIC SHOCK (H): ICD-10-CM

## 2025-05-21 DIAGNOSIS — J18.9 PNEUMONIA OF LEFT LOWER LOBE DUE TO INFECTIOUS ORGANISM: ICD-10-CM

## 2025-05-21 DIAGNOSIS — R65.21 SEPTIC SHOCK (H): ICD-10-CM

## 2025-05-21 DIAGNOSIS — J18.9 COMMUNITY ACQUIRED PNEUMONIA OF LEFT LOWER LOBE OF LUNG: Primary | ICD-10-CM

## 2025-05-21 DIAGNOSIS — I48.92 ATRIAL FLUTTER WITH RAPID VENTRICULAR RESPONSE (H): ICD-10-CM

## 2025-05-21 LAB
ALBUMIN SERPL BCG-MCNC: 3.8 G/DL (ref 3.5–5.2)
ALBUMIN UR-MCNC: 20 MG/DL
ALP SERPL-CCNC: 59 U/L (ref 40–150)
ALT SERPL W P-5'-P-CCNC: 18 U/L (ref 0–70)
ANION GAP SERPL CALCULATED.3IONS-SCNC: 13 MMOL/L (ref 7–15)
APPEARANCE UR: CLEAR
AST SERPL W P-5'-P-CCNC: 17 U/L (ref 0–45)
ATRIAL RATE - MUSE: 326 BPM
ATRIAL RATE - MUSE: NORMAL BPM
BASOPHILS # BLD AUTO: 0 10E3/UL (ref 0–0.2)
BASOPHILS NFR BLD AUTO: 0 %
BILIRUB SERPL-MCNC: 0.4 MG/DL
BILIRUB UR QL STRIP: NEGATIVE
BUN SERPL-MCNC: 14.1 MG/DL (ref 8–23)
C PNEUM DNA SPEC QL NAA+PROBE: NOT DETECTED
CALCIUM SERPL-MCNC: 9 MG/DL (ref 8.8–10.4)
CHLORIDE SERPL-SCNC: 100 MMOL/L (ref 98–107)
COLOR UR AUTO: ABNORMAL
CREAT SERPL-MCNC: 0.83 MG/DL (ref 0.67–1.17)
DIASTOLIC BLOOD PRESSURE - MUSE: NORMAL MMHG
DIASTOLIC BLOOD PRESSURE - MUSE: NORMAL MMHG
EGFRCR SERPLBLD CKD-EPI 2021: 87 ML/MIN/1.73M2
EOSINOPHIL # BLD AUTO: 0 10E3/UL (ref 0–0.7)
EOSINOPHIL NFR BLD AUTO: 0 %
ERYTHROCYTE [DISTWIDTH] IN BLOOD BY AUTOMATED COUNT: 14.7 % (ref 10–15)
FLUAV H1 2009 PAND RNA SPEC QL NAA+PROBE: NOT DETECTED
FLUAV H1 RNA SPEC QL NAA+PROBE: NOT DETECTED
FLUAV H3 RNA SPEC QL NAA+PROBE: NOT DETECTED
FLUAV RNA SPEC QL NAA+PROBE: NEGATIVE
FLUAV RNA SPEC QL NAA+PROBE: NOT DETECTED
FLUBV RNA RESP QL NAA+PROBE: NEGATIVE
FLUBV RNA SPEC QL NAA+PROBE: NOT DETECTED
GLUCOSE BLDC GLUCOMTR-MCNC: 162 MG/DL (ref 70–99)
GLUCOSE BLDC GLUCOMTR-MCNC: 222 MG/DL (ref 70–99)
GLUCOSE SERPL-MCNC: 129 MG/DL (ref 70–99)
GLUCOSE UR STRIP-MCNC: NEGATIVE MG/DL
HADV DNA SPEC QL NAA+PROBE: NOT DETECTED
HCO3 BLDV-SCNC: 20 MMOL/L (ref 21–28)
HCO3 SERPL-SCNC: 21 MMOL/L (ref 22–29)
HCOV PNL SPEC NAA+PROBE: NOT DETECTED
HCT VFR BLD AUTO: 38.4 % (ref 40–53)
HGB BLD-MCNC: 12.8 G/DL (ref 13.3–17.7)
HGB UR QL STRIP: ABNORMAL
HMPV RNA SPEC QL NAA+PROBE: NOT DETECTED
HOLD SPECIMEN: NORMAL
HPIV1 RNA SPEC QL NAA+PROBE: DETECTED
HPIV2 RNA SPEC QL NAA+PROBE: NOT DETECTED
HPIV3 RNA SPEC QL NAA+PROBE: NOT DETECTED
HPIV4 RNA SPEC QL NAA+PROBE: NOT DETECTED
IMM GRANULOCYTES # BLD: 0.1 10E3/UL
IMM GRANULOCYTES NFR BLD: 1 %
INTERPRETATION ECG - MUSE: NORMAL
INTERPRETATION ECG - MUSE: NORMAL
KETONES UR STRIP-MCNC: NEGATIVE MG/DL
LACTATE BLD-SCNC: 1.7 MMOL/L (ref 0.7–2)
LEUKOCYTE ESTERASE UR QL STRIP: NEGATIVE
LYMPHOCYTES # BLD AUTO: 0.7 10E3/UL (ref 0.8–5.3)
LYMPHOCYTES NFR BLD AUTO: 7 %
M PNEUMO DNA SPEC QL NAA+PROBE: NOT DETECTED
MCH RBC QN AUTO: 31.6 PG (ref 26.5–33)
MCHC RBC AUTO-ENTMCNC: 33.3 G/DL (ref 31.5–36.5)
MCV RBC AUTO: 95 FL (ref 78–100)
MONOCYTES # BLD AUTO: 0.9 10E3/UL (ref 0–1.3)
MONOCYTES NFR BLD AUTO: 9 %
MUCOUS THREADS #/AREA URNS LPF: PRESENT /LPF
NEUTROPHILS # BLD AUTO: 8.4 10E3/UL (ref 1.6–8.3)
NEUTROPHILS NFR BLD AUTO: 84 %
NITRATE UR QL: NEGATIVE
NRBC # BLD AUTO: 0 10E3/UL
NRBC BLD AUTO-RTO: 0 /100
P AXIS - MUSE: 84 DEGREES
P AXIS - MUSE: NORMAL DEGREES
PCO2 BLDV: 30 MM HG (ref 40–50)
PH BLDV: 7.42 [PH] (ref 7.32–7.43)
PH UR STRIP: 7 [PH] (ref 5–7)
PLATELET # BLD AUTO: 202 10E3/UL (ref 150–450)
PO2 BLDV: 35 MM HG (ref 25–47)
POTASSIUM SERPL-SCNC: 3.6 MMOL/L (ref 3.4–5.3)
PR INTERVAL - MUSE: NORMAL MS
PR INTERVAL - MUSE: NORMAL MS
PROCALCITONIN SERPL IA-MCNC: 0.09 NG/ML
PROT SERPL-MCNC: 6.1 G/DL (ref 6.4–8.3)
QRS DURATION - MUSE: 126 MS
QRS DURATION - MUSE: 132 MS
QT - MUSE: 312 MS
QT - MUSE: 376 MS
QTC - MUSE: 430 MS
QTC - MUSE: 467 MS
R AXIS - MUSE: 59 DEGREES
R AXIS - MUSE: 91 DEGREES
RBC # BLD AUTO: 4.05 10E6/UL (ref 4.4–5.9)
RBC URINE: 2 /HPF
RSV RNA SPEC NAA+PROBE: NEGATIVE
RSV RNA SPEC QL NAA+PROBE: NOT DETECTED
RSV RNA SPEC QL NAA+PROBE: NOT DETECTED
RV+EV RNA SPEC QL NAA+PROBE: NOT DETECTED
SAO2 % BLDV: 70 % (ref 70–75)
SARS-COV-2 RNA RESP QL NAA+PROBE: NEGATIVE
SODIUM SERPL-SCNC: 134 MMOL/L (ref 135–145)
SP GR UR STRIP: 1.02 (ref 1–1.03)
SYSTOLIC BLOOD PRESSURE - MUSE: NORMAL MMHG
SYSTOLIC BLOOD PRESSURE - MUSE: NORMAL MMHG
T AXIS - MUSE: 72 DEGREES
T AXIS - MUSE: 74 DEGREES
TROPONIN T SERPL HS-MCNC: 53 NG/L
TROPONIN T SERPL HS-MCNC: 99 NG/L
UROBILINOGEN UR STRIP-MCNC: NORMAL MG/DL
VENTRICULAR RATE- MUSE: 114 BPM
VENTRICULAR RATE- MUSE: 93 BPM
WBC # BLD AUTO: 10.1 10E3/UL (ref 4–11)
WBC URINE: <1 /HPF

## 2025-05-21 PROCEDURE — 999N000065 XR CHEST PORT 1 VIEW

## 2025-05-21 PROCEDURE — 73562 X-RAY EXAM OF KNEE 3: CPT | Mod: LT

## 2025-05-21 PROCEDURE — 81001 URINALYSIS AUTO W/SCOPE: CPT

## 2025-05-21 PROCEDURE — 72125 CT NECK SPINE W/O DYE: CPT

## 2025-05-21 PROCEDURE — 87040 BLOOD CULTURE FOR BACTERIA: CPT

## 2025-05-21 PROCEDURE — 999N000157 HC STATISTIC RCP TIME EA 10 MIN

## 2025-05-21 PROCEDURE — 84484 ASSAY OF TROPONIN QUANT: CPT | Performed by: STUDENT IN AN ORGANIZED HEALTH CARE EDUCATION/TRAINING PROGRAM

## 2025-05-21 PROCEDURE — 3E043XZ INTRODUCTION OF VASOPRESSOR INTO CENTRAL VEIN, PERCUTANEOUS APPROACH: ICD-10-PCS | Performed by: INTERNAL MEDICINE

## 2025-05-21 PROCEDURE — 87637 SARSCOV2&INF A&B&RSV AMP PRB: CPT | Performed by: STUDENT IN AN ORGANIZED HEALTH CARE EDUCATION/TRAINING PROGRAM

## 2025-05-21 PROCEDURE — 96367 TX/PROPH/DG ADDL SEQ IV INF: CPT

## 2025-05-21 PROCEDURE — 250N000013 HC RX MED GY IP 250 OP 250 PS 637: Performed by: INTERNAL MEDICINE

## 2025-05-21 PROCEDURE — 250N000011 HC RX IP 250 OP 636: Performed by: INTERNAL MEDICINE

## 2025-05-21 PROCEDURE — 272N000007 HC KIT ART LINE INSERTION

## 2025-05-21 PROCEDURE — 250N000009 HC RX 250: Performed by: STUDENT IN AN ORGANIZED HEALTH CARE EDUCATION/TRAINING PROGRAM

## 2025-05-21 PROCEDURE — 36415 COLL VENOUS BLD VENIPUNCTURE: CPT | Performed by: STUDENT IN AN ORGANIZED HEALTH CARE EDUCATION/TRAINING PROGRAM

## 2025-05-21 PROCEDURE — 83605 ASSAY OF LACTIC ACID: CPT

## 2025-05-21 PROCEDURE — 200N000001 HC R&B ICU

## 2025-05-21 PROCEDURE — 258N000003 HC RX IP 258 OP 636: Performed by: STUDENT IN AN ORGANIZED HEALTH CARE EDUCATION/TRAINING PROGRAM

## 2025-05-21 PROCEDURE — 71045 X-RAY EXAM CHEST 1 VIEW: CPT

## 2025-05-21 PROCEDURE — 99291 CRITICAL CARE FIRST HOUR: CPT | Performed by: STUDENT IN AN ORGANIZED HEALTH CARE EDUCATION/TRAINING PROGRAM

## 2025-05-21 PROCEDURE — 73030 X-RAY EXAM OF SHOULDER: CPT | Mod: LT

## 2025-05-21 PROCEDURE — 84145 PROCALCITONIN (PCT): CPT

## 2025-05-21 PROCEDURE — 99418 PROLNG IP/OBS E/M EA 15 MIN: CPT | Performed by: INTERNAL MEDICINE

## 2025-05-21 PROCEDURE — 84075 ASSAY ALKALINE PHOSPHATASE: CPT

## 2025-05-21 PROCEDURE — 87040 BLOOD CULTURE FOR BACTERIA: CPT | Performed by: STUDENT IN AN ORGANIZED HEALTH CARE EDUCATION/TRAINING PROGRAM

## 2025-05-21 PROCEDURE — 70450 CT HEAD/BRAIN W/O DYE: CPT

## 2025-05-21 PROCEDURE — 87486 CHLMYD PNEUM DNA AMP PROBE: CPT | Performed by: STUDENT IN AN ORGANIZED HEALTH CARE EDUCATION/TRAINING PROGRAM

## 2025-05-21 PROCEDURE — 258N000003 HC RX IP 258 OP 636: Performed by: INTERNAL MEDICINE

## 2025-05-21 PROCEDURE — 250N000013 HC RX MED GY IP 250 OP 250 PS 637: Performed by: STUDENT IN AN ORGANIZED HEALTH CARE EDUCATION/TRAINING PROGRAM

## 2025-05-21 PROCEDURE — 93005 ELECTROCARDIOGRAM TRACING: CPT

## 2025-05-21 PROCEDURE — 96365 THER/PROPH/DIAG IV INF INIT: CPT | Mod: 59

## 2025-05-21 PROCEDURE — 36556 INSERT NON-TUNNEL CV CATH: CPT

## 2025-05-21 PROCEDURE — 250N000011 HC RX IP 250 OP 636: Performed by: STUDENT IN AN ORGANIZED HEALTH CARE EDUCATION/TRAINING PROGRAM

## 2025-05-21 PROCEDURE — 94640 AIRWAY INHALATION TREATMENT: CPT

## 2025-05-21 PROCEDURE — 94640 AIRWAY INHALATION TREATMENT: CPT | Mod: 76

## 2025-05-21 PROCEDURE — 36415 COLL VENOUS BLD VENIPUNCTURE: CPT

## 2025-05-21 PROCEDURE — 99207 PR NO BILLABLE SERVICE THIS VISIT: CPT | Performed by: INTERNAL MEDICINE

## 2025-05-21 PROCEDURE — 72170 X-RAY EXAM OF PELVIS: CPT

## 2025-05-21 PROCEDURE — 85025 COMPLETE CBC W/AUTO DIFF WBC: CPT

## 2025-05-21 PROCEDURE — 99223 1ST HOSP IP/OBS HIGH 75: CPT | Performed by: INTERNAL MEDICINE

## 2025-05-21 PROCEDURE — 99291 CRITICAL CARE FIRST HOUR: CPT | Mod: 25

## 2025-05-21 PROCEDURE — 250N000009 HC RX 250: Performed by: INTERNAL MEDICINE

## 2025-05-21 RX ORDER — GUAIFENESIN/DEXTROMETHORPHAN 100-10MG/5
10 SYRUP ORAL EVERY 4 HOURS PRN
Status: DISCONTINUED | OUTPATIENT
Start: 2025-05-21 | End: 2025-05-24 | Stop reason: HOSPADM

## 2025-05-21 RX ORDER — NALOXONE HYDROCHLORIDE 0.4 MG/ML
0.2 INJECTION, SOLUTION INTRAMUSCULAR; INTRAVENOUS; SUBCUTANEOUS
Status: DISCONTINUED | OUTPATIENT
Start: 2025-05-21 | End: 2025-05-24 | Stop reason: HOSPADM

## 2025-05-21 RX ORDER — GABAPENTIN 300 MG/1
900 CAPSULE ORAL AT BEDTIME
Status: DISCONTINUED | OUTPATIENT
Start: 2025-05-21 | End: 2025-05-24 | Stop reason: HOSPADM

## 2025-05-21 RX ORDER — OXYCODONE HYDROCHLORIDE 5 MG/1
5 TABLET ORAL EVERY 4 HOURS PRN
Refills: 0 | Status: DISCONTINUED | OUTPATIENT
Start: 2025-05-21 | End: 2025-05-24 | Stop reason: HOSPADM

## 2025-05-21 RX ORDER — AZITHROMYCIN 500 MG/5ML
500 INJECTION, POWDER, LYOPHILIZED, FOR SOLUTION INTRAVENOUS EVERY 24 HOURS
Status: DISCONTINUED | OUTPATIENT
Start: 2025-05-21 | End: 2025-05-21

## 2025-05-21 RX ORDER — DEXTROSE MONOHYDRATE 25 G/50ML
25-50 INJECTION, SOLUTION INTRAVENOUS
Status: DISCONTINUED | OUTPATIENT
Start: 2025-05-21 | End: 2025-05-24 | Stop reason: HOSPADM

## 2025-05-21 RX ORDER — NALOXONE HYDROCHLORIDE 0.4 MG/ML
0.4 INJECTION, SOLUTION INTRAMUSCULAR; INTRAVENOUS; SUBCUTANEOUS
Status: DISCONTINUED | OUTPATIENT
Start: 2025-05-21 | End: 2025-05-24 | Stop reason: HOSPADM

## 2025-05-21 RX ORDER — PANTOPRAZOLE SODIUM 40 MG/1
40 TABLET, DELAYED RELEASE ORAL
Status: DISCONTINUED | OUTPATIENT
Start: 2025-05-22 | End: 2025-05-24 | Stop reason: HOSPADM

## 2025-05-21 RX ORDER — BISACODYL 10 MG
10 SUPPOSITORY, RECTAL RECTAL DAILY PRN
Status: DISCONTINUED | OUTPATIENT
Start: 2025-05-21 | End: 2025-05-24 | Stop reason: HOSPADM

## 2025-05-21 RX ORDER — IPRATROPIUM BROMIDE AND ALBUTEROL SULFATE 2.5; .5 MG/3ML; MG/3ML
6 SOLUTION RESPIRATORY (INHALATION) ONCE
Status: COMPLETED | OUTPATIENT
Start: 2025-05-21 | End: 2025-05-21

## 2025-05-21 RX ORDER — IPRATROPIUM BROMIDE AND ALBUTEROL SULFATE 2.5; .5 MG/3ML; MG/3ML
3 SOLUTION RESPIRATORY (INHALATION) 4 TIMES DAILY
Status: DISCONTINUED | OUTPATIENT
Start: 2025-05-21 | End: 2025-05-22

## 2025-05-21 RX ORDER — ONDANSETRON 2 MG/ML
4 INJECTION INTRAMUSCULAR; INTRAVENOUS EVERY 6 HOURS PRN
Status: DISCONTINUED | OUTPATIENT
Start: 2025-05-21 | End: 2025-05-24 | Stop reason: HOSPADM

## 2025-05-21 RX ORDER — CETIRIZINE HYDROCHLORIDE 10 MG/1
10 TABLET ORAL EVERY EVENING
Status: DISCONTINUED | OUTPATIENT
Start: 2025-05-21 | End: 2025-05-24 | Stop reason: HOSPADM

## 2025-05-21 RX ORDER — HYDROXYZINE HYDROCHLORIDE 10 MG/1
10 TABLET, FILM COATED ORAL EVERY 6 HOURS PRN
Status: DISCONTINUED | OUTPATIENT
Start: 2025-05-21 | End: 2025-05-24 | Stop reason: HOSPADM

## 2025-05-21 RX ORDER — CEFEPIME HYDROCHLORIDE 2 G/1
2 INJECTION, POWDER, FOR SOLUTION INTRAVENOUS EVERY 8 HOURS
Status: DISCONTINUED | OUTPATIENT
Start: 2025-05-21 | End: 2025-05-23

## 2025-05-21 RX ORDER — METHOCARBAMOL 500 MG/1
500 TABLET ORAL 3 TIMES DAILY
Status: DISCONTINUED | OUTPATIENT
Start: 2025-05-21 | End: 2025-05-24 | Stop reason: HOSPADM

## 2025-05-21 RX ORDER — ACETAMINOPHEN 325 MG/10.15ML
650 LIQUID ORAL EVERY 4 HOURS PRN
Status: DISCONTINUED | OUTPATIENT
Start: 2025-05-21 | End: 2025-05-24 | Stop reason: HOSPADM

## 2025-05-21 RX ORDER — METHYLPREDNISOLONE SODIUM SUCCINATE 40 MG/ML
40 INJECTION INTRAMUSCULAR; INTRAVENOUS EVERY 8 HOURS
Status: DISCONTINUED | OUTPATIENT
Start: 2025-05-21 | End: 2025-05-22

## 2025-05-21 RX ORDER — ROPIVACAINE IN 0.9% SOD CHL/PF 0.1 %
.01-.2 PLASTIC BAG, INJECTION (ML) EPIDURAL CONTINUOUS
Status: DISCONTINUED | OUTPATIENT
Start: 2025-05-21 | End: 2025-05-21

## 2025-05-21 RX ORDER — PROCHLORPERAZINE MALEATE 5 MG/1
5 TABLET ORAL EVERY 6 HOURS PRN
Status: DISCONTINUED | OUTPATIENT
Start: 2025-05-21 | End: 2025-05-24 | Stop reason: HOSPADM

## 2025-05-21 RX ORDER — ACETAMINOPHEN 325 MG/1
650 TABLET ORAL EVERY 4 HOURS PRN
Status: DISCONTINUED | OUTPATIENT
Start: 2025-05-21 | End: 2025-05-24 | Stop reason: HOSPADM

## 2025-05-21 RX ORDER — SODIUM CHLORIDE 9 MG/ML
INJECTION, SOLUTION INTRAVENOUS CONTINUOUS
Status: DISCONTINUED | OUTPATIENT
Start: 2025-05-21 | End: 2025-05-22

## 2025-05-21 RX ORDER — AMOXICILLIN 250 MG
2 CAPSULE ORAL 2 TIMES DAILY PRN
Status: DISCONTINUED | OUTPATIENT
Start: 2025-05-21 | End: 2025-05-24 | Stop reason: HOSPADM

## 2025-05-21 RX ORDER — ABIRATERONE ACETATE 250 MG/1
1000 TABLET ORAL DAILY
COMMUNITY

## 2025-05-21 RX ORDER — AMOXICILLIN 250 MG
1 CAPSULE ORAL 2 TIMES DAILY PRN
Status: DISCONTINUED | OUTPATIENT
Start: 2025-05-21 | End: 2025-05-24 | Stop reason: HOSPADM

## 2025-05-21 RX ORDER — NOREPINEPHRINE BITARTRATE 0.02 MG/ML
.01-.6 INJECTION, SOLUTION INTRAVENOUS CONTINUOUS
Status: DISCONTINUED | OUTPATIENT
Start: 2025-05-21 | End: 2025-05-21

## 2025-05-21 RX ORDER — CEFTRIAXONE 2 G/1
2 INJECTION, POWDER, FOR SOLUTION INTRAMUSCULAR; INTRAVENOUS ONCE
Status: COMPLETED | OUTPATIENT
Start: 2025-05-21 | End: 2025-05-21

## 2025-05-21 RX ORDER — GABAPENTIN 300 MG/1
600 CAPSULE ORAL 2 TIMES DAILY
Status: DISCONTINUED | OUTPATIENT
Start: 2025-05-22 | End: 2025-05-24 | Stop reason: HOSPADM

## 2025-05-21 RX ORDER — POLYETHYLENE GLYCOL 3350 17 G/17G
17 POWDER, FOR SOLUTION ORAL DAILY PRN
Status: DISCONTINUED | OUTPATIENT
Start: 2025-05-21 | End: 2025-05-24 | Stop reason: HOSPADM

## 2025-05-21 RX ORDER — PREDNISOLONE 5 MG/1
5 TABLET ORAL DAILY
COMMUNITY

## 2025-05-21 RX ORDER — AZITHROMYCIN 500 MG/5ML
500 INJECTION, POWDER, LYOPHILIZED, FOR SOLUTION INTRAVENOUS ONCE
Status: COMPLETED | OUTPATIENT
Start: 2025-05-21 | End: 2025-05-21

## 2025-05-21 RX ORDER — NICOTINE POLACRILEX 4 MG
15-30 LOZENGE BUCCAL
Status: DISCONTINUED | OUTPATIENT
Start: 2025-05-21 | End: 2025-05-24 | Stop reason: HOSPADM

## 2025-05-21 RX ORDER — ONDANSETRON 4 MG/1
4 TABLET, ORALLY DISINTEGRATING ORAL EVERY 6 HOURS PRN
Status: DISCONTINUED | OUTPATIENT
Start: 2025-05-21 | End: 2025-05-24 | Stop reason: HOSPADM

## 2025-05-21 RX ORDER — ACETAMINOPHEN 650 MG/1
650 SUPPOSITORY RECTAL ONCE
Status: COMPLETED | OUTPATIENT
Start: 2025-05-21 | End: 2025-05-21

## 2025-05-21 RX ORDER — ALBUTEROL SULFATE 0.83 MG/ML
3 SOLUTION RESPIRATORY (INHALATION)
Status: DISCONTINUED | OUTPATIENT
Start: 2025-05-21 | End: 2025-05-23

## 2025-05-21 RX ORDER — NOREPINEPHRINE BITARTRATE 0.02 MG/ML
.01-.6 INJECTION, SOLUTION INTRAVENOUS CONTINUOUS
Status: DISCONTINUED | OUTPATIENT
Start: 2025-05-21 | End: 2025-05-22

## 2025-05-21 RX ADMIN — IPRATROPIUM BROMIDE AND ALBUTEROL SULFATE 3 ML: .5; 3 SOLUTION RESPIRATORY (INHALATION) at 20:15

## 2025-05-21 RX ADMIN — IPRATROPIUM BROMIDE AND ALBUTEROL SULFATE 3 ML: .5; 3 SOLUTION RESPIRATORY (INHALATION) at 13:59

## 2025-05-21 RX ADMIN — METHYLPREDNISOLONE SODIUM SUCCINATE 40 MG: 40 INJECTION, POWDER, FOR SOLUTION INTRAMUSCULAR; INTRAVENOUS at 12:55

## 2025-05-21 RX ADMIN — ACETAMINOPHEN 650 MG: 650 SUPPOSITORY RECTAL at 11:56

## 2025-05-21 RX ADMIN — METHOCARBAMOL 500 MG: 500 TABLET ORAL at 21:26

## 2025-05-21 RX ADMIN — AMIODARONE HYDROCHLORIDE 1 MG/MIN: 1.8 INJECTION, SOLUTION INTRAVENOUS at 13:46

## 2025-05-21 RX ADMIN — AMIODARONE HYDROCHLORIDE 150 MG: 1.5 INJECTION, SOLUTION INTRAVENOUS at 09:54

## 2025-05-21 RX ADMIN — NOREPINEPHRINE BITARTRATE 0.03 MCG/KG/MIN: 0.02 INJECTION, SOLUTION INTRAVENOUS at 10:41

## 2025-05-21 RX ADMIN — AZITHROMYCIN MONOHYDRATE 500 MG: 500 INJECTION, POWDER, LYOPHILIZED, FOR SOLUTION INTRAVENOUS at 09:21

## 2025-05-21 RX ADMIN — CEFEPIME 2 G: 2 INJECTION, POWDER, FOR SOLUTION INTRAVENOUS at 12:50

## 2025-05-21 RX ADMIN — CEFEPIME 2 G: 2 INJECTION, POWDER, FOR SOLUTION INTRAVENOUS at 19:59

## 2025-05-21 RX ADMIN — IPRATROPIUM BROMIDE AND ALBUTEROL SULFATE 3 ML: .5; 3 SOLUTION RESPIRATORY (INHALATION) at 16:21

## 2025-05-21 RX ADMIN — GABAPENTIN 900 MG: 300 CAPSULE ORAL at 21:26

## 2025-05-21 RX ADMIN — CETIRIZINE HYDROCHLORIDE 10 MG: 10 TABLET, FILM COATED ORAL at 21:26

## 2025-05-21 RX ADMIN — AMIODARONE HYDROCHLORIDE 0.5 MG/MIN: 1.8 INJECTION, SOLUTION INTRAVENOUS at 19:05

## 2025-05-21 RX ADMIN — SODIUM CHLORIDE: 900 INJECTION INTRAVENOUS at 13:56

## 2025-05-21 RX ADMIN — ACETAMINOPHEN 650 MG: 325 TABLET, FILM COATED ORAL at 15:59

## 2025-05-21 RX ADMIN — METHYLPREDNISOLONE SODIUM SUCCINATE 40 MG: 40 INJECTION, POWDER, FOR SOLUTION INTRAMUSCULAR; INTRAVENOUS at 19:59

## 2025-05-21 RX ADMIN — NOREPINEPHRINE BITARTRATE 0.08 MCG/KG/MIN: 0.02 INJECTION, SOLUTION INTRAVENOUS at 12:53

## 2025-05-21 RX ADMIN — NICOTINE POLACRILEX 2 MG: 2 GUM, CHEWING BUCCAL at 21:29

## 2025-05-21 RX ADMIN — SODIUM CHLORIDE 2313 ML: 0.9 INJECTION, SOLUTION INTRAVENOUS at 08:57

## 2025-05-21 RX ADMIN — CEFTRIAXONE 2 G: 2 INJECTION, POWDER, FOR SOLUTION INTRAMUSCULAR; INTRAVENOUS at 09:22

## 2025-05-21 RX ADMIN — NOREPINEPHRINE BITARTRATE 0.03 MCG/KG/MIN: 0.02 INJECTION, SOLUTION INTRAVENOUS at 10:01

## 2025-05-21 RX ADMIN — APIXABAN 5 MG: 5 TABLET, FILM COATED ORAL at 19:59

## 2025-05-21 RX ADMIN — IPRATROPIUM BROMIDE AND ALBUTEROL SULFATE 6 ML: .5; 3 SOLUTION RESPIRATORY (INHALATION) at 08:51

## 2025-05-21 ASSESSMENT — ACTIVITIES OF DAILY LIVING (ADL)
ADLS_ACUITY_SCORE: 60
ADLS_ACUITY_SCORE: 70
ADLS_ACUITY_SCORE: 59
ADLS_ACUITY_SCORE: 70
ADLS_ACUITY_SCORE: 61
ADLS_ACUITY_SCORE: 61
ADLS_ACUITY_SCORE: 59
ADLS_ACUITY_SCORE: 70
ADLS_ACUITY_SCORE: 59

## 2025-05-21 NOTE — PHARMACY-ADMISSION MEDICATION HISTORY
Pharmacist Admission Medication History    Admission medication history is complete. The information provided in this note is only as accurate as the sources available at the time of the update.    Information Source(s): Patient via in-person    Pertinent Information: outside meds    Changes made to PTA medication list:  Added: prednisone and zytiga  Deleted: hydrocortisone cream  Changed: None    Allergies reviewed with patient and updates made in EHR: yes    Medication History Completed By: Tigre Silva [facility] 5/21/2025 6:00 PM    PTA Med List   Medication Sig Last Dose/Taking    abiraterone (ZYTIGA) 250 MG tablet Take 1,000 mg by mouth daily. 5/20/2025    acetaminophen (TYLENOL) 500 MG tablet Take 1,000 mg by mouth 3 times daily. 5/21/2025 Evening    albuterol (PROAIR HFA/PROVENTIL HFA/VENTOLIN HFA) 108 (90 Base) MCG/ACT inhaler Inhale 1-2 puffs into the lungs every 6 hours as needed for shortness of breath, wheezing or cough. Taking As Needed    apixaban ANTICOAGULANT (ELIQUIS) 5 MG tablet Take 1 tablet (5 mg) by mouth 2 times daily 5/20/2025    cetirizine (ZYRTEC) 10 MG tablet Take 10 mg by mouth every evening 5/20/2025    gabapentin (NEURONTIN) 300 MG capsule Take 600 mg by mouth 2 times daily. In the morning and afternoon. 5/20/2025    gabapentin (NEURONTIN) 300 MG capsule Take 900 mg by mouth at bedtime. 5/20/2025    methocarbamol (ROBAXIN) 500 MG tablet Take 1 tablet (500 mg) by mouth 3 times daily. 5/20/2025    metoprolol succinate ER (TOPROL XL) 25 MG 24 hr tablet Take 1 tablet by mouth daily. 5/20/2025    omeprazole (PRILOSEC) 20 MG DR capsule Take 1 capsule (20 mg) by mouth daily 5/20/2025    prednisoLONE 5 MG tablet Take 5 mg by mouth daily. 5/20/2025    sildenafil (REVATIO) 20 MG tablet Take 20-60 mg by mouth daily as needed. Unknown    tamsulosin (FLOMAX) 0.4 MG capsule Take 0.4 mg by mouth every evening. 5/20/2025    TRELEGY ELLIPTA 100-62.5-25 MCG/INH oral inhaler Inhale 1 puff into the lungs daily  5/20/2025    TRIAMCINOLONE ACETONIDE EX Apply topically daily as needed (eczema) Taking As Needed

## 2025-05-21 NOTE — ED PROVIDER NOTES
Emergency Department Note      History of Present Illness     Chief Complaint   Generalized Weakness    HPI   Tigre Gillette is a 85 year old male with history of prostate cancer, atrial fibrillation on Eliquis, and chronic lower back pain who presents to the ED via EMS for evaluation after a fall. Tigre reports he was trying to get back into bed this morning just prior to arrival when he slipped and fell to the ground. He denies loss of consciousness or head injury.  He made contact with the floor as well as a nearby dresser and primary impact was to his left side.  He did not have a chance to put on his hearing aids this morning.     He does endorse that he has been feeling weak recently. He has also had some symptoms of nasal congestion and coughing. He endorses some trouble breathing. He endorses chronic urinary hesitancy relating to his prostate cancer. Says he has been eating and drinking well as his daughter typically cooks supper for him.  He reports he took all of his regular medications last night and took his Eliquis this morning. He states he has been compliant with his medication regimen recently. He takes metoprolol in the evenings.    Independent Historian   None    Review of External Notes   none    Past Medical History     Medical History and Problem List   Chronic lower back pain  COPD  Prostate cancer  Inguinal hernia, right  Paroxysmal atrial fibrillation  Tobacco use disorder  Segmental dysfunction of sacral and thoracic region  Lumbar disc herniation with radiculopathy  Lumbar spondylosis  Cervical spondylosis  Pneumonia    Medications   Omeprazole  Apixaban  Metoprolol succinate  Tamsulosin  Methocarbamol  Prednisone  Abiraterone  Gabapentin    Surgical History   Left inguinal hernia x2  Right inguinal hernia repair  Right femoral hernia repair  Epigastric hernia repair  T&A  Cataract IOL bilateral    Physical Exam     Patient Vitals for the past 24 hrs:   BP Temp Temp src Pulse Resp SpO2    05/21/25 1135 106/70 -- -- 85 -- 92 %   05/21/25 1125 (!) 82/65 -- -- 91 -- 93 %   05/21/25 1122 (!) 85/72 -- -- -- -- --   05/21/25 1111 (!) 83/63 -- -- -- -- --   05/21/25 1107 (!) 77/57 -- -- -- -- --   05/21/25 1102 (!) 73/58 -- -- -- -- --   05/21/25 1055 (!) 76/59 -- -- -- -- --   05/21/25 1050 (!) 74/57 -- -- 97 -- 98 %   05/21/25 1043 102/63 -- -- 86 22 94 %   05/21/25 1025 93/63 -- -- 85 -- 94 %   05/21/25 1023 94/70 -- -- -- -- --   05/21/25 1015 (!) 64/53 -- -- 97 -- 95 %   05/21/25 1012 (!) 76/56 -- -- -- -- --   05/21/25 0930 (!) 70/45 -- -- 115 -- 96 %   05/21/25 0906 (!) 83/59 -- -- (!) 122 -- 100 %   05/21/25 0745 (!) 116/98 -- -- (!) 129 24 97 %   05/21/25 0729 -- -- -- (!) 135 24 92 %   05/21/25 0726 92/76 99.1  F (37.3  C) Oral 97 22 94 %     Physical Exam  General: Awake, alert, hard of hearing elderly man lying in bed  HEENT: Atraumatic   EOM normal   External ears normal   Trachea midline  Neck: Supple, normal ROM  CV: Tachycardic, irregular rhythm   No lower extremity edema  2+ radial and DP pulses  PULM: Wet and coarse breath sounds throughout, left greater than right, minimal wheezing  Tachypnea  ABD: Soft, non-tender, non-distended  Normal bowel sounds   No rebound or guarding   MSK: No gross deformities.  Tenderness over the anterior left humeral head, tenderness over left lateral knee.  Able to range the knee and shoulder without increase in pain.  No midline cervical tenderness  NEURO: Alert, no focal deficits.  Fully oriented, cranial nerves II through XII intact, equal strength bilateral upper and lower extremities  Skin: Warm, dry and intact      Diagnostics     Lab Results   Labs Ordered and Resulted from Time of ED Arrival to Time of ED Departure   ISTAT GASES LACTATE VENOUS POCT - Abnormal       Result Value    Lactic Acid POCT 1.7      Bicarbonate Venous POCT 20 (*)     O2 Sat, Venous POCT 70      pCO2 Venous POCT 30 (*)     pH Venous POCT 7.42      pO2 Venous POCT 35      COMPREHENSIVE METABOLIC PANEL - Abnormal    Sodium 134 (*)     Potassium 3.6      Carbon Dioxide (CO2) 21 (*)     Anion Gap 13      Urea Nitrogen 14.1      Creatinine 0.83      GFR Estimate 87      Calcium 9.0      Chloride 100      Glucose 129 (*)     Alkaline Phosphatase 59      AST 17      ALT 18      Protein Total 6.1 (*)     Albumin 3.8      Bilirubin Total 0.4     CBC WITH PLATELETS AND DIFFERENTIAL - Abnormal    WBC Count 10.1      RBC Count 4.05 (*)     Hemoglobin 12.8 (*)     Hematocrit 38.4 (*)     MCV 95      MCH 31.6      MCHC 33.3      RDW 14.7      Platelet Count 202      % Neutrophils 84      % Lymphocytes 7      % Monocytes 9      % Eosinophils 0      % Basophils 0      % Immature Granulocytes 1      NRBCs per 100 WBC 0      Absolute Neutrophils 8.4 (*)     Absolute Lymphocytes 0.7 (*)     Absolute Monocytes 0.9      Absolute Eosinophils 0.0      Absolute Basophils 0.0      Absolute Immature Granulocytes 0.1      Absolute NRBCs 0.0     TROPONIN T, HIGH SENSITIVITY - Abnormal    Troponin T, High Sensitivity 53 (*)    PROCALCITONIN - Normal    Procalcitonin 0.09     INFLUENZA A/B, RSV AND SARS-COV2 PCR - Normal    Influenza A PCR Negative      Influenza B PCR Negative      RSV PCR Negative      SARS CoV2 PCR Negative     TROPONIN T, HIGH SENSITIVITY   ROUTINE UA WITH MICROSCOPIC REFLEX TO CULTURE   BLOOD CULTURE   BLOOD CULTURE   RESPIRATORY PANEL PCR       Imaging   XR Chest Port 1 View   Final Result   IMPRESSION: Right-sided central venous line is seen with tip overlying the distal superior vena cava. Cardiomediastinal silhouette, the left lower lung and left costophrenic sulcus are outside the field-of-view. Vascular calcifications are seen in the    thoracic aorta. Patchy airspace and interstitial opacities are seen within the mid to lower lungs suggestive of pulmonary edema or atypical pneumonia. Ill-defined nodular opacity is seen within the right hilum which was not seen previously and could     reflect pulmonary nodule or lymphadenopathy. Consider follow-up characterization with CT chest exam for better characterization.      XR Shoulder Left G/E 3 Views   Final Result   IMPRESSION: Negative for acute fracture or dislocation. Bony irregularity and probable small separate ossicle along the distal aspect of the clavicle, appears chronic without a sharply marginated acute fracture line without adjacent soft tissue swelling.    Correlation recommended with an absence of focal pain and tenderness at this area. Mild degenerative arthritis at the acromioclavicular and glenohumeral joints.      XR Chest 1 View   Final Result   IMPRESSION: Emphysema. New airspace opacities left midlung suspicious for pneumonia. No pleural effusion. Normal heart size and pulmonary vascularity.      XR Pelvis 1/2 Views   Final Result   IMPRESSION: Hips and pelvis negative for fracture or joint malalignment. Moderate degenerative changes in the lower lumbar spine. Severe iliofemoral atherosclerotic calcification.      XR Knee Left 3 Views   Final Result   IMPRESSION: The left knee is negative for fracture or compartmental narrowing. No effusion.      Head CT w/o contrast   Final Result   IMPRESSION:   HEAD CT:   1.  Negative for acute intracranial hemorrhage or skull fracture.      CERVICAL SPINE CT:   1.  Negative for acute fracture or traumatic subluxation.       CT Cervical Spine w/o Contrast   Final Result   IMPRESSION:   HEAD CT:   1.  Negative for acute intracranial hemorrhage or skull fracture.      CERVICAL SPINE CT:   1.  Negative for acute fracture or traumatic subluxation.           EKG   ECG taken at 0723, ECG read at 0732  Atrial flutter with variable AV block with premature ventricular or aberrantly conducted complexes  Right bundle branch block  Abnormal ECG   No significant change as compared to prior, dated 3/17/25.  Rate 114 bpm. MD interval * ms. QRS duration 126 ms. QT/QTc 312/430 ms. P-R-T axes 84 91  74.    Independent Interpretation   CT Head: No intracranial hemorrhage.    ED Course      Medications Administered   Medications   norepinephrine (LEVOPHED) 4 mg in  mL infusion PREMIX (0.08 mcg/kg/min × 77.1 kg Intravenous Rate/Dose Change 5/21/25 1107)   acetaminophen (TYLENOL) Suppository 650 mg (has no administration in time range)   amiodarone (NEXTERONE) 1.8 mg/mL in dextrose 5% 200 mL ADULT STANDARD infusion (has no administration in time range)   ipratropium - albuterol 0.5 mg/2.5 mg/3 mL (DUONEB) neb solution 6 mL (6 mLs Nebulization $Given 5/21/25 0851)   sodium chloride 0.9% BOLUS 2,313 mL (0 mLs Intravenous Stopped 5/21/25 1011)   cefTRIAXone (ROCEPHIN) 2 g vial to attach to  ml bag for ADULTS or NS 50 ml bag for PEDS (0 g Intravenous Stopped 5/21/25 0952)   azithromycin (ZITHROMAX) 500 mg in  mL intermittent infusion (0 mg Intravenous Stopped 5/21/25 1032)   amiodarone (NEXTERONE) bolus 150 mg (0 mg Intravenous Stopped 5/21/25 1019)       Procedures   Procedures     Central Line Placement     Procedure:  Central Venous Catheter Insertion     Indication: Vasopressor administration and Vascular access    Consent:  Written from Patient  Risk Discussed: Arterial puncture & stroke, incorrect placement, failure to place, bleeding or infection, nerve damage, pneumothorax, and alternative treatment with no central line    Universal Protocol: Universal protocol was followed and time out conducted just prior to starting procedure, confirming patient identity, site/side, procedure, patient position, and availability of correct equipment and implants.     Anesthesia/Sedation: Lidocaine - 1%    Procedure Detail:    Central line bundle elements were complete including preparatory hand leansing, donning full barrier precautions, use of a full body drape and chlorhxidine gluconate skin prep.   The Right internal jugular vein was selected as the optimal location for patient s condition.   Ultrasound  was utilized for guidance: Yes, image not saved  A finder needle was used to enter the vein, through which a guidewire was inserted. The finder needle was then removed and the tract was dilated.   A 7.5 triple lumen central venous catheter was inserted over the guidewire without difficulty. The guidewire was removed and the catheter was sutured in place and covered with an appropriate dressing.   A post-procedure chest radiograph was performed.=     Patient Status:  The patient tolerated the procedure well: Yes. There were no complications.         Discussion of Management   Admitting Hospitalist, Dr. Daley    ED Course   ED Course as of 05/21/25 1147   Wed May 21, 2025   0731 My resident obtained history and examined the patient.   0928 I rechecked the patient and explained findings.    0958 I obtained consent for central line placement and performed the placement.   1111 I spoke with Dr. Daley of the hospitalist team, who accepted the patient for admission.        Additional Documentation  None    Medical Decision Making / Diagnosis     CMS Diagnoses: The patient has signs of sepsis Sepsis ED evaluation   The patient has signs of sepsis as evidenced by:  1. Presence of 2 SIRS criteria, suspected infection, AND  2. Organ dysfunction: Initial hypotension due to sepsis    Sepsis Care Initiation: Starting at 0855 AM on 05/21/25, until specified. Prior to this documentation, sepsis, severe sepsis, or septic shock was NOT thought to be a significant cause of illness. This order represents the first time infection was seriously considered to be affecting the patient.    Lactic Acid Results:  Recent Labs   Lab Test 05/21/25  0743 04/29/24  1326 04/29/24  1107   LACT 1.7 1.9 2.8*       3 Hour Bundle 6 Hour Bundle (Reassessment)   Blood Cultures before IV Antibiotics: Yes  Antibiotics given: see below  Prehospital fluid volume (mL):                     Total fluids given (ED +Pre-hospital):  Full 30 mL/kg bolus given  based on weight: 2,310 mL   Repeat Lactic Acid Level: Ordered by reflex for 2 hours after initial lactic acid collection.  Vasopressors: Vasopressors started for septic shock prior to completion of 30 mL/kg bolus due to critical hypotension during bolus infusion.  Repeat perfusion exam: I attest to having performed a repeat sepsis exam and assessment of perfusion at 0958 PM.   BMI Readings from Last 1 Encounters:   03/18/25 21.83 kg/m        Anti-infectives (From admission through now)      Start     Dose/Rate Route Frequency Ordered Stop    05/21/25 0900  cefTRIAXone (ROCEPHIN) 2 g vial to attach to  ml bag for ADULTS or NS 50 ml bag for PEDS         2 g  over 30 Minutes Intravenous ONCE 05/21/25 0855 05/21/25 0952    05/21/25 0900  azithromycin (ZITHROMAX) 500 mg in  mL intermittent infusion         500 mg  over 1 Hours Intravenous ONCE 05/21/25 0855 05/21/25 1032                MIPS   None               MDM   Tigre Gillette is a 85 year old male here with 3 days of generalized weakness, cough after he slipped to the ground this morning when tried to get back into bed.  Is anticoagulated.  CT head and cervical spine without acute findings.  Some arthralgias from his fall, plain films are negative for fractures.  Ultimately workup is revealing for what is likely a left lower lobe pneumonia with septic shock.  Has a normal EF, does not appear fluid overloaded. central line placed, started on Levophed.  His lactic and white count are WNL.  Fluvid swab negative, will add on full respiratory viral panel.  Is in a flutter with RVR, amnio bolus with improvement in rates, will order infusion.  Has been compliant with his Eliquis, very low suspicion for blood clot.  Is ultimately requiring 2 to 3 L supplemental oxygen that was started when he was lying flat for central line.  Given received Rocephin and azithromycin for community-acquired pneumonia.  Troponin slightly elevated from his baseline, repeat pending  on admission.       Disposition   The patient was admitted to the hospital.     Diagnosis     ICD-10-CM    1. Septic shock (H)  A41.9     R65.21       2. Pneumonia of left lower lobe due to infectious organism  J18.9       3. Atrial flutter with rapid ventricular response (H)  I48.92            Critical Care  The critical condition was: Cardiac Unstable Dysrhythmia: VT, atrial fibrillation, Sepsis, severe or septic shock, and Shock: hemorrhagic, hypovolemic, cardiogenic, anaphylactic, spinal    Critical interventions performed or strongly considered: Arrange Definitive Care: OR/cath lab/neurointervention/IR/endoscopy/dialysis/ICU and Infusion: naloxone, vasopressors, insulin, chronotropics, ionotropics, antiepileptics    Critical care time was 30 minutes exclusive of time spent on separately billable procedures.          Scribe Disclosure:  IDeidre, am serving as a scribe at 7:31 AM on 5/21/2025 to document services personally performed by Monica Powers DO based on my observations and the provider's statements to me.        Monica Powers DO  05/21/25 1147

## 2025-05-21 NOTE — ED TRIAGE NOTES
"Pt arrives via EMS for fall out of bed. Per report pt slid out of bed and was unable to get up.  Did not hit head, no LOC. EMS reports pt has history of COPD and afib, is on thinners and has had 2 episodes of pneumonia this year.  en route.     Upon arrival, pt states that he has been feeling \"pretty weak\" for the past 3 days.  Says he hit his side on the floor and on a dresser when he fell out of bed.  Scattered bruises and abrasions noted to bilateral arms and knees. Pt also states he has been coughing \"more than usual\" for the past few days.  Lung sounds diminished but clear. Denies SOB but some abdominal muscle use. Endorses CP with coughing. Denies palpitations. Endorses lower back pain, which is chronic. EKG obtained.  Pt hard of hearing; left hearing aids at home. A&O x 4.      Triage Assessment (Adult)       Row Name 05/21/25 0742          Triage Assessment    Airway WDL WDL        Respiratory WDL    Respiratory WDL X;cough        Cardiac WDL    Cardiac WDL X;chest pain  reports CP when coughing        Cognitive/Neuro/Behavioral WDL    Cognitive/Neuro/Behavioral WDL WDL                     "

## 2025-05-21 NOTE — ED NOTES
MD aware of VS. Spontaneous eye opening and responding to questions appropriately, AAOx4, BP cycling Q2min

## 2025-05-21 NOTE — H&P
[facility]    History and Physical - Hospitalist Service       Date of Admission:  5/21/2025    Assessment & Plan      Tigre Gillette is a 85 year old male admitted on 5/21/2025. He has a past medical history significant for COPD, atrial  fibrillation, prostate cancer, chronic back pain, GERD, and ongoing tobacco use disorder.  He presented to emergency room due to shortness of breath, cough, fever, weakness.  Found to have sepsis with septic shock due to pneumonia.  Also noted to be in atrial flutter.    Community-acquired pneumonia.  Sepsis with septic shock due to pneumonia.  - Initially started on antibiotics with azithromycin and IV ceftriaxone in the ER.  - Had aggressive IV fluid resuscitation due to sepsis.  - Continue to be hypotensive.  - Central line placed in ER.  Started on continuous IV norepinephrine.  - Continue norepinephrine.  - Broaden antibiotics to azithromycin and IV cefepime.  - Monitor culture results.    Acute COPD exacerbation.  - Bronchospasm on exam.  - Likely due to pneumonia.  -Start methylprednisolone 40 mg IV every 8 hours.  -Antibiotics as above.  - DuoNeb scheduled 4 times a day.  - Additional albuterol as needed.    Acute infectious encephalopathy.  - Due to sepsis and pneumonia.  - Treat infection as above.    Atrial flutter.  History of paroxysmal atrial fibrillation.  Mild troponin elevation.    Drug-induced coagulation defect.  -Given amiodarone bolus in ER and started on continuous IV infusion.    -Continue amiodarone infusion.  - Suspect current atrial flutter related to sepsis.  - Initial troponin 53.  Not having chest pain.  - Repeat troponin level pending.  - Hold metoprolol due to hypotension.  - Restart apixaban 5 mg twice a day.    Ongoing tobacco use disorder.  - I did advise smoking cessation.  - Nicotine gum if needed.    GERD.  - Pantoprazole 40 mg a day.    Hyponatremia.  - Mild.  Sodium 134.  -IV fluids overnight.    -Recheck metabolic panel  tomorrow.              Diet: Regular Diet Adult    DVT Prophylaxis: DOAC  Cornell Catheter: Not present  Lines: PRESENT             Cardiac Monitoring: ACTIVE order. Indication: ICU  Code Status: No CPR- Do NOT Intubate      Clinically Significant Risk Factors Present on Admission         # Hyponatremia: Lowest Na = 134 mmol/L in last 2 days, will monitor as appropriate        # Drug Induced Coagulation Defect: home medication list includes an anticoagulant medication      # Circulatory Shock: required vasopressors within past 24 hours                       Disposition Plan     Medically Ready for Discharge: Anticipated in 2-4 Days           Samuel Daley DO  Hospitalist Service  [facility]  Securely message with [email or web address] (more info)  Text page via [facility] Paging/Directory     ______________________________________________________________________    Chief Complaint   Short of breath, cough, fever, weakness.    History is obtained from the patient    History of Present Illness   Tigre Gillette is a 85 year old male who has a past medical history significant for COPD, atrial  fibrillation, prostate cancer, chronic back pain, GERD, and ongoing tobacco use disorder.  He presented to emergency room due to shortness of breath, cough, fever, weakness.  He has been feeling weak for the past few days.  Weakness has been getting worse.  Has also been short of breath.  Having a cough.  Cough is nonproductive.  Has felt like his had intermittent fevers and chills over the past day.  Weakness seem to get worse this morning.  He had a fall when he was trying to go back to bed earlier today.  Quite a bit of difficulty getting back up after his fall.  Having some left leg pain after his fall.  He did hit his left side during fall.  Was feeling confused this morning.  Presented to emergency room for worsening symptoms.  Feeling better after medication started in the ER.      Past Medical History    Past  Medical History:   Diagnosis Date    Arrhythmia     afib    Chronic lower back pain     COPD (chronic obstructive pulmonary disease) (H)     Degenerative arthritis of lumbar spine     spine    Eczema     uses topical low dose steroid and cetaphil lotion       Past Surgical History   Past Surgical History:   Procedure Laterality Date    COLONOSCOPY      DAVINCI XI HERNIORRHAPHY INGUINAL Right 9/13/2024    Procedure: Robotic assisted right inguinal hernia repair with mesh,  Robotic assisted repair of the right inguinal hernia,  and right obturator hernia;  Surgeon: Scar Martin MD;  Location: [facility] OR    HERNIORRHAPHY EPIGASTRIC N/A 01/30/2018    Procedure: HERNIORRHAPHY EPIGASTRIC;  Epigastric herniorrhaphy with Bard Ventralex ST Hernia Patch ;  Surgeon: Rossana Alas MD;  Location: [facility] OR    HERNIORRHAPHY INGUINAL  2010    open recurrent LIH rpr with mesh    HERNIORRHAPHY INGUINAL  1990    open LIH rpr with mesh    IR LUMBAR PUNCTURE  2/21/2025    IR LUMBAR PUNCTURE  4/25/2025    IR LUMBAR PUNCTURE  5/16/2025    LAPAROSCOPIC HERNIORRHAPHY FEMORAL Right 9/13/2024    Procedure: right femoral hernia hepair;  Surgeon: Scar Martin MD;  Location: [facility] OR    TONSILLECTOMY & ADENOIDECTOMY      tonsillectomy as a child       Prior to Admission Medications   Prior to Admission Medications   Prescriptions Last Dose Informant Patient Reported? Taking?   TRELEGY ELLIPTA 100-62.5-25 MCG/INH oral inhaler   Yes No   Sig: Inhale 1 puff into the lungs daily   TRIAMCINOLONE ACETONIDE EX   Yes No   Sig: Apply topically daily as needed (eczema)   acetaminophen (TYLENOL) 500 MG tablet   Yes No   Sig: Take 1,000 mg by mouth 3 times daily.   albuterol (PROAIR HFA/PROVENTIL HFA/VENTOLIN HFA) 108 (90 Base) MCG/ACT inhaler   No No   Sig: Inhale 1-2 puffs into the lungs every 6 hours as needed for shortness of breath, wheezing or cough.   apixaban ANTICOAGULANT (ELIQUIS) 5 MG tablet   No No   Sig: Take 1 tablet (5 mg) by mouth  2 times daily   cetirizine (ZYRTEC) 10 MG tablet   Yes No   Sig: Take 10 mg by mouth every evening   gabapentin (NEURONTIN) 300 MG capsule   Yes No   Sig: Take 600 mg by mouth 2 times daily. In the morning and afternoon.   gabapentin (NEURONTIN) 300 MG capsule   Yes No   Sig: Take 900 mg by mouth at bedtime.   hydrocortisone 2.5 % cream   Yes No   Sig: Apply topically as needed   methocarbamol (ROBAXIN) 500 MG tablet   No No   Sig: Take 1 tablet (500 mg) by mouth 3 times daily.   metoprolol succinate ER (TOPROL XL) 25 MG 24 hr tablet   Yes No   Sig: Take 1 tablet by mouth daily.   omeprazole (PRILOSEC) 20 MG DR capsule   No No   Sig: Take 1 capsule (20 mg) by mouth daily   sildenafil (REVATIO) 20 MG tablet   Yes No   Sig: Take 20-60 mg by mouth daily as needed.   tamsulosin (FLOMAX) 0.4 MG capsule   Yes No   Sig: Take 0.4 mg by mouth every evening.      Facility-Administered Medications: None        Allergies   Allergies   Allergen Reactions    Amoxicillin-Pot Clavulanate GI Disturbance and Rash    Amoxicillin     Finasteride Other (See Comments)     Lightheadedness        Physical Exam   Vital Signs: Temp: 99.1  F (37.3  C) Temp src: Oral BP: 101/75 Pulse: 107   Resp: 18 SpO2: 92 % O2 Device: None (Room air)    Weight: 0 lbs 0 oz    Gen:  NAD, A&Ox2 to person and place.  Trouble with time.  Eyes:  PERRL, sclera anicteric.  OP:  MMM, no lesions.  Neck:  Supple.  CV:  Irregular, no loud murmurs.  Lung:  Wheeze b/l, normal effort.  Ab:  +BS, soft.  Skin:  Warm, dry to touch.  No rash.  Ext:  No pitting edema LE b/l.      Medical Decision Making       90 MINUTES SPENT BY ME on the date of service doing chart review, history, exam, documentation & further activities per the note.      Data     I have personally reviewed the following data over the past 24 hrs:    10.1  \   12.8 (L)   / 202     134 (L) 100 14.1 /  129 (H)   3.6 21 (L) 0.83 \     ALT: 18 AST: 17 AP: 59 TBILI: 0.4   ALB: 3.8 TOT PROTEIN: 6.1 (L) LIPASE:  N/A     Trop: 53 (H) BNP: N/A     Procal: 0.09 CRP: N/A Lactic Acid: 1.7         Imaging results reviewed over the past 24 hrs:   Recent Results (from the past 24 hours)   CT Cervical Spine w/o Contrast    Narrative    EXAM: CT HEAD W/O CONTRAST, CT CERVICAL SPINE W/O CONTRAST  LOCATION: [facility]  DATE: 5/21/2025    INDICATION: fall on eliquis  COMPARISON: None.  TECHNIQUE:   1) Routine CT Head without IV contrast. Multiplanar reformats. Dose reduction techniques were used.  2) Routine CT Cervical Spine without IV contrast. Multiplanar reformats. Dose reduction techniques were used.    FINDINGS:  HEAD CT:  INTRACRANIAL CONTENTS: No intracranial hemorrhage or mass effect. No loss of gray-white matter differentiation. Preserved parenchymal attenuation for patient age. Preserved ventricles and sulci for patient age.     VISUALIZED ORBITS/SINUSES/MASTOIDS: No intraorbital abnormality. No paranasal sinus mucosal disease. No middle ear or mastoid effusion.    BONES/SOFT TISSUES: No scalp hematoma. No skull fracture.    CERVICAL SPINE CT:  VERTEBRA: Preserved vertebral heights and alignment. No traumatic subluxation. No acute fracture. No prevertebral edema.    CANAL: No bony encroachment on the spinal canal.    PARASPINAL: No extraspinal abnormality. Visualized lung fields are clear.      Impression    IMPRESSION:  HEAD CT:  1.  Negative for acute intracranial hemorrhage or skull fracture.    CERVICAL SPINE CT:  1.  Negative for acute fracture or traumatic subluxation.    Head CT w/o contrast    Narrative    EXAM: CT HEAD W/O CONTRAST, CT CERVICAL SPINE W/O CONTRAST  LOCATION: [facility]  DATE: 5/21/2025    INDICATION: fall on eliquis  COMPARISON: None.  TECHNIQUE:   1) Routine CT Head without IV contrast. Multiplanar reformats. Dose reduction techniques were used.  2) Routine CT Cervical Spine without IV contrast. Multiplanar reformats. Dose reduction techniques were  used.    FINDINGS:  HEAD CT:  INTRACRANIAL CONTENTS: No intracranial hemorrhage or mass effect. No loss of gray-white matter differentiation. Preserved parenchymal attenuation for patient age. Preserved ventricles and sulci for patient age.     VISUALIZED ORBITS/SINUSES/MASTOIDS: No intraorbital abnormality. No paranasal sinus mucosal disease. No middle ear or mastoid effusion.    BONES/SOFT TISSUES: No scalp hematoma. No skull fracture.    CERVICAL SPINE CT:  VERTEBRA: Preserved vertebral heights and alignment. No traumatic subluxation. No acute fracture. No prevertebral edema.    CANAL: No bony encroachment on the spinal canal.    PARASPINAL: No extraspinal abnormality. Visualized lung fields are clear.      Impression    IMPRESSION:  HEAD CT:  1.  Negative for acute intracranial hemorrhage or skull fracture.    CERVICAL SPINE CT:  1.  Negative for acute fracture or traumatic subluxation.    XR Knee Left 3 Views    Narrative    EXAM: XR KNEE LEFT 3 VIEWS  LOCATION: [facility]  DATE: 5/21/2025    INDICATION: Fall, tenderness  COMPARISON: None.      Impression    IMPRESSION: The left knee is negative for fracture or compartmental narrowing. No effusion.   XR Pelvis 1/2 Views    Narrative    EXAM: XR PELVIS 1/2 VIEWS  LOCATION: [facility]  DATE: 5/21/2025    INDICATION: Pelvic pain after a fall.  COMPARISON: 7/30/2024.      Impression    IMPRESSION: Hips and pelvis negative for fracture or joint malalignment. Moderate degenerative changes in the lower lumbar spine. Severe iliofemoral atherosclerotic calcification.   XR Chest 1 View    Narrative    EXAM: XR CHEST 1 VIEW  LOCATION: [facility]  DATE: 5/21/2025    INDICATION: R o pneumonia  COMPARISON: 05/13/2025.      Impression    IMPRESSION: Emphysema. New airspace opacities left midlung suspicious for pneumonia. No pleural effusion. Normal heart size and pulmonary vascularity.   XR Shoulder Left G/E  3 Views    Narrative    EXAM: XR SHOULDER LEFT G/E 3 VIEWS  LOCATION: [facility]  DATE: 5/21/2025    INDICATION: Left shoulder pain after a fall.  COMPARISON: None.      Impression    IMPRESSION: Negative for acute fracture or dislocation. Bony irregularity and probable small separate ossicle along the distal aspect of the clavicle, appears chronic without a sharply marginated acute fracture line without adjacent soft tissue swelling.   Correlation recommended with an absence of focal pain and tenderness at this area. Mild degenerative arthritis at the acromioclavicular and glenohumeral joints.   XR Chest Port 1 View    Narrative    EXAM: XR CHEST PORT 1 VIEW  LOCATION: [facility]  DATE: 5/21/2025    INDICATION: central line placement  COMPARISON: 5/21/2025      Impression    IMPRESSION: Right-sided central venous line is seen with tip overlying the distal superior vena cava. Cardiomediastinal silhouette, the left lower lung and left costophrenic sulcus are outside the field-of-view. Vascular calcifications are seen in the   thoracic aorta. Patchy airspace and interstitial opacities are seen within the mid to lower lungs suggestive of pulmonary edema or atypical pneumonia. Ill-defined nodular opacity is seen within the right hilum which was not seen previously and could   reflect pulmonary nodule or lymphadenopathy. Consider follow-up characterization with CT chest exam for better characterization.

## 2025-05-21 NOTE — PROGRESS NOTES
"Critical Care Progress Note      05/21/2025    Name: Tigre Gillette MRN#: [id number]   Age: 85 year old YOB: 1940                    Problem List:   Active Problems:    Septic shock (H)    Pneumonia of left lower lobe due to infectious organism           Summary/Hospital Course:   From H&P:    \"Tigre Gillette is a 85 year old male admitted on 5/21/2025. He has a past medical history significant for COPD, atrial  fibrillation, prostate cancer, chronic back pain, GERD, and ongoing tobacco use disorder.  He presented to emergency room due to shortness of breath, cough, fever, weakness.  Found to have sepsis with septic shock due to pneumonia.  Also noted to be in atrial flutter. \"    Per pt has been having several days of increased productive cough, fatigue, weakness.  OK PO intake, does not feel it was far off from his usual.         Interim History:   Admitted to ICU      Assessment and plan :     Tigre Gillette IS a 85 year old male admitted on 5/21/2025 for sepsis, COPD exacerbation  .   I have personally reviewed the daily labs, imaging studies, cultures and discussed the case with referring physician and consulting physicians.     My assessment and plan by system for this patient is as follows:      Neuro   Chronic low back pain - multimodal analgesia   Pulm   COPD exacerbation - azithro, steroids, duonebs, pulm toilet.  See hospitalist note for further details.  Currently maintaining SpO2 on room air   CV   Shock requiring vasopressors - likely septic in setting of pneumonia, received 20mL/kg bolus in ED.  Continue judicious resuscitation.    A Flutter - likely triggered by current illness, converted w/ amio bolus.  Continue gtt   Renal   No active ICU issues   GI   No active ICU issues   Endocrine   No active ICU issues   Heme   No active ICU issues   Infectious Diseases   Pneumonia - empiric abx, RVP negative so far (awaiting results of full panel).   Immunosuppression   No active ICU issues "   MSK/Integumentary   PT/OT as able       ICU Prophylaxis:   1. DVT: Home apixaban  2. VAP: n/a  3. Stress Ulcer: not indicated  4. Restraints: not indicated   5. Wound care - per unit routine   6. Feeding - OK for diet  7. Disposition - ICU           Key Medications:     Current Facility-Administered Medications   Medication Dose Route Frequency Provider Last Rate Last Admin    apixaban ANTICOAGULANT (ELIQUIS) tablet 5 mg  5 mg Oral BID Samuel Daley DO        [START ON 5/22/2025] azithromycin (ZITHROMAX) 250 mg in  mL intermittent infusion  250 mg Intravenous Q24H Samuel Daley DO        ceFEPIme (MAXIPIME) 2 g vial to attach to  mL bag for ADULTS or NS 50 mL bag for PEDS  2 g Intravenous Q8H Samuel Daley DO   2 g at 05/21/25 1250    ipratropium - albuterol 0.5 mg/2.5 mg/3 mL (DUONEB) neb solution 3 mL  3 mL Nebulization 4x Daily Samuel Daley DO   3 mL at 05/21/25 1359    methylPREDNISolone sodium succinate (SOLU-MEDROL) injection 40 mg  40 mg Intravenous Q8H Samuel Daley DO   40 mg at 05/21/25 1255    [START ON 5/22/2025] pantoprazole (PROTONIX) EC tablet 40 mg  40 mg Oral QAM AC Samuel Daley DO         Current Facility-Administered Medications   Medication Dose Route Frequency Provider Last Rate Last Admin    amiodarone (NEXTERONE) 1.8 mg/mL in dextrose 5% 200 mL ADULT STANDARD infusion  1 mg/min Intravenous Continuous Samuel Daley DO 33.3 mL/hr at 05/21/25 1346 1 mg/min at 05/21/25 1346    amiodarone (NEXTERONE) 1.8 mg/mL in dextrose 5% 200 mL ADULT STANDARD infusion  0.5 mg/min Intravenous Continuous Samuel Daley DO        norepinephrine (LEVOPHED) 4 mg in  mL infusion PREMIX  0.01-0.6 mcg/kg/min Intravenous Continuous Samuel Daley DO 28.9 mL/hr at 05/21/25 1345 0.1 mcg/kg/min at 05/21/25 3393    Patient is already receiving anticoagulation with heparin, enoxaparin (LOVENOX), warfarin (COUMADIN)  or other anticoagulant  "medication   Does not apply Continuous PRN Samuel Daley, DO        sodium chloride 0.9 % infusion   Intravenous Continuous Samuel Daley  mL/hr at 05/21/25 1356 New Bag at 05/21/25 1356               Physical Examination:   Temp:  [99.1  F (37.3  C)] 99.1  F (37.3  C)  Pulse:  [] 101  Resp:  [18-24] 19  BP: ()/(45-98) 90/62  SpO2:  [92 %-100 %] 95 %  No intake or output data in the 24 hours ending 05/21/25 1409  Wt Readings from Last 4 Encounters:   03/18/25 77.1 kg (170 lb)   01/06/25 74.1 kg (163 lb 4.8 oz)   09/14/24 79.3 kg (174 lb 13.2 oz)   09/13/24 73 kg (161 lb)     BP - Mean:  [] 73  Resp: 19  No lab results found in last 7 days.    GEN: no acute distress   HEENT: head ncat, sclera anicteric, OP patent, trachea midline   PULM: unlabored, scattered wheezes    CV/COR: RRR, no gallop,  No rub, no murmur  ABD: soft nontender, no mass  EXT:  Warm, well perfused  NEURO: grossly intact  SKIN: no obvious rash  LINES: clean, dry intact         Data:   All data and imaging reviewed     ROUTINE ICU LABS (Last four results)  CMP  Recent Labs   Lab 05/21/25  0742   *   POTASSIUM 3.6   CHLORIDE 100   CO2 21*   ANIONGAP 13   *   BUN 14.1   CR 0.83   GFRESTIMATED 87   KEVIN 9.0   PROTTOTAL 6.1*   ALBUMIN 3.8   BILITOTAL 0.4   ALKPHOS 59   AST 17   ALT 18     CBC  Recent Labs   Lab 05/21/25  0742   WBC 10.1   RBC 4.05*   HGB 12.8*   HCT 38.4*   MCV 95   MCH 31.6   MCHC 33.3   RDW 14.7        INRNo lab results found in last 7 days.  Arterial Blood GasNo lab results found in last 7 days.    All cultures:  No results for input(s): \"CULT\" in the last 168 hours.  Recent Results (from the past 24 hours)   CT Cervical Spine w/o Contrast    Narrative    EXAM: CT HEAD W/O CONTRAST, CT CERVICAL SPINE W/O CONTRAST  LOCATION: [facility]  DATE: 5/21/2025    INDICATION: fall on eliquis  COMPARISON: None.  TECHNIQUE:   1) Routine CT Head without IV contrast. " Multiplanar reformats. Dose reduction techniques were used.  2) Routine CT Cervical Spine without IV contrast. Multiplanar reformats. Dose reduction techniques were used.    FINDINGS:  HEAD CT:  INTRACRANIAL CONTENTS: No intracranial hemorrhage or mass effect. No loss of gray-white matter differentiation. Preserved parenchymal attenuation for patient age. Preserved ventricles and sulci for patient age.     VISUALIZED ORBITS/SINUSES/MASTOIDS: No intraorbital abnormality. No paranasal sinus mucosal disease. No middle ear or mastoid effusion.    BONES/SOFT TISSUES: No scalp hematoma. No skull fracture.    CERVICAL SPINE CT:  VERTEBRA: Preserved vertebral heights and alignment. No traumatic subluxation. No acute fracture. No prevertebral edema.    CANAL: No bony encroachment on the spinal canal.    PARASPINAL: No extraspinal abnormality. Visualized lung fields are clear.      Impression    IMPRESSION:  HEAD CT:  1.  Negative for acute intracranial hemorrhage or skull fracture.    CERVICAL SPINE CT:  1.  Negative for acute fracture or traumatic subluxation.    Head CT w/o contrast    Narrative    EXAM: CT HEAD W/O CONTRAST, CT CERVICAL SPINE W/O CONTRAST  LOCATION: [facility]  DATE: 5/21/2025    INDICATION: fall on eliquis  COMPARISON: None.  TECHNIQUE:   1) Routine CT Head without IV contrast. Multiplanar reformats. Dose reduction techniques were used.  2) Routine CT Cervical Spine without IV contrast. Multiplanar reformats. Dose reduction techniques were used.    FINDINGS:  HEAD CT:  INTRACRANIAL CONTENTS: No intracranial hemorrhage or mass effect. No loss of gray-white matter differentiation. Preserved parenchymal attenuation for patient age. Preserved ventricles and sulci for patient age.     VISUALIZED ORBITS/SINUSES/MASTOIDS: No intraorbital abnormality. No paranasal sinus mucosal disease. No middle ear or mastoid effusion.    BONES/SOFT TISSUES: No scalp hematoma. No skull  fracture.    CERVICAL SPINE CT:  VERTEBRA: Preserved vertebral heights and alignment. No traumatic subluxation. No acute fracture. No prevertebral edema.    CANAL: No bony encroachment on the spinal canal.    PARASPINAL: No extraspinal abnormality. Visualized lung fields are clear.      Impression    IMPRESSION:  HEAD CT:  1.  Negative for acute intracranial hemorrhage or skull fracture.    CERVICAL SPINE CT:  1.  Negative for acute fracture or traumatic subluxation.    XR Knee Left 3 Views    Narrative    EXAM: XR KNEE LEFT 3 VIEWS  LOCATION: [facility]  DATE: 5/21/2025    INDICATION: Fall, tenderness  COMPARISON: None.      Impression    IMPRESSION: The left knee is negative for fracture or compartmental narrowing. No effusion.   XR Pelvis 1/2 Views    Narrative    EXAM: XR PELVIS 1/2 VIEWS  LOCATION: [facility]  DATE: 5/21/2025    INDICATION: Pelvic pain after a fall.  COMPARISON: 7/30/2024.      Impression    IMPRESSION: Hips and pelvis negative for fracture or joint malalignment. Moderate degenerative changes in the lower lumbar spine. Severe iliofemoral atherosclerotic calcification.   XR Chest 1 View    Narrative    EXAM: XR CHEST 1 VIEW  LOCATION: [facility]  DATE: 5/21/2025    INDICATION: R o pneumonia  COMPARISON: 05/13/2025.      Impression    IMPRESSION: Emphysema. New airspace opacities left midlung suspicious for pneumonia. No pleural effusion. Normal heart size and pulmonary vascularity.   XR Shoulder Left G/E 3 Views    Narrative    EXAM: XR SHOULDER LEFT G/E 3 VIEWS  LOCATION: [facility]  DATE: 5/21/2025    INDICATION: Left shoulder pain after a fall.  COMPARISON: None.      Impression    IMPRESSION: Negative for acute fracture or dislocation. Bony irregularity and probable small separate ossicle along the distal aspect of the clavicle, appears chronic without a sharply marginated acute fracture line without  adjacent soft tissue swelling.   Correlation recommended with an absence of focal pain and tenderness at this area. Mild degenerative arthritis at the acromioclavicular and glenohumeral joints.   XR Chest Port 1 View    Narrative    EXAM: XR CHEST PORT 1 VIEW  LOCATION: [facility]  DATE: 5/21/2025    INDICATION: central line placement  COMPARISON: 5/21/2025      Impression    IMPRESSION: Right-sided central venous line is seen with tip overlying the distal superior vena cava. Cardiomediastinal silhouette, the left lower lung and left costophrenic sulcus are outside the field-of-view. Vascular calcifications are seen in the   thoracic aorta. Patchy airspace and interstitial opacities are seen within the mid to lower lungs suggestive of pulmonary edema or atypical pneumonia. Ill-defined nodular opacity is seen within the right hilum which was not seen previously and could   reflect pulmonary nodule or lymphadenopathy. Consider follow-up characterization with CT chest exam for better characterization.         Billing: This patient is critically ill: Yes. Total critical care time today 33 min.    Cristino Padilla MD    Department of Anesthesiology

## 2025-05-21 NOTE — PLAN OF CARE
Hours of care: 2755-8512  ICU End of Shift Summary.  For vital signs and complete assessments, please see documentation flowsheets.     Pertinent assessments: aox4, VSS, afib with PVC, RA and congested cough, tolerating PO, voiding via primofit  Major Shift Events: admitted to ICU, turned levo off at 1730, PRN tylenol given x1  Plan (Upcoming Events): continue plan of care  Discharge/Transfer Needs: TBD    Bedside Shift Report Completed : yes  Bedside Safety Check Completed: yes       Problem: Adult Inpatient Plan of Care  Goal: Plan of Care Review  Description: The Plan of Care Review/Shift note should be completed every shift.  The Outcome Evaluation is a brief statement about your assessment that the patient is improving, declining, or no change.  This information will be displayed automatically on your shift  note.  Outcome: Progressing  Flowsheets (Taken 5/21/2025 1745)  Outcome Evaluation: admitted to ICU, off levo, on amio gtt  Plan of Care Reviewed With: patient  Overall Patient Progress: improving   Goal Outcome Evaluation:      Plan of Care Reviewed With: patient    Overall Patient Progress: improvingOverall Patient Progress: improving    Outcome Evaluation: admitted to ICU, off levo, on amio gtt           No

## 2025-05-22 ENCOUNTER — APPOINTMENT (OUTPATIENT)
Dept: SPEECH THERAPY | Facility: CLINIC | Age: 85
DRG: 871 | End: 2025-05-22
Attending: INTERNAL MEDICINE
Payer: COMMERCIAL

## 2025-05-22 ENCOUNTER — APPOINTMENT (OUTPATIENT)
Dept: CARDIOLOGY | Facility: CLINIC | Age: 85
DRG: 871 | End: 2025-05-22
Attending: INTERNAL MEDICINE
Payer: COMMERCIAL

## 2025-05-22 ENCOUNTER — APPOINTMENT (OUTPATIENT)
Dept: OCCUPATIONAL THERAPY | Facility: CLINIC | Age: 85
DRG: 871 | End: 2025-05-22
Attending: INTERNAL MEDICINE
Payer: COMMERCIAL

## 2025-05-22 VITALS
OXYGEN SATURATION: 95 % | TEMPERATURE: 98.2 F | DIASTOLIC BLOOD PRESSURE: 72 MMHG | RESPIRATION RATE: 22 BRPM | WEIGHT: 158.51 LBS | BODY MASS INDEX: 20.34 KG/M2 | HEART RATE: 66 BPM | HEIGHT: 74 IN | SYSTOLIC BLOOD PRESSURE: 122 MMHG

## 2025-05-22 LAB
ALBUMIN SERPL BCG-MCNC: 3.2 G/DL (ref 3.5–5.2)
ALP SERPL-CCNC: 48 U/L (ref 40–150)
ALT SERPL W P-5'-P-CCNC: 18 U/L (ref 0–70)
ANION GAP SERPL CALCULATED.3IONS-SCNC: 9 MMOL/L (ref 7–15)
AST SERPL W P-5'-P-CCNC: 33 U/L (ref 0–45)
BACTERIA SPEC CULT: NORMAL
BACTERIA SPEC CULT: NORMAL
BACTERIA SPT CULT: NORMAL
BILIRUB SERPL-MCNC: 0.2 MG/DL
BUN SERPL-MCNC: 10.9 MG/DL (ref 8–23)
CALCIUM SERPL-MCNC: 8.7 MG/DL (ref 8.8–10.4)
CHLORIDE SERPL-SCNC: 106 MMOL/L (ref 98–107)
CREAT SERPL-MCNC: 0.69 MG/DL (ref 0.67–1.17)
EGFRCR SERPLBLD CKD-EPI 2021: >90 ML/MIN/1.73M2
ERYTHROCYTE [DISTWIDTH] IN BLOOD BY AUTOMATED COUNT: 14.9 % (ref 10–15)
GLUCOSE BLDC GLUCOMTR-MCNC: 124 MG/DL (ref 70–99)
GLUCOSE BLDC GLUCOMTR-MCNC: 134 MG/DL (ref 70–99)
GLUCOSE BLDC GLUCOMTR-MCNC: 147 MG/DL (ref 70–99)
GLUCOSE BLDC GLUCOMTR-MCNC: 155 MG/DL (ref 70–99)
GLUCOSE BLDC GLUCOMTR-MCNC: 167 MG/DL (ref 70–99)
GLUCOSE SERPL-MCNC: 148 MG/DL (ref 70–99)
GRAM STAIN RESULT: NORMAL
HCO3 SERPL-SCNC: 20 MMOL/L (ref 22–29)
HCT VFR BLD AUTO: 32.9 % (ref 40–53)
HGB BLD-MCNC: 11.1 G/DL (ref 13.3–17.7)
LVEF ECHO: NORMAL
MAGNESIUM SERPL-MCNC: 2.1 MG/DL (ref 1.7–2.3)
MCH RBC QN AUTO: 32.2 PG (ref 26.5–33)
MCHC RBC AUTO-ENTMCNC: 33.7 G/DL (ref 31.5–36.5)
MCV RBC AUTO: 95 FL (ref 78–100)
PHOSPHATE SERPL-MCNC: 3.1 MG/DL (ref 2.5–4.5)
PLATELET # BLD AUTO: 178 10E3/UL (ref 150–450)
POTASSIUM SERPL-SCNC: 3.4 MMOL/L (ref 3.4–5.3)
PROT SERPL-MCNC: 5.2 G/DL (ref 6.4–8.3)
RBC # BLD AUTO: 3.45 10E6/UL (ref 4.4–5.9)
SODIUM SERPL-SCNC: 135 MMOL/L (ref 135–145)
WBC # BLD AUTO: 11.1 10E3/UL (ref 4–11)

## 2025-05-22 PROCEDURE — 93306 TTE W/DOPPLER COMPLETE: CPT | Mod: 26 | Performed by: INTERNAL MEDICINE

## 2025-05-22 PROCEDURE — 94640 AIRWAY INHALATION TREATMENT: CPT

## 2025-05-22 PROCEDURE — 250N000009 HC RX 250: Performed by: INTERNAL MEDICINE

## 2025-05-22 PROCEDURE — 250N000012 HC RX MED GY IP 250 OP 636 PS 637: Performed by: INTERNAL MEDICINE

## 2025-05-22 PROCEDURE — 97535 SELF CARE MNGMENT TRAINING: CPT | Mod: GO

## 2025-05-22 PROCEDURE — 999N000157 HC STATISTIC RCP TIME EA 10 MIN

## 2025-05-22 PROCEDURE — 250N000011 HC RX IP 250 OP 636: Mod: JZ | Performed by: INTERNAL MEDICINE

## 2025-05-22 PROCEDURE — 94640 AIRWAY INHALATION TREATMENT: CPT | Mod: 76

## 2025-05-22 PROCEDURE — 84100 ASSAY OF PHOSPHORUS: CPT | Performed by: INTERNAL MEDICINE

## 2025-05-22 PROCEDURE — 250N000013 HC RX MED GY IP 250 OP 250 PS 637: Performed by: INTERNAL MEDICINE

## 2025-05-22 PROCEDURE — 99233 SBSQ HOSP IP/OBS HIGH 50: CPT | Performed by: INTERNAL MEDICINE

## 2025-05-22 PROCEDURE — 97165 OT EVAL LOW COMPLEX 30 MIN: CPT | Mod: GO

## 2025-05-22 PROCEDURE — 87070 CULTURE OTHR SPECIMN AEROBIC: CPT | Performed by: INTERNAL MEDICINE

## 2025-05-22 PROCEDURE — 92610 EVALUATE SWALLOWING FUNCTION: CPT | Mod: GN

## 2025-05-22 PROCEDURE — 83735 ASSAY OF MAGNESIUM: CPT | Performed by: INTERNAL MEDICINE

## 2025-05-22 PROCEDURE — 85027 COMPLETE CBC AUTOMATED: CPT | Performed by: INTERNAL MEDICINE

## 2025-05-22 PROCEDURE — 93306 TTE W/DOPPLER COMPLETE: CPT

## 2025-05-22 PROCEDURE — 258N000003 HC RX IP 258 OP 636: Performed by: INTERNAL MEDICINE

## 2025-05-22 PROCEDURE — 200N000001 HC R&B ICU

## 2025-05-22 PROCEDURE — 82310 ASSAY OF CALCIUM: CPT | Performed by: INTERNAL MEDICINE

## 2025-05-22 RX ORDER — POTASSIUM CHLORIDE 1500 MG/1
40 TABLET, EXTENDED RELEASE ORAL ONCE
Status: COMPLETED | OUTPATIENT
Start: 2025-05-22 | End: 2025-05-22

## 2025-05-22 RX ORDER — ALBUTEROL SULFATE 0.83 MG/ML
2.5 SOLUTION RESPIRATORY (INHALATION)
Status: DISCONTINUED | OUTPATIENT
Start: 2025-05-22 | End: 2025-05-23

## 2025-05-22 RX ORDER — AZITHROMYCIN 250 MG/1
250 TABLET, FILM COATED ORAL DAILY
Status: DISCONTINUED | OUTPATIENT
Start: 2025-05-23 | End: 2025-05-24 | Stop reason: HOSPADM

## 2025-05-22 RX ORDER — TAMSULOSIN HYDROCHLORIDE 0.4 MG/1
0.4 CAPSULE ORAL EVERY EVENING
Status: DISCONTINUED | OUTPATIENT
Start: 2025-05-22 | End: 2025-05-24 | Stop reason: HOSPADM

## 2025-05-22 RX ORDER — PREDNISONE 20 MG/1
40 TABLET ORAL 2 TIMES DAILY WITH MEALS
Status: DISCONTINUED | OUTPATIENT
Start: 2025-05-22 | End: 2025-05-23

## 2025-05-22 RX ORDER — FLUTICASONE FUROATE AND VILANTEROL 100; 25 UG/1; UG/1
1 POWDER RESPIRATORY (INHALATION) DAILY
Status: DISCONTINUED | OUTPATIENT
Start: 2025-05-22 | End: 2025-05-24 | Stop reason: HOSPADM

## 2025-05-22 RX ADMIN — APIXABAN 5 MG: 5 TABLET, FILM COATED ORAL at 08:29

## 2025-05-22 RX ADMIN — IPRATROPIUM BROMIDE AND ALBUTEROL SULFATE 3 ML: .5; 3 SOLUTION RESPIRATORY (INHALATION) at 12:13

## 2025-05-22 RX ADMIN — METOPROLOL TARTRATE 12.5 MG: 25 TABLET, FILM COATED ORAL at 12:34

## 2025-05-22 RX ADMIN — ACETAMINOPHEN 650 MG: 325 TABLET, FILM COATED ORAL at 21:43

## 2025-05-22 RX ADMIN — GABAPENTIN 600 MG: 300 CAPSULE ORAL at 08:29

## 2025-05-22 RX ADMIN — GABAPENTIN 600 MG: 300 CAPSULE ORAL at 12:34

## 2025-05-22 RX ADMIN — METHOCARBAMOL 500 MG: 500 TABLET ORAL at 15:53

## 2025-05-22 RX ADMIN — POTASSIUM CHLORIDE 40 MEQ: 20 TABLET, EXTENDED RELEASE ORAL at 05:33

## 2025-05-22 RX ADMIN — CETIRIZINE HYDROCHLORIDE 10 MG: 10 TABLET, FILM COATED ORAL at 20:34

## 2025-05-22 RX ADMIN — APIXABAN 5 MG: 5 TABLET, FILM COATED ORAL at 20:34

## 2025-05-22 RX ADMIN — ACETAMINOPHEN 650 MG: 325 TABLET, FILM COATED ORAL at 05:40

## 2025-05-22 RX ADMIN — CEFEPIME 2 G: 2 INJECTION, POWDER, FOR SOLUTION INTRAVENOUS at 03:52

## 2025-05-22 RX ADMIN — GABAPENTIN 900 MG: 300 CAPSULE ORAL at 21:43

## 2025-05-22 RX ADMIN — METOPROLOL TARTRATE 12.5 MG: 25 TABLET, FILM COATED ORAL at 20:34

## 2025-05-22 RX ADMIN — SODIUM CHLORIDE: 900 INJECTION INTRAVENOUS at 03:54

## 2025-05-22 RX ADMIN — PANTOPRAZOLE SODIUM 40 MG: 40 TABLET, DELAYED RELEASE ORAL at 08:29

## 2025-05-22 RX ADMIN — PREDNISONE 40 MG: 20 TABLET ORAL at 12:34

## 2025-05-22 RX ADMIN — CEFEPIME 2 G: 2 INJECTION, POWDER, FOR SOLUTION INTRAVENOUS at 20:34

## 2025-05-22 RX ADMIN — METHYLPREDNISOLONE SODIUM SUCCINATE 40 MG: 40 INJECTION, POWDER, FOR SOLUTION INTRAMUSCULAR; INTRAVENOUS at 03:53

## 2025-05-22 RX ADMIN — PREDNISONE 40 MG: 20 TABLET ORAL at 18:11

## 2025-05-22 RX ADMIN — AZITHROMYCIN MONOHYDRATE 250 MG: 500 INJECTION, POWDER, LYOPHILIZED, FOR SOLUTION INTRAVENOUS at 08:31

## 2025-05-22 RX ADMIN — IPRATROPIUM BROMIDE AND ALBUTEROL SULFATE 3 ML: .5; 3 SOLUTION RESPIRATORY (INHALATION) at 08:24

## 2025-05-22 RX ADMIN — TAMSULOSIN HYDROCHLORIDE 0.4 MG: 0.4 CAPSULE ORAL at 20:34

## 2025-05-22 RX ADMIN — NICOTINE POLACRILEX 2 MG: 2 GUM, CHEWING BUCCAL at 05:21

## 2025-05-22 RX ADMIN — METHOCARBAMOL 500 MG: 500 TABLET ORAL at 08:29

## 2025-05-22 RX ADMIN — METHOCARBAMOL 500 MG: 500 TABLET ORAL at 21:43

## 2025-05-22 RX ADMIN — CEFEPIME 2 G: 2 INJECTION, POWDER, FOR SOLUTION INTRAVENOUS at 12:34

## 2025-05-22 RX ADMIN — ALBUTEROL SULFATE 2.5 MG: 2.5 SOLUTION RESPIRATORY (INHALATION) at 19:55

## 2025-05-22 ASSESSMENT — ACTIVITIES OF DAILY LIVING (ADL)
ADLS_ACUITY_SCORE: 70

## 2025-05-22 NOTE — PROVIDER NOTIFICATION
2058> MD paged for elevated . No further order was made maybe due to steroids. And he's fine with BG of <250.

## 2025-05-22 NOTE — PLAN OF CARE
"  Problem: Adult Inpatient Plan of Care  Goal: Plan of Care Review  Description: The Plan of Care Review/Shift note should be completed every shift.  The Outcome Evaluation is a brief statement about your assessment that the patient is improving, declining, or no change.  This information will be displayed automatically on your shift  note.  Outcome: Progressing  Goal: Patient-Specific Goal (Individualized)  Description: You can add care plan individualizations to a care plan. Examples of Individualization might be:  \"Parent requests to be called daily at 9am for status\", \"I have a hard time hearing out of my right ear\", or \"Do not touch me to wake me up as it startles  me\".  Outcome: Progressing  Goal: Absence of Hospital-Acquired Illness or Injury  Outcome: Progressing  Intervention: Identify and Manage Fall Risk  Recent Flowsheet Documentation  Taken 5/22/2025 1600 by Anayeli Phelan, RN  Safety Promotion/Fall Prevention:   activity supervised   clutter free environment maintained   increase visualization of patient   lighting adjusted   nonskid shoes/slippers when out of bed   patient and family education   room near nurse's station   room organization consistent   safety round/check completed   treat reversible contributory factors   treat underlying cause  Taken 5/22/2025 1200 by Anayeli Phelan, RN  Safety Promotion/Fall Prevention:   activity supervised   clutter free environment maintained   increase visualization of patient   lighting adjusted   nonskid shoes/slippers when out of bed   patient and family education   room near nurse's station   room organization consistent   safety round/check completed   treat reversible contributory factors   treat underlying cause  Taken 5/22/2025 0800 by Anayeli Phelan, RN  Safety Promotion/Fall Prevention:   activity supervised   clutter free environment maintained   increase visualization of patient   lighting adjusted   nonskid shoes/slippers when out of bed   " patient and family education   room near nurse's station   room organization consistent   safety round/check completed   treat reversible contributory factors   treat underlying cause  Intervention: Prevent Skin Injury  Recent Flowsheet Documentation  Taken 5/22/2025 1600 by Anayeli Phelan RN  Body Position:   turned   legs elevated   upper extremity elevated   left  Taken 5/22/2025 1200 by Anayeli Phelan RN  Body Position:   turned   legs elevated   upper extremity elevated   left  Taken 5/22/2025 0800 by Anayeli Phelan RN  Body Position:   turned   legs elevated   upper extremity elevated   left  Intervention: Prevent and Manage VTE (Venous Thromboembolism) Risk  Recent Flowsheet Documentation  Taken 5/22/2025 1600 by Anayeli Phelan RN  VTE Prevention/Management: (elqiuis) SCDs off (sequential compression devices)  Taken 5/22/2025 1200 by Anayeli Phelan RN  VTE Prevention/Management: (elqiuis) SCDs off (sequential compression devices)  Taken 5/22/2025 0800 by Anayeli Phelan RN  VTE Prevention/Management: (elqiuis) SCDs off (sequential compression devices)  Goal: Optimal Comfort and Wellbeing  Outcome: Progressing  Intervention: Provide Person-Centered Care  Recent Flowsheet Documentation  Taken 5/22/2025 1600 by Anayeli Phelan RN  Trust Relationship/Rapport:   care explained   choices provided   emotional support provided   empathic listening provided   questions answered   questions encouraged   reassurance provided   thoughts/feelings acknowledged  Taken 5/22/2025 1200 by Anayeli Phelan RN  Trust Relationship/Rapport:   care explained   choices provided   emotional support provided   empathic listening provided   questions answered   questions encouraged   reassurance provided   thoughts/feelings acknowledged  Taken 5/22/2025 0800 by Anayeli Phelan RN  Trust Relationship/Rapport:   care explained   choices provided   emotional support provided   empathic listening provided   questions  answered   questions encouraged   reassurance provided   thoughts/feelings acknowledged  Goal: Readiness for Transition of Care  Outcome: Progressing       Goal Outcome Evaluation:  Alert and oriented. Able to make needs know. Lungs with mild crackles to bases. KRISHNAN Good appetite. Up to chair with therapy. External cath clear yellow urine. Afebrile.

## 2025-05-22 NOTE — PROGRESS NOTES
Cross cover    Called about a blood sugar of 222. Is getting steroids.  Given his age his goal is to prevent hypoglycemia.  I have fine with a sugar <250. He is also having back pain and requests his home robaxin/neurontin.  I presume they were held due to confusion but he is doing better. Patient requesting all his medications get resumed, will let the day team decide what is appropriate at this point.     Krishna Concepcion MD

## 2025-05-22 NOTE — PROGRESS NOTES
[facility]    Medicine Progress Note - Hospitalist Service    Date of Admission:  5/21/2025    Assessment & Plan     Tigre Gillette is a 85 year old male admitted on 5/21/2025. He has a past medical history significant for COPD, atrial  fibrillation, prostate cancer, chronic back pain, GERD, and ongoing tobacco use disorder.  He presented to emergency room due to shortness of breath, cough, fever, weakness.  Found to have sepsis with septic shock due to pneumonia.  Also noted to be in atrial flutter.     Community-acquired pneumonia.  Sepsis with septic shock due to pneumonia.  - Initially started on antibiotics with azithromycin and IV ceftriaxone in the ER.  - Had aggressive IV fluid resuscitation due to sepsis.  - Continued to be hypotensive.  - Central line placed in ER.    -Started on continuous IV norepinephrine.  - Hypotension resolved.  -Stop norepinephrine.  -Stop IV fluids.  - Continue antibiotics with azithromycin and IV cefepime.  - Monitor culture results.     Acute COPD exacerbation.  - Initially with Bronchospasm on exam.  - Likely due to pneumonia.  - Improving.   - Stop Methylprednisolone 40 mg IV every 8 hours.  -Start prednisone 40 mg twice a day.  -Antibiotics as above.  -Restart//Vilanterol/Umeclidinium.  - Stop DuoNeb scheduled 4 times a day.  -Start scheduled albuterol nebs.  - Continue albuterol as needed.     Acute infectious encephalopathy.  - Due to sepsis and pneumonia.  - Treat infection as above.  -Currently resolved.     Atrial flutter.  History of paroxysmal atrial fibrillation.  Mild troponin elevation.    Drug-induced coagulation defect.  -Given amiodarone bolus in ER and started on continuous IV infusion.    - Converted to normal sinus rhythm.  - Suspect atrial flutter related to sepsis.  - Initial troponin 53.  Not having chest pain.  - Repeat troponin level 99.  -  hypotension resolved.  -Restart metoprolol 12.5 mg twice a day.  - Continue apixaban 5 mg  twice a day.  -Echocardiogram with EF 60 to 65% and no regional WMA's.     Ongoing tobacco use disorder.  - I did advise smoking cessation on 5/21.  - Nicotine gum if needed.     GERD.  - Pantoprazole 40 mg a day.     Hyponatremia.  - Mild.  Sodium 134.  - Resolved.  - Stop IV fluids.          Diet: Regular Diet Adult    DVT Prophylaxis: DOAC  Cornell Catheter: Not present  Lines: PRESENT      CVC Triple Lumen Right Internal jugular-Site Assessment: WDL except      Cardiac Monitoring: ACTIVE order. Indication: ICU  Code Status: No CPR- Do NOT Intubate      Clinically Significant Risk Factors         # Hyponatremia: Lowest Na = 134 mmol/L in last 2 days, will monitor as appropriate       # Hypoalbuminemia: Lowest albumin = 3.2 g/dL at 5/22/2025  4:05 AM, will monitor as appropriate                           Social Drivers of Health    Tobacco Use: High Risk (5/21/2025)    Patient History     Smoking Tobacco Use: Every Day     Smokeless Tobacco Use: Never          Disposition Plan     Medically Ready for Discharge: Anticipated Tomorrow             Samuel Daley DO  Hospitalist Service  [facility]  Securely message with [employer] (more info)  Text page via [facility] Paging/Directory   ______________________________________________________________________    Interval History   Still with cough.  Shortness of breath much improved.  Confusion resolved.  Denies chest pain, fevers, chills, nausea.    Physical Exam   Vital Signs: Temp: 97.8  F (36.6  C) Temp src: Temporal BP: (!) 144/80 Pulse: 70   Resp: 21 SpO2: 97 % O2 Device: None (Room air)    Weight: 158 lbs 8.17 oz    Gen:  NAD, A&Ox3.  Eyes:  PERRL, sclera anicteric.  OP:  MMM, no lesions.  Neck:  Supple.  CV:  Regular, no murmurs.  Lung:  CTA b/l, normal effort.  Ab:  +BS, soft.  Skin:  Warm, dry to touch.  No rash.  Ext:  No pitting edema LE b/l.      Medical Decision Making       52 MINUTES SPENT BY ME on the date of service doing chart review,  history, exam, documentation & further activities per the note.      Data     I have personally reviewed the following data over the past 24 hrs:    11.1 (H)  \   11.1 (L)   / 178     135 106 10.9 /  124 (H)   3.4 20 (L) 0.69 \     ALT: 18 AST: 33 AP: 48 TBILI: 0.2   ALB: 3.2 (L) TOT PROTEIN: 5.2 (L) LIPASE: N/A       Imaging results reviewed over the past 24 hrs:   Recent Results (from the past 24 hours)   Echocardiogram Complete   Result Value    LVEF  60-65%    Narrative    [id number]  [id number]  [id number]  [id number]^IVAN^REJI^ROBINA     [facility]  Echocardiography Laboratory  [address] [place] [address]     Name: RAFY JARRELL  MRN: [id number]  : 1940  Study Date: 2025 11:11 AM  Age: 85 yrs  Gender: Male  Patient [facility]  Reason For Study: MI  Ordering Physician: REJI LOVE  Referring Physician: Susan smith  Performed By: Mateusz Leone RDCS     BSA: 2.0 m2  Height: 74 in  Weight: 158 lb  HR: 69  BP: 145/82 mmHg  ______________________________________________________________________________  Procedure  Echocardiogram with two-dimensional, color and spectral Doppler.  ______________________________________________________________________________  Interpretation Summary     Aortic root dilatation is present.  The visual ejection fraction is 60-65%.  Normal left ventricular wall motion  ______________________________________________________________________________  Left Ventricle  The left ventricle is normal in size. There is normal left ventricular wall  thickness. A sigmoid septum is present. The visual ejection fraction is 60-  65%. Left ventricular diastolic function is normal. Normal left ventricular  wall motion.     Right Ventricle  The right ventricle is normal in structure, function and size.     Atria  Normal left atrial size. Right atrial size is normal.     Mitral Valve  The mitral valve is normal in structure and function.      Tricuspid Valve  Normal tricuspid valve. There is tricuspid annular calcification. Right  ventricular systolic pressure is normal.     Aortic Valve  There is trivial trileaflet aortic sclerosis.     Pulmonic Valve  The pulmonic valve is not well seen, but is grossly normal.     Vessels  Aortic root dilatation is present. Normal size ascending aorta. The inferior  vena cava is normal.     Pericardium  There is no pericardial effusion.     Rhythm  Sinus rhythm was noted.  ______________________________________________________________________________  MMode/2D Measurements & Calculations  IVSd: 1.4 cm     LVIDd: 4.5 cm  LVIDs: 2.5 cm  LVPWd: 1.0 cm  IVC diam: 1.8 cm  FS: 44.2 %  LV mass(C)d: 194.2 grams  LV mass(C)dI: 98.8 grams/m2  Ao root diam: 4.1 cm  asc Aorta Diam: 3.6 cm  LVOT diam: 2.1 cm  LVOT area: 3.6 cm2  Ao root diam index Ht(cm/m): 2.2  Ao root diam index BSA (cm/m2): 2.1  Asc Ao diam index BSA (cm/m2): 1.9  Asc Ao diam index Ht(cm/m): 1.9  LA Volume (BP): 54.3 ml     LA Volume Index (BP): 27.6 ml/m2  RWT: 0.46  TAPSE: 1.9 cm     Time Measurements  Aortic HR: 60.0 BPM     Doppler Measurements & Calculations  MV E max gerardo: 62.4 cm/sec  MV A max gerardo: 59.7 cm/sec  MV E/A: 1.0  MV max P.1 mmHg  MV mean P.00 mmHg  MV V2 VTI: 24.6 cm  MVA(VTI): 3.9 cm2  MV dec time: 0.26 sec  LV V1 max P.0 mmHg  LV V1 max: 112.0 cm/sec  LV V1 VTI: 26.2 cm  CO(LVOT): 5.7 l/min  CI(LVOT): 2.9 l/min/m2  SV(LVOT): 94.7 ml  SI(LVOT): 48.2 ml/m2  PA acc time: 0.12 sec  TR max gerardo: 257.9 cm/sec  TR max P.6 mmHg     E/E' av.0  Lateral E/e': 6.4  Medial E/e': 7.5  RV S Gerardo: 11.1 cm/sec     ______________________________________________________________________________  Report approved by: Macho Barton MD on 2025 12:13 PM

## 2025-05-22 NOTE — PROVIDER NOTIFICATION
MD notified for HR of 57-58 and already converted to sinus Amiodarone  drip was off. MD agree to off the amiodarone drip.

## 2025-05-22 NOTE — PLAN OF CARE
Goal Outcome Evaluation:      Plan of Care Reviewed With: patient    Overall Patient Progress: improvingOverall Patient Progress: improving    Outcome Evaluation: See notes.            ICU End of Shift Summary.  For vital signs and complete assessments, please see documentation flowsheets.      Pertinent assessments:   Neuro: Patient alert and and oriented x4. Afebrile, Complains of back pain resumed home pain medications.  No complains of pain after.  Cardiac: Afib at the start of the shift and converted to SR to SB with occasional PVC's. BP acceptable did not require levo overnight.   Resp: RA sating well 94-96% even when sleeping. LS dim/coarse. With productive cough but declines cough medications.  GI: Tolerating diet. BM once. Audible BS  : External Catheter in place with good urine output.  Skin: Scattered bruise on upper extremities. Other than that  pressure areas intact  Lines: CVC  at Right internal jugular and 2 PIV's  Drips: NS 100mls/hr    Major Shift Events:   Off amniodarone patient converted to SR and HR to mid 50's MD aware  Resume home meds.  Able to sleep and awake in between cares.  MD paged @ 2662 that patient is having frequent PVC's and K is 3.4 MD placed on K RN protocol.  K replaced and recheck @ 1021  Plan (Upcoming Events): Continue ongoing ICU care  Discharge/Transfer Needs: TBD     Bedside Shift Report Completed : Y  Bedside Safety Check Completed: Y      Problem: Adult Inpatient Plan of Care  Goal: Plan of Care Review  Description: The Plan of Care Review/Shift note should be completed every shift.  The Outcome Evaluation is a brief statement about your assessment that the patient is improving, declining, or no change.  This information will be displayed automatically on your shift  note.  Flowsheets (Taken 5/22/2025 7769)  Outcome Evaluation: See notes.  Plan of Care Reviewed With: patient  Overall Patient Progress: improving  Goal: Absence of Hospital-Acquired Illness or  Injury  Intervention: Identify and Manage Fall Risk  Recent Flowsheet Documentation  Taken 5/22/2025 0406 by Dona Altamirano RN  Safety Promotion/Fall Prevention:   activity supervised   clutter free environment maintained   increase visualization of patient   lighting adjusted   nonskid shoes/slippers when out of bed   patient and family education   room near nurse's station   room organization consistent   safety round/check completed   treat reversible contributory factors   treat underlying cause  Taken 5/22/2025 0000 by Dona Altamirano RN  Safety Promotion/Fall Prevention:   activity supervised   clutter free environment maintained   increase visualization of patient   lighting adjusted   nonskid shoes/slippers when out of bed   patient and family education   room near nurse's station   room organization consistent   safety round/check completed   treat reversible contributory factors   treat underlying cause  Taken 5/21/2025 2000 by Dona Altamirano RN  Safety Promotion/Fall Prevention:   activity supervised   clutter free environment maintained   increase visualization of patient   lighting adjusted   nonskid shoes/slippers when out of bed   patient and family education   room near nurse's station   room organization consistent   safety round/check completed   treat reversible contributory factors   treat underlying cause  Intervention: Prevent Skin Injury  Recent Flowsheet Documentation  Taken 5/22/2025 0406 by Dona Altamirano RN  Body Position:   turned   legs elevated   upper extremity elevated   left  Taken 5/22/2025 0200 by Dona Altamirano RN  Body Position: weight shifting  Taken 5/22/2025 0000 by Dona Altamirano RN  Body Position:   turned   legs elevated   upper extremity elevated   left  Taken 5/21/2025 2200 by Dona Altamirano RN  Body Position: weight shifting  Taken 5/21/2025 2000 by Dona Altamirano RN  Body Position:   turned   right   legs elevated    upper extremity elevated  Intervention: Prevent and Manage VTE (Venous Thromboembolism) Risk  Recent Flowsheet Documentation  Taken 5/22/2025 0406 by Dona Altamirano RN  VTE Prevention/Management: (elqiuis) SCDs off (sequential compression devices)  Taken 5/22/2025 0000 by Dona Altamirano RN  VTE Prevention/Management: (elqiuis) SCDs off (sequential compression devices)  Taken 5/21/2025 2000 by Dona Altamirano RN  VTE Prevention/Management: (elqiuis) SCDs off (sequential compression devices)  Goal: Optimal Comfort and Wellbeing  Intervention: Monitor Pain and Promote Comfort  Recent Flowsheet Documentation  Taken 5/21/2025 2126 by Dona Altamirano RN  Pain Management Interventions:   MD notified (comment)   medication (see MAR)  Intervention: Provide Person-Centered Care  Recent Flowsheet Documentation  Taken 5/22/2025 0406 by Dona Altamirano RN  Trust Relationship/Rapport:   care explained   choices provided   emotional support provided   empathic listening provided   questions answered   questions encouraged   reassurance provided   thoughts/feelings acknowledged  Taken 5/22/2025 0000 by Dona Altamirano RN  Trust Relationship/Rapport:   care explained   choices provided   emotional support provided   empathic listening provided   questions answered   questions encouraged   reassurance provided   thoughts/feelings acknowledged  Taken 5/21/2025 2000 by Dona Altamirano RN  Trust Relationship/Rapport:   care explained   choices provided   emotional support provided   empathic listening provided   questions answered   questions encouraged   reassurance provided   thoughts/feelings acknowledged

## 2025-05-22 NOTE — PROGRESS NOTES
"Speech-Language Pathology Clinical Swallow Evaluation            05/22/25 1142   Appointment Info   Signing Clinician's Name / Credentials (SLP) Rossana Farley MS, CCC-SLP   General Information   Onset of Illness/Injury or Date of Surgery 05/21/25   Referring Physician Samuel Daley, DO   Pertinent History of Current Problem Per H&P, \"Tigre Gillette is a 85 year old male admitted on 5/21/2025. He has a past medical history significant for COPD, atrial  fibrillation, prostate cancer, chronic back pain, GERD, and ongoing tobacco use disorder.  He presented to emergency room due to shortness of breath, cough, fever, weakness.  Found to have sepsis with septic shock due to pneumonia.\"     Chest xray 5/20/25:   \"IMPRESSION: Right-sided central venous line is seen with tip overlying the distal superior vena cava. Cardiomediastinal silhouette, the left lower lung and left costophrenic sulcus are outside the field-of-view. Vascular calcifications are seen in the   thoracic aorta. Patchy airspace and interstitial opacities are seen within the mid to lower lungs suggestive of pulmonary edema or atypical pneumonia. Ill-defined nodular opacity is seen within the right hilum which was not seen previously and could   reflect pulmonary nodule or lymphadenopathy. Consider follow-up characterization with CT chest exam for better characterization.\"   General Observations Pt awake/alert, pleasant and cooperative.   Type of Evaluation   Type of Evaluation Swallow Evaluation   Oral Motor   Oral Musculature generally intact   Cough/Swallow/Gag Reflex (Oral Motor)   Volitional Throat Clear/Cough (Oral Motor) WNL   Vocal Quality/Secretion Management (Oral Motor)   Vocal Quality (Oral Motor) WFL   Secretion Management (Oral Motor) WNL   General Swallowing Observations   Current Diet/Method of Nutritional Intake (General Swallowing Observations, NIS) regular diet;thin liquids (level 0)   Respiratory Support room air   Past " History of Dysphagia None per pt/chart review   Swallowing Evaluation Clinical swallow evaluation   Clinical Swallow Evaluation   Clinical Swallow Evaluation Textures Trialed thin liquids;pureed;solid foods   Clinical Swallow Eval: Thin Liquid Texture Trial   Mode of Presentation, Thin Liquids cup;straw;self-fed   Oral Phase of Swallow WFL   Pharyngeal Phase of Swallow intact   Clinical Swallow Evaluation: Puree Solid Texture Trial   Mode of Presentation, Puree self-fed   Oral Phase, Puree WFL   Pharyngeal Phase, Puree intact   Clinical Swallow Evaluation: Solid Food Texture Trial   Mode of Presentation self-fed   Oral Phase impaired mastication;residue in oral cavity   Pharyngeal Phase intact   Swallowing Recommendations   Diet Consistency Recommendations regular diet;thin liquids (level 0)   Supervision Level for Intake distant supervision needed   Mode of Delivery Recommendations bolus size, small;slow rate of intake   Swallowing Maneuver Recommendations alternate food and liquid intake   Monitoring/Assistance Required (Eating/Swallowing) stop eating activities when fatigue is present;monitor for cough or change in vocal quality with intake   Recommended Feeding/Eating Techniques (Swallow Eval) maintain upright sitting position for eating;set-up and prepare tray   Medication Administration Recommendations, Swallowing (SLP) with liquid, one at a time PRN   General Therapy Interventions   Planned Therapy Interventions Dysphagia Treatment   Clinical Impression   Criteria for Skilled Therapeutic Interventions Met (SLP Eval) Yes, treatment indicated   SLP Diagnosis clinically grossly functional swallow   Clinical Impression Comments Pt currently presents with clinically grossly functional swallow. + adequate acceptance/containment. Bolus formation/propulsion and lingual coordination appeared adequate. Noted minimally reduced, prolonged mastication with increased oral transit time and minimal lingual residue; cleared  with liquid wash. Suspect timely swallow with adequate hyolaryngeal elevation. No cough, throat clear, wet vocal quality, eye watering, or increased WOB.   SLP Total Evaluation Time   Eval: oral/pharyngeal swallow function, clinical swallow Minutes (27353) 23   SLP Goals   Therapy Frequency (SLP Eval) 3 times/week   SLP Predicted Duration/Target Date for Goal Attainment 05/29/25   SLP Goals Swallow   SLP: Safely tolerate diet without signs/symptoms of aspiration Regular diet;Thin liquids   SLP Discharge Planning   SLP Plan Diet tolerance x1-2, consider VFSS if indicated per medical team   SLP Discharge Recommendation home   SLP Rationale for DC Rec Suspect SLP goal will be met during admission   SLP Brief overview of current status  Recommend continue regular diet with thin liquid and swallow precautions: sit upright, small bites/sips, slow rate, alternate solid/liquid. May consider VFSS to rule out silent aspiration if desired by medical team; per d/w MD will defer.   SLP Time and Intention   Total Session Time (sum of timed and untimed services) 23

## 2025-05-23 ENCOUNTER — APPOINTMENT (OUTPATIENT)
Dept: PHYSICAL THERAPY | Facility: CLINIC | Age: 85
DRG: 871 | End: 2025-05-23
Attending: INTERNAL MEDICINE
Payer: COMMERCIAL

## 2025-05-23 ENCOUNTER — APPOINTMENT (OUTPATIENT)
Dept: OCCUPATIONAL THERAPY | Facility: CLINIC | Age: 85
DRG: 871 | End: 2025-05-23
Payer: COMMERCIAL

## 2025-05-23 LAB
ANION GAP SERPL CALCULATED.3IONS-SCNC: 8 MMOL/L (ref 7–15)
BUN SERPL-MCNC: 14.4 MG/DL (ref 8–23)
CALCIUM SERPL-MCNC: 8.9 MG/DL (ref 8.8–10.4)
CHLORIDE SERPL-SCNC: 110 MMOL/L (ref 98–107)
CREAT SERPL-MCNC: 0.66 MG/DL (ref 0.67–1.17)
EGFRCR SERPLBLD CKD-EPI 2021: >90 ML/MIN/1.73M2
ERYTHROCYTE [DISTWIDTH] IN BLOOD BY AUTOMATED COUNT: 14.7 % (ref 10–15)
GLUCOSE BLDC GLUCOMTR-MCNC: 114 MG/DL (ref 70–99)
GLUCOSE SERPL-MCNC: 129 MG/DL (ref 70–99)
HCO3 SERPL-SCNC: 19 MMOL/L (ref 22–29)
HCT VFR BLD AUTO: 32.4 % (ref 40–53)
HGB BLD-MCNC: 10.9 G/DL (ref 13.3–17.7)
MCH RBC QN AUTO: 31.7 PG (ref 26.5–33)
MCHC RBC AUTO-ENTMCNC: 33.6 G/DL (ref 31.5–36.5)
MCV RBC AUTO: 94 FL (ref 78–100)
PLATELET # BLD AUTO: 187 10E3/UL (ref 150–450)
POTASSIUM SERPL-SCNC: 4 MMOL/L (ref 3.4–5.3)
RBC # BLD AUTO: 3.44 10E6/UL (ref 4.4–5.9)
SODIUM SERPL-SCNC: 137 MMOL/L (ref 135–145)
WBC # BLD AUTO: 14.7 10E3/UL (ref 4–11)

## 2025-05-23 PROCEDURE — 250N000011 HC RX IP 250 OP 636: Mod: JZ | Performed by: INTERNAL MEDICINE

## 2025-05-23 PROCEDURE — 97161 PT EVAL LOW COMPLEX 20 MIN: CPT | Mod: GP

## 2025-05-23 PROCEDURE — 250N000013 HC RX MED GY IP 250 OP 250 PS 637: Performed by: INTERNAL MEDICINE

## 2025-05-23 PROCEDURE — 999N000157 HC STATISTIC RCP TIME EA 10 MIN

## 2025-05-23 PROCEDURE — 120N000004 HC R&B MS OVERFLOW

## 2025-05-23 PROCEDURE — 94640 AIRWAY INHALATION TREATMENT: CPT | Mod: 76

## 2025-05-23 PROCEDURE — 999N000156 HC STATISTIC RCP CONSULT EA 30 MIN

## 2025-05-23 PROCEDURE — 250N000009 HC RX 250: Performed by: INTERNAL MEDICINE

## 2025-05-23 PROCEDURE — 85027 COMPLETE CBC AUTOMATED: CPT | Performed by: INTERNAL MEDICINE

## 2025-05-23 PROCEDURE — 99232 SBSQ HOSP IP/OBS MODERATE 35: CPT | Performed by: INTERNAL MEDICINE

## 2025-05-23 PROCEDURE — 84132 ASSAY OF SERUM POTASSIUM: CPT | Performed by: INTERNAL MEDICINE

## 2025-05-23 PROCEDURE — 94640 AIRWAY INHALATION TREATMENT: CPT

## 2025-05-23 PROCEDURE — 97530 THERAPEUTIC ACTIVITIES: CPT | Mod: GP

## 2025-05-23 PROCEDURE — 250N000012 HC RX MED GY IP 250 OP 636 PS 637: Performed by: INTERNAL MEDICINE

## 2025-05-23 PROCEDURE — 97535 SELF CARE MNGMENT TRAINING: CPT | Mod: GO

## 2025-05-23 RX ORDER — PREDNISONE 20 MG/1
40 TABLET ORAL DAILY
Status: DISCONTINUED | OUTPATIENT
Start: 2025-05-24 | End: 2025-05-24 | Stop reason: HOSPADM

## 2025-05-23 RX ORDER — METOPROLOL TARTRATE 25 MG/1
25 TABLET, FILM COATED ORAL 2 TIMES DAILY
Status: DISCONTINUED | OUTPATIENT
Start: 2025-05-23 | End: 2025-05-24 | Stop reason: HOSPADM

## 2025-05-23 RX ORDER — ALBUTEROL SULFATE 0.83 MG/ML
2.5 SOLUTION RESPIRATORY (INHALATION)
Status: DISCONTINUED | OUTPATIENT
Start: 2025-05-23 | End: 2025-05-24 | Stop reason: HOSPADM

## 2025-05-23 RX ORDER — CEFTRIAXONE 2 G/1
2 INJECTION, POWDER, FOR SOLUTION INTRAMUSCULAR; INTRAVENOUS EVERY 24 HOURS
Status: DISCONTINUED | OUTPATIENT
Start: 2025-05-23 | End: 2025-05-24 | Stop reason: HOSPADM

## 2025-05-23 RX ADMIN — GABAPENTIN 900 MG: 300 CAPSULE ORAL at 22:29

## 2025-05-23 RX ADMIN — UMECLIDINIUM 1 PUFF: 62.5 AEROSOL, POWDER ORAL at 08:37

## 2025-05-23 RX ADMIN — GABAPENTIN 600 MG: 300 CAPSULE ORAL at 16:20

## 2025-05-23 RX ADMIN — METOPROLOL TARTRATE 12.5 MG: 25 TABLET, FILM COATED ORAL at 11:55

## 2025-05-23 RX ADMIN — CEFTRIAXONE 2 G: 2 INJECTION, POWDER, FOR SOLUTION INTRAMUSCULAR; INTRAVENOUS at 11:54

## 2025-05-23 RX ADMIN — APIXABAN 5 MG: 5 TABLET, FILM COATED ORAL at 20:16

## 2025-05-23 RX ADMIN — ACETAMINOPHEN 650 MG: 325 TABLET, FILM COATED ORAL at 04:41

## 2025-05-23 RX ADMIN — ACETAMINOPHEN 650 MG: 325 TABLET, FILM COATED ORAL at 22:29

## 2025-05-23 RX ADMIN — METHOCARBAMOL 500 MG: 500 TABLET ORAL at 08:12

## 2025-05-23 RX ADMIN — AZITHROMYCIN DIHYDRATE 250 MG: 250 TABLET ORAL at 08:11

## 2025-05-23 RX ADMIN — APIXABAN 5 MG: 5 TABLET, FILM COATED ORAL at 08:11

## 2025-05-23 RX ADMIN — GABAPENTIN 600 MG: 300 CAPSULE ORAL at 08:11

## 2025-05-23 RX ADMIN — METHOCARBAMOL 500 MG: 500 TABLET ORAL at 16:20

## 2025-05-23 RX ADMIN — PREDNISONE 40 MG: 20 TABLET ORAL at 08:12

## 2025-05-23 RX ADMIN — ALBUTEROL SULFATE 2.5 MG: 2.5 SOLUTION RESPIRATORY (INHALATION) at 08:37

## 2025-05-23 RX ADMIN — METOPROLOL TARTRATE 12.5 MG: 25 TABLET, FILM COATED ORAL at 08:11

## 2025-05-23 RX ADMIN — CEFEPIME 2 G: 2 INJECTION, POWDER, FOR SOLUTION INTRAVENOUS at 04:39

## 2025-05-23 RX ADMIN — CETIRIZINE HYDROCHLORIDE 10 MG: 10 TABLET, FILM COATED ORAL at 20:16

## 2025-05-23 RX ADMIN — FLUTICASONE FUROATE AND VILANTEROL TRIFENATATE 1 PUFF: 100; 25 POWDER RESPIRATORY (INHALATION) at 08:37

## 2025-05-23 RX ADMIN — METOPROLOL TARTRATE 25 MG: 25 TABLET, FILM COATED ORAL at 20:16

## 2025-05-23 RX ADMIN — ALBUTEROL SULFATE 2.5 MG: 2.5 SOLUTION RESPIRATORY (INHALATION) at 19:26

## 2025-05-23 RX ADMIN — TAMSULOSIN HYDROCHLORIDE 0.4 MG: 0.4 CAPSULE ORAL at 20:16

## 2025-05-23 RX ADMIN — ALBUTEROL SULFATE 2.5 MG: 2.5 SOLUTION RESPIRATORY (INHALATION) at 15:57

## 2025-05-23 RX ADMIN — METHOCARBAMOL 500 MG: 500 TABLET ORAL at 22:29

## 2025-05-23 RX ADMIN — PANTOPRAZOLE SODIUM 40 MG: 40 TABLET, DELAYED RELEASE ORAL at 08:12

## 2025-05-23 ASSESSMENT — ACTIVITIES OF DAILY LIVING (ADL)
DEPENDENT_IADLS:: INDEPENDENT
ADLS_ACUITY_SCORE: 70
ADLS_ACUITY_SCORE: 67
ADLS_ACUITY_SCORE: 70
ADLS_ACUITY_SCORE: 67
ADLS_ACUITY_SCORE: 70
ADLS_ACUITY_SCORE: 67
ADLS_ACUITY_SCORE: 70
ADLS_ACUITY_SCORE: 67
ADLS_ACUITY_SCORE: 67
ADLS_ACUITY_SCORE: 70
ADLS_ACUITY_SCORE: 67

## 2025-05-23 NOTE — PROGRESS NOTES
[facility]    Medicine Progress Note - Hospitalist Service    Date of Admission:  5/21/2025    Assessment & Plan     Tigre Gillette is a 85 year old male admitted on 5/21/2025. He has a past medical history significant for COPD, atrial  fibrillation, prostate cancer, chronic back pain, GERD, and ongoing tobacco use disorder.  He presented to emergency room due to shortness of breath, cough, fever, weakness.  Found to have sepsis with septic shock due to pneumonia.  Also noted to be in atrial flutter.     Community-acquired pneumonia.  Sepsis with septic shock due to pneumonia.  - Initially started on antibiotics with azithromycin and IV ceftriaxone in the ER.  - Had aggressive IV fluid resuscitation due to sepsis.  - Continued to be hypotensive.  - Central line placed in ER.    -Started on continuous IV norepinephrine.  - Hypotension resolved.  -Stop norepinephrine.  -Stop IV fluids.  -Discontinue central line.  - Continue azithromycin.  -De-escalate cefepime to ceftriaxone 2 g IV every 24 hours.  - Monitor culture results.     Acute COPD exacerbation.  - Initially with Bronchospasm on exam.  - Likely due to pneumonia.  - Improving.   - Initially on methylprednisolone 40 mg IV every 8 hours.  -Decrease prednisone to 40 mg a day.  -Antibiotics as above.  -Continue fluticasone/Vilanterol/Umeclidinium.  - Stop DuoNeb scheduled 4 times a day.  - Continue scheduled albuterol nebs.  - Continue albuterol as needed.     Acute infectious encephalopathy.  - Due to sepsis and pneumonia.  - Treat infection as above.  -Currently resolved.     Atrial flutter.  History of paroxysmal atrial fibrillation.  Mild troponin elevation.    Drug-induced coagulation defect.  -Given amiodarone bolus in ER and started on continuous IV infusion.    - Converted to normal sinus rhythm.  - Suspect atrial flutter related to sepsis.  - Initial troponin 53.  Not having chest pain.  - Repeat troponin level 99.  -  hypotension  resolved.  - Increase metoprolol to 25 mg twice a day.  - Continue apixaban 5 mg twice a day.  -Echocardiogram with EF 60 to 65% and no regional WMA's.     Ongoing tobacco use disorder.  - I did advise smoking cessation on 5/21.  - Nicotine gum if needed.     GERD.  - Pantoprazole 40 mg a day.     Hyponatremia.  - Mild.  Sodium 134.  - Resolved.  - Stop IV fluids.             Diet: Regular Diet Adult    DVT Prophylaxis: DOAC  [place]ell Catheter: Not present  Lines: None     Cardiac Monitoring: ACTIVE order. Indication: ICU  Code Status: No CPR- Do NOT Intubate      Clinically Significant Risk Factors          # Hyperchloremia: Highest Cl = 110 mmol/L in last 2 days, will monitor as appropriate          # Hypoalbuminemia: Lowest albumin = 3.2 g/dL at 5/22/2025  4:05 AM, will monitor as appropriate                    # Financial/Environmental Concerns: none         Social Drivers of Health    Tobacco Use: High Risk (5/21/2025)    Patient History     Smoking Tobacco Use: Every Day     Smokeless Tobacco Use: Never          Disposition Plan     Medically Ready for Discharge: Anticipated Tomorrow             Samuel Daley DO  Hospitalist Service  [facility]  Securely message with [employer] (more info)  Text page via [facility] Paging/Directory   ______________________________________________________________________    Interval History   Coughing.  Short of breath at times.  Feels improved from previous.  Feels weak.  Denies chest pain, fevers, chills, nausea.    Physical Exam   Vital Signs: Temp: 98.9  F (37.2  C) Temp src: Temporal BP: (!) 134/106 Pulse: 101   Resp: 27 SpO2: 94 % O2 Device: None (Room air)    Weight: 158 lbs 8.17 oz    Gen:  NAD, A&Ox3.  Eyes:  PERRL, sclera anicteric.  OP:  MMM, no lesions.  Neck:  Supple.  CV: Fairly regular, no murmurs.  Lung:  Mild wheeze b/l, normal effort.  Ab:  +BS, soft.  Skin:  Warm, dry to touch.  No rash.  Ext:  No pitting edema LE b/l.      Medical Decision  Making       40 MINUTES SPENT BY ME on the date of service doing chart review, history, exam, documentation & further activities per the note.      Data     I have personally reviewed the following data over the past 24 hrs:    14.7 (H)  \   10.9 (L)   / 187     137 110 (H) 14.4 /  129 (H)   4.0 19 (L) 0.66 (L) \       Imaging results reviewed over the past 24 hrs:   Recent Results (from the past 24 hours)   Echocardiogram Complete   Result Value    LVEF  60-65%    Narrative    [id number]  [id number]  [id number]  [id number]^IVAN^REJI^ROBINA     [facility]  Echocardiography Laboratory  [address] [place] [address]     Name: RAFY JARRELL  MRN: [id number]  : 1940  Study Date: 2025 11:11 AM  Age: 85 yrs  Gender: Male  Patient Location: [facility]  Reason For Study: MI  Ordering Physician: REJI LOVE  Referring Physician: Susan smith  Performed By: Mateusz Leone RDCS     BSA: 2.0 m2  Height: 74 in  Weight: 158 lb  HR: 69  BP: 145/82 mmHg  ______________________________________________________________________________  Procedure  Echocardiogram with two-dimensional, color and spectral Doppler.  ______________________________________________________________________________  Interpretation Summary     Aortic root dilatation is present.  The visual ejection fraction is 60-65%.  Normal left ventricular wall motion  ______________________________________________________________________________  Left Ventricle  The left ventricle is normal in size. There is normal left ventricular wall  thickness. A sigmoid septum is present. The visual ejection fraction is 60-  65%. Left ventricular diastolic function is normal. Normal left ventricular  wall motion.     Right Ventricle  The right ventricle is normal in structure, function and size.     Atria  Normal left atrial size. Right atrial size is normal.     Mitral Valve  The mitral valve is normal in structure and function.      Tricuspid Valve  Normal tricuspid valve. There is tricuspid annular calcification. Right  ventricular systolic pressure is normal.     Aortic Valve  There is trivial trileaflet aortic sclerosis.     Pulmonic Valve  The pulmonic valve is not well seen, but is grossly normal.     Vessels  Aortic root dilatation is present. Normal size ascending aorta. The inferior  vena cava is normal.     Pericardium  There is no pericardial effusion.     Rhythm  Sinus rhythm was noted.  ______________________________________________________________________________  MMode/2D Measurements & Calculations  IVSd: 1.4 cm     LVIDd: 4.5 cm  LVIDs: 2.5 cm  LVPWd: 1.0 cm  IVC diam: 1.8 cm  FS: 44.2 %  LV mass(C)d: 194.2 grams  LV mass(C)dI: 98.8 grams/m2  Ao root diam: 4.1 cm  asc Aorta Diam: 3.6 cm  LVOT diam: 2.1 cm  LVOT area: 3.6 cm2  Ao root diam index Ht(cm/m): 2.2  Ao root diam index BSA (cm/m2): 2.1  Asc Ao diam index BSA (cm/m2): 1.9  Asc Ao diam index Ht(cm/m): 1.9  LA Volume (BP): 54.3 ml     LA Volume Index (BP): 27.6 ml/m2  RWT: 0.46  TAPSE: 1.9 cm     Time Measurements  Aortic HR: 60.0 BPM     Doppler Measurements & Calculations  MV E max gerardo: 62.4 cm/sec  MV A max gerardo: 59.7 cm/sec  MV E/A: 1.0  MV max P.1 mmHg  MV mean P.00 mmHg  MV V2 VTI: 24.6 cm  MVA(VTI): 3.9 cm2  MV dec time: 0.26 sec  LV V1 max P.0 mmHg  LV V1 max: 112.0 cm/sec  LV V1 VTI: 26.2 cm  CO(LVOT): 5.7 l/min  CI(LVOT): 2.9 l/min/m2  SV(LVOT): 94.7 ml  SI(LVOT): 48.2 ml/m2  PA acc time: 0.12 sec  TR max gerardo: 257.9 cm/sec  TR max P.6 mmHg     E/E' av.0  Lateral E/e': 6.4  Medial E/e': 7.5  RV S Gerardo: 11.1 cm/sec     ______________________________________________________________________________  Report approved by: Macho Barton MD on 2025 12:13 PM

## 2025-05-23 NOTE — PROGRESS NOTES
"   05/23/25 0938   Appointment Info   Signing Clinician's Name / Credentials (PT) Lena Carolina DPT   Living Environment   People in Home child(kim), adult;spouse   Current Living Arrangements house   Home Accessibility stairs to enter home;stairs within home   Number of Stairs, Main Entrance 3   Stair Railings, Main Entrance none   Number of Stairs, Within Home, Primary greater than 10 stairs   Stair Railings, Within Home, Primary railing on right side (ascending)   Transportation Anticipated family or friend will provide   Living Environment Comments laundry in basement, all other needs on main floor   Self-Care   Usual Activity Tolerance good   Current Activity Tolerance poor   Equipment Currently Used at Home cane, straight   Fall history within last six months yes   Number of times patient has fallen within last six months 1   Activity/Exercise/Self-Care Comment Patient reports that he will use cane for mobility (or furniture surfing when in home).  Patient's wife \"weaker than I am\" per patient, daughter and daughter's boyfriend (who is on disability) work outside of home during the day   General Information   Onset of Illness/Injury or Date of Surgery 05/21/25   Referring Physician Samuel Daley, DO   Patient/Family Therapy Goals Statement (PT) Patient firmly declining TCU, wants to return to home at discharge but acknowledges  poor endurance   Pertinent History of Current Problem (include personal factors and/or comorbidities that impact the POC) Per medical chart: Tigre Gillette is a 85 year old male admitted on 5/21/2025. He has a past medical history significant for COPD, atrial fibrillation, prostate cancer, chronic back pain, GERD, and ongoing tobacco use disorder. He presented to emergency room due to shortness of breath, cough, fever, weakness. Found to have sepsis with septic shock due to pneumonia. Also noted to be in atrial flutter.   Existing Precautions/Restrictions fall   Cognition "   Orientation Status (Cognition) oriented x 4   Safety Deficit (Cognition) insight into deficits/self-awareness;awareness of need for assistance   Pain Assessment   Patient Currently in Pain   (reported no pain during session)   Integumentary/Edema   Integumentary/Edema Comments Please refer to nursing notes   Posture    Posture Forward head position;Protracted shoulders   Range of Motion (ROM)   Range of Motion ROM is WFL   Strength (Manual Muscle Testing)   Strength (Manual Muscle Testing) Deficits observed during functional mobility   Bed Mobility   Comment, (Bed Mobility) Verbal cueing with SBA   Transfers   Transfers sit-stand transfer   Sit-Stand Transfer   Sit-Stand [place] (Transfers) contact guard   Assistive Device (Sit-Stand Transfers) walker, front-wheeled   Gait/Stairs (Locomotion)   [place] Level (Gait) contact guard   Assistive Device (Gait) walker, front-wheeled   Distance in Feet (Gait) 25 (eval) +35 (treatment)   Balance   Balance Comments Patient at increased fall risk secondary to decreased activity tolerance, now requiring bilateral UE support at walker   Clinical Impression   Criteria for Skilled Therapeutic Intervention Yes, treatment indicated   PT Diagnosis (PT) Impaired functional mobility   Influenced by the following impairments pain, decreased strength, activity tolerance, balance, cognition   Functional limitations due to impairments difficulties with transfers and ambulation   Clinical Presentation (PT Evaluation Complexity) stable   Clinical Presentation Rationale clinical judgement   Clinical Decision Making (Complexity) low complexity   Planned Therapy Interventions (PT) balance training;bed mobility training;gait training;patient/family education;strengthening;transfer training;progressive activity/exercise;ROM (range of motion);stair training;home program guidelines   Risk & Benefits of therapy have been explained evaluation/treatment results reviewed;care plan/treatment  goals reviewed;risks/benefits reviewed;current/potential barriers reviewed;participants included;patient;participants voiced agreement with care plan   PT Total Evaluation Time   PT Eval, Low Complexity Minutes (97744) 10   Physical Therapy Goals   PT Frequency Daily   PT Predicted Duration/Target Date for Goal Attainment 05/25/25   PT Goals Transfers;Gait;Aerobic Activity;Stairs;Bed Mobility   PT: Bed Mobility Supervision/stand-by assist;Supine to/from sit   PT: Transfers Sit to/from stand;Bed to/from chair;Assistive device;Supervision/stand-by assist   PT: Gait Assistive device;Supervision/stand-by assist;150 feet   PT: Stairs Supervision/stand-by assist;Assistive device;3 stairs  (no railing)   PT: Perform aerobic activity with stable cardiovascular response 10 minutes;ambulation;intermittent activity   Interventions   Interventions Quick Adds Therapeutic Activity   Therapeutic Activity   Therapeutic Activities: dynamic activities to improve functional performance Minutes (31026) 25   Symptoms Noted During/After Treatment Fatigue;Shortness of breath   Treatment Detail/Skilled Intervention Patient supine upon arrival, on RA throughout session.  Facilitated transfer to sitting at EOB with verbal cueing and environmental set up. Patient reported dizziness in sitting initially, /84.  Symptoms improved within 2 min.  Required mod A to don brief, max A for nannette cares and bed clean up (patient reports he does this independently at home).  Facilitated amb 20+35 feet with 2WW and CGA.  Patient with slow gait speed, increased trunk flexion, fair foot clearance, verbal cueing to avoid picking up walker.  Required seated rest break over 5 min between amb trials secondary to fatigue, SOB.  Patient maintained O2 sats between 92-96% on RA with activity, however increased WOB noted. BP following activity 134/106. Education on discharge recommendation to TCU; patient declined.  Encouraged to discuss with family as patient  would require 24/7 assist if discharging to home secondary to limited activity tolerance.   PT Discharge Planning   PT Plan progress amb distance, stair negotiation   PT Discharge Recommendation (DC Rec) Transitional Care Facility   PT Rationale for DC Rec Patient presents below baseline for functional mobility.  Patient unable to demonstrate safe, independent household mobility.  Currently requiring  Ax1 for transfers and ambulation, limited by poor activity tolerance.  Recommend discharge to TCU in order to increase strength, activity tolerance, balance and independence with mobility.  If patient discharges to home, would require 24/7 superivsion with ambulation, assist with ADLs/IADLs as needed, home RN/PT/OT/HHA   PT Brief overview of current status Ax1 with 2WW   PT Total Distance Amb During Session (feet) 55   Physical Therapy Time and Intention   Timed Code Treatment Minutes 25   Total Session Time (sum of timed and untimed services) 35

## 2025-05-23 NOTE — PROGRESS NOTES
05/22/25 1435   Appointment Info   Signing Clinician's Name / Credentials (OT) Rossana Morris, MS, OTR/L   Living Environment   People in Home spouse  (Daughter. Daughter's partner. Pt's spouse uses a cane so unable to provide physical assist)   Current Living Arrangements house   Home Accessibility stairs within home   Living Environment Comments Full flight of stairs to laundry, freezer   Self-Care   Usual Activity Tolerance good  (Pt does report gradual decline in activity tolerance since April 2025)   Current Activity Tolerance fair   Regular Exercise No   Equipment Currently Used at Home cane, straight;grab bar, toilet;grab bar, tub/shower;raised toilet seat;shower chair  (Step in shower)   Fall history within last six months yes   Number of times patient has fallen within last six months 1  (Fall at home on 5/21/25)   Activity/Exercise/Self-Care Comment Pt reports at baseline he is independent in self-cares. Uses cane for community mobility   Instrumental Activities of Daily Living (IADL)   IADL Comments Reports daughter does most cleaning, cooking. He does do some laundry, simple meal prep. Drives. Manages meds using pillboxes   General Information   Onset of Illness/Injury or Date of Surgery 05/21/25   Referring Physician Samuel Daley, DO   Patient/Family Therapy Goal Statement (OT) Improve independence   Additional Occupational Profile Info/Pertinent History of Current Problem 85 year old male admitted on 5/21/2025. He has a past medical history significant for COPD, atrial  fibrillation, prostate cancer, chronic back pain, GERD, and ongoing tobacco use disorder.  He presented to emergency room due to shortness of breath, cough, fever, weakness.  Found to have sepsis with septic shock due to pneumonia.  Also noted to be in atrial flutter.   Existing Precautions/Restrictions fall   Cognitive Status Examination   Affect/Mental Status (Cognitive) WFL   Follows Commands follows two-step commands    Executive Function Deficit minimal deficit;insight/awareness of deficits   Cognitive Status Comments [place]. Continue to monitor   Visual Perception   Visual Impairment/Limitations corrective lenses full-time;WFL   Sensory   Sensory Comments Pt reports baseline neuropathy in feet   Pain Assessment   Patient Currently in Pain Yes, see Vital Sign flowsheet  (chronic back pain)   Strength Comprehensive (MMT)   Comment, General Manual Muscle Testing (MMT) Assessment Generalized weakness   Transfers   Transfers toilet transfer;shower transfer   Shower Transfer   [place] Level (Shower Transfer) minimum assist (75% patient effort)   Shower Transfer Comments per clinical judgement   Toilet Transfer   [place] Level (Toilet Transfer) minimum assist (75% patient effort)   Toilet Transfer Comments per clinical judgement   Activities of Daily Living   BADL Assessment/Intervention lower body dressing;grooming;toileting   Lower Body Dressing Assessment/Training   [place] Level (Lower Body Dressing) minimum assist (75% patient effort)   Grooming Assessment/Training   Comment, (Grooming) unable to tolerate standing to complete   Toileting   [place] Level (Toileting) minimum assist (75% patient effort)   Clinical Impression   Criteria for Skilled Therapeutic Interventions Met (OT) Yes, treatment indicated   OT Diagnosis Decline function   OT Problem List-Impairments impacting ADL activity tolerance impaired;balance;mobility;strength  (Possible cog impairment- continue to monitor)   Assessment of Occupational Performance 5 or more Performance Deficits   Identified Performance Deficits LB dressing, toileting, grooming, bathing, functional mobility, IADLs   Planned Therapy Interventions (OT) ADL retraining;home program guidelines;progressive activity/exercise;risk factor education;transfer training   Clinical Decision Making Complexity (OT) problem focused assessment/low complexity   Risk & Benefits of therapy have  "been explained evaluation/treatment results reviewed;care plan/treatment goals reviewed;risks/benefits reviewed;current/potential barriers reviewed;participants voiced agreement with care plan;participants included;patient;spouse/significant other;daughter   OT Total Evaluation Time   OT Eval, Low Complexity Minutes (12049) 8   OT Goals   Therapy Frequency (OT) Daily   OT Predicted Duration/Target Date for Goal Attainment 06/05/25   OT Goals Hygiene/Grooming;Lower Body Dressing;Transfers;Toilet Transfer/Toileting   OT: Hygiene/Grooming supervision/stand-by assist;while standing   OT: Lower Body Dressing Supervision/stand-by assist;including set-up/clothing retrieval   OT: Transfer Supervision/stand-by assist  (Step in shower transfer)   OT: Toilet Transfer/Toileting Supervision/stand-by assist;toilet transfer;cleaning and garment management;using adaptive equipment   Interventions   Interventions Quick Adds Self-Care/Home Management   Self-Care/Home Management   Self-Care/Home Mgmt/ADL, Compensatory, Meal Prep Minutes (39075) 26   Symptoms Noted During/After Treatment (Meal Preparation/Planning Training) fatigue   Treatment Detail/Skilled Intervention Upon arrival pt in ICU. Spouse and daughter present. Pt needed encouragement to participate as he reports \"I am so weak\". Pt assisted OOB for first time in ICU. Hypertensive; RN aware. Semi-supine to sit with SBA. Pt donned slippers with back with CGA for sitting balance. Sit to stand from EOB with CGA, assist of 2 for safety as pt's first time standing. Systolic decrease from 179 to 152; pt denied dizziness throughout session. Pt stood and engaged in static standing, marching in place, mini knee squats for 2 minutes in preparation for activity. Pt ambulated 4 feet to recliner with FWW with CGA for balance safety. Pt had been incontinent. Pt stood for approximately 4 minutes to complete back nannette hygiene and wipe hands using whole pack of wet wipes. CGA for balance " safety required cue to unsteadiness. Pt reported significant fatigue. Stand to sit with SBA. Pt participated in education regarding how to progress activity tolerance while hospitalized. BP 140s/90s. O2 sat in 90s throughout session on room air per continuouse pulse ox. Pt left in recliner with needed supplies. Refer to VS flowsheet.   OT Discharge Planning   OT Plan G/H at sink with chair behind. LB dressing from recliner. Toilet transfer   OT Discharge Recommendation (DC Rec) Transitional Care Facility   OT Rationale for DC Rec Pt currently requiring assist of 1 for selfcares primarily due to impairments in activity tolerance. Recommend continued skilled OT in TCU to progress pt safety and independence. Pt does have potential to progress to home pending progress, LOS, frequent mobilization with nursing; will start with daily OT. IF pt refuses TCU would recommend Ax1 with selfcares and mobility, home OT/PT.   OT Brief overview of current status Assisted OOB for first time in ICU. Stood for approximately 13 consecutive minutes.   OT Total Distance Amb During Session (feet) 4   Total Session Time   Timed Code Treatment Minutes 26   Total Session Time (sum of timed and untimed services) 34

## 2025-05-23 NOTE — PLAN OF CARE
Goal Outcome Evaluation:      Plan of Care Reviewed With: patient    Overall Patient Progress: improvingOverall Patient Progress: improving    Outcome Evaluation: See notes  ICU End of Shift Summary.  For vital signs and complete assessments, please see documentation flowsheets.     Pertinent assessments: Patient alert and oriented x4. Afebrile. RA sating well. BP slightly elevated at the start of the shitf. Tele SB to SR with intermittent A fib CVR. Tolerating diet. No BM. External cath  good urine output. CVC and PIV.   Major Shift Events:   >   Plan (Upcoming Events): Continue ongoing ICU care  Discharge/Transfer Needs: TBD    Bedside Shift Report Completed : Y  Bedside Safety Check Completed: Y      Problem: Adult Inpatient Plan of Care  Goal: Plan of Care Review  Description: The Plan of Care Review/Shift note should be completed every shift.  The Outcome Evaluation is a brief statement about your assessment that the patient is improving, declining, or no change.  This information will be displayed automatically on your shift  note.  Flowsheets (Taken 5/23/2025 0724)  Outcome Evaluation: See notes  Plan of Care Reviewed With: patient  Overall Patient Progress: improving  Goal: Absence of Hospital-Acquired Illness or Injury  Intervention: Identify and Manage Fall Risk  Recent Flowsheet Documentation  Taken 5/23/2025 0440 by Dona Altamirano RN  Safety Promotion/Fall Prevention:   activity supervised   clutter free environment maintained   increase visualization of patient   lighting adjusted   nonskid shoes/slippers when out of bed   patient and family education   room near nurse's station   room organization consistent   safety round/check completed   treat reversible contributory factors   treat underlying cause  Taken 5/23/2025 0020 by Dona Altamirano RN  Safety Promotion/Fall Prevention:   activity supervised   clutter free environment maintained   increase visualization of patient   lighting  adjusted   nonskid shoes/slippers when out of bed   patient and family education   room near nurse's station   room organization consistent   safety round/check completed   treat reversible contributory factors   treat underlying cause  Taken 5/22/2025 2020 by Dona Altamirano RN  Safety Promotion/Fall Prevention:   activity supervised   clutter free environment maintained   increase visualization of patient   lighting adjusted   nonskid shoes/slippers when out of bed   patient and family education   room near nurse's station   room organization consistent   safety round/check completed   treat reversible contributory factors   treat underlying cause  Intervention: Prevent Skin Injury  Recent Flowsheet Documentation  Taken 5/23/2025 0440 by Dona Altamirano RN  Body Position: weight shifting  Taken 5/23/2025 0200 by Dona Altamirano RN  Body Position: weight shifting  Taken 5/23/2025 0020 by Dona Altamirano RN  Body Position: weight shifting  Taken 5/22/2025 2020 by Dona Altamirano RN  Body Position: weight shifting  Intervention: Prevent and Manage VTE (Venous Thromboembolism) Risk  Recent Flowsheet Documentation  Taken 5/23/2025 0440 by Dona Altamirano RN  VTE Prevention/Management: (elqifabrice) SCDs off (sequential compression devices)  Taken 5/23/2025 0020 by Dona Altamirano RN  VTE Prevention/Management: (elqiuis) SCDs off (sequential compression devices)  Taken 5/22/2025 2020 by Dona Altamirano RN  VTE Prevention/Management: (elqiuis) SCDs off (sequential compression devices)  Goal: Optimal Comfort and Wellbeing  Intervention: Monitor Pain and Promote Comfort  Recent Flowsheet Documentation  Taken 5/23/2025 0440 by Dona Altamirano RN  Pain Management Interventions:   medication (see MAR)   pillow support provided  Taken 5/22/2025 2143 by Dona Altamirano RN  Pain Management Interventions:   medication (see MAR)   pillow support provided  Taken 5/22/2025 2020  by Dona Altamirano RN  Pain Management Interventions: pillow support provided  Intervention: Provide Person-Centered Care  Recent Flowsheet Documentation  Taken 5/23/2025 0440 by Dona Altamirano RN  Trust Relationship/Rapport:   care explained   choices provided   emotional support provided   empathic listening provided   questions answered   questions encouraged   reassurance provided   thoughts/feelings acknowledged  Taken 5/23/2025 0020 by Dona Altamirano RN  Trust Relationship/Rapport:   care explained   choices provided   emotional support provided   empathic listening provided   questions answered   questions encouraged   reassurance provided   thoughts/feelings acknowledged  Taken 5/22/2025 2020 by Dona Altamirano RN  Trust Relationship/Rapport:   care explained   choices provided   emotional support provided   empathic listening provided   questions answered   questions encouraged   reassurance provided   thoughts/feelings acknowledged

## 2025-05-23 NOTE — CONSULTS
Care Management Initial Consult    General Information  Assessment completed with: Patient, VM-chart review, Children, daughter, Genna  Type of CM/SW Visit: Initial Assessment    Primary Care Provider verified and updated as needed: Yes   Readmission within the last 30 days: no previous admission in last 30 days      Reason for Consult: discharge planning  Advance Care Planning: Advance Care Planning Reviewed: present on chart          Communication Assessment  Patient's communication style: spoken language (English or Bilingual)             Cognitive  Cognitive/Neuro/Behavioral: .WDL except, level of consciousness  Level of Consciousness: alert  Arousal Level: arouses to voice  Orientation: oriented x 4  Mood/Behavior: calm, cooperative  Best Language: 0 - No aphasia  Speech: logical    Living Environment:   People in home: child(kim), adult, spouse     Current living Arrangements: house      Able to return to prior arrangements: yes       Family/Social Support:  Care provided by: self  Provides care for: no one  Marital Status:   Support system: Wife, Children          Description of Support System: Supportive, Involved    Support Assessment: Adequate family and caregiver support    Current Resources:   Patient receiving home care services: No        Community Resources: OP Infusion  Equipment currently used at home: cane, straight  Supplies currently used at home:      Employment/Financial:  Employment Status:          Financial Concerns: none   Referral to Financial Worker: No       Does the patient's insurance plan have a 3 day qualifying hospital stay waiver?  Yes     Which insurance plan 3 day waiver is available? Alternative insurance waiver    Will the waiver be used for post-acute placement? No    Lifestyle & Psychosocial Needs:  Social Drivers of Health     Food Insecurity: Low Risk  (3/18/2025)    Food Insecurity     Within the past 12 months, did you worry that your food would run out before you  got money to buy more?: No     Within the past 12 months, did the food you bought just not last and you didn t have money to get more?: No   Depression: Not at risk (3/10/2025)    Received from [facility]    PHQ-2     PHQ-2 Score: 0   Housing Stability: Low Risk  (3/18/2025)    Housing Stability     Do you have housing? : Yes     Are you worried about losing your housing?: No   Tobacco Use: High Risk (5/21/2025)    Patient History     Smoking Tobacco Use: Every Day     Smokeless Tobacco Use: Never     Passive Exposure: Not on file   Financial Resource Strain: Low Risk  (3/18/2025)    Financial Resource Strain     Within the past 12 months, have you or your family members you live with been unable to get utilities (heat, electricity) when it was really needed?: No   Alcohol Use: Not on file   Transportation Needs: Low Risk  (3/18/2025)    Transportation Needs     Within the past 12 months, has lack of transportation kept you from medical appointments, getting your medicines, non-medical meetings or appointments, work, or from getting things that you need?: No   Physical Activity: Not on file   Interpersonal Safety: Low Risk  (3/18/2025)    Interpersonal Safety     Do you feel physically and emotionally safe where you currently live?: Yes     Within the past 12 months, have you been hit, slapped, kicked or otherwise physically hurt by someone?: No     Within the past 12 months, have you been humiliated or emotionally abused in other ways by your partner or ex-partner?: No   Stress: Not on file   Social Connections: Not on file   Health Literacy: Not on file       Functional Status:  Prior to admission patient needed assistance:   Dependent ADLs:: Ambulation-cane  Dependent IADLs:: Independent (daughter does most cleaning)  Assesssment of Functional Status: Not at baseline with mobility    Mental Health Status:  Mental Health Status: No Current Concerns       Chemical Dependency Status:  Chemical Dependency Status:  "No Current Concerns             Values/Beliefs:  Spiritual, Cultural Beliefs, Zoroastrianism Practices, Values that affect care: no               Discussed  Partnership in Safe Discharge Planning  document with patient/family: No    Additional Information:  SW consulted for discharge planning. TCU is recommended and pt is declining recommendation. SW met with pt at bedside and he called his daughter, Genna to participate in discharge planning discussion.    Pt and his spouse live with their daughter, Genna and her significant other (work outside the home. Per chart, spouse is \"weaker than I am\" Pt uses a cane for mobility.     SW discussed recommendation from therapy, if going home \"require 24/7 superivsion with ambulation, assist with ADLs/IADLs as needed, home RN/PT/OT/HHA\". Pt would not have 24/7 assist and believes he can mobilize with his can and wall surfing, to get to the bathroom. He can have meals brought to him. He declines HHA for showering.  He declines OT and Rn but is agreeable to PT, daughter supports.  They agreed to have referrals sent to agencies that accept his insurance.    Home care PT order sent.    Family will transport at discharge.      Next Steps: follow up with pt with confirmed agency.    Update: [facility] accepted pt, updated pt.    ELAINE uNnez, LICSHEILA  Emergency Department/Inpatient Float  Care Coordination  [facility]  ED phone: [phone]      NESTOR NUNEZ      "

## 2025-05-24 ENCOUNTER — APPOINTMENT (OUTPATIENT)
Dept: PHYSICAL THERAPY | Facility: CLINIC | Age: 85
DRG: 871 | End: 2025-05-24
Payer: COMMERCIAL

## 2025-05-24 VITALS
OXYGEN SATURATION: 95 % | HEIGHT: 74 IN | BODY MASS INDEX: 20.63 KG/M2 | WEIGHT: 160.72 LBS | SYSTOLIC BLOOD PRESSURE: 167 MMHG | TEMPERATURE: 98.4 F | DIASTOLIC BLOOD PRESSURE: 93 MMHG | RESPIRATION RATE: 20 BRPM | HEART RATE: 57 BPM

## 2025-05-24 LAB
ERYTHROCYTE [DISTWIDTH] IN BLOOD BY AUTOMATED COUNT: 14.6 % (ref 10–15)
HCT VFR BLD AUTO: 35.1 % (ref 40–53)
HGB BLD-MCNC: 11.7 G/DL (ref 13.3–17.7)
MCH RBC QN AUTO: 31.1 PG (ref 26.5–33)
MCHC RBC AUTO-ENTMCNC: 33.3 G/DL (ref 31.5–36.5)
MCV RBC AUTO: 93 FL (ref 78–100)
PLATELET # BLD AUTO: 212 10E3/UL (ref 150–450)
POTASSIUM SERPL-SCNC: 3.6 MMOL/L (ref 3.4–5.3)
RBC # BLD AUTO: 3.76 10E6/UL (ref 4.4–5.9)
WBC # BLD AUTO: 10.9 10E3/UL (ref 4–11)

## 2025-05-24 PROCEDURE — 85014 HEMATOCRIT: CPT | Performed by: INTERNAL MEDICINE

## 2025-05-24 PROCEDURE — 250N000012 HC RX MED GY IP 250 OP 636 PS 637: Performed by: INTERNAL MEDICINE

## 2025-05-24 PROCEDURE — 250N000013 HC RX MED GY IP 250 OP 250 PS 637: Performed by: INTERNAL MEDICINE

## 2025-05-24 PROCEDURE — 36415 COLL VENOUS BLD VENIPUNCTURE: CPT | Performed by: INTERNAL MEDICINE

## 2025-05-24 PROCEDURE — 97530 THERAPEUTIC ACTIVITIES: CPT | Mod: GP

## 2025-05-24 PROCEDURE — 250N000011 HC RX IP 250 OP 636: Performed by: INTERNAL MEDICINE

## 2025-05-24 PROCEDURE — 99239 HOSP IP/OBS DSCHRG MGMT >30: CPT | Performed by: INTERNAL MEDICINE

## 2025-05-24 PROCEDURE — 84132 ASSAY OF SERUM POTASSIUM: CPT | Performed by: INTERNAL MEDICINE

## 2025-05-24 PROCEDURE — 97116 GAIT TRAINING THERAPY: CPT | Mod: GP

## 2025-05-24 RX ORDER — AZITHROMYCIN 250 MG/1
250 TABLET, FILM COATED ORAL DAILY
Qty: 1 TABLET | Refills: 0 | Status: SHIPPED | OUTPATIENT
Start: 2025-05-25 | End: 2025-05-26

## 2025-05-24 RX ORDER — PREDNISONE 10 MG/1
TABLET ORAL
Qty: 20 TABLET | Refills: 0 | Status: SHIPPED | OUTPATIENT
Start: 2025-05-24

## 2025-05-24 RX ORDER — CEFDINIR 300 MG/1
300 CAPSULE ORAL 2 TIMES DAILY
Qty: 6 CAPSULE | Refills: 0 | Status: SHIPPED | OUTPATIENT
Start: 2025-05-24 | End: 2025-05-27

## 2025-05-24 RX ADMIN — ACETAMINOPHEN 650 MG: 325 TABLET, FILM COATED ORAL at 10:22

## 2025-05-24 RX ADMIN — NICOTINE POLACRILEX 2 MG: 2 GUM, CHEWING BUCCAL at 09:41

## 2025-05-24 RX ADMIN — PANTOPRAZOLE SODIUM 40 MG: 40 TABLET, DELAYED RELEASE ORAL at 06:55

## 2025-05-24 RX ADMIN — AZITHROMYCIN DIHYDRATE 250 MG: 250 TABLET ORAL at 08:48

## 2025-05-24 RX ADMIN — NICOTINE POLACRILEX 2 MG: 2 GUM, CHEWING BUCCAL at 07:49

## 2025-05-24 RX ADMIN — NICOTINE POLACRILEX 2 MG: 2 GUM, CHEWING BUCCAL at 13:34

## 2025-05-24 RX ADMIN — APIXABAN 5 MG: 5 TABLET, FILM COATED ORAL at 07:48

## 2025-05-24 RX ADMIN — FLUTICASONE FUROATE AND VILANTEROL TRIFENATATE 1 PUFF: 100; 25 POWDER RESPIRATORY (INHALATION) at 08:48

## 2025-05-24 RX ADMIN — METHOCARBAMOL 500 MG: 500 TABLET ORAL at 08:48

## 2025-05-24 RX ADMIN — METOPROLOL TARTRATE 25 MG: 25 TABLET, FILM COATED ORAL at 08:48

## 2025-05-24 RX ADMIN — UMECLIDINIUM 1 PUFF: 62.5 AEROSOL, POWDER ORAL at 07:48

## 2025-05-24 RX ADMIN — GABAPENTIN 600 MG: 300 CAPSULE ORAL at 08:48

## 2025-05-24 RX ADMIN — CEFTRIAXONE 2 G: 2 INJECTION, POWDER, FOR SOLUTION INTRAMUSCULAR; INTRAVENOUS at 12:20

## 2025-05-24 RX ADMIN — PREDNISONE 40 MG: 20 TABLET ORAL at 08:48

## 2025-05-24 ASSESSMENT — ACTIVITIES OF DAILY LIVING (ADL)
ADLS_ACUITY_SCORE: 51
ADLS_ACUITY_SCORE: 71
ADLS_ACUITY_SCORE: 67
ADLS_ACUITY_SCORE: 51
ADLS_ACUITY_SCORE: 67
ADLS_ACUITY_SCORE: 67
ADLS_ACUITY_SCORE: 51
ADLS_ACUITY_SCORE: 67
ADLS_ACUITY_SCORE: 67

## 2025-05-24 NOTE — PLAN OF CARE
ICU End of Shift Summary.  For vital signs and complete assessments, please see documentation flowsheets.      Pertinent assessments: LS diminished. Afib CVR with BBB. Alert and oriented x4. Baseline neuropathy in legs. Voiding spontaneously. No major skin issues.   Major Shift Events:   -PRN nicorette given  -PRN tylenol given  Plan (Upcoming Events): Possible discharge? 5/24  Discharge/Transfer Needs: SW setting up home PT     Bedside Shift Report Completed : y  Bedside Safety Check Completed: y        Problem: Adult Inpatient Plan of Care  Goal: Absence of Hospital-Acquired Illness or Injury  Intervention: Identify and Manage Fall Risk  Recent Flowsheet Documentation  Taken 5/24/2025 0800 by Miracle Mota RN  Safety Promotion/Fall Prevention:   activity supervised   clutter free environment maintained   lighting adjusted   nonskid shoes/slippers when out of bed   safety round/check completed  Intervention: Prevent Skin Injury  Recent Flowsheet Documentation  Taken 5/24/2025 0800 by Miracle Mota RN  Body Position: position changed independently  Skin Protection:   adhesive use limited   tubing/devices free from skin contact   transparent dressing maintained  Intervention: Prevent and Manage VTE (Venous Thromboembolism) Risk  Recent Flowsheet Documentation  Taken 5/24/2025 0800 by Miracle Mota RN  VTE Prevention/Management: (eliquis) SCDs off (sequential compression devices)  Goal: Optimal Comfort and Wellbeing  Intervention: Provide Person-Centered Care  Recent Flowsheet Documentation  Taken 5/24/2025 0800 by Miracle Mota RN  Trust Relationship/Rapport:   care explained   choices provided   reassurance provided   thoughts/feelings acknowledged  Goal: Readiness for Transition of Care  Intervention: Mutually Develop Transition Plan  Recent Flowsheet Documentation  Taken 5/24/2025 0900 by Miracle Mota RN  Equipment Currently Used at Home: cane, straight     Problem: Delirium  Goal: Optimal  Coping  Intervention: Optimize Psychosocial Adjustment to Delirium  Recent Flowsheet Documentation  Taken 5/24/2025 0800 by Miracle Mota RN  Supportive Measures:   active listening utilized   self-care encouraged   verbalization of feelings encouraged  Family/Support System Care:   support provided   self-care encouraged   involvement promoted  Goal: Improved Behavioral Control  Intervention: Prevent and Manage Agitation  Recent Flowsheet Documentation  Taken 5/24/2025 0800 by Miracle Mota RN  Environment Familiarity/Consistency: daily routine followed  Intervention: Minimize Safety Risk  Recent Flowsheet Documentation  Taken 5/24/2025 0800 by Miracle Mota RN  Enhanced Safety Measures:   pain management   review medications for side effects with activity   room near unit station  Trust Relationship/Rapport:   care explained   choices provided   reassurance provided   thoughts/feelings acknowledged  Goal: Improved Attention and Thought Clarity  Intervention: Maximize Cognitive Function  Recent Flowsheet Documentation  Taken 5/24/2025 0800 by Miracle Mota RN  Sensory Stimulation Regulation:   television on   lighting decreased   care clustered  Reorientation Measures:   clock in view   glasses use encouraged     Problem: Pneumonia  Goal: Fluid Balance  Intervention: Monitor and Manage Fluid Balance  Recent Flowsheet Documentation  Taken 5/24/2025 0800 by Miracle Mota RN  Fluid/Electrolyte Management: fluids provided  Goal: Resolution of Infection Signs and Symptoms  Intervention: Prevent Infection Progression  Recent Flowsheet Documentation  Taken 5/24/2025 0800 by Miracle Mota RN  Infection Management: aseptic technique maintained  Isolation Precautions:   droplet precautions maintained   contact precautions maintained  Goal: Effective Oxygenation and Ventilation  Intervention: Promote Airway Secretion Clearance  Recent Flowsheet Documentation  Taken 5/24/2025 0800 by Miracle Mota  RN  Cough And Deep Breathing: done independently per patient  Activity Management: activity adjusted per tolerance  Intervention: Optimize Oxygenation and Ventilation  Recent Flowsheet Documentation  Taken 5/24/2025 0800 by Miracle Mota RN  Airway/Ventilation Management:   airway patency maintained   calming measures promoted  Head of Bed (HOB) Positioning: HOB at 30-45 degrees     Problem: Sepsis/Septic Shock  Goal: Optimal Coping  Intervention: Optimize Psychosocial Adjustment to Illness  Recent Flowsheet Documentation  Taken 5/24/2025 0800 by Miracle Mota RN  Supportive Measures:   active listening utilized   self-care encouraged   verbalization of feelings encouraged  Family/Support System Care:   support provided   self-care encouraged   involvement promoted  Goal: Absence of Bleeding  Intervention: Monitor and Manage Bleeding  Recent Flowsheet Documentation  Taken 5/24/2025 0800 by Miracle Mota RN  Bleeding Precautions:   blood pressure closely monitored   gentle oral care promoted   monitored for signs of bleeding  Bleeding Management: dressing monitored  Goal: Absence of Infection Signs and Symptoms  Intervention: Initiate Sepsis Management  Recent Flowsheet Documentation  Taken 5/24/2025 0800 by Miracle Mota RN  Infection Management: aseptic technique maintained  Isolation Precautions:   droplet precautions maintained   contact precautions maintained  Intervention: Promote Stabilization  Recent Flowsheet Documentation  Taken 5/24/2025 0800 by Miracle Mota RN  Lung Protection Measures: fluid excess minimized  Fluid/Electrolyte Management: fluids provided  Intervention: Promote Recovery  Recent Flowsheet Documentation  Taken 5/24/2025 0800 by Miracle Mota RN  Airway/Ventilation Management:   airway patency maintained   calming measures promoted  Activity Management: activity adjusted per tolerance  Goal: Optimal Nutrition Intake  Intervention: Optimize Nutrition Delivery  Recent  Flowsheet Documentation  Taken 5/24/2025 0800 by Miracle Mota RN  Nutrition Support Management: weight trending reviewed     Problem: Skin Injury Risk Increased  Goal: Skin Health and Integrity  Intervention: Plan: Nurse Driven Intervention: Moisture Management  Recent Flowsheet Documentation  Taken 5/24/2025 0800 by Miracle Mota RN  Moisture Interventions:   Encourage regular toileting   No brief in bed  Intervention: Optimize Skin Protection  Recent Flowsheet Documentation  Taken 5/24/2025 0800 by Miracle Mota RN  Skin Protection:   adhesive use limited   tubing/devices free from skin contact   transparent dressing maintained  Activity Management: activity adjusted per tolerance  Head of Bed (HOB) Positioning: HOB at 30-45 degrees  Intervention: Promote and Optimize Oral Intake  Recent Flowsheet Documentation  Taken 5/24/2025 0800 by Miracle Mota RN  Oral Nutrition Promotion: rest periods promoted

## 2025-05-24 NOTE — PROGRESS NOTES
Care Management Discharge Note    Discharge Date: 05/24/2025       Discharge Disposition: Home Care    Discharge Services:      Discharge DME:      Discharge Transportation: family or friend will provide    Private pay costs discussed: Not applicable    Does the patient's insurance plan have a 3 day qualifying hospital stay waiver?  No    PAS Confirmation Code:    Patient/family educated on Medicare website which has current facility and service quality ratings: yes    Education Provided on the Discharge Plan:  NO  Persons Notified of Discharge Plans: HC agency  Patient/Family in Agreement with the Plan: yes    Handoff Referral Completed: No, handoff not indicated or clinically appropriate    Additional Information:  Patient discharging to [facility] via family with [facility] for PT. Orders were faxed to the HC agency. [facility] info on the AVS.    Nimco Singh RN, BSN, CM  Inpatient Care Coordination  [facility]  [phone]

## 2025-05-24 NOTE — PROVIDER NOTIFICATION
"   05/23/25 2140   RCAT Assessment   Reason for Assessment COPD   Pulmonary Status 3   Surgical Status 0   Chest X-ray 3   Respiratory Pattern 0   Mental Status 0   Breath Sounds 0   Cough Effectiveness 0   Level of Activity 0   O2 Required for SpO2>=92% 0   Acuity Level (points) 6   Acuity Level  4   Re-eval Interval Guideline Every 3 days   Re-evaluation Date 05/26/25   $RT Consult Time Spent RCAT (30 minute increments) 1   Clinical Indications/Symptoms   Aerosol Therapy RCAT protocol   Broncho-pulmonary Hygiene History of mucous producing disease   Volume Expansion Prevent atelectasis   Aerosol Therapy Plan   RT Treatment Nebulizer   Beta-Adrenergic Agonists Albuterol solution (premix) 2.5mg/3mL neb Max 12 doses/24h   Aerosol Treatment Frequency Acuity Level 4: PRN L2v-Xaibrrvbvrsd wheezing   Broncho-Pulmonary Hygiene Plan   Broncho-Pulmonary Hygiene Treatment Coughing techniques   Broncho-Pulm Hygiene Frequency Acuity Level 4: BID-Unable to deep breathe & cough spontaneously   Volume Expansion Plan   Volume Expansion Treatment Incentive Spirometer   Volume Expansion Frequency Acuity Level 4: BID-Prevention of atelectasis     Mild swelling noted in face, no nasal flaring, no pursed lip breathing, mild accessory muscle use, able to speak in complete sentences, moist mucus membranes, mild clubbing noted in hands, good cap refill, alert and oriented, no edema, bruising noted in all extremities. Sats on RA 95%, BS clear.  BP (!) 153/86   Pulse 63   Temp 98.7  F (37.1  C) (Oral)   Resp 26   Ht 1.88 m (6' 2\")   Wt 71.9 kg (158 lb 8.2 oz)   SpO2 96%   BMI 20.35 kg/m    Will adjust nebs to PRN per RCAT, will continue to monitor and reevaluate in 72hrs.    Tigre Parra, RT on 5/23/2025 at 9:49 PM    "

## 2025-05-24 NOTE — DISCHARGE SUMMARY
[facility]  Hospitalist Discharge Summary      Date of Admission:  5/21/2025  Date of Discharge:  5/24/2025  Discharging Provider: Samuel Daley DO  Discharge Service: Hospitalist Service    Discharge Diagnoses   Community-acquired pneumonia.  Sepsis with septic shock due to pneumonia.  Acute COPD exacerbation.  Acute hypoxic respiratory failure.  Acute infectious encephalopathy.  Atrial flutter.  History of paroxysmal atrial fibrillation.  Mild troponin elevation due to type II myocardial infarction from demand ischemia.  Drug-induced coagulation defect.  Tobacco use disorder.  GERD.  Hyponatremia.      Clinically Significant Risk Factors          Follow-ups Needed After Discharge   Follow-up Appointments       Hospital Follow-up with Existing Primary Care Provider (PCP)          Schedule Primary Care visit within: 7 Days               Discharge Disposition   Discharged to home  Condition at discharge: Stable    Hospital Course   Tigre Gillette is a 85 year old male admitted on 5/21/2025. He has a past medical history significant for COPD, atrial  fibrillation, prostate cancer, chronic back pain, GERD, and ongoing tobacco use disorder.  He presented to emergency room due to shortness of breath, cough, fever, weakness.  Found to have sepsis with septic shock due to pneumonia.  Also noted to be in atrial flutter.  Started on antibiotics with azithromycin and IV ceftriaxone to treat pneumonia and sepsis.  Was initially started on amiodarone infusion to treat a flutter.  Converted to normal sinus rhythm.  Amiodarone able to be stopped.  Restarted on metoprolol during hospital stay.  Acute encephalopathy has resolved.  Hypoxia has resolved.  No longer requiring supplemental oxygen.  Pneumonia improving.  Noted be quite weak and deconditioned by therapy.  Recommended to go to transitional care unit.  Patient unwilling to go to the transitional care unit.  Have ordered home physical therapy  to continue to work with the patient.  Continue antibiotics with azithromycin and Omnicef.  Taper prednisone over the next 8 days.  Follow-up with primary care provider in 7 days.  Recheck CBC and BMP within 7 days.       Consultations This Hospital Stay   CARE MANAGEMENT / SOCIAL WORK IP CONSULT  SPEECH LANGUAGE PATH ADULT IP CONSULT  PHYSICAL THERAPY ADULT IP CONSULT  OCCUPATIONAL THERAPY ADULT IP CONSULT    Code Status   No CPR- Do NOT Intubate    Time Spent on this Encounter   I spent 40 minutes with Mr. Gillette and working on discharge on 5/20/2025.       Samuel Daley [facility] ICU  [address] [place] [address]  Phone: [phone]  Fax: [phone]  ______________________________________________________________________    Physical Exam   Vital Signs: Temp: 98.4  F (36.9  C) Temp src: Temporal BP: (!) 167/93 Pulse: 57   Resp: 20 SpO2: 95 % O2 Device: None (Room air)    Weight: 160 lbs 11.45 oz  Gen:  NAD, A&Ox3.  Eyes:  PERRL, sclera anicteric.  OP:  MMM, no lesions.  Neck:  Supple.  CV:  Regular, no murmurs.  Lung:  Mild wheeze b/l, normal effort.  Ab:  +BS, soft.  Skin:  Warm, dry to touch.  No rash.  Ext:  No pitting edema LE b/l.         Primary Care Physician   Macario Iyer    Discharge Orders      CBC with platelets     Basic metabolic panel     Home Care Referral      Reason for your hospital stay    Pneumonia and acute COPD exacerbation     Activity    Your activity upon discharge: activity as tolerated     Diet    Follow this diet upon discharge: Regular     Hospital Follow-up with Existing Primary Care Provider (PCP)                Discharge Medications   Current Discharge Medication List        START taking these medications    Details   azithromycin (ZITHROMAX) 250 MG tablet Take 1 tablet (250 mg) by mouth daily for 1 day.  Qty: 1 tablet, Refills: 0    Associated Diagnoses: Community acquired pneumonia of left lower lobe of lung      cefdinir (OMNICEF)  300 MG capsule Take 1 capsule (300 mg) by mouth 2 times daily for 3 days.  Qty: 6 capsule, Refills: 0    Associated Diagnoses: Community acquired pneumonia of left lower lobe of lung      predniSONE (DELTASONE) 10 MG tablet 4 tabs daily for 2 days, then 3 tabs daily for 2 days, then 2 tabs daily for 2 days, then 1 tab daily for 2 days, then stop  Qty: 20 tablet, Refills: 0    Associated Diagnoses: Community acquired pneumonia of left lower lobe of lung           CONTINUE these medications which have NOT CHANGED    Details   abiraterone (ZYTIGA) 250 MG tablet Take 1,000 mg by mouth daily.      acetaminophen (TYLENOL) 500 MG tablet Take 1,000 mg by mouth 3 times daily.      albuterol (PROAIR HFA/PROVENTIL HFA/VENTOLIN HFA) 108 (90 Base) MCG/ACT inhaler Inhale 1-2 puffs into the lungs every 6 hours as needed for shortness of breath, wheezing or cough.  Qty: 17 g, Refills: 11    Comments: Pharmacy may dispense brand covered by insurance (Proair, or proventil or ventolin or generic albuterol inhaler)  Associated Diagnoses: Chronic obstructive pulmonary disease, unspecified COPD type (H)      apixaban ANTICOAGULANT (ELIQUIS) 5 MG tablet Take 1 tablet (5 mg) by mouth 2 times daily  Qty: 60 tablet, Refills: 11    Associated Diagnoses: Atrial fibrillation, unspecified type (H)      cetirizine (ZYRTEC) 10 MG tablet Take 10 mg by mouth every evening      !! gabapentin (NEURONTIN) 300 MG capsule Take 600 mg by mouth 2 times daily. In the morning and afternoon.      !! gabapentin (NEURONTIN) 300 MG capsule Take 900 mg by mouth at bedtime.      methocarbamol (ROBAXIN) 500 MG tablet Take 1 tablet (500 mg) by mouth 3 times daily.  Qty: 60 tablet, Refills: 3    Associated Diagnoses: Myofascial pain      metoprolol succinate ER (TOPROL XL) 25 MG 24 hr tablet Take 1 tablet by mouth daily.      omeprazole (PRILOSEC) 20 MG DR capsule Take 1 capsule (20 mg) by mouth daily    Associated Diagnoses: Long term current use of anticoagulant  therapy      prednisoLONE 5 MG tablet Take 5 mg by mouth daily.      sildenafil (REVATIO) 20 MG tablet Take 20-60 mg by mouth daily as needed.      tamsulosin (FLOMAX) 0.4 MG capsule Take 0.4 mg by mouth every evening.      TRELEGY ELLIPTA 100-62.5-25 MCG/INH oral inhaler Inhale 1 puff into the lungs daily      TRIAMCINOLONE ACETONIDE EX Apply topically daily as needed (eczema)       !! - Potential duplicate medications found. Please discuss with provider.        Allergies   Allergies   Allergen Reactions    Amoxicillin-Pot Clavulanate GI Disturbance and Rash    Amoxicillin     Finasteride Other (See Comments)     Lightheadedness

## 2025-05-24 NOTE — PLAN OF CARE
ICU End of Shift Summary.  For vital signs and complete assessments, please see documentation flowsheets.      Pertinent assessments:   Neuro: Alert and orientated x4. Patient is hard of hearing, and uses hearing aids, Patient also has chronic neuropathy in his feet   Cardiac: Patient continues to go in and out of in Afib CVR with a BBB, and Sinus iva with BBB.  Patient's blood pressure running slightly high while awake systolic 150-160's while sleeping 140's. HR while sleeping in the 50-60's, while awake 60-70's  Resp: Some expiratory wheezes noted in his bases posteror. Patient denies SOB but does appear to be labored when talking or moving in bed. Patient reports his breathing is better than earlier in the day. Patient sating low low to mid 90's while sleeping. After walking to the bathroom, Patient's breathing was quite labored, and fast, Patient continues to insist it is batter than it had been.   GI: Bowel sounds are active. Patient denies nausea. Reports bowel movement during the day time, and 1 incontinent episode at night.   : Patient voiding clear yellow urine. Patient reported he often feels the urge to void right after he goes. But then can't go any more.  Skin: Patient has some scratches noted on his legs, and fragile skin.  Lines: PIV, SL  Drips: Antibiotics.    Major Shift Events:      Found nicorette gum in bedside table. Patient admits that he had his daughter bring it in and has been taking it while being here. Gum was moved away from the patient, and reported to next nurse to send it home with family if Patient does not discharge today.    Plan (Upcoming Events):  Discharge today?  Discharge/Transfer Needs: Social work involved with planning for home physical therapy, and daughter will give Patient a ride home when ready.     Bedside Shift Report Completed : yes  Bedside Safety Check Completed: yes         Goal Outcome Evaluation:      Plan of Care Reviewed With: patient    Overall Patient  "Progress: improvingOverall Patient Progress: improving    Outcome Evaluation: See notes        Problem: Adult Inpatient Plan of Care  Goal: Plan of Care Review  Description: The Plan of Care Review/Shift note should be completed every shift.  The Outcome Evaluation is a brief statement about your assessment that the patient is improving, declining, or no change.  This information will be displayed automatically on your shift  note.  Outcome: Progressing  Flowsheets (Taken 5/24/2025 0214)  Outcome Evaluation: See notes  Plan of Care Reviewed With: patient  Overall Patient Progress: improving  Goal: Patient-Specific Goal (Individualized)  Description: You can add care plan individualizations to a care plan. Examples of Individualization might be:  \"Parent requests to be called daily at 9am for status\", \"I have a hard time hearing out of my right ear\", or \"Do not touch me to wake me up as it startles  me\".  Outcome: Progressing  Flowsheets (Taken 5/23/2025 2016)  Individualized Care Needs: Home health, PT, OT  Anxieties, Fears or Concerns: does not want to go to TCU, wants to be at home  Goal: Absence of Hospital-Acquired Illness or Injury  Outcome: Progressing  Intervention: Identify and Manage Fall Risk  Recent Flowsheet Documentation  Taken 5/23/2025 2016 by Ana Matos RN  Safety Promotion/Fall Prevention:   activity supervised   clutter free environment maintained   lighting adjusted   nonskid shoes/slippers when out of bed   safety round/check completed  Intervention: Prevent Skin Injury  Recent Flowsheet Documentation  Taken 5/23/2025 2229 by Ana Matos RN  Body Position: weight shifting  Taken 5/23/2025 2016 by Ana Matos RN  Body Position: weight shifting  Skin Protection:   adhesive use limited   tubing/devices free from skin contact   transparent dressing maintained  Intervention: Prevent and Manage VTE (Venous Thromboembolism) Risk  Recent Flowsheet Documentation  Taken 5/23/2025 2016 by Ana Matos" RN  VTE Prevention/Management: (On eliquis) SCDs off (sequential compression devices)  Goal: Optimal Comfort and Wellbeing  Outcome: Progressing  Intervention: Provide Person-Centered Care  Recent Flowsheet Documentation  Taken 5/23/2025 2016 by Ana Matos RN  Trust Relationship/Rapport:   care explained   choices provided   reassurance provided   thoughts/feelings acknowledged  Goal: Readiness for Transition of Care  Outcome: Progressing     Problem: Delirium  Goal: Optimal Coping  Outcome: Progressing  Intervention: Optimize Psychosocial Adjustment to Delirium  Recent Flowsheet Documentation  Taken 5/23/2025 2016 by Ana Matos RN  Supportive Measures:   active listening utilized   self-care encouraged   verbalization of feelings encouraged  Family/Support System Care:   support provided   self-care encouraged   involvement promoted  Goal: Improved Behavioral Control  Outcome: Progressing  Intervention: Prevent and Manage Agitation  Recent Flowsheet Documentation  Taken 5/23/2025 2016 by Ana Matos RN  Environment Familiarity/Consistency: daily routine followed  Intervention: Minimize Safety Risk  Recent Flowsheet Documentation  Taken 5/23/2025 2016 by Ana Matos, RN  Enhanced Safety Measures:   pain management   review medications for side effects with activity   room near unit station  Trust Relationship/Rapport:   care explained   choices provided   reassurance provided   thoughts/feelings acknowledged  Goal: Improved Attention and Thought Clarity  Outcome: Progressing  Intervention: Maximize Cognitive Function  Recent Flowsheet Documentation  Taken 5/23/2025 2016 by Ana Matos RN  Sensory Stimulation Regulation:   television on   lighting decreased   care clustered  Reorientation Measures:   clock in view   glasses use encouraged  Goal: Improved Sleep  Outcome: Progressing  Intervention: Promote Sleep  Recent Flowsheet Documentation  Taken 5/23/2025 2016 by Ana Matos RN  Sleep/Rest Enhancement:    awakenings minimized   noise level reduced   room darkened     Problem: Pneumonia  Goal: Fluid Balance  Outcome: Progressing  Intervention: Monitor and Manage Fluid Balance  Recent Flowsheet Documentation  Taken 5/23/2025 2016 by Ana Matos RN  Fluid/Electrolyte Management: fluids provided  Goal: Resolution of Infection Signs and Symptoms  Outcome: Progressing  Intervention: Prevent Infection Progression  Recent Flowsheet Documentation  Taken 5/23/2025 2016 by Ana Matos RN  Infection Management: aseptic technique maintained  Isolation Precautions: droplet precautions maintained  Goal: Effective Oxygenation and Ventilation  Outcome: Progressing  Intervention: Promote Airway Secretion Clearance  Recent Flowsheet Documentation  Taken 5/23/2025 2229 by Ana Matos RN  Activity Management:   activity adjusted per tolerance   sitting, edge of bed  Taken 5/23/2025 2016 by Ana Matos RN  Cough And Deep Breathing: done independently per patient  Activity Management: activity adjusted per tolerance  Intervention: Optimize Oxygenation and Ventilation  Recent Flowsheet Documentation  Taken 5/23/2025 2016 by Ana Matos RN  Airway/Ventilation Management:   airway patency maintained   calming measures promoted     Problem: Sepsis/Septic Shock  Goal: Optimal Coping  Outcome: Progressing  Intervention: Optimize Psychosocial Adjustment to Illness  Recent Flowsheet Documentation  Taken 5/23/2025 2016 by Ana Matos RN  Supportive Measures:   active listening utilized   self-care encouraged   verbalization of feelings encouraged  Family/Support System Care:   support provided   self-care encouraged   involvement promoted  Goal: Absence of Bleeding  Outcome: Progressing  Intervention: Monitor and Manage Bleeding  Recent Flowsheet Documentation  Taken 5/23/2025 2016 by Ana Matos RN  Bleeding Precautions:   blood pressure closely monitored   gentle oral care promoted   monitored for signs of bleeding  Goal: Blood Glucose  Level Within Targeted Range  Outcome: Progressing  Goal: Absence of Infection Signs and Symptoms  Outcome: Progressing  Intervention: Initiate Sepsis Management  Recent Flowsheet Documentation  Taken 5/23/2025 2016 by Ana Matos RN  Infection Management: aseptic technique maintained  Isolation Precautions: droplet precautions maintained  Intervention: Promote Stabilization  Recent Flowsheet Documentation  Taken 5/23/2025 2016 by Ana Matos RN  Lung Protection Measures: fluid excess minimized  Fluid/Electrolyte Management: fluids provided  Intervention: Promote Recovery  Recent Flowsheet Documentation  Taken 5/23/2025 2229 by Ana Matos RN  Activity Management:   activity adjusted per tolerance   sitting, edge of bed  Taken 5/23/2025 2016 by Ana Matos RN  Airway/Ventilation Management:   airway patency maintained   calming measures promoted  Sleep/Rest Enhancement:   awakenings minimized   noise level reduced   room darkened  Activity Management: activity adjusted per tolerance  Goal: Optimal Nutrition Intake  Outcome: Progressing  Intervention: Optimize Nutrition Delivery  Recent Flowsheet Documentation  Taken 5/23/2025 2016 by Ana Matos RN  Nutrition Support Management: weight trending reviewed     Problem: Skin Injury Risk Increased  Goal: Skin Health and Integrity  Outcome: Progressing  Intervention: Plan: Nurse Driven Intervention: Moisture Management  Recent Flowsheet Documentation  Taken 5/23/2025 2016 by Ana Matos RN  Moisture Interventions:   Encourage regular toileting   No brief in bed  Intervention: Optimize Skin Protection  Recent Flowsheet Documentation  Taken 5/23/2025 2229 by Ana Matos RN  Activity Management:   activity adjusted per tolerance   sitting, edge of bed  Taken 5/23/2025 2016 by Ana Matos RN  Skin Protection:   adhesive use limited   tubing/devices free from skin contact   transparent dressing maintained  Activity Management: activity adjusted per  tolerance  Intervention: Promote and Optimize Oral Intake  Recent Flowsheet Documentation  Taken 5/23/2025 2016 by Ana Matos, RN  Oral Nutrition Promotion: rest periods promoted

## 2025-05-24 NOTE — PLAN OF CARE
Speech Language Therapy Discharge Summary    Reason for therapy discharge:    Discharged to home.    Progress towards therapy goal(s). See goals on Care Plan in [facility] electronic health record for goal details.  Goals not met.  Barriers to achieving goals:   discharge from facility.    Therapy recommendation(s):    No further therapy is recommended.     Recommend continue regular diet with thin liquid and swallow precautions: sit upright, small bites/sips, slow rate, alternate solid/liquid. No therapy if patient tolerating diet but consider follow-up if continued dysphagia symptoms.    *Patient not seen by this writer so information taken from most recent note from treating therapist.

## 2025-05-25 NOTE — PLAN OF CARE
"Occupational Therapy Discharge Summary    Reason for therapy discharge:    Discharged to home with home therapy.    Progress towards therapy goal(s). See goals on Care Plan in [facility] electronic health record for goal details.  Goals not met.  Barriers to achieving goals:   limited tolerance for therapy.    Therapy recommendation(s):    Continued therapy is recommended.  Rationale/Recommendations:  per treating OT \"Pt currently requiring assist of 1 for selfcares primarily due to impairments in activity tolerance. His balance is decent but needs frequent rest breaks with minimal activity. Recommend continued skilled OT in TCU to progress pt safety and independence; pt refusing. IF home would recommend Ax1 with selfcares and mobility, assist with IADLs, home OT/PT\".    **This patient was not seen by writing OT, information for discharge summary taken from treating OT's notes.**    "

## 2025-05-25 NOTE — PLAN OF CARE
Physical Therapy Discharge Summary     Reason for therapy discharge:    Discharged to home with home therapy.     Progress towards therapy goal(s). See goals on Care Plan in [facility] electronic health record for goal details.  Goals partially met.  Barriers to achieving goals:   discharge from facility.     Therapy recommendation(s):    Continued therapy is recommended.  Rationale/Recommendations:  Patient would benefit from home PT in order to increase strength, activity tolerance and independence with mobility.  Patient requires home PT at this time as attending PT in a clinic setting would be a considerable and significantly taxing effort, limiting his ability to participate in therapy session.

## 2025-05-26 LAB
BACTERIA SPEC CULT: NO GROWTH
BACTERIA SPEC CULT: NO GROWTH

## 2025-05-27 ENCOUNTER — PATIENT OUTREACH (OUTPATIENT)
Dept: CARE COORDINATION | Facility: CLINIC | Age: 85
End: 2025-05-27
Payer: COMMERCIAL

## 2025-05-27 NOTE — PROGRESS NOTES
"  [facility] Transitions of Care Outreach  Chief Complaint   Patient presents with    Clinic Care Coordination - [facility]       Most Recent Admission Date: 5/21/2025   Most Recent Admission Diagnosis: Atrial flutter with rapid ventricular response (H) - I48.92  Septic shock (H) - A41.9, R65.21  Pneumonia of left lower lobe due to infectious organism - J18.9     Most Recent Discharge Date: 5/24/2025   Most Recent Discharge Diagnosis: Septic shock (H) - A41.9, R65.21  Pneumonia of left lower lobe due to infectious organism - J18.9  Atrial flutter with rapid ventricular response (H) - I48.92  Community acquired pneumonia of left lower lobe of lung - J18.9     Transitions of Care Assessment    Discharge Assessment  How are you doing now that you are home?: \" I am feeling much better \"  How are your symptoms? (Red Flag symptoms escalate to triage hotline per guidelines): Improved  Do you know how to contact your clinic care team if you have future questions or changes to your health status? : Yes  Does the patient have their discharge instructions? : Yes  Does the patient have questions regarding their discharge instructions? : No  Were you started on any new medications or were there changes to any of your previous medications? : Yes  Does the patient have all of their medications?: Yes  Do you have questions regarding any of your medications? : No  Do you have all of your needed medical supplies or equipment (DME)?  (i.e. oxygen tank, CPAP, cane, etc.): Yes    Post-op ([facility] CTA Only)  If the patient had a surgery or procedure, do they have any questions for a nurse?: No           Follow up Plan     Discharge Follow-Up  Discharge follow up appointment scheduled in alignment with recommended follow up timeframe or Transitions of Risk Category? (Low = within 30 days; Moderate= within 14 days; High= within 7 days): Yes  Discharge Follow Up Appointment Date: 05/28/25  Discharge Follow Up Appointment " Scheduled with?: Specialty Care Provider    Future Appointments   Date Time Provider Department Center   7/7/2025 11:00 AM [id number]   7/7/2025 11:30 AM Katia Erazo MD [facility]       Outpatient Plan as outlined on AVS reviewed with patient.    For any urgent concerns, please contact our 24 hour nurse triage line: [phone] ([phone])       TRISH Thomas                 General/Regional

## 2025-07-07 ENCOUNTER — HOSPITAL ENCOUNTER (OUTPATIENT)
Dept: CT IMAGING | Facility: CLINIC | Age: 85
Discharge: HOME | End: 2025-07-07
Attending: INTERNAL MEDICINE | Admitting: INTERNAL MEDICINE
Payer: COMMERCIAL

## 2025-07-07 ENCOUNTER — ONCOLOGY VISIT (OUTPATIENT)
Dept: ONCOLOGY | Facility: CLINIC | Age: 85
End: 2025-07-07
Attending: INTERNAL MEDICINE
Payer: COMMERCIAL

## 2025-07-07 VITALS
DIASTOLIC BLOOD PRESSURE: 80 MMHG | BODY MASS INDEX: 20.66 KG/M2 | HEIGHT: 74 IN | SYSTOLIC BLOOD PRESSURE: 145 MMHG | OXYGEN SATURATION: 97 % | TEMPERATURE: 98 F | WEIGHT: 161 LBS | RESPIRATION RATE: 20 BRPM | HEART RATE: 58 BPM

## 2025-07-07 DIAGNOSIS — F17.200 NICOTINE DEPENDENCE, UNCOMPLICATED, UNSPECIFIED NICOTINE PRODUCT TYPE: ICD-10-CM

## 2025-07-07 DIAGNOSIS — R91.1 LUNG NODULE: ICD-10-CM

## 2025-07-07 DIAGNOSIS — J44.9 CHRONIC OBSTRUCTIVE PULMONARY DISEASE, UNSPECIFIED COPD TYPE (H): Primary | ICD-10-CM

## 2025-07-07 PROCEDURE — 99214 OFFICE O/P EST MOD 30 MIN: CPT | Mod: 25 | Performed by: INTERNAL MEDICINE

## 2025-07-07 PROCEDURE — G0463 HOSPITAL OUTPT CLINIC VISIT: HCPCS | Performed by: INTERNAL MEDICINE

## 2025-07-07 PROCEDURE — 99406 BEHAV CHNG SMOKING 3-10 MIN: CPT | Performed by: INTERNAL MEDICINE

## 2025-07-07 PROCEDURE — 71250 CT THORAX DX C-: CPT

## 2025-07-07 RX ORDER — AZITHROMYCIN 250 MG/1
250 TABLET, FILM COATED ORAL DAILY
Qty: 30 TABLET | Refills: 11 | Status: SHIPPED | OUTPATIENT
Start: 2025-07-07

## 2025-07-07 ASSESSMENT — PAIN SCALES - GENERAL: PAINLEVEL_OUTOF10: MILD PAIN (3)

## 2025-07-07 NOTE — LETTER
7/7/2025      Tigre Gillette  [address]  [place] [address]      Dear Colleague,    Thank you for referring your patient, Tigre Gillette, to the [facility]. Please see a copy of my visit note below.      [facility]  [address]  [facility] [address]  Phone: [phone]  Fax: [phone]    [facility] Follow Up Visit    Patient:  Tigre Gillette, Date of birth 1940  Date of Visit:  07/07/2025  Referring Provider Referred Self      Assessment & Plan     Tigre is an 85 yo male with COPD and abnormal chest imaging    COPD - moderate obstruction grossly similar FEV1 to spirometry [id number]   Continue Trelegy   Albuterol prn   Today I prescribed azithromycin to be taking indefinitely for prevention of exacerbations/pneumonia    Lung nodule - LLL deep basilar. Spiculated, stable 1 year and slightly smaller but not previously seen before 2024   Recommend follow up with CT in 1 year    Nicotine dependence - he smokes about 6 cigarettes per day. He has NRT, we discussed prescription medications. I spent 9 minutes counseling him and provided  Quitting for Good workbook    Medical Decision Making            Katia Erazo MD           Reason for Visit  Tigre Gillette is a 84 year old male who is referred by himself for abnormal chest CT  Pulmonary HPI    - getting daily radiation for prostate cancer, today is day 12 of 42 planned treatments  - he is on Trelegy;       Other active medical problems include:   - no prior radiation or chest trauma  - he was just diagnosed with prostate cancer    ROS Pulmonary  Dyspnea: Yes, Cough: No, Chest pain: No, Wheezing: No, Sputum Production: No, Hemoptysis: No  A complete ROS was otherwise negative except as noted in the HPI.  The patient was seen and examined by Katia Erazo MD   Current Outpatient Medications    Medication Sig Dispense Refill     abiraterone (ZYTIGA) 250 MG tablet Take 1,000 mg by mouth daily.       acetaminophen (TYLENOL) 500 MG tablet Take 1,000 mg by mouth 3 times daily.       albuterol (PROAIR HFA/PROVENTIL HFA/VENTOLIN HFA) 108 (90 Base) MCG/ACT inhaler Inhale 1-2 puffs into the lungs every 6 hours as needed for shortness of breath, wheezing or cough. 17 g 11     apixaban ANTICOAGULANT (ELIQUIS) 5 MG tablet Take 1 tablet (5 mg) by mouth 2 times daily 60 tablet 11     cetirizine (ZYRTEC) 10 MG tablet Take 10 mg by mouth every evening       gabapentin (NEURONTIN) 300 MG capsule Take 600 mg by mouth 2 times daily. In the morning and afternoon.       gabapentin (NEURONTIN) 300 MG capsule Take 900 mg by mouth at bedtime.       methocarbamol (ROBAXIN) 500 MG tablet Take 1 tablet (500 mg) by mouth 3 times daily. 60 tablet 3     omeprazole (PRILOSEC) 20 MG DR capsule Take 1 capsule (20 mg) by mouth daily       prednisoLONE 5 MG tablet Take 5 mg by mouth daily.       predniSONE (DELTASONE) 10 MG tablet 4 tabs daily for 2 days, then 3 tabs daily for 2 days, then 2 tabs daily for 2 days, then 1 tab daily for 2 days, then stop 20 tablet 0     sildenafil (REVATIO) 20 MG tablet Take 20-60 mg by mouth daily as needed.       tamsulosin (FLOMAX) 0.4 MG capsule Take 0.4 mg by mouth every evening.       TRELEGY ELLIPTA 100-62.5-25 MCG/INH oral inhaler Inhale 1 puff into the lungs daily       TRIAMCINOLONE ACETONIDE EX Apply topically daily as needed (eczema)       metoprolol succinate ER (TOPROL XL) 25 MG 24 hr tablet Take 1 tablet by mouth daily.       No current facility-administered medications for this visit.     Allergies   Allergen Reactions     Amoxicillin-Pot Clavulanate GI Disturbance and Rash     Amoxicillin      Finasteride Other (See Comments)     Lightheadedness     Social History     Socioeconomic History     Marital status:      Spouse name: Not on file     Number of children: Not on file      "Years of education: Not on file     Highest education level: Not on file   Occupational History     Not on file   Tobacco Use     Smoking status: Every Day     Current packs/day: 0.25     Average packs/day: 0.9 packs/day for 65.5 years (56.9 ttl pk-yrs)     Types: Cigarettes     Start date: 1960     Smokeless tobacco: Never     Tobacco comments:     8/27/24 6-8 cigarettes daily.    Substance and Sexual Activity     Alcohol use: Yes     Comment: rare      Drug use: Yes     Comment: THC/CBD gummies occas     Sexual activity: Yes     Partners: Female   Other Topics Concern     Parent/sibling w/ CABG, MI or angioplasty before 65F 55M? Not Asked   Social History Narrative    Lives with his wife ( '64), daughter and her boyfriend. [place] all\" kind of person, likes to fix and maintain his home. 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    Enjoys fishing, fixing things and being outdoors. Pain can limit his ability to do things he likes, such as fishing, snowmobiling. Has started to read more since custodial, especially about [place]. Has a [place] who lives [place].    Last updated 6/6/2023      Social Drivers of Health     Financial Resource Strain: Low Risk  (5/24/2025)    Financial Resource Strain      Within the past 12 months, have you or your family members you live with been unable to get utilities (heat, electricity) when it was really needed?: No   Food Insecurity: Low Risk  (5/24/2025)    Food Insecurity      Within the past 12 months, did you worry that your food would run out before you got money to buy more?: No      Within the past 12 months, did the food you bought just not last and you didn t have money to get more?: No   Transportation Needs: Low Risk  (5/24/2025)    " Transportation Needs      Within the past 12 months, has lack of transportation kept you from medical appointments, getting your medicines, non-medical meetings or appointments, work, or from getting things that you need?: No   Physical Activity: Not on file   Stress: Not on file   Social Connections: Not on file   Interpersonal Safety: Low Risk  (5/24/2025)    Interpersonal Safety      Do you feel physically and emotionally safe where you currently live?: Yes      Within the past 12 months, have you been hit, slapped, kicked or otherwise physically hurt by someone?: No      Within the past 12 months, have you been humiliated or emotionally abused in other ways by your partner or ex-partner?: No   Housing Stability: Low Risk  (5/24/2025)    Housing Stability      Do you have housing? : Yes      Are you worried about losing your housing?: No     Past Medical History:   Diagnosis Date     Arrhythmia     afib     Chronic lower back pain      COPD (chronic obstructive pulmonary disease) (H)      Degenerative arthritis of lumbar spine     spine     Eczema     uses topical low dose steroid and cetaphil lotion     Past Surgical History:   Procedure Laterality Date     COLONOSCOPY       DAVINCI XI HERNIORRHAPHY INGUINAL Right 9/13/2024    Procedure: Robotic assisted right inguinal hernia repair with mesh,  Robotic assisted repair of the right inguinal hernia,  and right obturator hernia;  Surgeon: Scar Martin MD;  Location: [facility] OR     HERNIORRHAPHY EPIGASTRIC N/A 01/30/2018    Procedure: HERNIORRHAPHY EPIGASTRIC;  Epigastric herniorrhaphy with Bard Ventralex ST Hernia Patch ;  Surgeon: Rossana Alas MD;  Location: [facility] OR     HERNIORRHAPHY INGUINAL  2010    open recurrent LIH rpr with mesh     HERNIORRHAPHY INGUINAL  1990    open LIH rpr with mesh     IR LUMBAR PUNCTURE  2/21/2025     IR LUMBAR PUNCTURE  4/25/2025     IR LUMBAR PUNCTURE  5/16/2025     LAPAROSCOPIC HERNIORRHAPHY FEMORAL Right 9/13/2024     "Procedure: right femoral hernia hepair;  Surgeon: Scar Martin MD;  Location: [facility] OR     TONSILLECTOMY & ADENOIDECTOMY      tonsillectomy as a child     Family History   Problem Relation Age of Onset     LUNG DISEASE No family hx of        Exam:   BP (!) 145/80 (Cuff Size: Adult Regular)   Pulse 58   Temp 98  F (36.7  C) (Tympanic)   Resp 20   Ht 1.88 m (6' 2\")   Wt 73 kg (161 lb)   SpO2 97%   BMI 20.67 kg/m    GENERAL APPEARANCE: Well developed, well nourished, alert, and in no apparent distress.  LUNGS clear bilaterally  PSYCH: mentation appears normal. and affect normal/bright  Results:  - My interpretation of the images relevant for this visit includes: CT chest images reviewed, there are 2 abnormalities. Lobulated nodular opacity peripheral in the LLL, that might be slightly growing over the last few months. central LLL opacities with some traction bronchiectasis, has more of a healing injury appearance.  Neither was seen on CT several years ago        - My interpretation of the PFT's relevant for this visit includes: Obstructive pattern moderate, no significant change in FEV1 compared to Feb 2023 spirometry      Again, thank you for allowing me to participate in the care of your patient.        Sincerely,        Katia Erazo MD    Electronically signed"

## 2025-07-07 NOTE — PROGRESS NOTES
[facility] [address]  [facility] [facility] [address]  Phone: [phone]  Fax: [phone]    [facility] Nod[facility] Follow Up Visit    Patient:  Tigre Gillette, Date of birth 1940  Date of Visit:  07/07/2025  Referring Provider Referred Self      Assessment & Plan      Tigre is an 83 yo male with COPD and abnormal chest imaging    COPD - moderate obstruction grossly similar FEV1 to spirometry [id number]   Continue Trelegy   Albuterol prn   Today I prescribed azithromycin to be taking indefinitely for prevention of exacerbations/pneumonia    Lung nodule - LLL deep basilar. Spiculated, stable 1 year and slightly smaller but not previously seen before 2024   Recommend follow up with CT in 1 year    Nicotine dependence - he smokes about 6 cigarettes per day. He has NRT, we discussed prescription medications. I spent 9 minutes counseling him and provided  Quitting for Good workbook    Medical Decision Making             Katia Erazo MD           Reason for Visit  Tigre Gillette is a 84 year old male who is referred by himself for abnormal chest CT  Pulmonary HPI    - getting daily radiation for prostate cancer, today is day 12 of 42 planned treatments  - he is on Trelegy;       Other active medical problems include:   - no prior radiation or chest trauma  - he was just diagnosed with prostate cancer    ROS Pulmonary  Dyspnea: Yes, Cough: No, Chest pain: No, Wheezing: No, Sputum Production: No, Hemoptysis: No  A complete ROS was otherwise negative except as noted in the HPI.  The patient was seen and examined by Katia Erazo MD   Current Outpatient Medications   Medication Sig Dispense Refill    abiraterone (ZYTIGA) 250 MG tablet Take 1,000 mg by mouth daily.      acetaminophen (TYLENOL) 500 MG tablet Take 1,000 mg by mouth 3 times daily.      albuterol (PROAIR HFA/PROVENTIL HFA/VENTOLIN HFA) 108  (90 Base) MCG/ACT inhaler Inhale 1-2 puffs into the lungs every 6 hours as needed for shortness of breath, wheezing or cough. 17 g 11    apixaban ANTICOAGULANT (ELIQUIS) 5 MG tablet Take 1 tablet (5 mg) by mouth 2 times daily 60 tablet 11    cetirizine (ZYRTEC) 10 MG tablet Take 10 mg by mouth every evening      gabapentin (NEURONTIN) 300 MG capsule Take 600 mg by mouth 2 times daily. In the morning and afternoon.      gabapentin (NEURONTIN) 300 MG capsule Take 900 mg by mouth at bedtime.      methocarbamol (ROBAXIN) 500 MG tablet Take 1 tablet (500 mg) by mouth 3 times daily. 60 tablet 3    omeprazole (PRILOSEC) 20 MG DR capsule Take 1 capsule (20 mg) by mouth daily      prednisoLONE 5 MG tablet Take 5 mg by mouth daily.      predniSONE (DELTASONE) 10 MG tablet 4 tabs daily for 2 days, then 3 tabs daily for 2 days, then 2 tabs daily for 2 days, then 1 tab daily for 2 days, then stop 20 tablet 0    sildenafil (REVATIO) 20 MG tablet Take 20-60 mg by mouth daily as needed.      tamsulosin (FLOMAX) 0.4 MG capsule Take 0.4 mg by mouth every evening.      TRELEGY ELLIPTA 100-62.5-25 MCG/INH oral inhaler Inhale 1 puff into the lungs daily      TRIAMCINOLONE ACETONIDE EX Apply topically daily as needed (eczema)      metoprolol succinate ER (TOPROL XL) 25 MG 24 hr tablet Take 1 tablet by mouth daily.       No current facility-administered medications for this visit.     Allergies   Allergen Reactions    Amoxicillin-Pot Clavulanate GI Disturbance and Rash    Amoxicillin     Finasteride Other (See Comments)     Lightheadedness     Social History     Socioeconomic History    Marital status:      Spouse name: Not on file    Number of children: Not on file    Years of education: Not on file    Highest education level: Not on file   Occupational History    Not on file   Tobacco Use    Smoking status: Every Day     Current packs/day: 0.25     Average packs/day: 0.9 packs/day for 65.5 years (56.9 ttl pk-yrs)     Types:  "Cigarettes     Start date: 1960    Smokeless tobacco: Never    Tobacco comments:     8/27/24 6-8 cigarettes daily.    Substance and Sexual Activity    Alcohol use: Yes     Comment: rare     Drug use: Yes     Comment: THC/CBD gummies occas    Sexual activity: Yes     Partners: Female   Other Topics Concern    Parent/sibling w/ CABG, MI or angioplasty before 65F 55M? Not Asked   Social History Narrative    Lives with his wife ( '64), daughter and her boyfriend. [place] all\" kind of person, likes to fix and maintain his home. 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    Enjoys fishing, fixing things and being outdoors. Pain can limit his ability to do things he likes, such as fishing, snowmobiling. Has started to read more since half-way, especially about [place]. Has a [place] who lives there.    Last updated 6/6/2023      Social Drivers of Health     Financial Resource Strain: Low Risk  (5/24/2025)    Financial Resource Strain     Within the past 12 months, have you or your family members you live with been unable to get utilities (heat, electricity) when it was really needed?: No   Food Insecurity: Low Risk  (5/24/2025)    Food Insecurity     Within the past 12 months, did you worry that your food would run out before you got money to buy more?: No     Within the past 12 months, did the food you bought just not last and you didn t have money to get more?: No   Transportation Needs: Low Risk  (5/24/2025)    Transportation Needs     Within the past 12 months, has lack of transportation kept you from medical appointments, getting your medicines, non-medical meetings or appointments, work, or from getting things that you need?: No   Physical Activity: Not on file   Stress: Not on file "   Social Connections: Not on file   Interpersonal Safety: Low Risk  (5/24/2025)    Interpersonal Safety     Do you feel physically and emotionally safe where you currently live?: Yes     Within the past 12 months, have you been hit, slapped, kicked or otherwise physically hurt by someone?: No     Within the past 12 months, have you been humiliated or emotionally abused in other ways by your partner or ex-partner?: No   Housing Stability: Low Risk  (5/24/2025)    Housing Stability     Do you have housing? : Yes     Are you worried about losing your housing?: No     Past Medical History:   Diagnosis Date    Arrhythmia     afib    Chronic lower back pain     COPD (chronic obstructive pulmonary disease) (H)     Degenerative arthritis of lumbar spine     spine    Eczema     uses topical low dose steroid and cetaphil lotion     Past Surgical History:   Procedure Laterality Date    COLONOSCOPY      DAVINCI XI HERNIORRHAPHY INGUINAL Right 9/13/2024    Procedure: Robotic assisted right inguinal hernia repair with mesh,  Robotic assisted repair of the right inguinal hernia,  and right obturator hernia;  Surgeon: Scar Martin MD;  Location: [facility] OR    HERNIORRHAPHY EPIGASTRIC N/A 01/30/2018    Procedure: HERNIORRHAPHY EPIGASTRIC;  Epigastric herniorrhaphy with Bard Ventralex ST Hernia Patch ;  Surgeon: Rossana Alas MD;  Location: [facility] OR    HERNIORRHAPHY INGUINAL  2010    open recurrent LIH rpr with mesh    HERNIORRHAPHY INGUINAL  1990    open LIH rpr with mesh    IR LUMBAR PUNCTURE  2/21/2025    IR LUMBAR PUNCTURE  4/25/2025    IR LUMBAR PUNCTURE  5/16/2025    LAPAROSCOPIC HERNIORRHAPHY FEMORAL Right 9/13/2024    Procedure: right femoral hernia hepair;  Surgeon: Scar Martin MD;  Location: [facility] OR    TONSILLECTOMY & ADENOIDECTOMY      tonsillectomy as a child     Family History   Problem Relation Age of Onset    LUNG DISEASE No family hx of        Exam:   BP (!) 145/80 (Cuff Size: Adult Regular)   Pulse 58   " Temp 98  F (36.7  C) (Tympanic)   Resp 20   Ht 1.88 m (6' 2\")   Wt 73 kg (161 lb)   SpO2 97%   BMI 20.67 kg/m    GENERAL APPEARANCE: Well developed, well nourished, alert, and in no apparent distress.  LUNGS clear bilaterally  PSYCH: mentation appears normal. and affect normal/bright  Results:  - My interpretation of the images relevant for this visit includes: CT chest images reviewed, there are 2 abnormalities. Lobulated nodular opacity peripheral in the LLL, that might be slightly growing over the last few months. central LLL opacities with some traction bronchiectasis, has more of a healing injury appearance.  Neither was seen on CT several years ago        - My interpretation of the PFT's relevant for this visit includes: Obstructive pattern moderate, no significant change in FEV1 compared to Feb 2023 spirometry    "

## 2025-07-07 NOTE — NURSING NOTE
"Oncology Rooming Note    July 7, 2025 11:08 AM   Tigre Gillette is a 85 year old male who presents for:    Chief Complaint   Patient presents with    Lung Nodule     Initial Vitals: BP (!) 145/80 (Cuff Size: Adult Regular)   Pulse 58   Temp 98  F (36.7  C) (Tympanic)   Resp 20   Ht 1.88 m (6' 2\")   Wt 73 kg (161 lb)   SpO2 97%   BMI 20.67 kg/m   Estimated body mass index is 20.67 kg/m  as calculated from the following:    Height as of this encounter: 1.88 m (6' 2\").    Weight as of this encounter: 73 kg (161 lb). Body surface area is 1.95 meters squared.  Mild Pain (3) Comment: Data Unavailable   No LMP for male patient.  Allergies reviewed: Yes  Medications reviewed: Yes    Medications: Medication refills not needed today.  Pharmacy name entered into [employer]: [facility] # [id number] - [place], [place] - [address]    Frailty Screening:   Is the patient here for a new oncology consult visit in cancer care? 2. No    PHQ9:  Did this patient require a PHQ9?: No      Clinical concerns: f/u       Susie Hartman, BRETT              "

## 2025-07-07 NOTE — PATIENT INSTRUCTIONS
I am prescribing azithromycin daily to prevent exacerbations of COPD or respiratory infections.   I think quitting smoking would really help to reduce pneumonia episodes.

## (undated) DEVICE — SU VICRYL 2-0 SH 27" J317H

## (undated) DEVICE — SU DERMABOND MINI DHVM12

## (undated) DEVICE — SU VICRYL 0 UR-6 27" J603H

## (undated) DEVICE — DAVINCI HOT SHEARS TIP COVER  400180

## (undated) DEVICE — LINEN FULL SHEET 5511

## (undated) DEVICE — GLOVE PROTEXIS BLUE W/NEU-THERA 7.0  2D73EB70

## (undated) DEVICE — ESU GROUND PAD ADULT W/CORD E7507

## (undated) DEVICE — DRAPE LAP W/ARMBOARD 29410

## (undated) DEVICE — LINEN ORTHO ACL PACK 5447

## (undated) DEVICE — SU VICRYL 4-0 PS-2 18" UND J496H

## (undated) DEVICE — Device

## (undated) DEVICE — DAVINCI XI DRAPE ARM 470015

## (undated) DEVICE — TUBING SMOKE EVAC ATTACHMENT E3590

## (undated) DEVICE — TUBING SMOKE EVAC 3/8"X10' E3645

## (undated) DEVICE — SYR 30ML LL W/O NDL 302832

## (undated) DEVICE — TUBING SUCTION 12"X1/4" N612

## (undated) DEVICE — LINEN HALF SHEET 5512

## (undated) DEVICE — CLEANSER WOUND IRRISEPT 0.05% CHG IRRISEPT-403

## (undated) DEVICE — NDL 22GA 1.5"

## (undated) DEVICE — BAG CLEAR TRASH 1.3M 39X33" P4040C

## (undated) DEVICE — PREP CHLORAPREP 26ML TINTED ORANGE  260815

## (undated) DEVICE — ESU PENCIL W/HOLSTER E2350H

## (undated) DEVICE — LUBRICANT INST ELECTROLUBE EL101

## (undated) DEVICE — SOL NACL 0.9% IRRIG 1000ML BOTTLE 2F7124

## (undated) DEVICE — DRAPE MAYO STAND 23X54 8337

## (undated) DEVICE — BLADE CLIPPER 3M 9670

## (undated) DEVICE — ESU ELEC BLADE 2.75" COATED/INSULATED E1455

## (undated) DEVICE — TRAY CATH SURESTEP OD14 FR INTERMITTENT STER LTX INTS14

## (undated) DEVICE — SU VICRYL 3-0 SH 27" UND J416H

## (undated) DEVICE — SU VICRYL 0 CT-2 CR 8X18" J727D

## (undated) DEVICE — GLOVE PROTEXIS POWDER FREE 8.5 ORTHOPEDIC 2D73ET85

## (undated) DEVICE — GLOVE BIOGEL PI MICRO SZ 7.5 48575

## (undated) DEVICE — SUCTION TIP YANKAUER W/O VENT K86

## (undated) DEVICE — SYR 10ML SLIP TIP W/O NDL 303134

## (undated) DEVICE — PREFILTER SMOKE EVAC E6330

## (undated) DEVICE — SOL WATER IRRIG 1000ML BOTTLE 2F7114

## (undated) DEVICE — LINEN TOWEL PACK X10 5473

## (undated) DEVICE — BLADE KNIFE SURG 10 371110

## (undated) DEVICE — GLOVE PROTEXIS POWDER FREE SMT 6.5  2D72PT65X

## (undated) DEVICE — DAVINCI XI OBTURATOR BLADELESS 8MM 470359

## (undated) DEVICE — GLOVE PROTEXIS BLUE W/NEU-THERA 6.5  2D73EB65

## (undated) DEVICE — SYR 05ML SLIP TIP W/O NDL 309647

## (undated) DEVICE — DECANTER VIAL 2006S

## (undated) DEVICE — PACK MINOR CUSTOM RIDGES SBA32RMRMA

## (undated) DEVICE — GLOVE BIOGEL PI MICRO SZ 8.0 48580

## (undated) DEVICE — DAVINCI XI SEAL UNIVERSAL 5-12MM 470500

## (undated) DEVICE — SU WND CLOSURE VLOC 90 ABS 3-0 VIOLET 6" CV-23 VLOCM0804

## (undated) DEVICE — SU PDS II 0 CT-2 27" Z334H

## (undated) RX ORDER — EPHEDRINE SULFATE 50 MG/ML
INJECTION, SOLUTION INTRAMUSCULAR; INTRAVENOUS; SUBCUTANEOUS
Status: DISPENSED
Start: 2018-01-30

## (undated) RX ORDER — CEFAZOLIN SODIUM/WATER 2 G/20 ML
SYRINGE (ML) INTRAVENOUS
Status: DISPENSED
Start: 2024-09-13

## (undated) RX ORDER — FENTANYL CITRATE 50 UG/ML
INJECTION, SOLUTION INTRAMUSCULAR; INTRAVENOUS
Status: DISPENSED
Start: 2018-01-30

## (undated) RX ORDER — DEXAMETHASONE SODIUM PHOSPHATE 4 MG/ML
INJECTION, SOLUTION INTRA-ARTICULAR; INTRALESIONAL; INTRAMUSCULAR; INTRAVENOUS; SOFT TISSUE
Status: DISPENSED
Start: 2018-01-30

## (undated) RX ORDER — FENTANYL CITRATE 50 UG/ML
INJECTION, SOLUTION INTRAMUSCULAR; INTRAVENOUS
Status: DISPENSED
Start: 2024-09-13

## (undated) RX ORDER — OXYCODONE HYDROCHLORIDE 5 MG/1
TABLET ORAL
Status: DISPENSED
Start: 2024-09-13

## (undated) RX ORDER — PROPOFOL 10 MG/ML
INJECTION, EMULSION INTRAVENOUS
Status: DISPENSED
Start: 2018-01-30

## (undated) RX ORDER — GLYCOPYRROLATE 0.2 MG/ML
INJECTION INTRAMUSCULAR; INTRAVENOUS
Status: DISPENSED
Start: 2018-01-30

## (undated) RX ORDER — ONDANSETRON 2 MG/ML
INJECTION INTRAMUSCULAR; INTRAVENOUS
Status: DISPENSED
Start: 2018-01-30

## (undated) RX ORDER — BUPIVACAINE HYDROCHLORIDE AND EPINEPHRINE 5; 5 MG/ML; UG/ML
INJECTION, SOLUTION EPIDURAL; INTRACAUDAL; PERINEURAL
Status: DISPENSED
Start: 2024-09-13

## (undated) RX ORDER — PHENYLEPHRINE HCL IN 0.9% NACL 1 MG/10 ML
SYRINGE (ML) INTRAVENOUS
Status: DISPENSED
Start: 2018-01-30

## (undated) RX ORDER — OXYCODONE HYDROCHLORIDE 5 MG/1
TABLET ORAL
Status: DISPENSED
Start: 2018-01-30

## (undated) RX ORDER — EPHEDRINE SULFATE 50 MG/ML
INJECTION, SOLUTION INTRAVENOUS
Status: DISPENSED
Start: 2018-01-30

## (undated) RX ORDER — EPHEDRINE SULFATE 50 MG/ML
INJECTION, SOLUTION INTRAMUSCULAR; INTRAVENOUS; SUBCUTANEOUS
Status: DISPENSED
Start: 2024-09-13

## (undated) RX ORDER — CEFAZOLIN SODIUM 2 G/100ML
INJECTION, SOLUTION INTRAVENOUS
Status: DISPENSED
Start: 2018-01-30

## (undated) RX ORDER — HYDROCODONE BITARTRATE AND ACETAMINOPHEN 5; 325 MG/1; MG/1
TABLET ORAL
Status: DISPENSED
Start: 2018-01-30

## (undated) RX ORDER — BUPIVACAINE HYDROCHLORIDE 2.5 MG/ML
INJECTION, SOLUTION EPIDURAL; INFILTRATION; INTRACAUDAL
Status: DISPENSED
Start: 2018-01-30

## (undated) RX ORDER — PROMETHAZINE HYDROCHLORIDE 25 MG/ML
INJECTION, SOLUTION INTRAMUSCULAR; INTRAVENOUS
Status: DISPENSED
Start: 2018-01-30

## (undated) RX ORDER — ACETAMINOPHEN 325 MG/1
TABLET ORAL
Status: DISPENSED
Start: 2024-09-13